# Patient Record
Sex: MALE | Race: WHITE | Employment: OTHER | URBAN - METROPOLITAN AREA
[De-identification: names, ages, dates, MRNs, and addresses within clinical notes are randomized per-mention and may not be internally consistent; named-entity substitution may affect disease eponyms.]

---

## 2017-04-10 ENCOUNTER — HOSPITAL ENCOUNTER (OUTPATIENT)
Dept: PREADMISSION TESTING | Age: 72
Discharge: HOME | End: 2017-04-10
Payer: MEDICARE

## 2017-04-10 VITALS
SYSTOLIC BLOOD PRESSURE: 155 MMHG | OXYGEN SATURATION: 96 % | HEIGHT: 67 IN | HEART RATE: 48 BPM | WEIGHT: 205 LBS | RESPIRATION RATE: 20 BRPM | DIASTOLIC BLOOD PRESSURE: 68 MMHG | BODY MASS INDEX: 32.18 KG/M2 | TEMPERATURE: 97.1 F

## 2017-04-10 LAB
ANION GAP BLD CALC-SCNC: 12 MMOL/L (ref 5–15)
BUN SERPL-MCNC: 35 MG/DL (ref 6–20)
BUN/CREAT SERPL: 30 (ref 12–20)
CALCIUM SERPL-MCNC: 8.8 MG/DL (ref 8.5–10.1)
CHLORIDE SERPL-SCNC: 104 MMOL/L (ref 97–108)
CO2 SERPL-SCNC: 23 MMOL/L (ref 21–32)
CREAT SERPL-MCNC: 1.18 MG/DL (ref 0.7–1.3)
GLUCOSE SERPL-MCNC: 88 MG/DL (ref 65–100)
POTASSIUM SERPL-SCNC: 4.6 MMOL/L (ref 3.5–5.1)
SODIUM SERPL-SCNC: 139 MMOL/L (ref 136–145)

## 2017-04-10 PROCEDURE — 36415 COLL VENOUS BLD VENIPUNCTURE: CPT | Performed by: ANESTHESIOLOGY

## 2017-04-10 PROCEDURE — 80048 BASIC METABOLIC PNL TOTAL CA: CPT | Performed by: ANESTHESIOLOGY

## 2017-04-10 PROCEDURE — 93005 ELECTROCARDIOGRAM TRACING: CPT

## 2017-04-10 RX ORDER — TRIAMTERENE/HYDROCHLOROTHIAZID 37.5-25 MG
1 TABLET ORAL DAILY
COMMUNITY

## 2017-04-10 RX ORDER — FENOFIBRATE 160 MG/1
160 TABLET ORAL
COMMUNITY

## 2017-04-10 RX ORDER — DICLOFENAC SODIUM 75 MG/1
TABLET, DELAYED RELEASE ORAL 2 TIMES DAILY
COMMUNITY
End: 2019-04-10

## 2017-04-10 NOTE — PERIOP NOTES
[facility]  PREOPERATIVE INSTRUCTIONS    Surgery Date:   4/21/17  Surgery arrival time given by surgeon: NO   If no,[facility] [address] staff will call you between 4 PM- 8 PM the day before surgery with your arrival time. If your surgery is on a Monday, we will call you the preceding Friday. Please call [phone] after 8 PM if you did not receive your arrival time. 1. Please report at the designated time to the [address] [facility]. Bring your insurance card, photo identification, and any copayment ( if applicable). 2. You must have a responsible adult to drive you home. You need to have a responsible adult to stay with you the first 24 hours after surgery if you are going home the same day of your surgery and you should not drive a car for 24 hours following your surgery. 3. Nothing to eat or drink after midnight the night before surgery. This includes no water, gum, mints, coffee, juice, etc.  Please note special instructions, if applicable, below for medications. 4. MEDICATIONS TO TAKE THE MORNING OF SURGERY WITH A SIP OF WATER: Propanolol,Amlodipine  5. No alcoholic beverages 24 hours before or after your surgery. 6. If you are being admitted to the hospital,please leave personal belongings/luggage in your car until you have an assigned hospital room number. 7. Stop Aspirin and/or any non-steroidal anti-inflammatory drugs (i.e. Ibuprofen, Naproxen, Advil, Aleve) as directed by your surgeon. You may take Tylenol. Stop herbal supplements 1 week prior to  surgery. 8. If you are currently taking Plavix, Coumadin,or any other blood-thinning/anticoagulant medication contact your surgeon for instructions. 9. Please wear comfortable clothes. Wear your glasses instead of contacts. We ask that all money, jewelry and valuables be left at home. Wear no make up, particularly mascara, the day of surgery. 10.  All body piercings, rings,and jewelry need to be removed and left at home.     Please wear your hair loose or down. Please no pony-tails, buns, or any metal hair accessories. If you shower the morning of surgery, please do not apply any lotions, powders, or deodorants afterwards. Do not shave any body area within 24 hours of your surgery. 11. Please follow all instructions to avoid any potential surgical cancellation. 12.  Should your physical condition change, (i.e. fever, cold, flu, etc.) please notify your surgeon as soon as possible. 13. It is important to be on time. If a situation occurs where you may be delayed, please call:  [phone]  on the day of surgery. 14. The Preadmission Testing staff can be reached at [phone]. .  15. Special instructions: free  parking    The patient was contacted  in person. He  verbalize  understanding of all instructions does not  need reinforcement.

## 2017-04-11 LAB
ATRIAL RATE: 43 BPM
CALCULATED P AXIS, ECG09: 43 DEGREES
CALCULATED R AXIS, ECG10: -29 DEGREES
CALCULATED T AXIS, ECG11: 34 DEGREES
DIAGNOSIS, 93000: NORMAL
P-R INTERVAL, ECG05: 178 MS
Q-T INTERVAL, ECG07: 482 MS
QRS DURATION, ECG06: 96 MS
QTC CALCULATION (BEZET), ECG08: 407 MS
VENTRICULAR RATE, ECG03: 43 BPM

## 2017-04-20 ENCOUNTER — ANESTHESIA EVENT (OUTPATIENT)
Dept: SURGERY | Age: 72
End: 2017-04-20
Payer: MEDICARE

## 2017-04-20 RX ORDER — DIPHENHYDRAMINE HYDROCHLORIDE 50 MG/ML
12.5 INJECTION, SOLUTION INTRAMUSCULAR; INTRAVENOUS AS NEEDED
Status: CANCELLED | OUTPATIENT
Start: 2017-04-20 | End: 2017-04-20

## 2017-04-20 RX ORDER — SODIUM CHLORIDE, SODIUM LACTATE, POTASSIUM CHLORIDE, CALCIUM CHLORIDE 600; 310; 30; 20 MG/100ML; MG/100ML; MG/100ML; MG/100ML
125 INJECTION, SOLUTION INTRAVENOUS CONTINUOUS
Status: CANCELLED | OUTPATIENT
Start: 2017-04-20

## 2017-04-20 RX ORDER — HYDROMORPHONE HYDROCHLORIDE 1 MG/ML
.25-1 INJECTION, SOLUTION INTRAMUSCULAR; INTRAVENOUS; SUBCUTANEOUS
Status: CANCELLED | OUTPATIENT
Start: 2017-04-20

## 2017-04-20 RX ORDER — ONDANSETRON 2 MG/ML
4 INJECTION INTRAMUSCULAR; INTRAVENOUS AS NEEDED
Status: CANCELLED | OUTPATIENT
Start: 2017-04-20

## 2017-04-20 RX ORDER — SODIUM CHLORIDE 0.9 % (FLUSH) 0.9 %
5-10 SYRINGE (ML) INJECTION AS NEEDED
Status: CANCELLED | OUTPATIENT
Start: 2017-04-20

## 2017-04-21 ENCOUNTER — ANESTHESIA (OUTPATIENT)
Dept: SURGERY | Age: 72
End: 2017-04-21
Payer: MEDICARE

## 2017-04-21 ENCOUNTER — APPOINTMENT (OUTPATIENT)
Dept: GENERAL RADIOLOGY | Age: 72
End: 2017-04-21
Attending: ORTHOPAEDIC SURGERY
Payer: MEDICARE

## 2017-04-21 ENCOUNTER — HOSPITAL ENCOUNTER (OUTPATIENT)
Age: 72
Setting detail: OUTPATIENT SURGERY
Discharge: HOME | End: 2017-04-21
Attending: ORTHOPAEDIC SURGERY | Admitting: ORTHOPAEDIC SURGERY
Payer: MEDICARE

## 2017-04-21 VITALS
SYSTOLIC BLOOD PRESSURE: 140 MMHG | RESPIRATION RATE: 21 BRPM | OXYGEN SATURATION: 94 % | HEART RATE: 49 BPM | TEMPERATURE: 97.5 F | DIASTOLIC BLOOD PRESSURE: 58 MMHG

## 2017-04-21 LAB
GLUCOSE BLD STRIP.AUTO-MCNC: 115 MG/DL (ref 65–100)
SERVICE CMNT-IMP: ABNORMAL

## 2017-04-21 PROCEDURE — 76010000154 HC OR TIME FIRST 0.5 HR: Performed by: ORTHOPAEDIC SURGERY

## 2017-04-21 PROCEDURE — 77030003666 HC NDL SPINAL BD -A: Performed by: ORTHOPAEDIC SURGERY

## 2017-04-21 PROCEDURE — 82962 GLUCOSE BLOOD TEST: CPT

## 2017-04-21 PROCEDURE — 74011636320 HC RX REV CODE- 636/320: Performed by: ORTHOPAEDIC SURGERY

## 2017-04-21 PROCEDURE — 74011250636 HC RX REV CODE- 250/636: Performed by: ANESTHESIOLOGY

## 2017-04-21 PROCEDURE — 74011250636 HC RX REV CODE- 250/636: Performed by: ORTHOPAEDIC SURGERY

## 2017-04-21 PROCEDURE — 76000 FLUOROSCOPY <1 HR PHYS/QHP: CPT

## 2017-04-21 PROCEDURE — 76060000031 HC ANESTHESIA FIRST 0.5 HR: Performed by: ORTHOPAEDIC SURGERY

## 2017-04-21 PROCEDURE — 76210000021 HC REC RM PH II 0.5 TO 1 HR: Performed by: ORTHOPAEDIC SURGERY

## 2017-04-21 PROCEDURE — 74011000250 HC RX REV CODE- 250: Performed by: ORTHOPAEDIC SURGERY

## 2017-04-21 RX ORDER — SODIUM CHLORIDE 0.9 % (FLUSH) 0.9 %
5-10 SYRINGE (ML) INJECTION AS NEEDED
Status: DISCONTINUED | OUTPATIENT
Start: 2017-04-21 | End: 2017-04-21 | Stop reason: HOSPADM

## 2017-04-21 RX ORDER — LIDOCAINE HYDROCHLORIDE 10 MG/ML
0.1 INJECTION, SOLUTION EPIDURAL; INFILTRATION; INTRACAUDAL; PERINEURAL AS NEEDED
Status: DISCONTINUED | OUTPATIENT
Start: 2017-04-21 | End: 2017-04-21 | Stop reason: HOSPADM

## 2017-04-21 RX ORDER — SODIUM CHLORIDE 0.9 % (FLUSH) 0.9 %
5-10 SYRINGE (ML) INJECTION EVERY 8 HOURS
Status: DISCONTINUED | OUTPATIENT
Start: 2017-04-21 | End: 2017-04-21 | Stop reason: HOSPADM

## 2017-04-21 RX ORDER — BUPIVACAINE HYDROCHLORIDE 5 MG/ML
INJECTION, SOLUTION EPIDURAL; INTRACAUDAL AS NEEDED
Status: DISCONTINUED | OUTPATIENT
Start: 2017-04-21 | End: 2017-04-21 | Stop reason: HOSPADM

## 2017-04-21 RX ORDER — SODIUM CHLORIDE, SODIUM LACTATE, POTASSIUM CHLORIDE, CALCIUM CHLORIDE 600; 310; 30; 20 MG/100ML; MG/100ML; MG/100ML; MG/100ML
100 INJECTION, SOLUTION INTRAVENOUS CONTINUOUS
Status: DISCONTINUED | OUTPATIENT
Start: 2017-04-21 | End: 2017-04-21 | Stop reason: HOSPADM

## 2017-04-21 RX ORDER — TRIAMCINOLONE ACETONIDE 40 MG/ML
INJECTION, SUSPENSION INTRA-ARTICULAR; INTRAMUSCULAR AS NEEDED
Status: DISCONTINUED | OUTPATIENT
Start: 2017-04-21 | End: 2017-04-21 | Stop reason: HOSPADM

## 2017-04-21 RX ORDER — LIDOCAINE HYDROCHLORIDE 10 MG/ML
INJECTION INFILTRATION; PERINEURAL AS NEEDED
Status: DISCONTINUED | OUTPATIENT
Start: 2017-04-21 | End: 2017-04-21 | Stop reason: HOSPADM

## 2017-04-21 RX ADMIN — CEFAZOLIN 0.2 G: 1 INJECTION, POWDER, FOR SOLUTION INTRAMUSCULAR; INTRAVENOUS; PARENTERAL at 15:42

## 2017-04-21 RX ADMIN — SODIUM CHLORIDE, SODIUM LACTATE, POTASSIUM CHLORIDE, AND CALCIUM CHLORIDE 100 ML/HR: 600; 310; 30; 20 INJECTION, SOLUTION INTRAVENOUS at 15:43

## 2017-04-21 NOTE — PERIOP NOTES
Discharge instructions reviewed with patient. Verbalized understanding and compliance. Discharged via w/c accompanied by daughter. No complaints offered at this time.

## 2017-04-21 NOTE — H&P
Orthopaedic PRE-OP Admission History and Physical    Past Medical History:   Diagnosis Date    Arthritis     knees and hips    Diabetes ([facility] [address] 2007    Hypertension     Ill-defined condition     elevted cho;estero;    Ill-defined condition     gilbert\"s syndrome    Ill-defined condition     essential tremors      Past Surgical History:   Procedure Laterality Date    HX ORTHOPAEDIC Left     repair fractured arm age 10   [place] TONSILLECTOMY      SHOULDER SURG [address] UNLISTED Left 2010    arthroplasty      Prior to Admission medications    Medication Sig Start Date End Date Taking? Authorizing Provider   diclofenac EC (VOLTAREN) 75 mg EC tablet Take  by mouth two (2) times a day. Historical Provider   triamterene-hydroCHLOROthiazide (MAXZIDE) 37.5-25 mg per tablet Take  by mouth daily. Historical Provider   fenofibrate (LOFIBRA) 160 mg tablet Take 160 mg by mouth daily. Historical Provider   amLODIPine (NORVASC) 10 mg tablet Take 10 mg by mouth daily. Historical Provider   lisinopril (PRINIVIL, ZESTRIL) 10 mg tablet Take 40 mg by mouth daily. Historical Provider   EZETIMIBE (ZETIA PO) Take 10 mg by mouth daily. Historical Provider   metFORMIN (GLUCOPHAGE) 500 mg tablet Take 1,000 mg by mouth two (2) times daily (with meals). Historical Provider   potassium citrate (UROCIT-K10) 10 mEq (1,080 mg) TbER Take  by mouth daily. Historical Provider   propranolol (INDERAL) 10 mg tablet Take 20 mg by mouth two (2) times a day. Historical Provider     No current facility-administered medications for this encounter. Current Outpatient Prescriptions   Medication Sig    diclofenac EC (VOLTAREN) 75 mg EC tablet Take  by mouth two (2) times a day.  triamterene-hydroCHLOROthiazide (MAXZIDE) 37.5-25 mg per tablet Take  by mouth daily.  fenofibrate (LOFIBRA) 160 mg tablet Take 160 mg by mouth daily.  amLODIPine (NORVASC) 10 mg tablet Take 10 mg by mouth daily.     lisinopril (PRINIVIL, ZESTRIL) 10 mg tablet Take 40 mg by mouth daily.  EZETIMIBE (ZETIA PO) Take 10 mg by mouth daily.  metFORMIN (GLUCOPHAGE) 500 mg tablet Take 1,000 mg by mouth two (2) times daily (with meals).  potassium citrate (UROCIT-K10) 10 mEq (1,080 mg) TbER Take  by mouth daily.  propranolol (INDERAL) 10 mg tablet Take 20 mg by mouth two (2) times a day. Allergies   Allergen Reactions    Penicillins Hives    Statins-Hmg-Coa Reductase Inhibitors Other (comments)     Elevated liver and bilirubin enzgmes        Review of Systems  Review of systems was documented in PAT and also in the HPI. Physical Exam  Gen: No acute distress   Resp: No accessory muscle use, no acute distress, conversant without gasping, clear lung fields. Card: No abnormalities detected, RRR- See PAT exam if available. Abd: Soft, non-tender, non-distended  Lymph: No palpable lymph nodes of the affected extremity  Skin: No skin breakdown noted. Labs: No results for input(s): WBC, HGB, HCT, K, CREA, GLU, CRP, HGBEXT, HCTEXT in the last 72 hours. No lab exists for component: ESR    [facility] Note - Subjective / Exam / Niyah Lente / Plan      Chief Complaint    left hip and knee   __________________________________________________________________________________________  SUBJECTIVE :       The patient returns in follow-up today for left hip arthritis, left knee arthritis. Intensity is noted to be severe in regards to the left hip, some radiating pain and left knee pain as well. The patient localizes their pain to left groin, with severe rotational symptoms. Since the previous visit the patient notes marked improvement with a left hip injection, then worsening symptoms over the past several months. Prior treatments or interventions include physical therapy, intra-articular injections, anti-inflammatories.      Social:    The patient does live alone, he travels a great deal, and participates in baseball collection activities. The patient's daughter is a physical therapist here in town.  __________________________________________________________________________________________  OBJECTIVE :     Left hip evaluation demonstrates marked rotational limitation, positive Stinchfield, positive impingement. The patient walks with a coxalgic gait utilizing a cane for ambulation, left slightly shorter than the right. Left knee evaluation demonstrates a 1+ knee effusion, limited knee range of motion, tenderness to palpation over the medial lateral joint line.  __________________________________________________________________________________________  RADIOGRAPHIC EVALUATION :      I ordered and interpreted the following films AP of the pelvis, AP and lateral views of the hip demonstrate progressive arthritic change, collapse of the femoral head, bone-on-bone changes, osteophyte formation, subchondral sclerosis. __________________________________________________________________________________________  ASSESSMENT :     Left hip arthritis, referred pain to the left knee with mild left knee arthritis.  __________________________________________________________________________________________  PLAN      Medical Decision Making and Discussion:  I have discussed with the patient I think this is the hip causing the vast majority of his symptoms, I would recommend proceeding with left total hip replacement. The patient does have a trip planned for late June or early July, we can do the surgery in the early portion of July after he returns. The patient does live alone, his daughter who is a physical therapist should be able to stay with him over the weekend, we will do the surgery on Wednesday, and he should be fairly functional by Monday or Tuesday of the following week. Therapy recommendations: At home exercises.       Medications this Visit:  Anti-inflammatories, diclofenac     Medical Concerns or Issues:  None     Follow-up: Primary care clearance, preadmission testing, joint class. _____________________________________________________________________  SPECIAL SURGICAL CONSIDERATIONS :  Direct lateral approach     POST SURGERY CONSIDERATIONS :  None     SOCIAL CONSIDERATIONS :  None  ______________________________________________________________________  COUNSELING :     I have discussed the planned surgery with the patient in detail and a joint decision was made to proceed with surgical intervention. Conservative measures have been exhausted prior to the decision for arthroplasty. We have discussed the risks, alternatives, and benefits of the surgery. Risks of surgery include infection, bleeding, damage to neurovascular structures, the need for further surgery, and the risk associated with anesthesia. These include heart attack, stroke, DVT formation, pulmonary embolism and death. We has also discussed bone or implant failure following surgery and the further interventions if necessary. Specific arthroplasty risks include fracture around the prosthesis, deep infection, limited range of motion, and possible dislocation of the prosthesis. We had a lengthy discussion regarding total joint replacement, and longevity of the implants. The patient was not given a guarantee of a successful outcome. I discussed expectations prior to the surgery, the need for rehabilitation following surgery. A packet was given to the patient to include the date of surgery, testing prior to the surgery, and postoperative follow-up to include physical therapy. TREATMENT :      I performed injection. The left knee was injected. Informed verbal consent was obtained prior to the injection. The risk indications and alternatives to the injection were discussed with the patient. A sterile prep of the area was performed and the skin anesthetized with Ethyl Chloride spray.  The patient was injected with 2cc of 1% lidocaine, 2 cc of 0.5% Marcaine, and 1cc of Celestone 6 milligrams/cc. A sterile dressing was applied following the injection. A possible elevation of blood sugar in diabetics following corticosteriod injections was discussed. The patient may require primary care clearance for surgery. Please do not hesitate to contact my office at  if you have any concerns. Amended : Roxy Liz MD, MD; 03/17/2017 2:25 PM EST  Active Problems   Complete rotator cuff tear   (M75.120)  Left hip pain   (M25.552)  Osteoarthritis of shoulder   (M19.019)  Osteoarthritis, localized, knee   (M17.9)  Sprain of left rotator cuff capsule   (H83.838N)  Sprain of right rotator cuff capsule   (F95.418J). Current Meds   TraMADol HCl - 50 MG Oral Tablet;TAKE 1 TABLET 3 TIMES DAILY AS NEEDED.; Rx  Diclofenac Sodium 75 MG Oral Tablet Delayed Release;TAKE 1 TABLET TWICE DAILY WITH FOOD.; Rx  MetFORMIN HCl - 500 MG Oral Tablet;; RPT  Potassium Chloride ER 10 MEQ Oral Capsule Extended Release;; RPT  HydroCHLOROthiazide 25 MG Oral Tablet;; RPT  Lisinopril 40 MG Oral Tablet;; RPT  Zetia 10 MG Oral Tablet (Ezetimibe);; RPT  Felodipine ER 5 MG Oral Tablet Extended Release 24 Hour;; RPT  AmLODIPine Besylate 10 MG Oral Tablet;; RPT  AmLODIPine Besylate 5 MG Oral Tablet;; RPT  Fenofibrate 160 MG Oral Tablet;; RPT  Klor-Con M10 10 MEQ Oral Tablet Extended Release;; RPT  Propranolol HCl - 20 MG Oral Tablet;; RPT. Allergies   Penicillins. PMH   Arthritis (M19.90)  Diabetes (E11.9)  High blood pressure (I10). PSH   Adenoidectomy  Cataract Surgery. Family Hx   Arthritis: Mother,Brother (M19.90)  Cancer: Mother (C80.1)  Diabetes: Brother (E11.9)  Hypertension: Mother,Father,Brother (I10)  Stroke: Brother (I63.9).   Personal Hx   Abstinence from alcohol (Z78.9)  Alcohol drinker (Z78.9); : 6  Being A Social Drinker  Former smoker (X51.942)  Living alone (Z60.2)  Number of children;         Surgical Counseling   After a thorough discussion we will proceed with surgical intervention without contraindications. I discussed surgical indications and alternatives with the patient. Risks including infection, bleeding, damage to other structures, need for further surgery and the risks of anesthesia (DVT, PE, Stroke, Heart Attack and Death) have been discussed with the patient. Verbal and written consent were obtained. Orthopaedic PRE-OP Admission History and Physical    Past Medical History:   Diagnosis Date    Arthritis     knees and hips    Diabetes ([address] 2007    Hypertension     Ill-defined condition     elevted cho;estero;    Ill-defined condition     gilbert\"s syndrome    Ill-defined condition     essential tremors      Past Surgical History:   Procedure Laterality Date    HX ORTHOPAEDIC Left     repair fractured arm age 10   [de-identified] TONSILLECTOMY      SHOULDER SURG [address] UNLISTED Left 2010    arthroplasty      Prior to Admission medications    Medication Sig Start Date End Date Taking? Authorizing Provider   diclofenac EC (VOLTAREN) 75 mg EC tablet Take  by mouth two (2) times a day. Historical Provider   triamterene-hydroCHLOROthiazide (MAXZIDE) 37.5-25 mg per tablet Take  by mouth daily. Historical Provider   fenofibrate (LOFIBRA) 160 mg tablet Take 160 mg by mouth daily. Historical Provider   amLODIPine (NORVASC) 10 mg tablet Take 10 mg by mouth daily. Historical Provider   lisinopril (PRINIVIL, ZESTRIL) 10 mg tablet Take 40 mg by mouth daily. Historical Provider   EZETIMIBE (ZETIA PO) Take 10 mg by mouth daily. Historical Provider   metFORMIN (GLUCOPHAGE) 500 mg tablet Take 1,000 mg by mouth two (2) times daily (with meals). Historical Provider   potassium citrate (UROCIT-K10) 10 mEq (1,080 mg) TbER Take  by mouth daily. Historical Provider   propranolol (INDERAL) 10 mg tablet Take 20 mg by mouth two (2) times a day. Historical Provider     No current facility-administered medications for this encounter.       Current Outpatient Prescriptions   Medication Sig    diclofenac EC (VOLTAREN) 75 mg EC tablet Take  by mouth two (2) times a day.  triamterene-hydroCHLOROthiazide (MAXZIDE) 37.5-25 mg per tablet Take  by mouth daily.  fenofibrate (LOFIBRA) 160 mg tablet Take 160 mg by mouth daily.  amLODIPine (NORVASC) 10 mg tablet Take 10 mg by mouth daily.  lisinopril (PRINIVIL, ZESTRIL) 10 mg tablet Take 40 mg by mouth daily.  EZETIMIBE (ZETIA PO) Take 10 mg by mouth daily.  metFORMIN (GLUCOPHAGE) 500 mg tablet Take 1,000 mg by mouth two (2) times daily (with meals).  potassium citrate (UROCIT-K10) 10 mEq (1,080 mg) TbER Take  by mouth daily.  propranolol (INDERAL) 10 mg tablet Take 20 mg by mouth two (2) times a day. Allergies   Allergen Reactions    Penicillins Hives    Statins-Hmg-Coa Reductase Inhibitors Other (comments)     Elevated liver and bilirubin enzgmes        Review of Systems  Review of systems was documented in PAT and also in the HPI. Physical Exam  Gen: No acute distress   Resp: No accessory muscle use, no acute distress, conversant without gasping, clear lung fields. Card: No abnormalities detected, RRR- See PAT exam if available. Abd: Soft, non-tender, non-distended  Lymph: No palpable lymph nodes of the affected extremity  Skin: No skin breakdown noted. Labs: No results for input(s): WBC, HGB, HCT, K, CREA, GLU, CRP, HGBEXT, HCTEXT in the last 72 hours. No lab exists for component: ESR    [facility] Note - Subjective / Exam / Sierra Martínez / Plan      Chief Complaint    left hip and knee   __________________________________________________________________________________________  SUBJECTIVE :       The patient returns in follow-up today for left hip arthritis, left knee arthritis. Intensity is noted to be severe in regards to the left hip, some radiating pain and left knee pain as well. The patient localizes their pain to left groin, with severe rotational symptoms. Since the previous visit the patient notes marked improvement with a left hip injection, then worsening symptoms over the past several months. Prior treatments or interventions include physical therapy, intra-articular injections, anti-inflammatories. Social:    The patient does live alone, he travels a great deal, and participates in baseball collection activities. The patient's daughter is a physical therapist here in town.  __________________________________________________________________________________________  OBJECTIVE :     Left hip evaluation demonstrates marked rotational limitation, positive Stinchfield, positive impingement. The patient walks with a coxalgic gait utilizing a cane for ambulation, left slightly shorter than the right. Left knee evaluation demonstrates a 1+ knee effusion, limited knee range of motion, tenderness to palpation over the medial lateral joint line.  __________________________________________________________________________________________  RADIOGRAPHIC EVALUATION :      I ordered and interpreted the following films AP of the pelvis, AP and lateral views of the hip demonstrate progressive arthritic change, collapse of the femoral head, bone-on-bone changes, osteophyte formation, subchondral sclerosis. __________________________________________________________________________________________  ASSESSMENT :     Left hip arthritis, referred pain to the left knee with mild left knee arthritis.  __________________________________________________________________________________________  PLAN      Medical Decision Making and Discussion:  I have discussed with the patient I think this is the hip causing the vast majority of his symptoms, I would recommend proceeding with left total hip replacement. The patient does have a trip planned for late June or early July, we can do the surgery in the early portion of July after he returns.   The patient does live alone, his daughter who is a physical therapist should be able to stay with him over the weekend, we will do the surgery on Wednesday, and he should be fairly functional by Monday or Tuesday of the following week. Therapy recommendations: At home exercises. Medications this Visit:  Anti-inflammatories, diclofenac     Medical Concerns or Issues:  None     Follow-up:  Primary care clearance, preadmission testing, joint class. _____________________________________________________________________  SPECIAL SURGICAL CONSIDERATIONS :  Direct lateral approach     POST SURGERY CONSIDERATIONS :  None     SOCIAL CONSIDERATIONS :  None  ______________________________________________________________________  COUNSELING :     I have discussed the planned surgery with the patient in detail and a joint decision was made to proceed with surgical intervention. Conservative measures have been exhausted prior to the decision for arthroplasty. We have discussed the risks, alternatives, and benefits of the surgery. Risks of surgery include infection, bleeding, damage to neurovascular structures, the need for further surgery, and the risk associated with anesthesia. These include heart attack, stroke, DVT formation, pulmonary embolism and death. We has also discussed bone or implant failure following surgery and the further interventions if necessary. Specific arthroplasty risks include fracture around the prosthesis, deep infection, limited range of motion, and possible dislocation of the prosthesis. We had a lengthy discussion regarding total joint replacement, and longevity of the implants. The patient was not given a guarantee of a successful outcome. I discussed expectations prior to the surgery, the need for rehabilitation following surgery. A packet was given to the patient to include the date of surgery, testing prior to the surgery, and postoperative follow-up to include physical therapy. TREATMENT :      I performed injection.  The left knee was injected. Informed verbal consent was obtained prior to the injection. The risk indications and alternatives to the injection were discussed with the patient. A sterile prep of the area was performed and the skin anesthetized with Ethyl Chloride spray. The patient was injected with 2cc of 1% lidocaine, 2 cc of 0.5% Marcaine, and 1cc of Celestone 6 milligrams/cc. A sterile dressing was applied following the injection. A possible elevation of blood sugar in diabetics following corticosteriod injections was discussed. The patient may require primary care clearance for surgery. Please do not hesitate to contact my office at  if you have any concerns. Amended : Adria Rdz MD, MD; 03/17/2017 2:25 PM EST  Active Problems   Complete rotator cuff tear   (M75.120)  Left hip pain   (M25.552)  Osteoarthritis of shoulder   (M19.019)  Osteoarthritis, localized, knee   (M17.9)  Sprain of left rotator cuff capsule   (A28.322W)  Sprain of right rotator cuff capsule   (Z30.368K). Current Meds   TraMADol HCl - 50 MG Oral Tablet;TAKE 1 TABLET 3 TIMES DAILY AS NEEDED.; Rx  Diclofenac Sodium 75 MG Oral Tablet Delayed Release;TAKE 1 TABLET TWICE DAILY WITH FOOD.; Rx  MetFORMIN HCl - 500 MG Oral Tablet;; RPT  Potassium Chloride ER 10 MEQ Oral Capsule Extended Release;; RPT  HydroCHLOROthiazide 25 MG Oral Tablet;; RPT  Lisinopril 40 MG Oral Tablet;; RPT  Zetia 10 MG Oral Tablet (Ezetimibe);; RPT  Felodipine ER 5 MG Oral Tablet Extended Release 24 Hour;; RPT  AmLODIPine Besylate 10 MG Oral Tablet;; RPT  AmLODIPine Besylate 5 MG Oral Tablet;; RPT  Fenofibrate 160 MG Oral Tablet;; RPT  Klor-Con M10 10 MEQ Oral Tablet Extended Release;; RPT  Propranolol HCl - 20 MG Oral Tablet;; RPT. Allergies   Penicillins. PMH   Arthritis (M19.90)  Diabetes (E11.9)  High blood pressure (I10). PSH   Adenoidectomy  Cataract Surgery. Family Hx   Arthritis: Mother,Brother (M19.90)  Cancer:  Mother (C80.1)  Diabetes: Brother (E11.9)  Hypertension: Mother,Father,Brother (I10)  Stroke: Brother (I63.9). Personal Hx   Abstinence from alcohol (Z78.9)  Alcohol drinker (Z78.9); : 6  Being A Social Drinker  Former smoker (W71.996)  Living alone (Z60.2)  Number of children; : 3.        Surgical Counseling   After a thorough discussion we will proceed with surgical intervention without contraindications. I discussed surgical indications and alternatives with the patient. Risks including infection, bleeding, damage to other structures, need for further surgery and the risks of anesthesia (DVT, PE, Stroke, Heart Attack and Death) have been discussed with the patient. Verbal and written consent were obtained.          Donna Chavis MD  Cell [phone]  Nurse [address] [place] [address] [phone]  Medical Staff : Nimco Bustos / Heide Marcus  Office : [phone] [facility]  [phone]

## 2017-04-21 NOTE — OP NOTES
OPERATIVE REPORT    Admit Date: 4/21/2017  Admit Diagnosis: LEFT HIP OSTEOARTHRITIS    Date of Procedure: 4/21/2017   Preoperative Diagnosis: LEFT HIP OSTEOARTHRITIS  Postoperative Diagnosis: LEFT HIP OSTEOARTHRITIS    Procedure: Procedure(s):  LEFT HIP INJECTION  Surgeon: Roxy Liz MD  Assistant(s): None  Anesthesia: MAC   Estimated Blood Loss: 20cc  Specimens: * No specimens in log *   Complications: None      INDICATIONS:  Nicholas Valle is a patient with possible hip arthritis and was indicated for an injection. We discussed risks, alternatives and expectations prior to surgery. PROCEDURE PERFORMED :   The patient was seen in the pre-operative holding area and the proper limb initialized. Questions were answered and the patient was taken to the operating room for anesthesia. They were positioned on the table and the operative extremity was prepped and draped in a sterile fashion. The appropriate time-out was performed prior to the start. Utilizing flouro the hip was identified on AP radiographs. A spinal needle was used and localized to the intra-articular joint capsule. The hip was first injected with 1cc of Radio-opaque dye to ensure intra-articular placement. The hip was then injected with a combination of 2cc of Kenalog 40mg / cc, 2cc of 1% Lidocaine and 2cc of 0.5% Marcaine under sterile conditions. I performed the injection. Hip Arthritis Tonnis Grade : 3    A sterile dressing was applied and the patient then recovered from anesthesia and was taken to the post-operative holding area in a stable condition.      IMPLANTS :   * No implants in log *      Roxy Liz MD  Pager [phone]

## 2017-04-21 NOTE — DISCHARGE INSTRUCTIONS
GENERAL DISCHARGE INSTRUCTIONS - Hip Injection    Patient: Soniya Ward MRN: [id number]  SSN: [id number]              Please take the time to review the following instructions before you leave the hospital and use them as guidelines during your recovery from surgery. If you have any questions you may contact my office at [phone]. SPECIAL INSTRUCTIONS :   1. No driving for 12XUB. This is due to the anesthesia and not the injection. 2. May weight bear as tolerated. No therapy is formally required. 3. Avoid excessive activities even if the hip feels markedly improved. 4. It may take 1-10 days for the steroid to take effect. If you do not have marked relief by 10 days then the improvement from the injection may be limited. Wound Care/ Dressing Changes:     SOFT DRESSING :   You may remove the dressing the day following surgery. It isnt necessary to apply antibiotic ointment to your incisions. Showering/ Bathing: You may shower the day following the injection. Diet:  You may advance to your regular diet as tolerated. Medication:      1. You should not require narcotics or other pain medications following the injection. Over the counter medications such as Alleve, Motrin may be beneficial if needed. 2.   Do not exceed 4000mg of Tylenol/Acetaminophen per day. 3. You may resume the medication you were taking prior to your surgery. Pain medication may change the effects of any antidepressant medication you may be taking. If you have any questions about possible interactions between your regular medications and the pain medication, you should consult the physician who prescribes your regular medications. Follow up appointment:    Please follow up at your scheduled appointment or 1-2 months following the injection if your hip symptoms are note markedly improved.   If you do not already have an appointment please call our office at [phone] for your follow up appointment. Important Signs and Symptoms:    If any of the following signs or symptoms occur, you should contact Dr. Radha Kee office. Please be advised if a problem arises which you feel requires immediate medical attention or you are unable to contact Dr. Radha Kee office you should seek immediate medical attention at the ER or other health care facility you have access to.    1. A sudden increase in swelling and/or redness or warmth at the area your surgery was performed which isnt relieved by rest, ice, and elevation. 2. Oral temperature greater than 101 degrees for 12 hours or more which isnt relieved by an increase in fluid intake and taking 2 Tylenol every 4-6 hours. 3. Excessive drainage from your incisions, or drainage which hasnt stopped by 72 hours after your surgery. 4. Fever, chills, shortness of breath, chest pain, nausea, vomiting or other signs and symptoms which are of concern to you. GENERAL DISCHARGE INSTRUCTIONS - Hip Injection    Patient: Pamela Lamb MRN: [id number]  SSN: [id number]              Please take the time to review the following instructions before you leave the hospital and use them as guidelines during your recovery from surgery. If you have any questions you may contact my office at [phone]. SPECIAL INSTRUCTIONS :   1. No driving for 04HLS. This is due to the anesthesia and not the injection. 2. May weight bear as tolerated. No therapy is formally required. 3. Avoid excessive activities even if the hip feels markedly improved. 4. It may take 1-10 days for the steroid to take effect. If you do not have marked relief by 10 days then the improvement from the injection may be limited. Wound Care/ Dressing Changes:     SOFT DRESSING :   You may remove the dressing the day following surgery. It isnt necessary to apply antibiotic ointment to your incisions. Showering/ Bathing: You may shower the day following the injection. Diet:  You may advance to your regular diet as tolerated. Medication:      4. You should not require narcotics or other pain medications following the injection. Over the counter medications such as Alleve, Motrin may be beneficial if needed. 5.   Do not exceed 4000mg of Tylenol/Acetaminophen per day. 6. You may resume the medication you were taking prior to your surgery. Pain medication may change the effects of any antidepressant medication you may be taking. If you have any questions about possible interactions between your regular medications and the pain medication, you should consult the physician who prescribes your regular medications. Follow up appointment:    Please follow up at your scheduled appointment or 1-2 months following the injection if your hip symptoms are note markedly improved. If you do not already have an appointment please call our office at [phone] for your follow up appointment. Important Signs and Symptoms:    If any of the following signs or symptoms occur, you should contact Dr. Mak Marshall office. Please be advised if a problem arises which you feel requires immediate medical attention or you are unable to contact Dr. Mak Marshall office you should seek immediate medical attention at the ER or other health care facility you have access to.    5. A sudden increase in swelling and/or redness or warmth at the area your surgery was performed which isnt relieved by rest, ice, and elevation. 6. Oral temperature greater than 101 degrees for 12 hours or more which isnt relieved by an increase in fluid intake and taking 2 Tylenol every 4-6 hours. 7. Excessive drainage from your incisions, or drainage which hasnt stopped by 72 hours after your surgery. 8. Fever, chills, shortness of breath, chest pain, nausea, vomiting or other signs and symptoms which are of concern to you.

## 2017-04-21 NOTE — ANESTHESIA PREPROCEDURE EVALUATION
Anesthetic History   No history of anesthetic complications            Review of Systems / Medical History  Patient summary reviewed, nursing notes reviewed and pertinent labs reviewed    Pulmonary  Within defined limits                 Neuro/Psych   Within defined limits           Cardiovascular    Hypertension              Exercise tolerance: >4 METS     GI/Hepatic/Renal  Within defined limits              Endo/Other    Diabetes    Obesity and arthritis     Other Findings              Physical Exam    Airway  Mallampati: II    Neck ROM: normal range of motion   Mouth opening: Normal     Cardiovascular  Regular rate and rhythm,  S1 and S2 normal,  no murmur, click, rub, or gallop  Rhythm: regular  Rate: normal         Dental  No notable dental hx       Pulmonary  Breath sounds clear to auscultation               Abdominal  GI exam deferred       Other Findings            Anesthetic Plan    ASA: 2  Anesthesia type: MAC          Induction: Intravenous  Anesthetic plan and risks discussed with: Patient

## 2017-04-21 NOTE — IP AVS SNAPSHOT
[address] [place] [address] 
[phone] Patient: Greer Mcwilliams MRN: [id number] :1945 You are allergic to the following Allergen Reactions Penicillins Hives Ancef graded challenge done 17, tolerated well, no reaction noted Statins-Hmg-Coa Reductase Inhibitors Other (comments) Elevated liver and bilirubin enzgmes Recent Documentation Smoking Status Former Smoker Emergency Contacts Name Discharge Info Relation Home Work Mobile Maddie Salter DISCHARGE CAREGIVER [3] Daughter [21] [phone] About your hospitalization You were admitted on:  2017 You last received care in the:  [facility] PACU You were discharged on:  2017 Unit phone number:  [phone] Why you were hospitalized Your primary diagnosis was:  Not on File Providers Seen During Your Hospitalizations Provider Role Specialty Primary office phone Re Salvaodr MD Attending Provider Orthopedic Surgery [phone] Your Primary Care Physician (PCP) Primary Care Physician Office Phone Office Fax Evette Ramos [phone] Follow-up Information Follow up With Details Comments Contact Info [place]   [id number] [facility] [address] [place] [address] 
[phone] Current Discharge Medication List  
  
CONTINUE these medications which have NOT CHANGED Dose & Instructions Dispensing Information Comments Morning Noon Evening Bedtime  
 amLODIPine 10 mg tablet Commonly known as:  [place] Michelle Your last dose was: Your next dose is:    
   
   
 Dose:  10 mg Take 10 mg by mouth daily. Refills:  0  
     
   
   
   
  
 diclofenac EC 75 mg EC tablet Commonly known as:  VOLTAREN Your last dose was: Your next dose is: Take  by mouth two (2) times a day. Refills:  0  
     
   
   
   
  
 fenofibrate 160 mg tablet Commonly known as:  LOFIBRA Your last dose was: Your next dose is:    
   
   
 Dose:  160 mg Take 160 mg by mouth daily. Refills:  0  
     
   
   
   
  
 lisinopril 10 mg tablet Commonly known as:  Daljit Mash Your last dose was: Your next dose is:    
   
   
 Dose:  40 mg Take 40 mg by mouth daily. Refills:  0  
     
   
   
   
  
 metFORMIN 500 mg tablet Commonly known as:  GLUCOPHAGE Your last dose was: Your next dose is:    
   
   
 Dose:  1000 mg Take 1,000 mg by mouth two (2) times daily (with meals). Refills:  0  
     
   
   
   
  
 potassium citrate 10 mEq (1,080 mg) Joyce Spears Commonly known as:  [place] Your last dose was: Your next dose is: Take  by mouth daily. Refills:  0  
     
   
   
   
  
 propranolol 10 mg tablet Commonly known as:  INDERAL Your last dose was: Your next dose is:    
   
   
 Dose:  20 mg Take 20 mg by mouth two (2) times a day. Refills:  0  
     
   
   
   
  
 triamterene-hydroCHLOROthiazide 37.5-25 mg per tablet Commonly known as:  Laila Hotter Your last dose was: Your next dose is: Take  by mouth daily. Refills:  0 ZETIA PO Your last dose was: Your next dose is:    
   
   
 Dose:  10 mg Take 10 mg by mouth daily. Refills:  0 Discharge Instructions GENERAL DISCHARGE INSTRUCTIONS - Hip Injection Patient: Jolynn Ibarra MRN: [id number]  SSN: [id number] Please take the time to review the following instructions before you leave the hospital and use them as guidelines during your recovery from surgery. If you have any questions you may contact my office at [phone]. SPECIAL INSTRUCTIONS :  
1. No driving for 64TVY. This is due to the anesthesia and not the injection. 2. May weight bear as tolerated. No therapy is formally required. 3. Avoid excessive activities even if the hip feels markedly improved. 4. It may take 1-10 days for the steroid to take effect. If you do not have marked relief by 10 days then the improvement from the injection may be limited. Wound Care/ Dressing Changes: SOFT DRESSING :  
You may remove the dressing the day following surgery. It isnt necessary to apply antibiotic ointment to your incisions. Showering/ Bathing: You may shower the day following the injection. Diet: 
You may advance to your regular diet as tolerated. Medication: 
 
 
1. You should not require narcotics or other pain medications following the injection. Over the counter medications such as Alleve, Motrin may be beneficial if needed. 2.   Do not exceed 4000mg of Tylenol/Acetaminophen per day. 3. You may resume the medication you were taking prior to your surgery. Pain medication may change the effects of any antidepressant medication you may be taking. If you have any questions about possible interactions between your regular medications and the pain medication, you should consult the physician who prescribes your regular medications. Follow up appointment: 
 
Please follow up at your scheduled appointment or 1-2 months following the injection if your hip symptoms are note markedly improved. If you do not already have an appointment please call our office at [phone] for your follow up appointment. Important Signs and Symptoms: 
 
If any of the following signs or symptoms occur, you should contact Dr. Don Pa office.   Please be advised if a problem arises which you feel requires immediate medical attention or you are unable to contact Dr. Don Pa office you should seek immediate medical attention at the ER or other health care facility you have access to. 
 
1. A sudden increase in swelling and/or redness or warmth at the area your surgery was performed which isnt relieved by rest, ice, and elevation. 2. Oral temperature greater than 101 degrees for 12 hours or more which isnt relieved by an increase in fluid intake and taking 2 Tylenol every 4-6 hours. 3. Excessive drainage from your incisions, or drainage which hasnt stopped by 72 hours after your surgery. 4. Fever, chills, shortness of breath, chest pain, nausea, vomiting or other signs and symptoms which are of concern to you. GENERAL DISCHARGE INSTRUCTIONS - Hip Injection Patient: Soniya Ward MRN: [id number]  SSN: [id number] Please take the time to review the following instructions before you leave the hospital and use them as guidelines during your recovery from surgery. If you have any questions you may contact my office at [phone]. SPECIAL INSTRUCTIONS :  
1. No driving for 50EFC. This is due to the anesthesia and not the injection. 2. May weight bear as tolerated. No therapy is formally required. 3. Avoid excessive activities even if the hip feels markedly improved. 4. It may take 1-10 days for the steroid to take effect. If you do not have marked relief by 10 days then the improvement from the injection may be limited. Wound Care/ Dressing Changes: SOFT DRESSING :  
You may remove the dressing the day following surgery. It isnt necessary to apply antibiotic ointment to your incisions. Showering/ Bathing: You may shower the day following the injection. Diet: 
You may advance to your regular diet as tolerated. Medication: 
 
 
4. You should not require narcotics or other pain medications following the injection. Over the counter medications such as Alleve, Motrin may be beneficial if needed. 5.   Do not exceed 4000mg of Tylenol/Acetaminophen per day. 6. You may resume the medication you were taking prior to your surgery. Pain medication may change the effects of any antidepressant medication you may be taking. If you have any questions about possible interactions between your regular medications and the pain medication, you should consult the physician who prescribes your regular medications. Follow up appointment: 
 
Please follow up at your scheduled appointment or 1-2 months following the injection if your hip symptoms are note markedly improved. If you do not already have an appointment please call our office at [phone] for your follow up appointment. Important Signs and Symptoms: 
 
If any of the following signs or symptoms occur, you should contact Dr. Ferny Eduardo office. Please be advised if a problem arises which you feel requires immediate medical attention or you are unable to contact Dr. Ferny Eduardo office you should seek immediate medical attention at the ER or other health care facility you have access to. 
 
5. A sudden increase in swelling and/or redness or warmth at the area your surgery was performed which isnt relieved by rest, ice, and elevation. 6. Oral temperature greater than 101 degrees for 12 hours or more which isnt relieved by an increase in fluid intake and taking 2 Tylenol every 4-6 hours. 7. Excessive drainage from your incisions, or drainage which hasnt stopped by 72 hours after your surgery. 8. Fever, chills, shortness of breath, chest pain, nausea, vomiting or other signs and symptoms which are of concern to you. Discharge Orders None General Information Please provide this summary of care documentation to your next provider. Patient Signature:  ____________________________________________________________ Date:  ____________________________________________________________  
  
Paula Daniel  Provider Signature: ____________________________________________________________ Date:  ____________________________________________________________

## 2017-07-19 ENCOUNTER — HOSPITAL ENCOUNTER (OUTPATIENT)
Dept: PREADMISSION TESTING | Age: 72
Discharge: HOME | End: 2017-07-19
Payer: MEDICARE

## 2017-07-19 VITALS
SYSTOLIC BLOOD PRESSURE: 142 MMHG | WEIGHT: 188.05 LBS | OXYGEN SATURATION: 95 % | HEIGHT: 67 IN | DIASTOLIC BLOOD PRESSURE: 63 MMHG | HEART RATE: 56 BPM | TEMPERATURE: 97.1 F | BODY MASS INDEX: 29.52 KG/M2 | RESPIRATION RATE: 18 BRPM

## 2017-07-19 LAB
ABO + RH BLD: NORMAL
ALBUMIN SERPL BCP-MCNC: 4.2 G/DL (ref 3.5–5)
ALBUMIN/GLOB SERPL: 1.4 {RATIO} (ref 1.1–2.2)
ALP SERPL-CCNC: 41 U/L (ref 45–117)
ALT SERPL-CCNC: 21 U/L (ref 12–78)
AMORPH CRY URNS QL MICRO: ABNORMAL
ANION GAP BLD CALC-SCNC: 7 MMOL/L (ref 5–15)
APPEARANCE UR: ABNORMAL
APTT PPP: 29.2 SEC (ref 22.1–32.5)
AST SERPL W P-5'-P-CCNC: 16 U/L (ref 15–37)
ATRIAL RATE: 53 BPM
BACTERIA URNS QL MICRO: NEGATIVE /HPF
BASOPHILS # BLD AUTO: 0.1 K/UL (ref 0–0.1)
BASOPHILS # BLD: 1 % (ref 0–1)
BILIRUB SERPL-MCNC: 0.5 MG/DL (ref 0.2–1)
BILIRUB UR QL: NEGATIVE
BLOOD GROUP ANTIBODIES SERPL: NORMAL
BUN SERPL-MCNC: 44 MG/DL (ref 6–20)
BUN/CREAT SERPL: 30 (ref 12–20)
CALCIUM SERPL-MCNC: 9.4 MG/DL (ref 8.5–10.1)
CALCULATED P AXIS, ECG09: 59 DEGREES
CALCULATED R AXIS, ECG10: -28 DEGREES
CALCULATED T AXIS, ECG11: 20 DEGREES
CHLORIDE SERPL-SCNC: 102 MMOL/L (ref 97–108)
CO2 SERPL-SCNC: 31 MMOL/L (ref 21–32)
COLOR UR: ABNORMAL
CREAT SERPL-MCNC: 1.47 MG/DL (ref 0.7–1.3)
CRP SERPL-MCNC: <0.29 MG/DL
DIAGNOSIS, 93000: NORMAL
EOSINOPHIL # BLD: 0.1 K/UL (ref 0–0.4)
EOSINOPHIL NFR BLD: 1 % (ref 0–7)
EPITH CASTS URNS QL MICRO: ABNORMAL /LPF
ERYTHROCYTE [DISTWIDTH] IN BLOOD BY AUTOMATED COUNT: 12.5 % (ref 11.5–14.5)
EST. AVERAGE GLUCOSE BLD GHB EST-MCNC: 114 MG/DL
GLOBULIN SER CALC-MCNC: 3 G/DL (ref 2–4)
GLUCOSE SERPL-MCNC: 115 MG/DL (ref 65–100)
GLUCOSE UR STRIP.AUTO-MCNC: NEGATIVE MG/DL
GRAN CASTS URNS QL MICRO: ABNORMAL /LPF
HBA1C MFR BLD: 5.6 % (ref 4.2–6.3)
HCT VFR BLD AUTO: 39.2 % (ref 36.6–50.3)
HGB BLD-MCNC: 12.8 G/DL (ref 12.1–17)
HGB UR QL STRIP: NEGATIVE
INR PPP: 1.1 (ref 0.9–1.1)
KETONES UR QL STRIP.AUTO: NEGATIVE MG/DL
LEUKOCYTE ESTERASE UR QL STRIP.AUTO: NEGATIVE
LYMPHOCYTES # BLD AUTO: 13 % (ref 12–49)
LYMPHOCYTES # BLD: 1.2 K/UL (ref 0.8–3.5)
MCH RBC QN AUTO: 29.2 PG (ref 26–34)
MCHC RBC AUTO-ENTMCNC: 32.7 G/DL (ref 30–36.5)
MCV RBC AUTO: 89.5 FL (ref 80–99)
MONOCYTES # BLD: 0.5 K/UL (ref 0–1)
MONOCYTES NFR BLD AUTO: 6 % (ref 5–13)
NEUTS SEG # BLD: 7.2 K/UL (ref 1.8–8)
NEUTS SEG NFR BLD AUTO: 79 % (ref 32–75)
NITRITE UR QL STRIP.AUTO: NEGATIVE
P-R INTERVAL, ECG05: 158 MS
PH UR STRIP: 5 [PH] (ref 5–8)
PLATELET # BLD AUTO: 311 K/UL (ref 150–400)
POTASSIUM SERPL-SCNC: 4.7 MMOL/L (ref 3.5–5.1)
PROT SERPL-MCNC: 7.2 G/DL (ref 6.4–8.2)
PROT UR STRIP-MCNC: NEGATIVE MG/DL
PROTHROMBIN TIME: 10.7 SEC (ref 9–11.1)
Q-T INTERVAL, ECG07: 434 MS
QRS DURATION, ECG06: 90 MS
QTC CALCULATION (BEZET), ECG08: 407 MS
RBC # BLD AUTO: 4.38 M/UL (ref 4.1–5.7)
RBC #/AREA URNS HPF: ABNORMAL /HPF (ref 0–5)
SODIUM SERPL-SCNC: 140 MMOL/L (ref 136–145)
SP GR UR REFRACTOMETRY: 1.02 (ref 1–1.03)
SPECIMEN EXP DATE BLD: NORMAL
THERAPEUTIC RANGE,PTTT: NORMAL SECS (ref 58–77)
UA: UC IF INDICATED,UAUC: ABNORMAL
UROBILINOGEN UR QL STRIP.AUTO: 0.2 EU/DL (ref 0.2–1)
VENTRICULAR RATE, ECG03: 53 BPM
WBC # BLD AUTO: 9.1 K/UL (ref 4.1–11.1)
WBC URNS QL MICRO: ABNORMAL /HPF (ref 0–4)

## 2017-07-19 PROCEDURE — 83036 HEMOGLOBIN GLYCOSYLATED A1C: CPT | Performed by: ORTHOPAEDIC SURGERY

## 2017-07-19 PROCEDURE — 93005 ELECTROCARDIOGRAM TRACING: CPT

## 2017-07-19 PROCEDURE — 85025 COMPLETE CBC W/AUTO DIFF WBC: CPT | Performed by: ORTHOPAEDIC SURGERY

## 2017-07-19 PROCEDURE — 81001 URINALYSIS AUTO W/SCOPE: CPT | Performed by: ORTHOPAEDIC SURGERY

## 2017-07-19 PROCEDURE — 85730 THROMBOPLASTIN TIME PARTIAL: CPT | Performed by: ORTHOPAEDIC SURGERY

## 2017-07-19 PROCEDURE — 86140 C-REACTIVE PROTEIN: CPT | Performed by: ORTHOPAEDIC SURGERY

## 2017-07-19 PROCEDURE — 86900 BLOOD TYPING SEROLOGIC ABO: CPT | Performed by: ORTHOPAEDIC SURGERY

## 2017-07-19 PROCEDURE — 84466 ASSAY OF TRANSFERRIN: CPT | Performed by: ORTHOPAEDIC SURGERY

## 2017-07-19 PROCEDURE — 80053 COMPREHEN METABOLIC PANEL: CPT | Performed by: ORTHOPAEDIC SURGERY

## 2017-07-19 PROCEDURE — 36415 COLL VENOUS BLD VENIPUNCTURE: CPT | Performed by: ORTHOPAEDIC SURGERY

## 2017-07-19 PROCEDURE — 85610 PROTHROMBIN TIME: CPT | Performed by: ORTHOPAEDIC SURGERY

## 2017-07-19 RX ORDER — TRAMADOL HYDROCHLORIDE 50 MG/1
50 TABLET ORAL
COMMUNITY
End: 2017-07-28

## 2017-07-19 RX ORDER — HYDROCODONE BITARTRATE AND ACETAMINOPHEN 7.5; 325 MG/1; MG/1
1 TABLET ORAL
COMMUNITY
End: 2017-07-28

## 2017-07-19 RX ORDER — POTASSIUM CHLORIDE 750 MG/1
10 TABLET, EXTENDED RELEASE ORAL DAILY
COMMUNITY

## 2017-07-19 RX ORDER — PROPRANOLOL HYDROCHLORIDE 20 MG/1
20 TABLET ORAL DAILY
COMMUNITY

## 2017-07-19 RX ORDER — LISINOPRIL 40 MG/1
40 TABLET ORAL
COMMUNITY

## 2017-07-19 NOTE — H&P
PAT Pre-Op History & Physical    Patient: Rupali Fall                  MRN: [id number]          SSN: [id number]  YOB: 1945          Age: 70 y.o. Sex: male                Subjective:   Patient is a 70 y.o.  male who presents with history of chronic left hip pain that has been a problem for \"years\" per patient report. Rates his hip pain 9/10 and describes it as constant aching but can be sharp with movement. Has failed steroid joint injections, NSAIDs, Tramadol, and narcotic pain medications. The patient was evaluated in the surgeon's office and it was determined that the most appropriate plan of care is to proceed with surgical intervention. Patient's PCP Pierre Santana MD        Past Medical History:   Diagnosis Date    Arthritis     knees and hips;  Right Shoulder    Chronic pain     hip, knes, right shoulder    Diabetes ([facility] [address] 2007    High cholesterol     Hypertension     Ill-defined condition     elevated cholesterol    Ill-defined condition     gilbert\"s syndrome    Ill-defined condition     essential tremors    Ill-defined condition 07/19/2017    overweight  BMI= 29.4      Past Surgical History:   Procedure Laterality Date    HX ORTHOPAEDIC Left     repair fractured arm age 10    HX TONSILLECTOMY      childhood    SHOULDER SURG PROC UNLISTED Left 2010    arthroplasty      Prior to Admission medications    Medication Sig Start Date End Date Taking? Authorizing Provider   propranolol (INDERAL) 20 mg tablet Take 20 mg by mouth daily as needed. Takes Every Morning Before Breakfast.   Yes Historical Provider   lisinopril (PRINIVIL, ZESTRIL) 40 mg tablet Take 40 mg by mouth daily (after breakfast). Yes Historical Provider   traMADol (ULTRAM) 50 mg tablet Take 50 mg by mouth every six (6) hours as needed for Pain. May TAKE 1-2 Tabs as needed.    Indications: Pain   Yes Historical Provider   HYDROcodone-acetaminophen (NORCO) 7.5-325 mg per tablet Take 1 Tab by mouth every eight (8) hours as needed for Pain. Indications: Pain   Yes Historical Provider   potassium chloride (KLOR-CON M10) 10 mEq tablet Take 10 mEq by mouth daily (after breakfast). Yes Historical Provider   triamterene-hydroCHLOROthiazide (MAXZIDE) 37.5-25 mg per tablet Take 1 Tab by mouth Daily (before breakfast). Yes Historical Provider   fenofibrate (LOFIBRA) 160 mg tablet Take 160 mg by mouth daily (after breakfast). Yes Historical Provider   amLODIPine (NORVASC) 10 mg tablet Take 10 mg by mouth daily (after breakfast). Yes Historical Provider   EZETIMIBE (ZETIA PO) Take 10 mg by mouth Daily (before breakfast). Yes Historical Provider   metFORMIN (GLUCOPHAGE) 500 mg tablet Take 1,000 mg by mouth two (2) times daily (with meals). Yes Historical Provider   diclofenac EC (VOLTAREN) 75 mg EC tablet Take  by mouth two (2) times a day. Historical Provider     Current Outpatient Prescriptions   Medication Sig    propranolol (INDERAL) 20 mg tablet Take 20 mg by mouth daily as needed. Takes Every Morning Before Breakfast.    lisinopril (PRINIVIL, ZESTRIL) 40 mg tablet Take 40 mg by mouth daily (after breakfast).  traMADol (ULTRAM) 50 mg tablet Take 50 mg by mouth every six (6) hours as needed for Pain. May TAKE 1-2 Tabs as needed. Indications: Pain    HYDROcodone-acetaminophen (NORCO) 7.5-325 mg per tablet Take 1 Tab by mouth every eight (8) hours as needed for Pain. Indications: Pain    potassium chloride (KLOR-CON M10) 10 mEq tablet Take 10 mEq by mouth daily (after breakfast).  triamterene-hydroCHLOROthiazide (MAXZIDE) 37.5-25 mg per tablet Take 1 Tab by mouth Daily (before breakfast).  fenofibrate (LOFIBRA) 160 mg tablet Take 160 mg by mouth daily (after breakfast).  amLODIPine (NORVASC) 10 mg tablet Take 10 mg by mouth daily (after breakfast).  EZETIMIBE (ZETIA PO) Take 10 mg by mouth Daily (before breakfast).     metFORMIN (GLUCOPHAGE) 500 mg tablet Take 1,000 mg by mouth two (2) times daily (with meals).  diclofenac EC (VOLTAREN) 75 mg EC tablet Take  by mouth two (2) times a day. No current facility-administered medications for this encounter. Allergies   Allergen Reactions    Penicillins Hives     Ancef graded challenge done 17, tolerated well, no reaction noted    Statins-Hmg-Coa Reductase Inhibitors Other (comments)     Elevated liver and bilirubin enzgmes      Social History   Substance Use Topics    Smoking status: Former Smoker     Packs/day: 0.50     Years: 5.00     Quit date: 4/10/1969    Smokeless tobacco: Never Used    Alcohol use 1.8 oz/week     1 Glasses of wine, 1 Cans of beer, 1 Shots of liquor per week      Comment: Occassionally Drinks 3 days a week ; One drink. Not all three in one night. History   Drug Use No     Family History   Problem Relation Age of Onset    Cancer Mother      cervical    Hypertension Mother     Diabetes Father     Kidney Disease Father     Hypertension Brother     Stroke Brother     Lupus Sister     Alcohol abuse Sister          Review of Systems    Patient denies difficulty swallowing, mouth sores, or loose teeth. Patient denies any recent dental procedures or any planned prior to surgery. Patient denies chest pain, tightness, pain radiating down left arm, palpitations. Denies dizziness, visual disturbances, or lightheadedness. Patient denies shortness of breath, wheezing, cough, fever, or chills. Patient denies diarrhea or abdominal pain. States he has esplosive loose stools at times. Patient denies urinary problems including dysuria, hesitancy, urgency, or incontinence. Denies skin breakdown, rashes, insect bites or open area. Objective:   Patient Vitals for the past 24 hrs:   Temp Pulse Resp BP SpO2   17 1322 97.1 °F (36.2 °C) (!) 56 18 142/63 95 %     Temp (24hrs), Av.1 °F (36.2 °C), Min:97.1 °F (36.2 °C), Max:97.1 °F (36.2 °C)    Body mass index is 29.45 kg/(m^2).   Wt Readings from Last 1 Encounters:   07/19/17 85.3 kg (188 lb 0.8 oz)        Physical Exam:     General: Pleasant,  cooperative, no apparent distress, appears stated age. Uses cane to ambulate. Eyes: Conjunctivae/corneas clear. EOMs intact. Nose: Nares normal.   Mouth/Throat: Lips, mucosa, and tongue normal. Teeth and gums normal.   Neck: Supple, symmetrical, trachea midline. Back: Symmetric   Lungs: Clear to auscultation bilaterally. Heart: Regular rate and rhythm, S1, S2 normal. No murmur, click, rub or gallop. Abdomen: Soft, non-tender. Bowel sounds normal. No distention. Musculoskeletal:  Gait is antalgic. Extremities:  + tremors BUE. Otherwise extremities normal, atraumatic, no cyanosis or edema. Calves                                 supple, non tender to palpation. Pulses: 2+ and symmetric bilateral upper extremities. Cap. refill <2 seconds   Skin: Skin color, texture, turgor normal.  No rashes or lesions. Neurologic: CN II-XII grossly intact. Alert and oriented x3. Labs:   Recent Results (from the past 72 hour(s))   C REACTIVE PROTEIN, QT    Collection Time: 07/19/17  2:31 PM   Result Value Ref Range    C-Reactive protein <0.29 <0.60 mg/dL   CBC WITH AUTOMATED DIFF    Collection Time: 07/19/17  2:31 PM   Result Value Ref Range    WBC 9.1 4.1 - 11.1 K/uL    RBC 4.38 4.10 - 5.70 M/uL    HGB 12.8 12.1 - 17.0 g/dL    HCT 39.2 36.6 - 50.3 %    MCV 89.5 80.0 - 99.0 FL    MCH 29.2 26.0 - 34.0 PG    MCHC 32.7 30.0 - 36.5 g/dL    RDW 12.5 11.5 - 14.5 %    PLATELET 998 820 - 052 K/uL    NEUTROPHILS 79 (H) 32 - 75 %    LYMPHOCYTES 13 12 - 49 %    MONOCYTES 6 5 - 13 %    EOSINOPHILS 1 0 - 7 %    BASOPHILS 1 0 - 1 %    ABS. NEUTROPHILS 7.2 1.8 - 8.0 K/UL    ABS. LYMPHOCYTES 1.2 0.8 - 3.5 K/UL    ABS. MONOCYTES 0.5 0.0 - 1.0 K/UL    ABS. EOSINOPHILS 0.1 0.0 - 0.4 K/UL    ABS.  BASOPHILS 0.1 0.0 - 0.1 K/UL   METABOLIC PANEL, COMPREHENSIVE    Collection Time: 07/19/17  2:31 PM   Result Value Ref Range    Sodium 140 136 - 145 mmol/L    Potassium 4.7 3.5 - 5.1 mmol/L    Chloride 102 97 - 108 mmol/L    CO2 31 21 - 32 mmol/L    Anion gap 7 5 - 15 mmol/L    Glucose 115 (H) 65 - 100 mg/dL    BUN 44 (H) 6 - 20 MG/DL    Creatinine 1.47 (H) 0.70 - 1.30 MG/DL    BUN/Creatinine ratio 30 (H) 12 - 20      GFR est AA 57 (L) >60 ml/min/1.73m2    GFR est non-AA 47 (L) >60 ml/min/1.73m2    Calcium 9.4 8.5 - 10.1 MG/DL    Bilirubin, total 0.5 0.2 - 1.0 MG/DL    ALT (SGPT) 21 12 - 78 U/L    AST (SGOT) 16 15 - 37 U/L    Alk. phosphatase 41 (L) 45 - 117 U/L    Protein, total 7.2 6.4 - 8.2 g/dL    Albumin 4.2 3.5 - 5.0 g/dL    Globulin 3.0 2.0 - 4.0 g/dL    A-G Ratio 1.4 1.1 - 2.2     HEMOGLOBIN A1C WITH EAG    Collection Time: 07/19/17  2:31 PM   Result Value Ref Range    Hemoglobin A1c 5.6 4.2 - 6.3 %    Est. average glucose 114 mg/dL   CULTURE, MRSA    Collection Time: 07/19/17  2:31 PM   Result Value Ref Range    Special Requests: NO SPECIAL REQUESTS      Culture result: MRSA NOT PRESENT      Culture result:            Screening of patient nares for MRSA is for surveillance purposes and, if positive, to facilitate isolation considerations in high risk settings. It is not intended for automatic decolonization interventions per se as regimens are not sufficiently effective to warrant routine use.    PROTHROMBIN TIME + INR    Collection Time: 07/19/17  2:31 PM   Result Value Ref Range    INR 1.1 0.9 - 1.1      Prothrombin time 10.7 9.0 - 11.1 sec   PTT    Collection Time: 07/19/17  2:31 PM   Result Value Ref Range    aPTT 29.2 22.1 - 32.5 sec    aPTT, therapeutic range     58.0 - 77.0 SECS   URINALYSIS W/ REFLEX CULTURE    Collection Time: 07/19/17  2:31 PM   Result Value Ref Range    Color YELLOW/STRAW      Appearance CLOUDY (A) CLEAR      Specific gravity 1.018 1.003 - 1.030      pH (UA) 5.0 5.0 - 8.0      Protein NEGATIVE  NEG mg/dL    Glucose NEGATIVE  NEG mg/dL    Ketone NEGATIVE  NEG mg/dL    Bilirubin NEGATIVE  NEG      Blood NEGATIVE NEG      Urobilinogen 0.2 0.2 - 1.0 EU/dL    Nitrites NEGATIVE  NEG      Leukocyte Esterase NEGATIVE  NEG      WBC 0-4 0 - 4 /hpf    RBC 0-5 0 - 5 /hpf    Epithelial cells FEW FEW /lpf    Bacteria NEGATIVE  NEG /hpf    UA:UC IF INDICATED CULTURE NOT INDICATED BY UA RESULT CNI      Amorphous Crystals FEW (A) NEG      Granular cast 2-5 (A) NEG /lpf   TYPE & SCREEN    Collection Time: 07/19/17  2:31 PM   Result Value Ref Range    Crossmatch Expiration 07/29/2017     ABO/Rh(D) A POSITIVE     Antibody screen NEG    TRANSFERRIN    Collection Time: 07/19/17  2:31 PM   Result Value Ref Range    Transferrin 276 200 - 370 mg/dL   EKG, 12 LEAD, INITIAL    Collection Time: 07/19/17  3:26 PM   Result Value Ref Range    Ventricular Rate 53 BPM    Atrial Rate 53 BPM    P-R Interval 158 ms    QRS Duration 90 ms    Q-T Interval 434 ms    QTC Calculation (Bezet) 407 ms    Calculated P Axis 59 degrees    Calculated R Axis -28 degrees    Calculated T Axis 20 degrees    Diagnosis       Sinus bradycardia  Poor Anterior Forces  Abnormal ECG  When compared with ECG of 4/10/17  No significant change  Confirmed by Teofilo Yadav MD. ([id number]) on 7/19/2017 6:54:54 PM         Assessment:     Left hip arthritis. Plan:     Scheduled for left total hip arthroplasty. Labs and EKG done per surgeon's orders. Lab results and EKG reviewed- unremarkable except creatinine= 1.47/ GFR= 47. Renal insufficiency POA- advise that NSAIDs be avoided pre and post operatively. Attended joint class 7/19/2017.     Davon Stevenson NP

## 2017-07-19 NOTE — PERIOP NOTES
[facility]  PREOPERATIVE INSTRUCTIONS    Surgery Date: Wednesday, 7/26/17. Surgery arrival time given by surgeon: NO  (If [facility] staff will call you between 2pm - 7pm the day before surgery with your arrival time. If your surgery is on a Monday, we will call you the preceding Friday. Please call [phone] after 7pm if you did not receive your arrival time.) Verification phone call on Tuesday, 7/25/17. 1. Report  to the 2nd [facility] at the time you were told the night before surgery. Bring your insurance card, photo identification, and any copayment (if applicable). 2. You must have a responsible adult to drive you home and stay with you the first 24 hours after surgery if you are going home the same day of your surgery. 3. Nothing to eat or drink after midnight the night before surgery. This means NO water, gum, mints, coffee, juice, etc.    4. No alcoholic beverages 24 hours before and after your surgery. 5. If you are being admitted to the hospital,please leave personal belongings/luggage in your car until you have an assigned hospital room number. 6. The hospital discharge time is 12pm NOON. Your adult  should be here prior to the 12pm NOON discharge time. 7. NO Aspirin and/or any non-steroidal anti-inflammatory drugs (i.e. Ibuprofen, Naproxen, Advil, Aleve) as directed by your surgeon. You may take Tylenol. Stop herbal supplements 1 week prior to  surgery. 8. If you are currently taking Plavix, Coumadin,or any other blood-thinning/ anticoagulant medication contact your surgeon for instructions. 9. Wear comfortable clothes. Wear your glasses instead of contacts. Please leave all money, jewelry and valuables at home. No make up, particularly mascara or finger nail polish, the day of surgery. 10.  REMOVE ALL body piercings, rings,and jewelry and leave at home. Wear your hair loose or down.; no pony-tails, buns, or any metal hair clips.    11. If you shower the morning of surgery, please do not apply any lotions, powders, or deodorants afterwards. Do not shave any body area within 24 hours of your surgery. 12. Please follow all instructions to avoid any potential surgical cancellation. 13. Should your physical condition change, (i.e. fever, cold, flu, etc.) please notify your surgeon as soon as possible. 14. It is important to be on time. If a situation occurs where you may be delayed, please call:  [phone] / [phone] on the day of surgery. 15. The Preadmission Testing staff can be reached at [phone]. 16. MEDICATIONS TO TAKE THE MORNING OF SURGERY WITH A SIP OF WATER: Propranolol and Amlodipine. May TAKE hydrocodone if needed for pain. 15.  Special Instructions:  · Use Chlorhexidine Care Fusion wash and sponges 3 days prior to surgery as instructed. · Incentive spirometer given with instructions to practice at home and bring back to the hospital on the day of surgery. · [facility] will contact you if your Hemoglobin A1C is greater than 7.5. · Ensure/Glucerna  sample, nutritional information, and Ensure/Glucerna coupon given. · Pain pamphlet and Call Don't Fall reminder reviewed with patient. ·  parking is complimentary Monday - Friday 7 am - 5 pm  · Bring PTA Medication list day of surgery with the last doses taken documented   · Do not bring medication bottles the day of surgery    The patient was contacted  in person. He  verbalize  understanding of all instructions does not  need reinforcement.

## 2017-07-20 LAB
BACTERIA SPEC CULT: NORMAL
BACTERIA SPEC CULT: NORMAL
SERVICE CMNT-IMP: NORMAL

## 2017-07-21 LAB — TRANSFERRIN SERPL-MCNC: 276 MG/DL (ref 200–370)

## 2017-07-21 NOTE — PROGRESS NOTES
Creatinine elevated (1.47) and GFR decreased (47) from last results in [address] [facility] (4/10/17= 1.18/>60). Patient has been taking Diclofenac 75 mg BID for hip pain. Surgeon notified of changes in lab values, CMP results faxed to patient's PCP Adriana Ashton MD), and left message on patient's answering machine to be sure he has stopped NSAIDs prior to surgery. Also notified him about changes in lab values and that they were not critical changes but he needs to avoid NSAIDs. Also let patient know that PCP was notified of changes in lab values.

## 2017-07-25 ENCOUNTER — ANESTHESIA EVENT (OUTPATIENT)
Dept: SURGERY | Age: 72
DRG: 470 | End: 2017-07-25
Payer: MEDICARE

## 2017-07-26 ENCOUNTER — HOSPITAL ENCOUNTER (INPATIENT)
Age: 72
LOS: 2 days | Discharge: HOME HEALTH | DRG: 470 | End: 2017-07-28
Attending: ORTHOPAEDIC SURGERY | Admitting: ORTHOPAEDIC SURGERY
Payer: MEDICARE

## 2017-07-26 ENCOUNTER — APPOINTMENT (OUTPATIENT)
Dept: GENERAL RADIOLOGY | Age: 72
DRG: 470 | End: 2017-07-26
Attending: ORTHOPAEDIC SURGERY
Payer: MEDICARE

## 2017-07-26 ENCOUNTER — ANESTHESIA (OUTPATIENT)
Dept: SURGERY | Age: 72
DRG: 470 | End: 2017-07-26
Payer: MEDICARE

## 2017-07-26 PROBLEM — M16.10 HIP ARTHRITIS: Status: ACTIVE | Noted: 2017-07-26

## 2017-07-26 PROBLEM — M19.90 OSTEOARTHRITIS (ARTHRITIS DUE TO WEAR AND TEAR OF JOINTS): Status: ACTIVE | Noted: 2017-07-26

## 2017-07-26 LAB
GLUCOSE BLD STRIP.AUTO-MCNC: 114 MG/DL (ref 65–100)
GLUCOSE BLD STRIP.AUTO-MCNC: 120 MG/DL (ref 65–100)
GLUCOSE BLD STRIP.AUTO-MCNC: 93 MG/DL (ref 65–100)
SERVICE CMNT-IMP: ABNORMAL
SERVICE CMNT-IMP: ABNORMAL
SERVICE CMNT-IMP: NORMAL

## 2017-07-26 PROCEDURE — 77030018883 HC BLD SAW SAG4 STRY -B: Performed by: ORTHOPAEDIC SURGERY

## 2017-07-26 PROCEDURE — 77030031139 HC SUT VCRL2 J&J -A: Performed by: ORTHOPAEDIC SURGERY

## 2017-07-26 PROCEDURE — 74011250636 HC RX REV CODE- 250/636: Performed by: ANESTHESIOLOGY

## 2017-07-26 PROCEDURE — 77030033067 HC SUT PDO STRATFX SPIR J&J -B: Performed by: ORTHOPAEDIC SURGERY

## 2017-07-26 PROCEDURE — 77030010507 HC ADH SKN DERMBND J&J -B: Performed by: ORTHOPAEDIC SURGERY

## 2017-07-26 PROCEDURE — 65270000029 HC RM PRIVATE

## 2017-07-26 PROCEDURE — 74011250637 HC RX REV CODE- 250/637: Performed by: ANESTHESIOLOGY

## 2017-07-26 PROCEDURE — 3E0T3CZ INTRODUCE REGIONAL ANESTH IN PERIPH NRV, PLEXI, PERC: ICD-10-PCS | Performed by: ANESTHESIOLOGY

## 2017-07-26 PROCEDURE — 74011000258 HC RX REV CODE- 258

## 2017-07-26 PROCEDURE — 77030002933 HC SUT MCRYL J&J -A: Performed by: ORTHOPAEDIC SURGERY

## 2017-07-26 PROCEDURE — 74011000250 HC RX REV CODE- 250

## 2017-07-26 PROCEDURE — 77030013079 HC BLNKT BAIR HGGR 3M -A: Performed by: ANESTHESIOLOGY

## 2017-07-26 PROCEDURE — 76030000021 HC AMB SURG 2 TO 2.5 HR INTENSV-TIER 1: Performed by: ORTHOPAEDIC SURGERY

## 2017-07-26 PROCEDURE — 77030020788: Performed by: ORTHOPAEDIC SURGERY

## 2017-07-26 PROCEDURE — C1776 JOINT DEVICE (IMPLANTABLE): HCPCS | Performed by: ORTHOPAEDIC SURGERY

## 2017-07-26 PROCEDURE — 76060000064 HC AMB SURG ANES 2 TO 2.5 HR: Performed by: ORTHOPAEDIC SURGERY

## 2017-07-26 PROCEDURE — 77030018836 HC SOL IRR NACL ICUM -A: Performed by: ORTHOPAEDIC SURGERY

## 2017-07-26 PROCEDURE — 74011250636 HC RX REV CODE- 250/636: Performed by: ORTHOPAEDIC SURGERY

## 2017-07-26 PROCEDURE — 77030032490 HC SLV COMPR SCD KNE COVD -B: Performed by: ORTHOPAEDIC SURGERY

## 2017-07-26 PROCEDURE — 74011000250 HC RX REV CODE- 250: Performed by: ORTHOPAEDIC SURGERY

## 2017-07-26 PROCEDURE — 77030018788 HC NDL SUT ANCH -A: Performed by: ORTHOPAEDIC SURGERY

## 2017-07-26 PROCEDURE — 0SRB04A REPLACEMENT OF LEFT HIP JOINT WITH CERAMIC ON POLYETHYLENE SYNTHETIC SUBSTITUTE, UNCEMENTED, OPEN APPROACH: ICD-10-PCS | Performed by: ORTHOPAEDIC SURGERY

## 2017-07-26 PROCEDURE — 77030007866 HC KT SPN ANES BBMI -B

## 2017-07-26 PROCEDURE — 74011250637 HC RX REV CODE- 250/637: Performed by: ORTHOPAEDIC SURGERY

## 2017-07-26 PROCEDURE — 77030012890

## 2017-07-26 PROCEDURE — 72170 X-RAY EXAM OF PELVIS: CPT

## 2017-07-26 PROCEDURE — 74011000272 HC RX REV CODE- 272: Performed by: ORTHOPAEDIC SURGERY

## 2017-07-26 PROCEDURE — 74011250636 HC RX REV CODE- 250/636

## 2017-07-26 PROCEDURE — 82962 GLUCOSE BLOOD TEST: CPT

## 2017-07-26 PROCEDURE — 77030020782 HC GWN BAIR PAWS FLX 3M -B

## 2017-07-26 PROCEDURE — 77030013708 HC HNDPC SUC IRR PULS STRY –B: Performed by: ORTHOPAEDIC SURGERY

## 2017-07-26 PROCEDURE — 77030011640 HC PAD GRND REM COVD -A: Performed by: ORTHOPAEDIC SURGERY

## 2017-07-26 PROCEDURE — 77030018547 HC SUT ETHBND1 J&J -B: Performed by: ORTHOPAEDIC SURGERY

## 2017-07-26 PROCEDURE — 76210000034 HC AMBSU PH I REC 0.5 TO 1 HR: Performed by: ORTHOPAEDIC SURGERY

## 2017-07-26 DEVICE — STEM FEM SZ 5 L108MM NK L35MM 40MM OFFSET 132DEG HIP TI: Type: IMPLANTABLE DEVICE | Site: HIP | Status: FUNCTIONAL

## 2017-07-26 DEVICE — COMPONENT HIP PRSS FT MTL ON CERM POLYETH X3: Type: IMPLANTABLE DEVICE | Status: FUNCTIONAL

## 2017-07-26 DEVICE — INSERT ACET 0DEG 36MM E X3 -- TRIDENT: Type: IMPLANTABLE DEVICE | Site: HIP | Status: FUNCTIONAL

## 2017-07-26 DEVICE — HEAD FEM DELT V40 -5MM NK 36MM -- V40 BIOLOX: Type: IMPLANTABLE DEVICE | Site: HIP | Status: FUNCTIONAL

## 2017-07-26 RX ORDER — OXYCODONE HYDROCHLORIDE 5 MG/1
5 TABLET ORAL
Status: DISCONTINUED | OUTPATIENT
Start: 2017-07-26 | End: 2017-07-28 | Stop reason: HOSPADM

## 2017-07-26 RX ORDER — EZETIMIBE 10 MG/1
10 TABLET ORAL DAILY
Status: DISCONTINUED | OUTPATIENT
Start: 2017-07-27 | End: 2017-07-28 | Stop reason: HOSPADM

## 2017-07-26 RX ORDER — HYDROMORPHONE HYDROCHLORIDE 1 MG/ML
0.5 INJECTION, SOLUTION INTRAMUSCULAR; INTRAVENOUS; SUBCUTANEOUS
Status: ACTIVE | OUTPATIENT
Start: 2017-07-26 | End: 2017-07-27

## 2017-07-26 RX ORDER — SODIUM CHLORIDE 0.9 % (FLUSH) 0.9 %
5-10 SYRINGE (ML) INJECTION EVERY 8 HOURS
Status: DISCONTINUED | OUTPATIENT
Start: 2017-07-26 | End: 2017-07-26 | Stop reason: HOSPADM

## 2017-07-26 RX ORDER — DIPHENHYDRAMINE HYDROCHLORIDE 50 MG/ML
12.5 INJECTION, SOLUTION INTRAMUSCULAR; INTRAVENOUS
Status: ACTIVE | OUTPATIENT
Start: 2017-07-26 | End: 2017-07-27

## 2017-07-26 RX ORDER — HYDROMORPHONE HYDROCHLORIDE 1 MG/ML
.25-1 INJECTION, SOLUTION INTRAMUSCULAR; INTRAVENOUS; SUBCUTANEOUS
Status: DISCONTINUED | OUTPATIENT
Start: 2017-07-26 | End: 2017-07-26 | Stop reason: HOSPADM

## 2017-07-26 RX ORDER — OXYCODONE HYDROCHLORIDE 5 MG/1
5 TABLET ORAL
Qty: 60 TAB | Refills: 0 | Status: SHIPPED | OUTPATIENT
Start: 2017-07-26 | End: 2019-04-10

## 2017-07-26 RX ORDER — AMLODIPINE BESYLATE 5 MG/1
10 TABLET ORAL
Status: DISCONTINUED | OUTPATIENT
Start: 2017-07-27 | End: 2017-07-28 | Stop reason: HOSPADM

## 2017-07-26 RX ORDER — DEXTROSE 50 % IN WATER (D50W) INTRAVENOUS SYRINGE
12.5-25 AS NEEDED
Status: DISCONTINUED | OUTPATIENT
Start: 2017-07-26 | End: 2017-07-28 | Stop reason: HOSPADM

## 2017-07-26 RX ORDER — TRIAMTERENE/HYDROCHLOROTHIAZID 37.5-25 MG
1 TABLET ORAL
Status: DISCONTINUED | OUTPATIENT
Start: 2017-07-27 | End: 2017-07-28 | Stop reason: HOSPADM

## 2017-07-26 RX ORDER — TRAMADOL HYDROCHLORIDE 50 MG/1
50 TABLET ORAL
Qty: 60 TAB | Refills: 0 | Status: SHIPPED | OUTPATIENT
Start: 2017-07-26 | End: 2019-04-10

## 2017-07-26 RX ORDER — OXYCODONE AND ACETAMINOPHEN 7.5; 325 MG/1; MG/1
1-2 TABLET ORAL
Qty: 70 TAB | Refills: 0 | Status: SHIPPED | OUTPATIENT
Start: 2017-07-26 | End: 2019-04-10

## 2017-07-26 RX ORDER — PROPOFOL 10 MG/ML
INJECTION, EMULSION INTRAVENOUS
Status: DISCONTINUED | OUTPATIENT
Start: 2017-07-26 | End: 2017-07-26 | Stop reason: HOSPADM

## 2017-07-26 RX ORDER — SODIUM CHLORIDE 0.9 % (FLUSH) 0.9 %
5-10 SYRINGE (ML) INJECTION EVERY 8 HOURS
Status: DISCONTINUED | OUTPATIENT
Start: 2017-07-27 | End: 2017-07-28 | Stop reason: HOSPADM

## 2017-07-26 RX ORDER — OXYCODONE HYDROCHLORIDE 5 MG/1
5 TABLET ORAL
Status: COMPLETED | OUTPATIENT
Start: 2017-07-26 | End: 2017-07-26

## 2017-07-26 RX ORDER — MAGNESIUM SULFATE 100 %
4 CRYSTALS MISCELLANEOUS AS NEEDED
Status: DISCONTINUED | OUTPATIENT
Start: 2017-07-26 | End: 2017-07-28 | Stop reason: HOSPADM

## 2017-07-26 RX ORDER — SODIUM CHLORIDE 9 MG/ML
INJECTION, SOLUTION INTRAVENOUS
Status: DISCONTINUED | OUTPATIENT
Start: 2017-07-26 | End: 2017-07-26 | Stop reason: HOSPADM

## 2017-07-26 RX ORDER — ACETAMINOPHEN 325 MG/1
975 TABLET ORAL ONCE
Status: COMPLETED | OUTPATIENT
Start: 2017-07-26 | End: 2017-07-26

## 2017-07-26 RX ORDER — SODIUM CHLORIDE 0.9 % (FLUSH) 0.9 %
5-10 SYRINGE (ML) INJECTION AS NEEDED
Status: DISCONTINUED | OUTPATIENT
Start: 2017-07-26 | End: 2017-07-26 | Stop reason: HOSPADM

## 2017-07-26 RX ORDER — POTASSIUM CHLORIDE 750 MG/1
10 TABLET, FILM COATED, EXTENDED RELEASE ORAL DAILY
Status: DISCONTINUED | OUTPATIENT
Start: 2017-07-27 | End: 2017-07-28 | Stop reason: HOSPADM

## 2017-07-26 RX ORDER — LIDOCAINE HYDROCHLORIDE 10 MG/ML
0.1 INJECTION, SOLUTION EPIDURAL; INFILTRATION; INTRACAUDAL; PERINEURAL AS NEEDED
Status: DISCONTINUED | OUTPATIENT
Start: 2017-07-26 | End: 2017-07-26 | Stop reason: HOSPADM

## 2017-07-26 RX ORDER — SODIUM CHLORIDE, SODIUM LACTATE, POTASSIUM CHLORIDE, CALCIUM CHLORIDE 600; 310; 30; 20 MG/100ML; MG/100ML; MG/100ML; MG/100ML
100 INJECTION, SOLUTION INTRAVENOUS CONTINUOUS
Status: DISCONTINUED | OUTPATIENT
Start: 2017-07-26 | End: 2017-07-26 | Stop reason: HOSPADM

## 2017-07-26 RX ORDER — OXYCODONE HYDROCHLORIDE 5 MG/1
10 TABLET ORAL
Status: DISCONTINUED | OUTPATIENT
Start: 2017-07-26 | End: 2017-07-28 | Stop reason: HOSPADM

## 2017-07-26 RX ORDER — FENTANYL CITRATE 50 UG/ML
INJECTION, SOLUTION INTRAMUSCULAR; INTRAVENOUS AS NEEDED
Status: DISCONTINUED | OUTPATIENT
Start: 2017-07-26 | End: 2017-07-26 | Stop reason: HOSPADM

## 2017-07-26 RX ORDER — ASPIRIN 325 MG
325 TABLET, DELAYED RELEASE (ENTERIC COATED) ORAL 2 TIMES DAILY
Status: DISCONTINUED | OUTPATIENT
Start: 2017-07-26 | End: 2017-07-28 | Stop reason: HOSPADM

## 2017-07-26 RX ORDER — OXYCODONE HCL 10 MG/1
10 TABLET, FILM COATED, EXTENDED RELEASE ORAL EVERY 12 HOURS
Status: COMPLETED | OUTPATIENT
Start: 2017-07-26 | End: 2017-07-27

## 2017-07-26 RX ORDER — SODIUM CHLORIDE 0.9 % (FLUSH) 0.9 %
5-10 SYRINGE (ML) INJECTION AS NEEDED
Status: DISCONTINUED | OUTPATIENT
Start: 2017-07-26 | End: 2017-07-28 | Stop reason: HOSPADM

## 2017-07-26 RX ORDER — DEXAMETHASONE SODIUM PHOSPHATE 4 MG/ML
10 INJECTION, SOLUTION INTRA-ARTICULAR; INTRALESIONAL; INTRAMUSCULAR; INTRAVENOUS; SOFT TISSUE ONCE
Status: COMPLETED | OUTPATIENT
Start: 2017-07-26 | End: 2017-07-26

## 2017-07-26 RX ORDER — FENOFIBRATE 145 MG/1
145 TABLET, COATED ORAL DAILY
Status: DISCONTINUED | OUTPATIENT
Start: 2017-07-27 | End: 2017-07-28 | Stop reason: HOSPADM

## 2017-07-26 RX ORDER — POLYETHYLENE GLYCOL 3350 17 G/17G
17 POWDER, FOR SOLUTION ORAL DAILY
Status: DISCONTINUED | OUTPATIENT
Start: 2017-07-27 | End: 2017-07-28 | Stop reason: HOSPADM

## 2017-07-26 RX ORDER — FAMOTIDINE 20 MG/1
20 TABLET, FILM COATED ORAL 2 TIMES DAILY
Status: DISCONTINUED | OUTPATIENT
Start: 2017-07-26 | End: 2017-07-28 | Stop reason: HOSPADM

## 2017-07-26 RX ORDER — INSULIN LISPRO 100 [IU]/ML
INJECTION, SOLUTION INTRAVENOUS; SUBCUTANEOUS
Status: DISCONTINUED | OUTPATIENT
Start: 2017-07-26 | End: 2017-07-28 | Stop reason: HOSPADM

## 2017-07-26 RX ORDER — PROPOFOL 10 MG/ML
INJECTION, EMULSION INTRAVENOUS AS NEEDED
Status: DISCONTINUED | OUTPATIENT
Start: 2017-07-26 | End: 2017-07-26 | Stop reason: HOSPADM

## 2017-07-26 RX ORDER — ASPIRIN 325 MG
325 TABLET ORAL 2 TIMES DAILY
Qty: 60 TAB | Refills: 0 | Status: SHIPPED | OUTPATIENT
Start: 2017-07-26 | End: 2019-04-10

## 2017-07-26 RX ORDER — ONDANSETRON 2 MG/ML
4 INJECTION INTRAMUSCULAR; INTRAVENOUS
Status: ACTIVE | OUTPATIENT
Start: 2017-07-26 | End: 2017-07-27

## 2017-07-26 RX ORDER — FACIAL-BODY WIPES
10 EACH TOPICAL DAILY
Qty: 2 SUPPOSITORY | Refills: 0 | Status: SHIPPED | OUTPATIENT
Start: 2017-07-26 | End: 2019-04-10

## 2017-07-26 RX ORDER — FACIAL-BODY WIPES
10 EACH TOPICAL DAILY PRN
Status: DISCONTINUED | OUTPATIENT
Start: 2017-07-28 | End: 2017-07-28 | Stop reason: HOSPADM

## 2017-07-26 RX ORDER — SODIUM CHLORIDE 9 MG/ML
125 INJECTION, SOLUTION INTRAVENOUS CONTINUOUS
Status: DISPENSED | OUTPATIENT
Start: 2017-07-26 | End: 2017-07-27

## 2017-07-26 RX ORDER — ACETAMINOPHEN 325 MG/1
650 TABLET ORAL EVERY 6 HOURS
Status: DISCONTINUED | OUTPATIENT
Start: 2017-07-26 | End: 2017-07-28 | Stop reason: HOSPADM

## 2017-07-26 RX ORDER — CEFAZOLIN SODIUM IN 0.9 % NACL 2 G/50 ML
2 INTRAVENOUS SOLUTION, PIGGYBACK (ML) INTRAVENOUS ONCE
Status: COMPLETED | OUTPATIENT
Start: 2017-07-26 | End: 2017-07-26

## 2017-07-26 RX ORDER — NALOXONE HYDROCHLORIDE 0.4 MG/ML
0.4 INJECTION, SOLUTION INTRAMUSCULAR; INTRAVENOUS; SUBCUTANEOUS AS NEEDED
Status: DISCONTINUED | OUTPATIENT
Start: 2017-07-26 | End: 2017-07-28 | Stop reason: HOSPADM

## 2017-07-26 RX ORDER — ONDANSETRON 8 MG/1
4 TABLET, ORALLY DISINTEGRATING ORAL
Qty: 30 TAB | Refills: 0 | Status: SHIPPED | OUTPATIENT
Start: 2017-07-26 | End: 2019-04-10

## 2017-07-26 RX ORDER — AMOXICILLIN 250 MG
1 CAPSULE ORAL 2 TIMES DAILY
Status: DISCONTINUED | OUTPATIENT
Start: 2017-07-26 | End: 2017-07-28 | Stop reason: HOSPADM

## 2017-07-26 RX ORDER — PREGABALIN 75 MG/1
75 CAPSULE ORAL ONCE
Status: COMPLETED | OUTPATIENT
Start: 2017-07-26 | End: 2017-07-26

## 2017-07-26 RX ORDER — FENTANYL CITRATE 50 UG/ML
25 INJECTION, SOLUTION INTRAMUSCULAR; INTRAVENOUS
Status: DISCONTINUED | OUTPATIENT
Start: 2017-07-26 | End: 2017-07-26 | Stop reason: HOSPADM

## 2017-07-26 RX ORDER — BUPIVACAINE HYDROCHLORIDE 7.5 MG/ML
INJECTION, SOLUTION EPIDURAL; RETROBULBAR AS NEEDED
Status: DISCONTINUED | OUTPATIENT
Start: 2017-07-26 | End: 2017-07-26 | Stop reason: HOSPADM

## 2017-07-26 RX ORDER — MIDAZOLAM HYDROCHLORIDE 1 MG/ML
INJECTION, SOLUTION INTRAMUSCULAR; INTRAVENOUS AS NEEDED
Status: DISCONTINUED | OUTPATIENT
Start: 2017-07-26 | End: 2017-07-26 | Stop reason: HOSPADM

## 2017-07-26 RX ORDER — CEFAZOLIN SODIUM IN 0.9 % NACL 2 G/50 ML
2 INTRAVENOUS SOLUTION, PIGGYBACK (ML) INTRAVENOUS EVERY 8 HOURS
Status: COMPLETED | OUTPATIENT
Start: 2017-07-26 | End: 2017-07-27

## 2017-07-26 RX ADMIN — BUPIVACAINE HYDROCHLORIDE 2.6 ML: 7.5 INJECTION, SOLUTION EPIDURAL; RETROBULBAR at 12:49

## 2017-07-26 RX ADMIN — SODIUM CHLORIDE 125 ML/HR: 900 INJECTION, SOLUTION INTRAVENOUS at 18:08

## 2017-07-26 RX ADMIN — OXYCODONE HYDROCHLORIDE 10 MG: 10 TABLET, FILM COATED, EXTENDED RELEASE ORAL at 20:09

## 2017-07-26 RX ADMIN — PROPOFOL 50 MCG/KG/MIN: 10 INJECTION, EMULSION INTRAVENOUS at 13:02

## 2017-07-26 RX ADMIN — DOCUSATE SODIUM -SENNOSIDES 1 TABLET: 50; 8.6 TABLET, COATED ORAL at 17:37

## 2017-07-26 RX ADMIN — MIDAZOLAM HYDROCHLORIDE 2 MG: 1 INJECTION, SOLUTION INTRAMUSCULAR; INTRAVENOUS at 12:39

## 2017-07-26 RX ADMIN — OXYCODONE HYDROCHLORIDE 10 MG: 5 TABLET ORAL at 23:27

## 2017-07-26 RX ADMIN — ACETAMINOPHEN 650 MG: 325 TABLET ORAL at 17:37

## 2017-07-26 RX ADMIN — PROPOFOL 20 MG: 10 INJECTION, EMULSION INTRAVENOUS at 13:02

## 2017-07-26 RX ADMIN — SODIUM CHLORIDE: 9 INJECTION, SOLUTION INTRAVENOUS at 14:02

## 2017-07-26 RX ADMIN — SODIUM CHLORIDE, SODIUM LACTATE, POTASSIUM CHLORIDE, AND CALCIUM CHLORIDE: 600; 310; 30; 20 INJECTION, SOLUTION INTRAVENOUS at 12:43

## 2017-07-26 RX ADMIN — SODIUM CHLORIDE, SODIUM LACTATE, POTASSIUM CHLORIDE, AND CALCIUM CHLORIDE 100 ML/HR: 600; 310; 30; 20 INJECTION, SOLUTION INTRAVENOUS at 11:23

## 2017-07-26 RX ADMIN — FAMOTIDINE 20 MG: 20 TABLET, FILM COATED ORAL at 17:37

## 2017-07-26 RX ADMIN — FENTANYL CITRATE 50 MCG: 50 INJECTION, SOLUTION INTRAMUSCULAR; INTRAVENOUS at 12:39

## 2017-07-26 RX ADMIN — OXYCODONE HYDROCHLORIDE 5 MG: 5 TABLET ORAL at 15:50

## 2017-07-26 RX ADMIN — ACETAMINOPHEN 650 MG: 325 TABLET ORAL at 23:21

## 2017-07-26 RX ADMIN — OXYCODONE HYDROCHLORIDE 10 MG: 10 TABLET, FILM COATED, EXTENDED RELEASE ORAL at 11:07

## 2017-07-26 RX ADMIN — CEFAZOLIN 2 G: 1 INJECTION, POWDER, FOR SOLUTION INTRAMUSCULAR; INTRAVENOUS; PARENTERAL at 20:09

## 2017-07-26 RX ADMIN — ACETAMINOPHEN 975 MG: 325 TABLET ORAL at 11:07

## 2017-07-26 RX ADMIN — ASPIRIN 325 MG: 325 TABLET, DELAYED RELEASE ORAL at 17:37

## 2017-07-26 RX ADMIN — CEFAZOLIN 2 G: 1 INJECTION, POWDER, FOR SOLUTION INTRAMUSCULAR; INTRAVENOUS; PARENTERAL at 13:20

## 2017-07-26 RX ADMIN — OXYCODONE HYDROCHLORIDE 10 MG: 5 TABLET ORAL at 18:12

## 2017-07-26 RX ADMIN — DEXAMETHASONE SODIUM PHOSPHATE 10 MG: 4 INJECTION, SOLUTION INTRAMUSCULAR; INTRAVENOUS at 17:37

## 2017-07-26 RX ADMIN — PREGABALIN 75 MG: 75 CAPSULE ORAL at 11:07

## 2017-07-26 NOTE — INTERVAL H&P NOTE
H&P Update:  Sai Arenas was seen and examined. History and physical has been reviewed. The patient has been examined.  There have been no significant clinical changes since the completion of the originally dated History and Physical.    Signed By: Joanie Murrell MD     July 26, 2017 6:09 AM

## 2017-07-26 NOTE — ANESTHESIA PREPROCEDURE EVALUATION
Anesthetic History   No history of anesthetic complications            Review of Systems / Medical History  Patient summary reviewed and pertinent labs reviewed    Pulmonary  Within defined limits                 Neuro/Psych             Comments: Essential tremor Cardiovascular    Hypertension              Exercise tolerance: >4 METS     GI/Hepatic/Renal               Comments: Elevated BUN/Creat. Since being started on Voltaren several months ago Endo/Other    Diabetes: type 2    Arthritis     Other Findings   Comments: Harish's Syn         Physical Exam    Airway  Mallampati: III  TM Distance: 4 - 6 cm  Neck ROM: normal range of motion   Mouth opening: Normal     Cardiovascular    Rhythm: regular  Rate: normal         Dental  No notable dental hx       Pulmonary  Breath sounds clear to auscultation               Abdominal         Other Findings            Anesthetic Plan    ASA: 3  Anesthesia type: spinal            Anesthetic plan and risks discussed with: Patient and Son / Daughter    Discussed at length with the patient and his daughter the issue of his recently elevated BUN/Creat. I spoke with his PCP who said this is a new finding. I explained that this is probably related to recently starting Voltaren, but no way to be sure today. They desire to proceed and will follow up with Dr. Charito Neal postop.

## 2017-07-26 NOTE — PROGRESS NOTES
Physical Therapy orders acknowledged, chart reviewed and discussed with nurse. Evaluation initiated and aborted patient still have no movement from waist down. Nurse aware and monitoring patient. We will continue to follow up with the patient for therapy. Thank you.

## 2017-07-26 NOTE — PROGRESS NOTES
Primary Nurse Yue Degroot RN and Emmanuel Saenz RN performed a dual skin assessment on this patient No impairment noted  Mario score is 19

## 2017-07-26 NOTE — OP NOTES
OPERATIVE REPORT     Admit Date: 7/26/2017  Admit Diagnosis: LEFT HIP ARTHRITIS  Osteoarthritis (arthritis due to wear and tear of joints)  Preoperative Diagnosis: LEFT HIP ARTHRITIS  Postoperative Diagnosis: LEFT HIP ARTHRITIS    Procedure: Procedure(s):  LEFT TOTAL HIP ARTHROPLASTY DIRECT LATERAL  Surgeon: Shanita Mosqueda MD  Assistant(s): None  Anesthesia: Spinal   Estimated Blood Loss: 200cc  Specimens: * No specimens in log *   Complications: None        INDICATIONS:    The patient is a 70 y.o., male who has complained of left hip pain. The patient has failed conservative treatment and presents for definitive operative care. Informed consent was obtained including a discussion of the risks and benefits, which include, but are not limited to, bleeding, infection, neurovascular damage, wound complications, pain and stiffness in the hip, periprosthetic loosening, fracture, dislocation and venous thrombo-embolic disease, the patient consented for the procedure with both verbal and written consent. DESCRIPTION OF PROCEDURE:          The correct hip was identified and signed with my initials in the preoperative holding area. All questions were answered. The patient was positioned lateral decubitus on the operating room table with the operative hip up. After adequate anesthesia, the hip was prepped and draped in sterile fashion. A direct lateral approach was used through skin and down to the Iliotibial band which was marked using a surgical marking pen. The Iliotibial band was split in-line with it's fibers. The gluteus medius was exposed and split at the mid-point of the insertion into the greater trochanter. The muscle was marked with a separate surgical marker and two tag sutures were placed in a horizontal mattress fashion. Along with capsule this was elevated as a unit and a gentle dislocation of the hip was performed.  A standard neck cut was made according to the implant geometry and the femoral head was removed. Acetabular exposure was then obtained and the labrum was excised along with the foveal contents. Reaming in an anatomic position was then performed starting with an odd-sized reamer 8 sizes below the templated acetabular cup size. Sequential reaming was performed up to the final reamer size with good overall contact. Osteophytes were removed at this point. Final cup position with version and inclination were checked and verified. The transverse acetabular ligament was used as a landmark. The final cup was impacted in place. Screw fixation was not used and then the liner placed. The femur was then exposed, residual soft tissue was removed and the box osteotome was used along with the entry reamer to open the canal. Sequential broaching was started with the zero broach and broached up to the final implant size. The final femoral stem was then placed. Trials were performed and leg lengths were verified. The final head was impacted in place. Stability and leg lengths were assessed again and verified. The final implants were irrigated. The abductor repair was closed with a free needle to approximate and then with 0-Vicryl. The wound was irrigated again and the Iliotibial band was closed with 0-Vicryl and #2 Strattafix barbed suture. The wound was irrigated and the sub-Q was closed with 2-0 Vicryl, 4-0 monocryl and dermabond. A sterile dressing was applied. The patient was awoken from anesthesia and taken to the recovery room in a stable condiition. OPERATIVE FINDINGS : Severe OA of the left hip    IMPLANTS :     Implant Name Type Inv.  Item Serial No.  Lot No. LRB No. Used Action   INSERT ACET 0DEG 36MM E X3 -- TRIDENT - SN/A  INSERT ACET 0DEG 36MM E X3 -- TRIDENT N/A [employer] HOW [id number] Left 1 Implanted   TACHO TRITANIUM HEMISPHERICAL CLUSTER HOLE SHELL SIZE 54MM ALPH CDE E   N/A [employer] HOW [id number] Left 1 Implanted   STEM FEM SZ 5 132D [id number] -- ACCOLADE II V40 - SN/A  STEM FEM SZ 5 132D [id number] -- ACCOLADE II V40 N/A [employer] HOW [id number] Left 1 Implanted   HEAD FEM DELT V40 -5MM NK 36MM -- V40 BIOLOX - SN/A   HEAD FEM DELT V40 -5MM NK 36MM -- V40 BIOLOX N/A [employer] HOW [id number] Left 1 Implanted       JUSTIFICATION FOR SURGICAL ASSISTANT:   Surgical Assistant physician assistant was requried and necessary in this case, to help with soft tissue retraction, extremity positioning, equiment management, implant management, and wound closure.      Kylee Santo MD

## 2017-07-26 NOTE — PROGRESS NOTES
Data collection time discrepnacy - 1st data set from monitor at 1454 - in PACU at 1459 - Approximate 6 min lag. PACU IN REPORT FROM ANESTHESIA    Verbal report received from   [facility]   +SRNA    following Spinal for Procedure(s) (LRB):  LEFT TOTAL HIP ARTHROPLASTY DIRECT LATERAL (Left) by  Carlitos Ruelas MD.    Note the anesthesia record for medications given intraoperatively. Brief Initial Visual Assessment:    Patient is an  Geriatric Adult,  And is  not crying and is  not consolable. Patient is:  DROWSY    ALERT    CALM & COOPERATIVE      Oriented x 4 (Person, Place, Time and Situation.)     Airway is:   Patent    Respiratory pattern is:    Even, Non-labored. Skin is:   Pink, Warm and Dry. Membranes are:    Pink and Moist.    Patient is in:    No Acute Discomfort. 0/10 pain using Verbal Numeric  Scale. - Note E-MAR for medications administered. Note assessments documented in flowsheets;any assessment variants to be found in comments or narrative perioperative nurse notes.        Post-anesthesia care now assumed, record signed by Cassidy DELANEY, RN-BC

## 2017-07-26 NOTE — IP AVS SNAPSHOT
Yvonne Garza 
 
 
 [address] [facility] 
[phone] Patient: Jose Dixon MRN: [id number] :1945 You are allergic to the following Allergen Reactions Penicillins Hives Ancef graded challenge done 17, tolerated well, no reaction noted Statins-Hmg-Coa Reductase Inhibitors Other (comments) Elevated liver and bilirubin enzgmes Recent Documentation Height Weight BMI Smoking Status 1.702 m 82.2 kg 28.38 kg/m2 Former Smoker Emergency Contacts Name Discharge Info Relation Home Work Mobile Maddie Salter DISCHARGE CAREGIVER [3] Daughter [21] [phone] About your hospitalization You were admitted on:  2017 You last received care in the:  [facility] 4M POST SURG ORT 2 You were discharged on:  2017 Unit phone number:  [phone] Why you were hospitalized Your primary diagnosis was:  Not on File Your diagnoses also included:  Osteoarthritis (Arthritis Due To Wear And Tear Of Joints), Hip Arthritis Providers Seen During Your Hospitalizations Provider Role Specialty Primary office phone Nano Goldberg MD Attending Provider Orthopedic Surgery [phone] Your Primary Care Physician (PCP) Primary Care Physician Office Phone Office Fax Timothy Mccormick [phone] Follow-up Information Follow up With Details Comments Contact Info AT [address] [facility].   [address] [facility] [address] [phone] Santiago Moralez MD   [address] [facility] [address] [facility] 
[phone] Current Discharge Medication List  
  
START taking these medications Dose & Instructions Dispensing Information Comments Morning Noon Evening Bedtime  
 aspirin 325 mg tablet Commonly known as:  ASPIRIN Your last dose was: Your next dose is:    
   
   
 Dose:  325 mg Take 1 Tab by mouth two (2) times a day. Quantity:  60 Tab Refills:  0  
     
   
   
   
  
 bisacodyl 10 mg suppository Commonly known as:  DULCOLAX (BISACODYL) Your last dose was: Your next dose is:    
   
   
 Dose:  10 mg Insert 10 mg into rectum daily. Quantity:  2 Suppository Refills:  0  
     
   
   
   
  
 ondansetron 8 mg disintegrating tablet Commonly known as:  ZOFRAN ODT Your last dose was: Your next dose is:    
   
   
 Dose:  4 mg Take 0.5 Tabs by mouth every eight (8) hours as needed for Nausea. Quantity:  30 Tab Refills:  0  
     
   
   
   
  
 oxyCODONE IR 5 mg immediate release tablet Commonly known as:  Elias Seats Your last dose was: Your next dose is:    
   
   
 Dose:  5 mg Take 1 Tab by mouth every eight (8) hours as needed for Pain. Max Daily Amount: 15 mg. Quantity:  60 Tab Refills:  0  
     
   
   
   
  
 oxyCODONE-acetaminophen 7.5-325 mg per tablet Commonly known as:  PERCOCET 7.5 Your last dose was: Your next dose is:    
   
   
 Dose:  1-2 Tab Take 1-2 Tabs by mouth every four (4) hours as needed for Pain. Max Daily Amount: 12 Tabs. For Post-op Pain Control Quantity:  70 Tab Refills:  0 CONTINUE these medications which have CHANGED Dose & Instructions Dispensing Information Comments Morning Noon Evening Bedtime  
 traMADol 50 mg tablet Commonly known as:  ULTRAM  
What changed:  additional instructions Your last dose was: Your next dose is:    
   
   
 Dose:  50 mg Take 1 Tab by mouth every six (6) hours as needed for Pain. Max Daily Amount: 200 mg. Quantity:  60 Tab Refills:  0 CONTINUE these medications which have NOT CHANGED Dose & Instructions Dispensing Information Comments Morning Noon Evening Bedtime amLODIPine 10 mg tablet Commonly known as:  Salbador Hess Your last dose was: Your next dose is:    
   
   
 Dose:  10 mg Take 10 mg by mouth daily (after breakfast). Refills:  0  
     
   
   
   
  
 diclofenac EC 75 mg EC tablet Commonly known as:  VOLTAREN Your last dose was: Your next dose is: Take  by mouth two (2) times a day. Refills:  0  
     
   
   
   
  
 fenofibrate 160 mg tablet Commonly known as:  LOFIBRA Your last dose was: Your next dose is:    
   
   
 Dose:  160 mg Take 160 mg by mouth daily (after breakfast). Refills:  0 KLOR-CON M10 10 mEq tablet Generic drug:  potassium chloride Your last dose was: Your next dose is:    
   
   
 Dose:  10 mEq Take 10 mEq by mouth daily (after breakfast). Refills:  0  
     
   
   
   
  
 lisinopril 40 mg tablet Commonly known as:  Therisa Semen Your last dose was: Your next dose is:    
   
   
 Dose:  40 mg Take 40 mg by mouth daily (after breakfast). Refills:  0  
     
   
   
   
  
 metFORMIN 500 mg tablet Commonly known as:  GLUCOPHAGE Your last dose was: Your next dose is:    
   
   
 Dose:  1000 mg Take 1,000 mg by mouth two (2) times daily (with meals). Refills:  0  
     
   
   
   
  
 propranolol 20 mg tablet Commonly known as:  INDERAL Your last dose was: Your next dose is:    
   
   
 Dose:  20 mg Take 20 mg by mouth daily as needed. Takes Every Morning Before Breakfast.  
 Refills:  0  
     
   
   
   
  
 triamterene-hydroCHLOROthiazide 37.5-25 mg per tablet Commonly known as:  Albesa Flatten Your last dose was: Your next dose is:    
   
   
 Dose:  1 Tab Take 1 Tab by mouth Daily (before breakfast). Refills:  0 ZETIA PO Your last dose was:     
   
Your next dose is:    
   
   
 Dose:  10 mg  
 Take 10 mg by mouth Daily (before breakfast). Refills:  0 STOP taking these medications HYDROcodone-acetaminophen 7.5-325 mg per tablet Commonly known as:  Brad Ayers Where to Get Your Medications Information on where to get these meds will be given to you by the nurse or doctor. ! Ask your nurse or doctor about these medications  
  aspirin 325 mg tablet  
 bisacodyl 10 mg suppository  
 ondansetron 8 mg disintegrating tablet  
 oxyCODONE IR 5 mg immediate release tablet  
 oxyCODONE-acetaminophen 7.5-325 mg per tablet  
 traMADol 50 mg tablet Discharge Instructions TOTAL HIP DISCHARGE INSTRUCTIONS Patient: Michael Allison MRN: [id number]  SSN: [id number] Please take the time to review the following instructions before you leave the hospital and use them as guidelines during your recovery from surgery. If you have any questions you may contact my office at [phone]. After hours or during the weekend you may reach me personally at [phone] if there is an emergency. SPECIAL INSTRUCTIONS :  
1. Do not bend greater than 90 degrees at the hip for 4 weeks following your discharge 2. Avoid exercises or activities which bring the leg out or away from the mid-line of the body. The surgical repair involves this muscle and it will require 4 weeks to heal. You may disregard these instructions for a direct anterior approach. 3. You may walk as tolerated and are encouraged to work daily on progressing your activities with a walker initially. 4. You may transition to a cane for walking 5-7 days from surgery once you feel safe. You may use a walker for longer periods if you feel unstable. Wound Care/ Dressing Changes: DRESSING :  
 
Honey Comb Dressing : This may be removed to shower at 72 hours.  This is used only in the hospital. Other dressing options can be purchased over the counter at a local pharmacy or medical supply vendor. A porous adhesive dressing such as pictured above can be purchased at a local [employer] or [employer]. You only need to keep the incision covered for 7 days after showers. A dressing may be used for longer if there are issues with clothing clinging to the incision. Showering/ Bathing: If your incision is dry without drainage you may shower following your discharge home. Your dressing should be removed for showering. It is fine to have water run over the incision. Do not vigorously scrub your incision. Apply a clean, dry dressing after you have dried your incision. Do not take a bath or get into a swimming pool / Junita My until you follow up with Dr. Obdulio Benoit. Do not soak your incision under water. If there is continued drainage or you are concerned contact Dr Taiwo Torres office prior to showering [phone] ext [phone]. Showering/ Bathing: If your incision is dry without drainage you may shower following your discharge home. Your dressing should be removed for showering. It is fine to have water run over the incision. Do not vigorously scrub your incision. Apply a clean, dry dressing after you have dried your incision. Do not take a bath or get into a swimming pool / Junita My until you follow up with Dr. Obdulio Benoit. Do not soak your incision under water. If there is continued drainage or you are concerned contact Dr Taiwo Torres office prior to showering [phone] ext [phone]. Diet: 
You may advance to your regular diet as tolerated. Increase your clear liquid intake for the next 2-3 days. Nutrition Recommendations for Discharge:          
 
Continue Oral Nutrition Supplements at discharge: Ensure Active High Protein for Muscle Health 1-2 times per day 
for 30 days unless otherwise directed by your Primary Care Physician.   
This product can be purchased at your local grocery store or drug store and online. [address] Medication: 
 
 
1. You will be given prescriptions for pain medication when you are discharged from the hospital. The side effects of these medications can be substantial and the narcotic medications are not mandatory. You may substitute these medications with Tylenol or Alleve / Motrin. 2.  Please use the medications as prescribed. Pain medications may cause constipation- Colace twice daily and Miralax two scoops 2-3 times a day while taking the narcotic medication should help prevent constipation. Other possible side effects of pain medication are dizziness, headache, nausea, vomiting, and urinary retention. Discontinue the pain medication if you develop itching, rash, shortness of breath, or difficulties swallowing. If these symptoms become severe or are not relieved by discontinuing the medication, you should seek immediate medical attention. 3. Refills of pain medication are authorized during office hours only (8 AM- 5 PM  Monday thru Friday). Many of these medication will require you or a family member to pick-up a physical prescription at the office. 4. Medications other than antiinflammatories will not be called into the pharmacy after business hours. 5. Do not take Tylenol/Acetaminophen in addition to your pain medication as most pain medications already contain this ingredient. Do not exceed 4000mg of Tylenol/Acetaminophen per day. 6. You may resume the medication(s) you were taking prior to your surgery. Narcotics may change the effects of some antidepressant medication(s). If you have any questions about possible interactions between your regular medications and the pain medication, you should ask the pharmacist or contact the prescribing physician.  
7. You will be discharged with prescriptions for additional pain medications (Tramadol or Toradol) and a medication for nausea and vomiting (Phenergan). You only need to fill these prescriptions if the primary pain medication is not working or you experiencing post-op nausea. 8. If you have constipation which is not improved by oral stool softeners then a Ducolax suppository should be purchased over the counter. 9. Continue the blood thinner (Aspirin or Lovenox) for a total of 30 days following surgery. Follow up appointment: 
 
Please call our office at [phone] for your follow up appointment. This should be scheduled 14 days following the date of surgery. Physical Therapy / Nursing: 
 
Physical Therapy following surgery will be arranged at home along with at home nursing care. They have specific instructions for rehab and wound care. .  
 
Returning to work: 
 
Normal return to work is 3-12 weeks following total hip replacement. Depending on your progression following surgery and specific job duties you may take longer for a full return to work. DRIVING You should not return to driving until you are off all narcotic pain medications and able to safely and quickly apply the brakes. This is normally 3-6 weeks for left hips and 4-8 weeks for right hip. Important Signs and Symptoms: 
 
If any of the following signs or symptoms occur, you should contact Dr. Maximiliano Virgen office. Please be advised if a problem arises which you feel requires immediate medical attention or you are unable to contact Dr. Maximiliano Virgen office you should seek immediate medical attention at the ER or other health care facility you have access to. 
 
1. A sudden increase in swelling and/or redness or warmth at the area your surgery was performed which isnt relieved by rest, ice, and elevation. 2. Oral temperature greater than 101 degrees for 12 hours or more which isnt relieved by an increase in fluid intake and taking 2 Tylenol every 4-6 hours. 3. Excessive drainage from your incisions, or drainage which hasnt stopped by 72 hours after your surgery. 4. Fever, chills, shortness of breath, chest pain, nausea, vomiting or other signs and symptoms which are of concern to you. frequently asked questions ? What should I take for pain? 
o In general you will be discharged with three medications for pain (Percocet 7.5 / 325mg, Oxycodone 5mg and Tramadol). This may vary slightly depending on what you were taking in the hospital.  
? 1st Line  Percocet 1-2 tablets every 4-6 hrs (Or as directed). ? This is the first and only medication you need to take. Initially you may need 2 tablets every 4 hours, but as your pain subsides, this will taper to 1 tablet every 6 hours. ? 2nd Line  Oxycodone 1 every 8 hours (Or as directed), take these between Percocet doses if your pain is not below 4 / 10. This may be needed only for several days following your discharge. ? Oxycodone may be taken more frequently if needed 1-2 tablets every 4-6 hours if the pain is severe between Percocet doses. ? 3rd Line  Tramadol  Take this medication as directed if the Percocet and Oxycodone are not working. Once you taper off Percocet, this medication may also be used. ? 4th Line  Add Alleve 220mg every 12 hours or Motrin 400mg (200mg x 2) every 8 hours ? When should I call for advice regarding my pain? 
o After 12 hours on the above regimen, if nothing is working call the office ([phone]) or call my cell after hours [phone]. 
? Can I get refills? 
o Narcotic refills are provided for the first 6 weeks following surgery. ? I will generally try to taper down to a single narcotic medication by your two week appointment. o Try Tylenol 650mg along with Alleve 220mg or Motrin 200mg during the majority of the day. ? Save the narcotic pain medications for physical therapy (1 hour prior) and before sleeping at night. ? Keep in mind you need to discontinue these medications prior to returning to driving.   
? Is swelling normal? 
 o Almost everyone has some degree of swelling following surgery. o Following hip and knee replacement surgery, swelling can be normal below the incision for the first 1-2 weeks. ? This swelling peaks around 5-7 days after surgery. ? You may have some bruising around the back of the thigh, calf and even into the foot. ? What should I do for the swelling? 
o Keep the limb elevated. o Apply compression socks (knee high for total knees and up to the mid-thigh for total hips.  
o Heat or ice may be applied, choose the modality that makes you the most comfortable. ? How long should I remain on blood thinners following surgery? 
o Thirty days ? When can I drive? 
o Once you have stopped using regular narcotic pain medications (Percocet, Lortab, etc.) and can safely apply the brakes without hesitation. ? When can I shower? o 72hours following surgery if the incision is dry. 
o No submersion of the incision, bathing or swimming for 14 days following surgery or until cleared by Dr Adan Anand. ? What do I do with the dressing when I shower? 
o The dressing can be removed. o The incision is sealed with Dermabond (Biologic glue) and except for wounds which are draining should be watertight. ? How active should I be following surgery? o Progress activities in moderation at your own pace.  
o Walk each day and set progressive goals with small increments (1st week  ½ block of walking, 2nd week  1 block, 3rd week  2 blocks, etc.) Please do not hesitate to call me at [phone] (cell phone) for questions following surgery - Kat Flores MD 
 
 
 
 
 
Discharge Orders None [employer] Announcement We are excited to announce that we are making your provider's discharge notes available to you in [employer]. You will see these notes when they are completed and signed by the physician that discharged you from your recent hospital stay.   If you have any questions or concerns about any information you see in [facility], please call the Health Information Department where you were seen or reach out to your Primary Care Provider for more information about your plan of care. General Information Please provide this summary of care documentation to your next provider. Patient Signature:  ____________________________________________________________ Date:  ____________________________________________________________  
  
Gloria Oka Provider Signature:  ____________________________________________________________ Date:  ____________________________________________________________

## 2017-07-26 NOTE — PROGRESS NOTES
POST ANESTHESIA CARE   DISCHARGE or TRANSFER NOTE    Michael Almazan was   transfered      via     Bed  To  hospital room 433 . Patient was escorted by    nurse   . Patient verbalized   appreciation and was very pleased with care received   throughout their stay. Patient was discharged in   pleasant mood . Pain at discharge/transfer was     0 /10. Discharge, medication and follow-up instructions were verbalized as understood prior to discharge  (if applicable for same-day procedures being discharged.)    All personal belongings have been returned to patient, and patient/family verbally confirm receiving belongings as all present. TRANSFER - OUT REPORT:    Verbal report given to   Jaki MEI  receiving   Michael Almazan   and being transferred   to  [address] [place] [address]      for routine post - op       Patient Situation, Background, Assessment and Recommendations (SBAR) was provided and included information from the following SBAR report(s) (SBAR, OR Summary and MAR) which were reviewed with the receiving nurse. Lines:   Peripheral IV 07/26/17 Right;Mid Forearm (Active)   Site Assessment Clean, dry, & intact 7/26/2017  2:55 PM   Phlebitis Assessment 0 7/26/2017  2:55 PM   Infiltration Assessment 0 7/26/2017  2:55 PM   Dressing Status Clean, dry, & intact 7/26/2017  2:55 PM   Dressing Type Transparent;Tape 7/26/2017  2:55 PM   Hub Color/Line Status Patent; Infusing 7/26/2017  2:55 PM      Also Reviewed (checked if appropriate):  []  PCA Drug and Settings      []  On-Q @ * ml/hr      [x]  Cold Therapy with extra gels  []  Drains x *      []  Significant Wound care/devices (e.g. Wound vac, etc.)  []  Vent Settings      []  Critical Care Drips  Contact Precautions: There are currently no Active Isolations  Patient transported with:  Registered Nurse    After report, opportunity for questions and clarification was provided. Chance DELANEY RN-BC Clin. IV

## 2017-07-26 NOTE — ANESTHESIA PROCEDURE NOTES
Spinal Block    Start time: 7/26/2017 12:39 PM  End time: 7/26/2017 12:49 PM  Performed by: Loren Orourke  Authorized by: Loren Orourke     Pre-procedure:   Indications: at surgeon's request and primary anesthetic  Preanesthetic Checklist: patient identified, risks and benefits discussed, anesthesia consent, site marked, patient being monitored and timeout performed    Timeout Time: 12:39          Spinal Block:   Patient Position:  Seated  Prep Region:  Lumbar  Prep: DuraPrep      Location:  L3-4  Technique:  Single shot        Needle:   Needle Type:  Pencan  Needle Gauge:  25 G  Attempts:  1      Events: CSF confirmed, no blood with aspiration and no paresthesia        Assessment:  Insertion:  Uncomplicated  Patient tolerance:  Patient tolerated the procedure well with no immediate complications

## 2017-07-26 NOTE — ANESTHESIA POSTPROCEDURE EVALUATION
Post-Anesthesia Evaluation and Assessment    Patient: Jill Diego MRN: [id number]  SSN: [id number]    YOB: 1945  Age: 70 y.o. Sex: male       Cardiovascular Function/Vital Signs  Visit Vitals    /73    Pulse (!) 54    Temp 36.4 °C (97.5 °F)    Resp 13    Ht 5' 7\" (1.702 m)    Wt 82.2 kg (181 lb 3.5 oz)    SpO2 100%    BMI 28.38 kg/m2       Patient is status post spinal anesthesia for Procedure(s):  LEFT TOTAL HIP ARTHROPLASTY DIRECT LATERAL. Nausea/Vomiting: None    Postoperative hydration reviewed and adequate. Pain:  Pain Scale 1: Numeric (0 - 10) (07/26/17 1454)  Pain Intensity 1: 0 (07/26/17 1454)   Managed    Neurological Status:   Neuro (WDL): Exceptions to WDL (07/26/17 1454)  Neuro  Neurologic State: Drowsy; Appropriate for age (07/26/17 1454)  LLE Motor Response: Numbness; No movement to any stimulus; Pharmacologically paralyzed (07/26/17 1454)  RLE Motor Response: Numbness; No movement to any stimulus; Pharmacologically paralyzed (07/26/17 1454)   At baseline    Mental Status and Level of Consciousness: Arousable    Pulmonary Status:   O2 Device: Room air (07/26/17 1505)   Adequate oxygenation and airway patent    Complications related to anesthesia: None    Post-anesthesia assessment completed.  No concerns    Signed By: Wesley Reynolds MD     July 26, 2017

## 2017-07-26 NOTE — IP AVS SNAPSHOT
Tobi Mohr 
 
 
 [address] [place] [address] 
[phone] Patient: Marya Hernandez MRN: [id number] :1945 Current Discharge Medication List  
  
START taking these medications Dose & Instructions Dispensing Information Comments Morning Noon Evening Bedtime  
 aspirin 325 mg tablet Commonly known as:  ASPIRIN Your last dose was: Your next dose is:    
   
   
 Dose:  325 mg Take 1 Tab by mouth two (2) times a day. Quantity:  60 Tab Refills:  0  
     
   
   
   
  
 bisacodyl 10 mg suppository Commonly known as:  DULCOLAX (BISACODYL) Your last dose was: Your next dose is:    
   
   
 Dose:  10 mg Insert 10 mg into rectum daily. Quantity:  2 Suppository Refills:  0  
     
   
   
   
  
 ondansetron 8 mg disintegrating tablet Commonly known as:  ZOFRAN ODT Your last dose was: Your next dose is:    
   
   
 Dose:  4 mg Take 0.5 Tabs by mouth every eight (8) hours as needed for Nausea. Quantity:  30 Tab Refills:  0  
     
   
   
   
  
 oxyCODONE IR 5 mg immediate release tablet Commonly known as:  Nilam Gomes Your last dose was: Your next dose is:    
   
   
 Dose:  5 mg Take 1 Tab by mouth every eight (8) hours as needed for Pain. Max Daily Amount: 15 mg. Quantity:  60 Tab Refills:  0  
     
   
   
   
  
 oxyCODONE-acetaminophen 7.5-325 mg per tablet Commonly known as:  PERCOCET 7.5 Your last dose was: Your next dose is:    
   
   
 Dose:  1-2 Tab Take 1-2 Tabs by mouth every four (4) hours as needed for Pain. Max Daily Amount: 12 Tabs. For Post-op Pain Control Quantity:  70 Tab Refills:  0 CONTINUE these medications which have CHANGED Dose & Instructions Dispensing Information Comments Morning Noon Evening Bedtime  
 traMADol 50 mg tablet Commonly known as:  Lalo Fulton  
 What changed:  additional instructions Your last dose was: Your next dose is:    
   
   
 Dose:  50 mg Take 1 Tab by mouth every six (6) hours as needed for Pain. Max Daily Amount: 200 mg. Quantity:  60 Tab Refills:  0 CONTINUE these medications which have NOT CHANGED Dose & Instructions Dispensing Information Comments Morning Noon Evening Bedtime  
 amLODIPine 10 mg tablet Commonly known as:  Sundra Eastern Your last dose was: Your next dose is:    
   
   
 Dose:  10 mg Take 10 mg by mouth daily (after breakfast). Refills:  0  
     
   
   
   
  
 diclofenac EC 75 mg EC tablet Commonly known as:  VOLTAREN Your last dose was: Your next dose is: Take  by mouth two (2) times a day. Refills:  0  
     
   
   
   
  
 fenofibrate 160 mg tablet Commonly known as:  LOFIBRA Your last dose was: Your next dose is:    
   
   
 Dose:  160 mg Take 160 mg by mouth daily (after breakfast). Refills:  0 KLOR-CON M10 10 mEq tablet Generic drug:  potassium chloride Your last dose was: Your next dose is:    
   
   
 Dose:  10 mEq Take 10 mEq by mouth daily (after breakfast). Refills:  0  
     
   
   
   
  
 lisinopril 40 mg tablet Commonly known as:  Pecola Cypher Your last dose was: Your next dose is:    
   
   
 Dose:  40 mg Take 40 mg by mouth daily (after breakfast). Refills:  0  
     
   
   
   
  
 metFORMIN 500 mg tablet Commonly known as:  GLUCOPHAGE Your last dose was: Your next dose is:    
   
   
 Dose:  1000 mg Take 1,000 mg by mouth two (2) times daily (with meals). Refills:  0  
     
   
   
   
  
 propranolol 20 mg tablet Commonly known as:  INDERAL Your last dose was:     
   
Your next dose is:    
   
   
 Dose:  20 mg  
 Take 20 mg by mouth daily as needed. Takes Every Morning Before Breakfast.  
 Refills:  0  
     
   
   
   
  
 triamterene-hydroCHLOROthiazide 37.5-25 mg per tablet Commonly known as:  Bry Benson Your last dose was: Your next dose is:    
   
   
 Dose:  1 Tab Take 1 Tab by mouth Daily (before breakfast). Refills:  0 ZETIA PO Your last dose was: Your next dose is:    
   
   
 Dose:  10 mg Take 10 mg by mouth Daily (before breakfast). Refills:  0 STOP taking these medications HYDROcodone-acetaminophen 7.5-325 mg per tablet Commonly known as:  Brissa Auguste Where to Get Your Medications Information on where to get these meds will be given to you by the nurse or doctor. ! Ask your nurse or doctor about these medications  
  aspirin 325 mg tablet  
 bisacodyl 10 mg suppository  
 ondansetron 8 mg disintegrating tablet  
 oxyCODONE IR 5 mg immediate release tablet  
 oxyCODONE-acetaminophen 7.5-325 mg per tablet  
 traMADol 50 mg tablet

## 2017-07-27 LAB
ANION GAP BLD CALC-SCNC: 12 MMOL/L (ref 5–15)
BUN SERPL-MCNC: 27 MG/DL (ref 6–20)
BUN/CREAT SERPL: 28 (ref 12–20)
CALCIUM SERPL-MCNC: 8.3 MG/DL (ref 8.5–10.1)
CHLORIDE SERPL-SCNC: 105 MMOL/L (ref 97–108)
CO2 SERPL-SCNC: 24 MMOL/L (ref 21–32)
CREAT SERPL-MCNC: 0.95 MG/DL (ref 0.7–1.3)
GLUCOSE BLD STRIP.AUTO-MCNC: 116 MG/DL (ref 65–100)
GLUCOSE BLD STRIP.AUTO-MCNC: 146 MG/DL (ref 65–100)
GLUCOSE BLD STRIP.AUTO-MCNC: 167 MG/DL (ref 65–100)
GLUCOSE BLD STRIP.AUTO-MCNC: 168 MG/DL (ref 65–100)
GLUCOSE SERPL-MCNC: 131 MG/DL (ref 65–100)
HGB BLD-MCNC: 10.6 G/DL (ref 12.1–17)
POTASSIUM SERPL-SCNC: 4.1 MMOL/L (ref 3.5–5.1)
SERVICE CMNT-IMP: ABNORMAL
SODIUM SERPL-SCNC: 141 MMOL/L (ref 136–145)

## 2017-07-27 PROCEDURE — 82962 GLUCOSE BLOOD TEST: CPT

## 2017-07-27 PROCEDURE — 97165 OT EVAL LOW COMPLEX 30 MIN: CPT

## 2017-07-27 PROCEDURE — 97530 THERAPEUTIC ACTIVITIES: CPT

## 2017-07-27 PROCEDURE — 65270000029 HC RM PRIVATE

## 2017-07-27 PROCEDURE — 80048 BASIC METABOLIC PNL TOTAL CA: CPT | Performed by: ORTHOPAEDIC SURGERY

## 2017-07-27 PROCEDURE — 97116 GAIT TRAINING THERAPY: CPT

## 2017-07-27 PROCEDURE — 74011250636 HC RX REV CODE- 250/636: Performed by: ORTHOPAEDIC SURGERY

## 2017-07-27 PROCEDURE — 74011250637 HC RX REV CODE- 250/637: Performed by: ORTHOPAEDIC SURGERY

## 2017-07-27 PROCEDURE — 97161 PT EVAL LOW COMPLEX 20 MIN: CPT

## 2017-07-27 PROCEDURE — 36415 COLL VENOUS BLD VENIPUNCTURE: CPT | Performed by: ORTHOPAEDIC SURGERY

## 2017-07-27 PROCEDURE — 97535 SELF CARE MNGMENT TRAINING: CPT

## 2017-07-27 PROCEDURE — 74011250637 HC RX REV CODE- 250/637: Performed by: ANESTHESIOLOGY

## 2017-07-27 PROCEDURE — 85018 HEMOGLOBIN: CPT | Performed by: ORTHOPAEDIC SURGERY

## 2017-07-27 RX ADMIN — FAMOTIDINE 20 MG: 20 TABLET, FILM COATED ORAL at 17:14

## 2017-07-27 RX ADMIN — OXYCODONE HYDROCHLORIDE 10 MG: 10 TABLET, FILM COATED, EXTENDED RELEASE ORAL at 08:21

## 2017-07-27 RX ADMIN — OXYCODONE HYDROCHLORIDE 10 MG: 5 TABLET ORAL at 10:40

## 2017-07-27 RX ADMIN — OXYCODONE HYDROCHLORIDE 10 MG: 5 TABLET ORAL at 14:13

## 2017-07-27 RX ADMIN — AMLODIPINE BESYLATE 10 MG: 5 TABLET ORAL at 08:19

## 2017-07-27 RX ADMIN — SODIUM CHLORIDE 125 ML/HR: 900 INJECTION, SOLUTION INTRAVENOUS at 02:23

## 2017-07-27 RX ADMIN — Medication 10 ML: at 07:15

## 2017-07-27 RX ADMIN — ACETAMINOPHEN 650 MG: 325 TABLET ORAL at 11:53

## 2017-07-27 RX ADMIN — OXYCODONE HYDROCHLORIDE 5 MG: 5 TABLET ORAL at 21:18

## 2017-07-27 RX ADMIN — DOCUSATE SODIUM -SENNOSIDES 1 TABLET: 50; 8.6 TABLET, COATED ORAL at 08:19

## 2017-07-27 RX ADMIN — POLYETHYLENE GLYCOL 3350 17 G: 17 POWDER, FOR SOLUTION ORAL at 08:21

## 2017-07-27 RX ADMIN — FAMOTIDINE 20 MG: 20 TABLET, FILM COATED ORAL at 08:20

## 2017-07-27 RX ADMIN — TRIAMTERENE AND HYDROCHLOROTHIAZIDE 1 TABLET: 37.5; 25 TABLET ORAL at 07:14

## 2017-07-27 RX ADMIN — FENOFIBRATE 145 MG: 145 TABLET ORAL at 08:19

## 2017-07-27 RX ADMIN — Medication 10 ML: at 21:19

## 2017-07-27 RX ADMIN — Medication 10 ML: at 14:00

## 2017-07-27 RX ADMIN — DOCUSATE SODIUM -SENNOSIDES 1 TABLET: 50; 8.6 TABLET, COATED ORAL at 17:14

## 2017-07-27 RX ADMIN — ASPIRIN 325 MG: 325 TABLET, DELAYED RELEASE ORAL at 17:14

## 2017-07-27 RX ADMIN — ACETAMINOPHEN 650 MG: 325 TABLET ORAL at 17:14

## 2017-07-27 RX ADMIN — CEFAZOLIN 2 G: 1 INJECTION, POWDER, FOR SOLUTION INTRAMUSCULAR; INTRAVENOUS; PARENTERAL at 04:31

## 2017-07-27 RX ADMIN — ACETAMINOPHEN 650 MG: 325 TABLET ORAL at 07:14

## 2017-07-27 RX ADMIN — ASPIRIN 325 MG: 325 TABLET, DELAYED RELEASE ORAL at 08:19

## 2017-07-27 RX ADMIN — OXYCODONE HYDROCHLORIDE 10 MG: 5 TABLET ORAL at 04:31

## 2017-07-27 RX ADMIN — OXYCODONE HYDROCHLORIDE 10 MG: 5 TABLET ORAL at 07:14

## 2017-07-27 RX ADMIN — POTASSIUM CHLORIDE 10 MEQ: 750 TABLET, FILM COATED, EXTENDED RELEASE ORAL at 08:20

## 2017-07-27 RX ADMIN — OXYCODONE HYDROCHLORIDE 10 MG: 10 TABLET, FILM COATED, EXTENDED RELEASE ORAL at 21:18

## 2017-07-27 RX ADMIN — EZETIMIBE 10 MG: 10 TABLET ORAL at 08:20

## 2017-07-27 NOTE — PROGRESS NOTES
Bedside and Verbal shift change report given to [place], RN (oncoming nurse) by Chuy Malagon RN (offgoing nurse). Report included the following information SBAR, Kardex and MAR.

## 2017-07-27 NOTE — PROGRESS NOTES
Problem: Self Care Deficits Care Plan (Adult)  Goal: *Acute Goals and Plan of Care (Insert Text)  Occupational Therapy Goals  Initiated 7/27/2017     1. Patient will perform lower body dressing using adaptive dressing aids at modified independence level within 7 days. 2. Patient will perform toilet transfers at supervision/set-up level using rolling walker within 7 days. 3. Patient will perform all aspects of toileting at minimal assistance level within 7 days. 4. Patient will demonstrate all lateral hip precautions without cues within 7 days. OCCUPATIONAL THERAPY EVALUATION  Patient: Shaylee Flores (89 y.o. male)  Date: 7/27/2017  Primary Diagnosis: LEFT HIP ARTHRITIS  Osteoarthritis (arthritis due to wear and tear of joints)  Hip arthritis  Procedure(s) (LRB):  LEFT TOTAL HIP ARTHROPLASTY DIRECT LATERAL (Left) 1 Day Post-Op   Precautions: fall, WBAT, Total hip-lateral approach       ASSESSMENT :  Based on the objective data described below, the patient presents with decreased activity tolerance following admission for L FRANSISCA, direct lateral approach. PTA, patient lived alone, has close support from his daughter who's a PT, and he managed all care independently. Patient was educated on all lateral hip precautions and required max verbal cues/reminders to maintain during activity. Patient was received in the chair and required min A for all OOB transfers using rolling walker; Initially needed up to mod A for sit-stand transfers however with repetition and verbal cues able to improved to min A. Patient was able to transfer into the bathroom for toilet transfer training; Recommend BSC over standard commode to ensure precautions are maintained with toileting hygiene. Patient is currently independent with UB ADLs and required mod to max A for LB ADLs. Patient demonstrated good understanding of education provided regarding safe transfer technique, body mechanics/positioning, and adaptive ADL techniques. Identified need for [facility] and hip kit during treatment; He is agreeable to discuss with daughter and arrange to have it at home for him by discharge. Patient reports that he will have a personal care aid stay with for several days post d/c from 8pm-8am (overnight) to assist as needed. Patient would benefit from continued skilled OT to progress towards goals and improve overall independence. Patient will benefit from skilled intervention to address the above impairments. Patients rehabilitation potential is considered to be Good  Factors which may influence rehabilitation potential include:   [X]             None noted  [ ]             Mental ability/status  [ ]             Medical condition  [ ]             Home/family situation and support systems  [ ]             Safety awareness  [ ]             Pain tolerance/management  [ ]             Other:        PLAN :  Recommendations and Planned Interventions:  [X]               Self Care Training                  [X]        Therapeutic Activities  [X]               Functional Mobility Training    [ ]        Cognitive Retraining  [X]               Therapeutic Exercises           [X]        Endurance Activities  [ ]               Balance Training                   [ ]        Neuromuscular Re-Education  [ ]               Visual/Perceptual Training     [X]   Home Safety Training  [X]               Patient Education                 [X]        Family Training/Education  [ ]               Other (comment):     Frequency/Duration: Patient will be followed by occupational therapy 5 times a week to address goals. Discharge Recommendations: To Be Determined  Further Equipment Recommendations for Discharge: hip kit, [facility]- agreeable to have at home prior to discharge       SUBJECTIVE:   Patient stated I think that will really help; I'll talk to my daughter.  re: [facility]      OBJECTIVE DATA SUMMARY:   HISTORY:   Past Medical History:   Diagnosis Date    Arthritis       knees and hips;  Right Shoulder    Chronic pain       hip, knes, right shoulder    Diabetes ([facility] [address] 2007    High cholesterol      Hypertension      Ill-defined condition       elevated cholesterol    Ill-defined condition       gilbert\"s syndrome    Ill-defined condition       essential tremors    Ill-defined condition 07/19/2017     overweight  BMI= 29.4     Past Surgical History:   Procedure Laterality Date    HX ORTHOPAEDIC Left       repair fractured arm age 10    HX TONSILLECTOMY         childhood    SHOULDER SURG PROC UNLISTED Left 2010     arthroplasty        Prior Level of Function/Home Situation: Patient lives alone. See assessment section for PLOF information. Home Situation  Home Environment: Private residence  # Steps to Enter: 2  One/Two Story Residence: Two story  # of Interior Steps: 13  Height of Each Step (in): 0 inches  Interior Rails: Left  Lift Chair Available: No  Living Alone: Yes  Support Systems: Child(juan)  Patient Expects to be Discharged to[de-identified] Private residence  Current DME Used/Available at Home: Safety frame toliet  Tub or Shower Type: Shower  [X]  Right hand dominant             [ ]  Left hand dominant     EXAMINATION OF PERFORMANCE DEFICITS:  Cognitive/Behavioral Status:  Neurologic State: Alert  Orientation Level: Oriented X4  Cognition: Appropriate for age attention/concentration; Appropriate decision making; Appropriate safety awareness; Follows commands  Perception: Appears intact  Perseveration: No perseveration noted  Safety/Judgement: Awareness of environment     Skin: Intact in the uppers     Edema: None noted in the uppers     Hearing:   Auditory  Auditory Impairment: Hard of hearing, bilateral, None     Vision/Perceptual:    Tracking: Able to track stimulus in all quadrants w/o difficulty    Diplopia: No    Acuity: Within Defined Limits    Corrective Lenses: Glasses     Range of Motion:  WDL in the uppers     Strength:  WDL in the uppers     Coordination:  Fine Motor Skills-Upper: Left Intact; Right Intact    Gross Motor Skills-Upper: Left Intact; Right Intact     Tone & Sensation:  Tone: normal  Sensation: intact in the uppers     Balance:  Sitting: Intact  Standing: Impaired  Standing - Static: Good  Standing - Dynamic : Fair     Functional Mobility and Transfers for ADLs:  Bed Mobility:  Rolling:  (pt was received up & requesting to remain up for lunch)  Supine to Sit: Minimum assistance  Sit to Supine: Minimum assistance  Scooting: Minimum assistance     Transfers:  Sit to Stand: Minimum assistance;Assist x1;Additional time  Stand to Sit: Minimum assistance;Assist x1;Additional time  Bed to Chair: Minimum assistance;Assist x1;Additional time  Toilet Transfer : Minimum assistance;Assist x1;Additional time     ADL Assessment:  Feeding: Independent     Oral Facial Hygiene/Grooming: Independent     Bathing: Moderate assistance     Upper Body Dressing: Independent     Lower Body Dressing: Maximum assistance     Toileting: Maximum assistance      Dressing joint: Patient instructed to don/doff left LE first/last.  Patient instructed to don all clothing while sitting prior to standing, doff all clothing to knees while standing, then sit to doff clothing off from knees to feet in order to facilitate fall prevention, pain management, and energy conservation. Patient indicated understanding/recalled strategies with verbal + visual cues. Home safety: Patient instructed on home modifications and safety (raise height of ADL objects, appropriate height of chair surfaces, recliner safety, change of floor surfaces, clear pathways) to increase independence and fall prevention. Patient indicated understanding. Standing: Patient instructed to walk up to sink/counter top/surfaces, step into walker to increase safety of joint and fall prevention. Patient educated about hip anatomy verbally and with pictures and educated to avoid internal rotation of left LE.   Instructed to apply concept to ADLs within the home (no reaching across body to left side, square off while using objects, slide objects along surfaces). Patient instructed to increase amount of time standing, observe standing position during ADLs in order to increase even weight bearing through bilateral LEs in order to increase independence with ADLs. Goal to be reached 30 days post - op, per orthopedic surgeon or per PT. Patient indicated understanding. Cognitive Retraining  Safety/Judgement: Awareness of environment     Functional Measure:  Barthel Index:      Bathin  Bladder: 10  Bowels: 10  Groomin  Dressin  Feeding: 10  Mobility: 0  Stairs: 0  Toilet Use: 0  Transfer (Bed to Chair and Back): 10  Total: 50         Barthel and G-code impairment scale:  Percentage of impairment CH  0% CI  1-19% CJ  20-39% CK  40-59% CL  60-79% CM  80-99% CN  100%   Barthel Score 0-100 100 99-80 79-60 59-40 20-39 1-19    0   Barthel Score 0-20 20 17-19 13-16 9-12 5-8 1-4 0      The Barthel ADL Index: Guidelines  1. The index should be used as a record of what a patient does, not as a record of what a patient could do. 2. The main aim is to establish degree of independence from any help, physical or verbal, however minor and for whatever reason. 3. The need for supervision renders the patient not independent. 4. A patient's performance should be established using the best available evidence. Asking the patient, friends/relatives and nurses are the usual sources, but direct observation and common sense are also important. However direct testing is not needed. 5. Usually the patient's performance over the preceding 24-48 hours is important, but occasionally longer periods will be relevant. 6. Middle categories imply that the patient supplies over 50 per cent of the effort. 7. Use of aids to be independent is allowed. Glendy Foss, Barthel, D.W. (8380). Functional evaluation: the Barthel Index. [address] [facility] (14)2.   Greg pearson [facility], NICK IBARRA. Steve Resendiz., Milana De Guzman., Howard Orr (1999). Measuring the change indisability after inpatient rehabilitation; comparison of the responsiveness of the Barthel Index and Functional [place] Measure. Journal of Neurology, Neurosurgery, and Psychiatry, 66(4), 446-881. OTIS Mckeon, TATE Gamez, & Christi Weinstein M.A. (2004.) Assessment of post-stroke quality of life in cost-effectiveness studies: The usefulness of the Barthel Index and the EuroQoL-5D. Quality of Life Research, 13, 782-25         G codes: In compliance with CMSs Claims Based Outcome Reporting, the following G-code set was chosen for this patient based on their primary functional limitation being treated: The outcome measure chosen to determine the severity of the functional limitation was the Barthel Index with a score of 50/100 which was correlated with the impairment scale.       · Self Care:               - CURRENT STATUS:    CK - 40%-59% impaired, limited or restricted               - GOAL STATUS:           CJ - 20%-39% impaired, limited or restricted               - D/C STATUS:                       ---------------To be determined---------------      Occupational Therapy Evaluation Charge Determination   History Examination Decision-Making   LOW Complexity : Brief history review  LOW Complexity : 1-3 performance deficits relating to physical, cognitive , or psychosocial skils that result in activity limitations and / or participation restrictions  LOW Complexity : No comorbidities that affect functional and no verbal or physical assistance needed to complete eval tasks       Based on the above components, the patient evaluation is determined to be of the following complexity level: LOW   Pain:  Pain Scale 1: Numeric (0 - 10)  Pain Intensity 1: 3  Pain Location 1: Hip  Pain Orientation 1: Left  Pain Description 1: Aching  Pain Intervention(s) 1: Medication (see MAR)      Activity Tolerance: Patient tolerated eval well. After treatment:   [X] Patient left in no apparent distress sitting up in chair  [ ] Patient left in no apparent distress in bed  [X] Call bell left within reach  [X] Nursing notified  [ ] Caregiver present  [X] Chair alarm activated      COMMUNICATION/EDUCATION:   The patients plan of care was discussed with: Physical Therapist, Registered Nurse and patient. .  [X] Home safety education was provided and the patient/caregiver indicated understanding. [X] Patient/family have participated as able in goal setting and plan of care. [X] Patient/family agree to work toward stated goals and plan of care. [ ] Patient understands intent and goals of therapy, but is neutral about his/her participation. [ ] Patient is unable to participate in goal setting and plan of care. This patients plan of care is appropriate for delegation to \[facility]\"".      Thank you for this referral.  Montana Welsh OT  Time Calculation: 46 mins

## 2017-07-27 NOTE — PROGRESS NOTES
7/27/2017  CARE MANAGEMENT NOTE:  CM is following pt for initial discharge planning. EMR reviewed. CM met with pt who was his own historian for this needs assessment. Reportedly, pt resides alone in a two story home with bedroom on the second floor. There are two entry steps thru the garage and 14 stairs between floor levels. PTA, pt was ambulatory with a singe point cane, indepn with ADLs, and drives. He states that his family resides locally but he has some concern with getting to appts as they work. [facility]. He does not have home healthcare and only a cane for home use. PCP is Dr. America Hodgkin. Pt has pvt duty set up with [facility] Friday, Sat., Sunday from 8 p - 8 a and on Monday until 3 p.m.  CM made home health referral for skilled nursing and PT to [facility] and they have accepted. Ordered rolling walker for home use thru [place] and it will be delivered to hospital room on Friday.   Kirit

## 2017-07-27 NOTE — PROGRESS NOTES
Bedside and Verbal shift change report given to [address] (oncoming nurse) by AMANDA Greene (offgoing nurse). Report given with SBAR, Kardex, OR Summary, Intake/Output, MAR and Recent Results.

## 2017-07-27 NOTE — PROGRESS NOTES
Rounded on [facility] and provided Anointing of the Sick at request of patient    Fr. Mak Theodore

## 2017-07-27 NOTE — PROGRESS NOTES
TOTAL HIP ARTHROPLASTY DAILY NOTE     ASSESSMENT / PLAN :   1. Pain Control : Excellent - Able to sleep and participate with therapy  2. Overnight Issues : None, some knee pain with dangling over the bed  3. Wound or incisional issue : Healing incision with no visible drainage  4. Therapy / Weight Bearing Recommendations : Weight bear as tolerated with use of a walker and two person assist while mobilizing  5. DVT Prophylaxis : Mechanical and Aspirin and mechanical lower extremity compression device  6. Disposition : Discharge to home with home health (maximum of 4 visits)  7. Medical Concerns : Stable  8. Comments : None       POD  1 Day Post-Op s/p Procedure(s):  LEFT TOTAL HIP ARTHROPLASTY DIRECT LATERAL     SUBJECTIVE :     Concerns : Stable. OBJECTIVE :     Vitals:    07/26/17 1658 07/26/17 2010 07/26/17 2329 07/27/17 0414   BP: 168/73 166/86 140/83 138/79   Pulse: (!) 51 71 71 68   Resp: 16 16 18 16   Temp:  98.1 °F (36.7 °C) 97.8 °F (36.6 °C) 98.1 °F (36.7 °C)   SpO2: 99% 96% 96% 98%   Weight:       Height:           Alert and oriented x3. left exam of the hip reveals that the dressing is clean, dry and intact. The patient is able to fire the quadriceps / flex at the hip  Sensation is intact to light touch. No calf pain.      Labs:  Recent Labs      07/27/17   0515   HGB  10.6*   NA  141   K  4.1   CL  105   CO2  24   BUN  27*   CREA  0.95   GLU  131*        Patient mobility           Candice Hopper MD  Cell [phone]  Medical Staff : Ajay Ramirez / Blanca Triana  Office : [phone] ext  [phone]

## 2017-07-27 NOTE — PROGRESS NOTES
Problem: Mobility Impaired (Adult and Pediatric)  Goal: *Acute Goals and Plan of Care (Insert Text)  Physical Therapy Goals  Initiated 7/27/2017    1. Patient will move from supine to sit and sit to supine , scoot up and down and roll side to side in bed with independence within 4 days. 2. Patient will perform sit to stand with modified independence within 4 days. 3. Patient will ambulate with modified independence for 200 feet with the least restrictive device within 4 days. 4. Patient will ascend/descend 4 stairs with handrail(s) with modified independence within 4 days. 5. Patient will verbalize and demonstrate understanding of lateral precautions per protocol within 4 days. 6. Patient will perform all home exercise program per protocol with independence within 4 days. PHYSICAL THERAPY TREATMENT  Patient: Collin Matos (33 y.o. male)  Date: 7/27/2017  Diagnosis: LEFT HIP ARTHRITIS  Osteoarthritis (arthritis due to wear and tear of joints)  Hip arthritis <principal problem not specified>  Procedure(s) (LRB):  LEFT TOTAL HIP ARTHROPLASTY DIRECT LATERAL (Left) 1 Day Post-Op  Precautions: WBAT, Total hip      ASSESSMENT:  Based on the objective data described below, patient tolerated treatment very well. Patient in the bathroom when received and ready to ambulate back to bed. Offered to be OOB to chair however patient requested to just go back to bed he was up in the chair a long time this am and would like to take a nap. Assisted back to bed min assist, nurse in the room and getting ready to give his medication and agreed to monitor patient. Progression toward goals:  [X]    Improving appropriately and progressing toward goals  [ ]    Improving slowly and progressing toward goals  [ ]    Not making progress toward goals and plan of care will be adjusted       PLAN:  Patient continues to benefit from skilled intervention to address the above impairments.   Continue treatment per established plan of care.  Discharge Recommendations:  Home Health  Further Equipment Recommendations for Discharge:  Rolling walker ordered already       SUBJECTIVE:   Patient stated Just gas and ready to go back to bed to get a nap.       OBJECTIVE DATA SUMMARY:   Critical Behavior:  Neurologic State: Alert  Orientation Level: Oriented X4  Cognition: Appropriate for age attention/concentration, Appropriate decision making, Appropriate safety awareness, Follows commands  Safety/Judgement: Awareness of environment  Functional Mobility Training:  Bed Mobility:  Rolling: Minimum assistance  Supine to Sit: Minimum assistance  Sit to Supine: Minimum assistance  Scooting: Minimum assistance        Transfers:  Sit to Stand: Minimum assistance  Stand to Sit: Minimum assistance  Stand Pivot Transfers: Minimum assistance     Bed to Chair: Minimum assistance                    Balance:  Sitting: Intact  Standing: Impaired  Standing - Static: Good  Standing - Dynamic : Fair  Ambulation/Gait Training:  Distance (ft): 10 Feet (ft)  Assistive Device: Walker, rolling;Gait belt  Ambulation - Level of Assistance: Minimal assistance     Gait Description (WDL): Exceptions to WDL  Gait Abnormalities: Antalgic     Left Side Weight Bearing: As tolerated  Base of Support: Widened     Speed/Nel: Slow              Therapeutic Exercises:    Instructed patient to continue active range of motion exercise on both legs while up on chair or on bed. Pain:  Pain Scale 1: Numeric (0 - 10)  Pain Intensity 1: 9  Pain Location 1: Hip  Pain Orientation 1: Left  Pain Description 1: Aching  Pain Intervention(s) 1: Medication (see MAR)  Activity Tolerance:   Good. Please refer to the flowsheet for vital signs taken during this treatment.   After treatment:   [ ]    Patient left in no apparent distress sitting up in chair  [X]    Patient left in no apparent distress in bed  [X]    Call bell left within reach  [X]    Nursing notified  [ ]    Caregiver present  [ ] Bed alarm activated      COMMUNICATION/COLLABORATION:   The patients plan of care was discussed with: Registered Nurse and patient     Pollo Winters, PT,WCC.    Time Calculation: 24 mins

## 2017-07-27 NOTE — PROGRESS NOTES
Problem: Mobility Impaired (Adult and Pediatric)  Goal: *Acute Goals and Plan of Care (Insert Text)  Physical Therapy Goals  Initiated 7/27/2017    1. Patient will move from supine to sit and sit to supine , scoot up and down and roll side to side in bed with independence within 4 days. 2. Patient will perform sit to stand with modified independence within 4 days. 3. Patient will ambulate with modified independence for 200 feet with the least restrictive device within 4 days. 4. Patient will ascend/descend 4 stairs with handrail(s) with modified independence within 4 days. 5. Patient will verbalize and demonstrate understanding of lateral precautions per protocol within 4 days. 6. Patient will perform all home exercise program per protocol with independence within 4 days. PHYSICAL THERAPY EVALUATION  Patient: Bahman Cruz (15 y.o. male)  Date: 7/27/2017  Primary Diagnosis: LEFT HIP ARTHRITIS  Osteoarthritis (arthritis due to wear and tear of joints)  Hip arthritis  Procedure(s) (LRB):  LEFT TOTAL HIP ARTHROPLASTY DIRECT LATERAL (Left) 1 Day Post-Op   Precautions:   WBAT, Total hip       ASSESSMENT :  Based on the objective data described below, the patient presents with difficulty with ambulation. Sit on the edge of bed with min assist, sit to stand min assist, ambulate with rolling walker min assist, no loss of balance slow antalgic gait pattern noted. Need some verbal cues to advance rolling walker OOB to chair as tolerated. Performed some active range of motion exercise on both LE per Lateral exercise protocol. Activated chair alarm, notified nurse who agreed to monitor and assist back to bed when ask. Patient will benefit from skilled intervention to address the above impairments.   Patients rehabilitation potential is considered to be Excellent  Factors which may influence rehabilitation potential include:   [ ]         None noted  [ ]         Mental ability/status  [ ]         Medical condition  [ ]         Home/family situation and support systems  [ ]         Safety awareness  [X]         Pain tolerance/management  [ ]         Other:        PLAN :  Recommendations and Planned Interventions:  [X]           Bed Mobility Training             [ ]    Neuromuscular Re-Education  [X]           Transfer Training                   [ ]    Orthotic/Prosthetic Training  [X]           Gait Training                         [ ]    Modalities  [X]           Therapeutic Exercises           [ ]    Edema Management/Control  [X]           Therapeutic Activities            [ ]    Patient and Family Training/Education  [ ]           Other (comment):     Frequency/Duration: Patient will be followed by physical therapy  twice daily to address goals. Discharge Recommendations: Home Health  Further Equipment Recommendations for Discharge: rolling walker       SUBJECTIVE:   Patient stated Ready for my therapy.       OBJECTIVE DATA SUMMARY:   HISTORY:    Past Medical History:   Diagnosis Date    Arthritis       knees and hips;  Right Shoulder    Chronic pain       hip, knes, right shoulder    Diabetes ([facility] [address] 2007    High cholesterol      Hypertension      Ill-defined condition       elevated cholesterol    Ill-defined condition       gilbert\"s syndrome    Ill-defined condition       essential tremors    Ill-defined condition 07/19/2017     overweight  BMI= 29.4     Past Surgical History:   Procedure Laterality Date    HX ORTHOPAEDIC Left       repair fractured arm age 10   [facility] HX TONSILLECTOMY         childhood    SHOULDER SURG PROC UNLISTED Left 2010     arthroplasty     Prior Level of Function/Home Situation: Modified independent with single point cane.   Personal factors and/or comorbidities impacting plan of care:      Home Situation  Home Environment: Private residence  # Steps to Enter: 2  One/Two Story Residence: Two story  # of Interior Steps: 13  Height of Each Step (in): 0 inches  Interior Rails: Left  Lift Chair Available: No  Living Alone: Yes  Support Systems: None  Patient Expects to be Discharged to[de-identified] Private residence  Current DME Used/Available at Home: Cane, straight, Raised toilet seat     EXAMINATION/PRESENTATION/DECISION MAKING:   Critical Behavior:  Neurologic State: Alert           Hearing: Auditory  Auditory Impairment: Hard of hearing, bilateral, None     Range Of Motion:  AROM: Generally decreased, functional (limited due to pain)           PROM: Generally decreased, functional (limited due to pain)           Strength:    Strength: Generally decreased, functional (limited due to pain)                    Tone & Sensation:                                  Coordination:  Coordination: Within functional limits  Vision:      Functional Mobility:  Bed Mobility:  Rolling: Minimum assistance  Supine to Sit: Minimum assistance  Sit to Supine: Minimum assistance  Scooting: Minimum assistance  Transfers:  Sit to Stand: Minimum assistance  Stand to Sit: Minimum assistance  Stand Pivot Transfers: Minimum assistance     Bed to Chair: Minimum assistance              Balance:   Sitting: Intact  Standing: Impaired;Pull to stand; With support  Standing - Static: Good  Standing - Dynamic : Fair  Ambulation/Gait Training:  Distance (ft): 30 Feet (ft)  Assistive Device: Walker, rolling;Gait belt  Ambulation - Level of Assistance: Minimal assistance     Gait Description (WDL): Exceptions to WDL  Gait Abnormalities: Antalgic     Left Side Weight Bearing: As tolerated  Base of Support: Widened     Speed/Nel: Slow                                   Therapeutic Exercises:    Instructed patient to continue active range of motion exercise lateral exercise protocol on both legs while up on chair or on bed.          Functional Measure:  Tinetti test:      Sitting Balance: 1  Arises: 1  Attempts to Rise: 2  Immediate Standing Balance: 1  Standing Balance: 1  Nudged: 2  Eyes Closed: 1  Turn 360 Degrees - Continuous/Discontinuous: 1  Turn 360 Degrees - Steady/Unsteady: 1  Sitting Down: 2  Balance Score: 13  Indication of Gait: 1  R Step Length/Height: 0  L Step Length/Height: 0  R Foot Clearance: 1  L Foot Clearance: 1  Step Symmetry: 1  Step Continuity: 1  Path: 1  Trunk: 1  Walking Time: 0  Gait Score: 7  Total Score: 20         Tinetti Test and G-code impairment scale:  Percentage of Impairment CH     0%    CI     1-19% CJ     20-39% CK     40-59% CL     60-79% CM     80-99% CN      100%   Tinetti  Score 0-28 28 23-27 17-22 12-16 6-11 1-5 0          Tinetti Tool Score Risk of Falls  <19 = High Fall Risk  19-24 = Moderate Fall Risk  25-28 = Low Fall Risk  Tinetti ME. Performance-Oriented Assessment of Mobility Problems in Elderly Patients. [address]el[address] 66; [id number]. (Scoring Description: PT Bulletin Feb. 10, 1993)     Older adults: Anabel Rodríguez et al, 2009; n = 1000 [facility] elderly evaluated with ABC, DYLAN, ADL, and IADL)  · Mean DYLAN score for males aged 69-68 years = 26.21(3.40)  · Mean DYLAN score for females age 69-68 years = 25.16(4.30)  · Mean DYLAN score for males over 80 years = 23.29(6.02)  · Mean DYLAN score for females over 80 years = 17.20(8.32)         G codes: In compliance with CMSs Claims Based Outcome Reporting, the following G-code set was chosen for this patient based on their primary functional limitation being treated: The outcome measure chosen to determine the severity of the functional limitation was the tinetti with a score of 20/28 which was correlated with the impairment scale.       · Mobility - Walking and Moving Around:               - CURRENT STATUS:    CJ - 20%-39% impaired, limited or restricted               - GOAL STATUS:           CI - 1%-19% impaired, limited or restricted               - D/C STATUS:                       ---------------To be determined---------------      Physical Therapy Evaluation Charge Determination   History Examination Presentation Decision-Making   LOW Complexity : Zero comorbidities / personal factors that will impact the outcome / POC LOW Complexity : 1-2 Standardized tests and measures addressing body structure, function, activity limitation and / or participation in recreation  LOW Complexity : Stable, uncomplicated  Other outcome measures tinetti  MEDIUM      Based on the above components, the patient evaluation is determined to be of the following complexity level: LOW      Pain:  Pain Scale 1: Numeric (0 - 10)  Pain Intensity 1: 9  Pain Location 1: Shoulder  Pain Orientation 1: Left  Pain Description 1: Aching  Pain Intervention(s) 1: Emotional support;Position  Activity Tolerance:   Good. Please refer to the flowsheet for vital signs taken during this treatment. After treatment:   [X]         Patient left in no apparent distress sitting up in chair  [ ]         Patient left in no apparent distress in bed  [X]         Call bell left within reach  [X]         Nursing notified  [ ]         Caregiver present  [X]         Chair alarm activated      COMMUNICATION/EDUCATION:   The patients plan of care was discussed with: Registered Nurse,  and patient. [X]         Fall prevention education was provided and the patient/caregiver indicated understanding. [X]         Patient/family have participated as able in goal setting and plan of care. [X]         Patient/family agree to work toward stated goals and plan of care. [ ]         Patient understands intent and goals of therapy, but is neutral about his/her participation. [ ]         Patient is unable to participate in goal setting and plan of care. Thank you for this referral.  Walt George, PT,WCC.     Time Calculation: 25 mins

## 2017-07-27 NOTE — PROGRESS NOTES
Nutrition Assessment:    RECOMMENDATIONS/INTERVENTION(S):   Continue CCD (Diabetic diet)    Start ONS trial to aid in meeting protein needs:  · Glucerna once daily  · Ensure High Protein once daily    Monitor PO, ONS tolerance, BG, wt  ASSESSMENT:   7/27:  Consult received for general nutrition management & supplements. 70 yom admitted for L hip OA. Pmhx includes DM, HTN, high cholesterol, Gilbert's syndrome. S/p total hip arthroplasty w/ surgical wound to L hip. No edema noted. Labs/meds reviewed. -120-114; A1C 5.6. Visited pt this morning. Reports some nausea and decreased appetite - observed 50% PO of lunch meal.  Poor to fair appetite w/ decreased PO x 3-4 mo PTA. # - 11.7% decrease. Continued to eat ~ 3 meals per day but decreased portion sizes. Feeling good about wt loss. Somewhat agreeable to ONS trial to aid in meeting protein needs. Encouraged attempting 3 meals and ONS BID as snacks. Also encouraged pt to ask RN for anti-nausea meds prn. SUBJECTIVE/OBJECTIVE:     Diet Order: Consistent carb  % Eaten:  No data found. Pertinent Medications: [x] Reviewed - insulin, pepcid, miralax, pericolace, zofran, compazine    Chemistries:  Lab Results   Component Value Date/Time    Sodium 141 07/27/2017 05:15 AM    Potassium 4.1 07/27/2017 05:15 AM    Chloride 105 07/27/2017 05:15 AM    CO2 24 07/27/2017 05:15 AM    Anion gap 12 07/27/2017 05:15 AM    Glucose 131 07/27/2017 05:15 AM    BUN 27 07/27/2017 05:15 AM    Creatinine 0.95 07/27/2017 05:15 AM    BUN/Creatinine ratio 28 07/27/2017 05:15 AM    GFR est AA >60 07/27/2017 05:15 AM    GFR est non-AA >60 07/27/2017 05:15 AM    Calcium 8.3 07/27/2017 05:15 AM    AST (SGOT) 16 07/19/2017 02:31 PM    Alk.  phosphatase 41 07/19/2017 02:31 PM    Protein, total 7.2 07/19/2017 02:31 PM    Albumin 4.2 07/19/2017 02:31 PM    Globulin 3.0 07/19/2017 02:31 PM    A-G Ratio 1.4 07/19/2017 02:31 PM    ALT (SGPT) 21 07/19/2017 02:31 PM Anthropometrics: Height: 5' 7\" (170.2 cm) Weight: 82.2 kg (181 lb 3.5 oz)    IBW (%IBW): 74 kg (163 lb 2.3 oz) (111.08 %) UBW (%UBW): 93 kg (205 lb) (88.4 %)    BMI: Body mass index is 28.38 kg/(m^2). This BMI is indicative of:   [] Underweight    [] Normal    [x] Overweight    []  Obesity    []  Extreme Obesity (BMI>40)  Estimated Nutrition Needs (Based on): 1995 Kcals/day (RMR (1535) x 1.3 AF) , 88 g (-103 (1.2-1.4 g/kg x IBW)) Protein  Carbohydrate: At Least 130 g/day  Fluids: 2000 mL/day    Last BM: 7/26, abd WDL   [x]Active     []Hyperactive  []Hypoactive       [] Absent   BS  Skin:    [] Intact   [x] Incision - L hip  [] Breakdown   [] DTI   [] Tears/Excoriation/Abrasion  []Edema [] Other:      Wt Readings from Last 30 Encounters:   07/27/17 82.2 kg (181 lb 3.5 oz)   07/19/17 85.3 kg (188 lb 0.8 oz)   04/10/17 93 kg (205 lb)   08/17/16 92.7 kg (204 lb 7 oz)      NUTRITION DIAGNOSES:   Problem:  Inadequate protein-energy intake     Etiology: related to conditions associated with a dx: L hip OA     Signs/Symptoms: as evidenced by reports of pain x 3-4 mo & decreased appetite, PO PTA  observed 50% PO lunch meal    NUTRITION INTERVENTIONS:  Meals/Snacks: General/healthful diet   Supplements: Commercial supplement     Initial/Brief Nutrition Education: Purpose of nutrition education        GOAL:   Consume/tolerate >50% meals & >75% ONS w/ stable BG in the next 3-5 days    Cultural, Scientology, or Ethnic Dietary Needs: None     LEARNING NEEDS (Diet, Food/Nutrient-Drug Interaction):    [] None Identified   [x] Identified and Education Provided/Documented   [] Identified and Pt declined/was not appropriate      [x] Interdisciplinary Care Plan Reviewed/Documented    [x] Discharge Needs:    See D/C note   [] No Nutrition Related Discharge Needs    NUTRITION RISK:   Pt Is At Nutrition Risk  [x]     No Nutrition Risk Identified  []       PT SEEN FOR:    [x]  MD Consult: []Calorie Count      []Diabetic Diet Education        []Diet Education     []Electrolyte Management     [x]General Nutrition Management and Supplements     []Management of Tube Feeding     []TPN Recommendations    []  RN Referral:  []MST score >=2     []Enteral/Parenteral Nutrition PTA     []Pregnant: Gestational DM or Multigestation                 [] Pressure Ulcer    []  Low BMI      []  Length of Stay       [] Dysphagia Diet         [] Ventilator  []  Follow-up     Previous Recommendations:   [] Implemented          [] Not Implemented          [x] Not Applicable    Previous Goal:   [] Met              [] Progressing Towards Goal              [] Not Progressing Towards Goal   [x] Not Applicable            An Murphy, [address] [place] [address]   Pager [phone]

## 2017-07-28 VITALS
TEMPERATURE: 98.4 F | RESPIRATION RATE: 15 BRPM | SYSTOLIC BLOOD PRESSURE: 140 MMHG | HEIGHT: 67 IN | DIASTOLIC BLOOD PRESSURE: 71 MMHG | HEART RATE: 88 BPM | WEIGHT: 181.22 LBS | BODY MASS INDEX: 28.44 KG/M2 | OXYGEN SATURATION: 98 %

## 2017-07-28 LAB
GLUCOSE BLD STRIP.AUTO-MCNC: 130 MG/DL (ref 65–100)
GLUCOSE BLD STRIP.AUTO-MCNC: 177 MG/DL (ref 65–100)
HGB BLD-MCNC: 10.6 G/DL (ref 12.1–17)
SERVICE CMNT-IMP: ABNORMAL
SERVICE CMNT-IMP: ABNORMAL

## 2017-07-28 PROCEDURE — 85018 HEMOGLOBIN: CPT | Performed by: ORTHOPAEDIC SURGERY

## 2017-07-28 PROCEDURE — 97535 SELF CARE MNGMENT TRAINING: CPT

## 2017-07-28 PROCEDURE — 97530 THERAPEUTIC ACTIVITIES: CPT

## 2017-07-28 PROCEDURE — 82962 GLUCOSE BLOOD TEST: CPT

## 2017-07-28 PROCEDURE — 36415 COLL VENOUS BLD VENIPUNCTURE: CPT | Performed by: ORTHOPAEDIC SURGERY

## 2017-07-28 PROCEDURE — 74011250637 HC RX REV CODE- 250/637: Performed by: ORTHOPAEDIC SURGERY

## 2017-07-28 PROCEDURE — 97116 GAIT TRAINING THERAPY: CPT

## 2017-07-28 RX ADMIN — ASPIRIN 325 MG: 325 TABLET, DELAYED RELEASE ORAL at 08:52

## 2017-07-28 RX ADMIN — ACETAMINOPHEN 650 MG: 325 TABLET ORAL at 12:17

## 2017-07-28 RX ADMIN — Medication 10 ML: at 04:33

## 2017-07-28 RX ADMIN — ACETAMINOPHEN 650 MG: 325 TABLET ORAL at 08:53

## 2017-07-28 RX ADMIN — POLYETHYLENE GLYCOL 3350 17 G: 17 POWDER, FOR SOLUTION ORAL at 08:53

## 2017-07-28 RX ADMIN — OXYCODONE HYDROCHLORIDE 10 MG: 5 TABLET ORAL at 13:36

## 2017-07-28 RX ADMIN — EZETIMIBE 10 MG: 10 TABLET ORAL at 08:52

## 2017-07-28 RX ADMIN — OXYCODONE HYDROCHLORIDE 5 MG: 5 TABLET ORAL at 04:33

## 2017-07-28 RX ADMIN — OXYCODONE HYDROCHLORIDE 10 MG: 5 TABLET ORAL at 07:28

## 2017-07-28 RX ADMIN — ACETAMINOPHEN 650 MG: 325 TABLET ORAL at 04:33

## 2017-07-28 RX ADMIN — AMLODIPINE BESYLATE 10 MG: 5 TABLET ORAL at 08:52

## 2017-07-28 RX ADMIN — FENOFIBRATE 145 MG: 145 TABLET ORAL at 08:52

## 2017-07-28 RX ADMIN — FAMOTIDINE 20 MG: 20 TABLET, FILM COATED ORAL at 08:53

## 2017-07-28 RX ADMIN — OXYCODONE HYDROCHLORIDE 5 MG: 5 TABLET ORAL at 10:34

## 2017-07-28 RX ADMIN — DOCUSATE SODIUM -SENNOSIDES 1 TABLET: 50; 8.6 TABLET, COATED ORAL at 08:52

## 2017-07-28 RX ADMIN — POTASSIUM CHLORIDE 10 MEQ: 750 TABLET, FILM COATED, EXTENDED RELEASE ORAL at 08:53

## 2017-07-28 RX ADMIN — TRIAMTERENE AND HYDROCHLOROTHIAZIDE 1 TABLET: 37.5; 25 TABLET ORAL at 07:28

## 2017-07-28 NOTE — PROGRESS NOTES
Problem: Self Care Deficits Care Plan (Adult)  Goal: *Acute Goals and Plan of Care (Insert Text)  Occupational Therapy Goals  Initiated 7/27/2017; All goals met 7/28/2017    1. Patient will perform lower body dressing using adaptive dressing aids at modified independence level within 7 days. 2. Patient will perform toilet transfers at supervision/set-up level using rolling walker within 7 days. 3. Patient will perform all aspects of toileting at minimal assistance level within 7 days. 4. Patient will demonstrate all lateral hip precautions without cues within 7 days. OCCUPATIONAL THERAPY TREATMENT/DISCHARGE  Patient: Nicole Shea (21 y.o. male)  Date: 7/28/2017  Diagnosis: LEFT HIP ARTHRITIS  Osteoarthritis (arthritis due to wear and tear of joints)  Hip arthritis <principal problem not specified>  Procedure(s) (LRB):  LEFT TOTAL HIP ARTHROPLASTY DIRECT LATERAL (Left) 2 Days Post-Op  Precautions: WBAT, Total hip- direct lateral approach  Chart, occupational therapy assessment, plan of care, and goals were reviewed. ASSESSMENT:  Mr. Liat Arriaga has made good progress, met all goals, and cleared for discharge from OT perspective. Patient will have his daughter who is a PT provide a ride home and stay with him until this evening, then he will have caregivers 24/7 for 3 days post op with the option to extend if needed. Patient was able to recall/verbalize all lateral hip precautions;Min verbal cues/reminders needed during ADL retraining to maintain precautions. Patient demonstrated good understanding of education provided regarding adaptive ADL techniques, including education and proper use of adaptive dressing and bathing aids (hip kit), and safe transfer technique. Patient was received in the chair and required CGA using rolling walker for all OOB transfers, including on/off elevated commode and simulation of shower transfer.   Patient was independent with UB dressing and bathing, supervision/setup + use of adaptive equipment needed for LB dressing and bathing, and increased time (mod I) needed for toileting. Patient was educated on general home safety with good carryover of information during today's tx. Patient has no further skilled OT and is expected to discharge home this afternoon. Progression toward goals:  [X]          Improving appropriately and progressing toward goals  [ ]          Improving slowly and progressing toward goals  [ ]          Not making progress toward goals and plan of care will be adjusted       PLAN:  Patient will be discharged from occupational therapy at this time. Rationale for discharge:  [X] Goals Achieved  [ ] Goebony Sher  [ ] Patient not participating in therapy  [ ] Other:  Discharge Recommendations:  None for OT  Further Equipment Recommendations for Discharge:  Hip kit and BSC already obtained by daughter and at home setup and ready for use       SUBJECTIVE:   Patient stated I have been doing it this way for a long time.  re: LB dressing however with further education he confirms that he did not maintain precautions that he now has post op. OBJECTIVE DATA SUMMARY:   Cognitive/Behavioral Status:  Neurologic State: Alert  Orientation Level: Oriented X4  Cognition: Appropriate decision making, Appropriate for age attention/concentration, Appropriate safety awareness  Safety/Judgement: Awareness of environment, Good awareness of safety precautions      Functional Mobility and Transfers for ADLs:              Bed Mobility:  Rolling:  (already up in the chair)  Scooting: Supervision                Transfers:  Sit to Stand: Contact guard assistance              Functional Transfers  Toilet Transfer : Contact guard assistance     Balance:  Sitting: Intact  Standing: Intact; With support  Standing - Static: Good  Standing - Dynamic : Fair      ADL Intervention:  Grooming  Grooming Assistance: Independent     Upper Body Bathing  Bathing Assistance: Independent     Lower Body Bathing  Bathing Assistance: Supervision/set-up  Lower Body : Supervision/set-up  Position Performed: Seated in chair  Cues: Verbal cues provided  Adaptive Equipment: Long handled sponge     Upper Body Dressing Assistance  Dressing Assistance: Independent  Pullover Shirt: Independent  Cues: Verbal cues provided (instructed to perform sitting down not standing)     Lower Body Dressing Assistance  Dressing Assistance: Supervision/set up  Underpants: Supervision/set-up  Socks: Supervision/set-up  Shoes with Cloth Laces: Supervision/set-up (shoes remained tied )  Leg Crossed Method Used: No (precautions limit this position)  Position Performed: Seated in chair  Cues: Verbal cues provided;Visual cues provided  Adaptive Equipment Used: Sock aid; Long handled shoe horn;Dressing stick; Reacher     Toileting  Toileting Assistance: Modified independent (increased time)  Bladder Hygiene: Modified independent  Bowel Hygiene: Modified indpendent  Clothing Management: Modified independent  Cues: Verbal cues provided  Adaptive Equipment:  (BSC placed over commode)      Bathing: Patient instructed when bathing to not submerge wound in water, stand to shower or sponge bathe, cover wound with plastic and tape to ensure no water reaches bandage/wound without cues and to leave bandage on and not remove. Patient indicated understanding. Dressing joint: Patient instructed to don/doff left LE first/last.  Patient instructed to don all clothing while sitting prior to standing, doff all clothing to knees while standing, then sit to doff clothing off from knees to feet in order to facilitate fall prevention, pain management, and energy conservation. Patient indicated understanding/recalled strategies.        Home safety: Patient instructed on home modifications and safety (raise height of ADL objects, appropriate height of chair surfaces, recliner safety, change of floor surfaces, clear pathways) to increase independence and fall prevention. Patient indicated understanding. Standing: Patient instructed to walk up to sink/counter top/surfaces, step into walker to increase safety of joint and fall prevention. Patient educated about hip anatomy verbally and with pictures and educated to avoid internal rotation of left LE. Instructed to apply concept to ADLs within the home (no reaching across body to left side, square off while using objects, slide objects along surfaces). Patient instructed to increase amount of time standing, observe standing position during ADLs in order to increase even weight bearing through bilateral LEs in order to increase independence with ADLs. Goal to be reached 30 days post - op, per orthopedic surgeon or per PT. Patient indicated understanding. Tub/shower combo transfer: Patient instructed regarding it is safe to begin transfer into walk in shower with small ledge as evidenced by demonstration with simulated shower height during tx. Patient instructed to use the same technique as used with stairs when entering and exiting tub (\"up with the non-surgical, down with the surgical leg\"). Patient indicated understanding and demonstrated safe transfer technique. He was instructed to complete shower only when cleared by MD (discharge paperwork) and to have a caregiver with him when showering for the first time after transitioning home. Cognitive Retraining  Safety/Judgement: Awareness of environment;Good awareness of safety precautions     Activity Tolerance:    Patient tolerated eval well. After treatment:   [X]  Patient left in no apparent distress sitting up in chair  [ ]  Patient left in no apparent distress in bed  [X]  Call bell left within reach  [X]  Nursing notified  [ ]  Caregiver present  [X]  Chair alarm activated      COMMUNICATION/COLLABORATION:   The patients plan of care was discussed with: Physical Therapist, Registered Nurse and patient.      Uche Mendoza OTR/L  Time Calculation: 64 mins

## 2017-07-28 NOTE — PROGRESS NOTES
Problem: Mobility Impaired (Adult and Pediatric)  Goal: *Acute Goals and Plan of Care (Insert Text)  Physical Therapy Goals  Initiated 7/27/2017    1. Patient will move from supine to sit and sit to supine , scoot up and down and roll side to side in bed with independence within 4 days. 2. Patient will perform sit to stand with modified independence within 4 days. 3. Patient will ambulate with modified independence for 200 feet with the least restrictive device within 4 days. 4. Patient will ascend/descend 4 stairs with handrail(s) with modified independence within 4 days. 5. Patient will verbalize and demonstrate understanding of lateral precautions per protocol within 4 days. 6. Patient will perform all home exercise program per protocol with independence within 4 days. PHYSICAL THERAPY TREATMENT  Patient: Daniela Syed (80 y.o. male)  Date: 7/28/2017  Diagnosis: LEFT HIP ARTHRITIS  Osteoarthritis (arthritis due to wear and tear of joints)  Hip arthritis <principal problem not specified>  Procedure(s) (LRB):  LEFT TOTAL HIP ARTHROPLASTY DIRECT LATERAL (Left) 2 Days Post-Op  Precautions: WBAT, Total hip      ASSESSMENT:  Based on the objective data described below, patient tolerated treatment very well. Patient pain better controlled now, sit to stand CGA, ambulate with rolling walker CGA on the 4th floor hallway slow pace gait, still having some pain on his left knee during ambualtion. Per chart reviewed Dr. Parvin Rivera just performed injection on the left knee prior to ambulation. OOB to chair as tolerated  Instructed patient to continue active range of motion exercise on both legs while up on chair or on bed. Patient eager to go home explained to patient since he has steps to enter his house he need to practice stairs first prior to going home. Notified nurse who agreed to monitor patient.       Progression toward goals:  [X]    Improving appropriately and progressing toward goals  [ ]    Improving slowly and progressing toward goals  [ ]    Not making progress toward goals and plan of care will be adjusted       PLAN:  Patient continues to benefit from skilled intervention to address the above impairments. Continue treatment per established plan of care. Discharge Recommendations:  Home Health   Further Equipment Recommendations for Discharge:  rolling walker       SUBJECTIVE:   Patient stated Umer Milton will be going home today.       OBJECTIVE DATA SUMMARY:   Critical Behavior:  Neurologic State: Alert  Orientation Level: Oriented X4  Cognition: Appropriate decision making, Appropriate safety awareness, Follows commands  Safety/Judgement: Awareness of environment  Functional Mobility Training:  Bed Mobility:  Rolling:  (already up in the chair by nurse)                 Transfers:  Sit to Stand: Contact guard assistance  Stand to Sit: Contact guard assistance  Stand Pivot Transfers: Contact guard assistance     Bed to Chair: Contact guard assistance                    Balance:  Sitting: Intact  Standing: Intact; With support  Standing - Static: Fair  Standing - Dynamic : Fair  Ambulation/Gait Training:  Distance (ft): 60 Feet (ft)  Assistive Device: Walker, rolling;Gait belt  Ambulation - Level of Assistance: Contact guard assistance     Gait Description (WDL): Exceptions to WDL  Gait Abnormalities: Antalgic     Left Side Weight Bearing: As tolerated  Base of Support: Widened     Speed/Nel: Slow                 Therapeutic Exercises:    Instructed patient to continue active range of motion exercise on both legs while up on chair or on bed. Pain:  Pain Scale 1: Numeric (0 - 10)  Pain Intensity 1: 6  Pain Location 1: Hip;Knee  Pain Orientation 1: Left  Pain Description 1: Aching  Pain Intervention(s) 1: Medication (see MAR)  Activity Tolerance:   Good. Please refer to the flowsheet for vital signs taken during this treatment.   After treatment:   [X]    Patient left in no apparent distress sitting up in chair  [ ] Patient left in no apparent distress in bed  [X]    Call bell left within reach  [X]    Nursing notified  [ ]    Caregiver present  [ ]    Bed alarm activated      COMMUNICATION/COLLABORATION:   The patients plan of care was discussed with: Occupational Therapist, Registered Nurse and patient     Sara Orta PT,WCC.    Time Calculation: 24 mins

## 2017-07-28 NOTE — PROGRESS NOTES
Problem: Mobility Impaired (Adult and Pediatric)  Goal: *Acute Goals and Plan of Care (Insert Text)  Physical Therapy Goals  Initiated 7/27/2017    1. Patient will move from supine to sit and sit to supine , scoot up and down and roll side to side in bed with independence within 4 days. 2. Patient will perform sit to stand with modified independence within 4 days. 3. Patient will ambulate with modified independence for 200 feet with the least restrictive device within 4 days. 4. Patient will ascend/descend 4 stairs with handrail(s) with modified independence within 4 days. 5. Patient will verbalize and demonstrate understanding of lateral precautions per protocol within 4 days. 6. Patient will perform all home exercise program per protocol with independence within 4 days. PHYSICAL THERAPY TREATMENT  Patient: Bahman Cruz (37 y.o. male)  Date: 7/28/2017  Diagnosis: LEFT HIP ARTHRITIS  Osteoarthritis (arthritis due to wear and tear of joints)  Hip arthritis <principal problem not specified>  Procedure(s) (LRB):  LEFT TOTAL HIP ARTHROPLASTY DIRECT LATERAL (Left) 2 Days Post-Op  Precautions: WBAT, Total hip      ASSESSMENT:  Based on the objective data described below, patient tolerated treatment very well. ambulate with rolling walker CGA, pain little better up and down stairs with single point cane on the right and rails on the left CGA. Patient insist of going home today. Patient stated his daughter will be with him tonight and a nurse coming today at 8 pm and will have nurse 24/7 for 3 days. Patient stated both of his knee are bad, left side bone on bone and will not make any difference if he stay overnight or go home today. It will still be painful. Explained to patient to continue to do range of motion exercise on both LE all planes and HHPT to follow up when discharge to home.  Patient verbalized understanding and agreed with all precautions taught by Physical Therapist. Notified nurse who agreed to monitor patient. Progression toward goals:  [X]    Improving appropriately and progressing toward goals  [ ]    Improving slowly and progressing toward goals  [ ]    Not making progress toward goals and plan of care will be adjusted       PLAN:  Patient continues to benefit from skilled intervention to address the above impairments. Continue treatment per established plan of care. Discharge Recommendations:  Home Health  Further Equipment Recommendations for Discharge:  rolling walker       SUBJECTIVE:   Patient stated Memo Sebastian will go home today and I already have a nurse coming for 3 days to stay with me.       OBJECTIVE DATA SUMMARY:   Critical Behavior:  Neurologic State: Alert  Orientation Level: Oriented X4  Cognition: Appropriate decision making, Appropriate safety awareness, Follows commands  Safety/Judgement: Awareness of environment  Functional Mobility Training:  Bed Mobility:  Rolling:  (already up in the chair)                 Transfers:  Sit to Stand: Contact guard assistance  Stand to Sit: Contact guard assistance  Stand Pivot Transfers: Contact guard assistance     Bed to Chair: Contact guard assistance                    Balance:  Sitting: Intact  Standing: Intact; With support  Standing - Static: Good  Standing - Dynamic : Fair  Ambulation/Gait Training:  Distance (ft): 80 Feet (ft)  Assistive Device: Walker, rolling;Gait belt  Ambulation - Level of Assistance: Contact guard assistance     Gait Description (WDL): Exceptions to WDL  Gait Abnormalities: Antalgic     Left Side Weight Bearing: As tolerated  Base of Support: Widened     Speed/Nel: Slow                                  Stairs:  Number of Stairs Trained: 4  Stairs - Level of Assistance: Contact guard assistance              Rail Use: Left      Therapeutic Exercises:    Instructed patient to continue active range of motion exercise on both legs while up on chair or on bed.       Pain:  Pain Scale 1: Numeric (0 - 10)  Pain Intensity 1: 9  Pain Location 1: Hip  Pain Orientation 1: Left  Pain Description 1: Aching  Pain Intervention(s) 1: Medication (see MAR)  Activity Tolerance:   Good. Please refer to the flowsheet for vital signs taken during this treatment. After treatment:   [X]    Patient left in no apparent distress sitting up in chair  [ ]    Patient left in no apparent distress in bed  [X]    Call bell left within reach  [X]    Nursing notified  [ ]    Caregiver present  [ ]    Bed alarm activated      COMMUNICATION/COLLABORATION:   The patients plan of care was discussed with: Occupational Therapist, Registered Nurse,  and patient     Hemanth Oneal PT,WCC.    Time Calculation: 25 mins

## 2017-07-28 NOTE — PROGRESS NOTES
Discussed with the patient and all questioned fully answered. He will call Dr. Lino Davidson if any problems arise. Discharge instructions reviewed with pt and pt's daughter. Prescriptions given to pt.

## 2017-07-28 NOTE — DISCHARGE INSTRUCTIONS
TOTAL HIP DISCHARGE INSTRUCTIONS    Patient: Jose Dixon MRN: [id number]  SSN: [id number]              Please take the time to review the following instructions before you leave the hospital and use them as guidelines during your recovery from surgery. If you have any questions you may contact my office at [phone]. After hours or during the weekend you may reach me personally at [phone] if there is an emergency. SPECIAL INSTRUCTIONS :   1. Do not bend greater than 90 degrees at the hip for 4 weeks following your discharge  2. Avoid exercises or activities which bring the leg out or away from the mid-line of the body. The surgical repair involves this muscle and it will require 4 weeks to heal. You may disregard these instructions for a direct anterior approach. 3. You may walk as tolerated and are encouraged to work daily on progressing your activities with a walker initially. 4. You may transition to a cane for walking 5-7 days from surgery once you feel safe. You may use a walker for longer periods if you feel unstable. Wound Care/ Dressing Changes:      DRESSING :     Honey Comb Dressing : This may be removed to shower at 72 hours. This is used only in the hospital. Other dressing options can be purchased over the counter at a local pharmacy or medical supply vendor. A porous adhesive dressing such as pictured above can be purchased at a local [employer] or [employer]. You only need to keep the incision covered for 7 days after showers. A dressing may be used for longer if there are issues with clothing clinging to the incision. Showering/ Bathing: If your incision is dry without drainage you may shower following your discharge home. Your dressing should be removed for showering. It is fine to have water run over the incision. Do not vigorously scrub your incision. Apply a clean, dry dressing after you have dried your incision.   Do not take a bath or get into a swimming pool / ulike until you follow up with Dr. Anuel Kamara. Do not soak your incision under water. If there is continued drainage or you are concerned contact Dr Dick Lassiter office prior to showering [phone] ext [phone]. Showering/ Bathing: If your incision is dry without drainage you may shower following your discharge home. Your dressing should be removed for showering. It is fine to have water run over the incision. Do not vigorously scrub your incision. Apply a clean, dry dressing after you have dried your incision. Do not take a bath or get into a swimming pool / [place] Barnett until you follow up with Dr. Anuel Kamara. Do not soak your incision under water. If there is continued drainage or you are concerned contact Dr Dick Lassiter office prior to showering [phone] ext [phone]. Diet:  You may advance to your regular diet as tolerated. Increase your clear liquid intake for the next 2-3 days. Nutrition Recommendations for Discharge:             Continue Oral Nutrition Supplements at discharge:   Ensure Active High Protein for Muscle Health 1-2 times per day  for 30 days unless otherwise directed by your Primary Care Physician. This product can be purchased at your local grocery store or drug store and online. Ronna Cool RD           Medication:      1. You will be given prescriptions for pain medication when you are discharged from the hospital. The side effects of these medications can be substantial and the narcotic medications are not mandatory. You may substitute these medications with Tylenol or Alleve / Motrin. 2.  Please use the medications as prescribed. Pain medications may cause constipation- Colace twice daily and Miralax two scoops 2-3 times a day while taking the narcotic medication should help prevent constipation.  Other possible side effects of pain medication are dizziness, headache, nausea, vomiting, and urinary retention. Discontinue the pain medication if you develop itching, rash, shortness of breath, or difficulties swallowing. If these symptoms become severe or are not relieved by discontinuing the medication, you should seek immediate medical attention. 3. Refills of pain medication are authorized during office hours only (8 AM- 5 PM  Monday thru Friday). Many of these medication will require you or a family member to pick-up a physical prescription at the office. 4. Medications other than antiinflammatories will not be called into the pharmacy after business hours. 5. Do not take Tylenol/Acetaminophen in addition to your pain medication as most pain medications already contain this ingredient. Do not exceed 4000mg of Tylenol/Acetaminophen per day. 6. You may resume the medication(s) you were taking prior to your surgery. Narcotics may change the effects of some antidepressant medication(s). If you have any questions about possible interactions between your regular medications and the pain medication, you should ask the pharmacist or contact the prescribing physician. 7. You will be discharged with prescriptions for additional pain medications (Tramadol or Toradol) and a medication for nausea and vomiting (Phenergan). You only need to fill these prescriptions if the primary pain medication is not working or you experiencing post-op nausea. 8. If you have constipation which is not improved by oral stool softeners then a Ducolax suppository should be purchased over the counter. 9. Continue the blood thinner (Aspirin or Lovenox) for a total of 30 days following surgery. Follow up appointment:    Please call our office at [phone] for your follow up appointment. This should be scheduled 14 days following the date of surgery. Physical Therapy / Nursing:    Physical Therapy following surgery will be arranged at home along with at home nursing care.  They have specific instructions for rehab and wound care. .     Returning to work:    Normal return to work is 3-12 weeks following total hip replacement. Depending on your progression following surgery and specific job duties you may take longer for a full return to work. DRIVING    You should not return to driving until you are off all narcotic pain medications and able to safely and quickly apply the brakes. This is normally 3-6 weeks for left hips and 4-8 weeks for right hip. Important Signs and Symptoms:    If any of the following signs or symptoms occur, you should contact Dr. Steven Rae office. Please be advised if a problem arises which you feel requires immediate medical attention or you are unable to contact Dr. Steven Rae office you should seek immediate medical attention at the ER or other health care facility you have access to.    1. A sudden increase in swelling and/or redness or warmth at the area your surgery was performed which isnt relieved by rest, ice, and elevation. 2. Oral temperature greater than 101 degrees for 12 hours or more which isnt relieved by an increase in fluid intake and taking 2 Tylenol every 4-6 hours. 3. Excessive drainage from your incisions, or drainage which hasnt stopped by 72 hours after your surgery. 4. Fever, chills, shortness of breath, chest pain, nausea, vomiting or other signs and symptoms which are of concern to you. frequently asked questions   What should I take for pain?  o In general you will be discharged with three medications for pain (Percocet 7.5 / 325mg, Oxycodone 5mg and Tramadol). This may vary slightly depending on what you were taking in the hospital.   - 1st Line - Percocet 1-2 tablets every 4-6 hrs (Or as directed).  This is the first and only medication you need to take. Initially you may need 2 tablets every 4 hours, but as your pain subsides, this will taper to 1 tablet every 6 hours.    - 2nd Line - Oxycodone 1 every 8 hours (Or as directed), take these between Percocet doses if your pain is not below 4 / 10. This may be needed only for several days following your discharge.  Oxycodone may be taken more frequently if needed 1-2 tablets every 4-6 hours if the pain is severe between Percocet doses. - 3rd Line - Tramadol - Take this medication as directed if the Percocet and Oxycodone are not working. Once you taper off Percocet, this medication may also be used. - 4th Line - Add Alleve 220mg every 12 hours or Motrin 400mg (200mg x 2) every 8 hours   When should I call for advice regarding my pain?  o After 12 hours on the above regimen, if nothing is working call the office ([phone] or [phone]) or call my cell after hours [phone].  Can I get refills?  o Narcotic refills are provided for the first 6 weeks following surgery. - I will generally try to taper down to a single narcotic medication by your two week appointment. o Try Tylenol 650mg along with Alleve 220mg or Motrin 200mg during the majority of the day. - Save the narcotic pain medications for physical therapy (1 hour prior) and before sleeping at night. - Keep in mind you need to discontinue these medications prior to returning to driving.  Is swelling normal?  o Almost everyone has some degree of swelling following surgery. o Following hip and knee replacement surgery, swelling can be normal below the incision for the first 1-2 weeks. - This swelling peaks around 5-7 days after surgery.   - You may have some bruising around the back of the thigh, calf and even into the foot.  What should I do for the swelling?  o Keep the limb elevated. o Apply compression socks (knee high for total knees and up to the mid-thigh for total hips.   o Heat or ice may be applied, choose the modality that makes you the most comfortable.     How long should I remain on blood thinners following surgery?  o Thirty days   When can I drive?  o Once you have stopped using regular narcotic pain medications (Percocet, Lortab, etc.) and can safely apply the brakes without hesitation.  When can I shower? o 72hours following surgery if the incision is dry.  o No submersion of the incision, bathing or swimming for 14 days following surgery or until cleared by Dr Britt Corrales.  What do I do with the dressing when I shower?  o The dressing can be removed. o The incision is sealed with Dermabond (Biologic glue) and except for wounds which are draining should be watertight.  How active should I be following surgery?   o Progress activities in moderation at your own pace.   o Walk each day and set progressive goals with small increments (1st week - ½ block of walking, 2nd week - 1 block, 3rd week - 2 blocks, etc.)    Please do not hesitate to call me at [phone] (cell phone) for questions following surgery - Laurel Avila MD

## 2017-07-28 NOTE — PROGRESS NOTES
7/28/2017   CARE MANAGEMENT NOTE:  CM notified [facility] [employer] agency of pt's discharge today and faxed JOHANA Beth

## 2017-07-28 NOTE — PROGRESS NOTES
Problem: Falls - Risk of  Goal: *Absence of falls  Outcome: Resolved/Met Date Met:  07/28/17  No falls at the time of this note; aware of all fall precautions and current need for assistance with OOB and ambulation. Problem: Hip Replacement: Post-Op Day 2  Goal: *Met physical therapy criteria for discharge to the next level of care  Outcome: Progressing Towards Goal  Progressing with Rehab; PT/OT. Not cleared at this time but plan after 2 sessions today patient may be cleared for discharge to home. Will follow for progress. Goal: *Optimal pain control with oral analgesia  Outcome: Progressing Towards Goal  Tolerating prn analgesia; stated pain goal = 3. Goal: *Hemodynamically stable  Outcome: Progressing Towards Goal  VSS, Hgb appears stable at 10.8. Goal: *Tolerating diet  Outcome: Resolved/Met Date Met:  07/28/17  Tolerating diabetic diet; had consultation with dietician regarding high protein food choices while recovering from surgery. Goal: *Verbalizes understanding of any indicated hip precautions  Outcome: Progressing Towards Goal  Working with PT and reviewing hip precautions; will be able to verbalize prior to Rehab clearing him for discharge. Goal: *Patient verbalizes understanding of discharge instructions  Outcome: Progressing Towards Goal  Discharge instructions prepared and to be reviewed with patients once clears Rehab.

## 2017-07-28 NOTE — PROGRESS NOTES
TOTAL HIP ARTHROPLASTY DAILY NOTE     ASSESSMENT / PLAN :   1. Pain Control : Acceptable - Some pain at times, but the patient does not wish to increase their medications  2. Overnight Issues : none, continued L. Knee pain w/ known OA  3. Wound or incisional issue : Healing incision with no visible drainage  4. Therapy / Weight Bearing Recommendations : Weight bear as tolerated with use of a walker and two person assist while mobilizing  5. DVT Prophylaxis : Mechanical and Aspirin and mechanical lower extremity compression device  6. Disposition : Discharge to home with home health (maximum of 4 visits)  7. Medical Concerns : Stable this am  8. Comments : mobilize with therapy, home this afternoon. PROCEDURE : I performed an injection of the left knee :    The knee was cleaned with alcohol. Using an 22-gauge needle the knee was injected with 1cc Kenalog 40mg / ml, 2cc Lidocaine 1% and 2cc of Marcaine 0.5%. A sterile dressing was applied after the injection. POD  2 Days Post-Op s/p Procedure(s):  LEFT TOTAL HIP ARTHROPLASTY DIRECT LATERAL     SUBJECTIVE :     Concerns : Stable. OBJECTIVE :     Vitals:    07/27/17 1609 07/27/17 2004 07/28/17 0436 07/28/17 0730   BP: 147/70 153/70 178/75 119/75   Pulse: 67 60 65 74   Resp: 18 16 18 15   Temp: 97.9 °F (36.6 °C) 98.1 °F (36.7 °C) 97.7 °F (36.5 °C) 98.3 °F (36.8 °C)   SpO2: 99% 95% 100% 98%   Weight:       Height:           Alert and oriented x3. left exam of the hip reveals that the dressing is clean, dry and intact. The patient is able to fire the quadriceps / flex at the hip  Sensation is intact to light touch. No calf pain.      Labs:  Recent Labs      07/28/17   0443  07/27/17   0515   HGB  10.6*  10.6*   NA   --   141   K   --   4.1   CL   --   105   CO2   --   24   BUN   --   27*   CREA   --   0.95   GLU   --   131*        Patient mobility  Gait  Base of Support: Widened  Speed/Nel: Slow  Gait Abnormalities: Antalgic  Ambulation - Level of Assistance: Minimal assistance  Distance (ft): 10 Feet (ft)  Assistive Device: Walker, rolling, Gait belt        Dennie Dover, MD  Cell [phone]  Medical Staff : Ramses Cope / Nikolai Valenzuela  Office : [phone] ext  [phone]

## 2018-06-26 ENCOUNTER — HOSPITAL ENCOUNTER (OUTPATIENT)
Dept: VASCULAR SURGERY | Age: 73
Discharge: HOME | End: 2018-06-26
Attending: FAMILY MEDICINE
Payer: MEDICARE

## 2018-06-26 DIAGNOSIS — I82.409 DVT (DEEP VENOUS THROMBOSIS) (HCC): ICD-10-CM

## 2018-06-26 DIAGNOSIS — R60.9 SWELLING: ICD-10-CM

## 2018-06-26 PROCEDURE — 93970 EXTREMITY STUDY: CPT

## 2018-06-26 NOTE — PROCEDURES
Clarisa  *** FINAL REPORT ***    Name: Carlos Durbin  MRN: [id number]    Outpatient  : 17 Oct 1945  HIS Order #: [id number]  [address] [facility] Visit #: [id number]  Date: 2018    TYPE OF TEST: Peripheral Venous Testing    REASON FOR TEST  Edema    Right Leg:-  Deep venous thrombosis:           No  Superficial venous thrombosis:    No  Deep venous insufficiency:        No  Superficial venous insufficiency: No    Left Leg:-  Deep venous thrombosis:           No  Superficial venous thrombosis:    No  Deep venous insufficiency:        No  Superficial venous insufficiency: No      INTERPRETATION/FINDINGS  PROCEDURE:  BILATERAL LE VENOUS DUPLEX. Evaluation of lower extremity veins with ultrasound (B-mode imaging,  pulsed Doppler, color Doppler). Includes the common femoral, deep  femoral, femoral, popliteal, posterior tibial, peroneal, and great  saphenous veins. FINDINGS: Technically difficult study due to accoustic shadowing from  calcified arteries and excess edema. Gray scale and color flow duplex  images of the veins visualized in both lower extremities demonstrate  normal compressibility, spontaneous and augmented flow profiles, and  absence of filling defects throughout the deep and superficial veins  in both lower extremities. CONCLUSION: Bilateral lower extremity venous duplex negative for deep  venous thrombosis or thrombophlebitis in the veins visualized. Prominent lymph nodes noted bilaterally in the groin, measuring 3.02 x   1.24 cm on the right and 4.11 x 0.95 cm on the left. Avascular  structure with calcification is noted in the right popliteal fossa  measuring approximately 3.51 x 1.44 cm. ADDITIONAL COMMENTS    I have personally reviewed the data relevant to the interpretation of  this  study. TECHNOLOGIST: Shari Garcia RDCS  Signed: 2018 03:14 PM    PHYSICIAN: Oc Lugo.  Rosanna Concepcion MD  Signed: 2018 04:00 PM

## 2018-07-05 ENCOUNTER — HOSPITAL ENCOUNTER (OUTPATIENT)
Dept: CT IMAGING | Age: 73
Discharge: HOME | End: 2018-07-05
Attending: FAMILY MEDICINE
Payer: MEDICARE

## 2018-07-05 DIAGNOSIS — R59.0 INGUINAL ADENOPATHY: ICD-10-CM

## 2018-07-05 PROCEDURE — 74177 CT ABD & PELVIS W/CONTRAST: CPT

## 2018-07-05 PROCEDURE — 74011636320 HC RX REV CODE- 636/320: Performed by: FAMILY MEDICINE

## 2018-07-05 RX ADMIN — IOPAMIDOL 100 ML: 755 INJECTION, SOLUTION INTRAVENOUS at 10:58

## 2018-08-22 ENCOUNTER — HOSPITAL ENCOUNTER (OUTPATIENT)
Dept: CT IMAGING | Age: 73
Discharge: HOME | End: 2018-08-22
Attending: ORTHOPAEDIC SURGERY
Payer: MEDICARE

## 2018-08-22 DIAGNOSIS — M25.551 RIGHT HIP PAIN: ICD-10-CM

## 2018-08-22 PROCEDURE — 73700 CT LOWER EXTREMITY W/O DYE: CPT

## 2019-03-18 ENCOUNTER — HOSPITAL ENCOUNTER (OUTPATIENT)
Dept: CT IMAGING | Age: 74
Discharge: HOME | End: 2019-03-18
Attending: ORTHOPAEDIC SURGERY
Payer: MEDICARE

## 2019-03-18 DIAGNOSIS — G89.29 CHRONIC PAIN OF RIGHT HIP: ICD-10-CM

## 2019-03-18 DIAGNOSIS — M16.11 PRIMARY OSTEOARTHRITIS OF RIGHT HIP: ICD-10-CM

## 2019-03-18 DIAGNOSIS — M25.551 CHRONIC PAIN OF RIGHT HIP: ICD-10-CM

## 2019-03-18 PROCEDURE — 73700 CT LOWER EXTREMITY W/O DYE: CPT

## 2019-04-10 ENCOUNTER — HOSPITAL ENCOUNTER (OUTPATIENT)
Dept: NON INVASIVE DIAGNOSTICS | Age: 74
Discharge: HOME | End: 2019-04-10
Attending: ORTHOPAEDIC SURGERY
Payer: MEDICARE

## 2019-04-10 ENCOUNTER — HOSPITAL ENCOUNTER (OUTPATIENT)
Dept: PREADMISSION TESTING | Age: 74
Discharge: HOME | End: 2019-04-10
Payer: MEDICARE

## 2019-04-10 VITALS
DIASTOLIC BLOOD PRESSURE: 61 MMHG | HEART RATE: 41 BPM | RESPIRATION RATE: 16 BRPM | BODY MASS INDEX: 32.91 KG/M2 | SYSTOLIC BLOOD PRESSURE: 140 MMHG | WEIGHT: 209.7 LBS | TEMPERATURE: 97.6 F | OXYGEN SATURATION: 95 % | HEIGHT: 67 IN

## 2019-04-10 LAB
ABO + RH BLD: NORMAL
ALBUMIN SERPL-MCNC: 4 G/DL (ref 3.5–5)
ALBUMIN/GLOB SERPL: 1.4 {RATIO} (ref 1.1–2.2)
ALP SERPL-CCNC: 37 U/L (ref 45–117)
ALT SERPL-CCNC: 22 U/L (ref 12–78)
ANION GAP SERPL CALC-SCNC: 6 MMOL/L (ref 5–15)
APPEARANCE UR: CLEAR
AST SERPL-CCNC: 15 U/L (ref 15–37)
ATRIAL RATE: 45 BPM
BACTERIA URNS QL MICRO: NEGATIVE /HPF
BASOPHILS # BLD: 0.1 K/UL (ref 0–0.1)
BASOPHILS NFR BLD: 1 % (ref 0–1)
BILIRUB SERPL-MCNC: 0.6 MG/DL (ref 0.2–1)
BILIRUB UR QL: NEGATIVE
BLOOD GROUP ANTIBODIES SERPL: NORMAL
BUN SERPL-MCNC: 33 MG/DL (ref 6–20)
BUN/CREAT SERPL: 29 (ref 12–20)
CALCIUM SERPL-MCNC: 8.9 MG/DL (ref 8.5–10.1)
CALCULATED P AXIS, ECG09: 63 DEGREES
CALCULATED R AXIS, ECG10: -18 DEGREES
CALCULATED T AXIS, ECG11: 12 DEGREES
CHLORIDE SERPL-SCNC: 108 MMOL/L (ref 97–108)
CO2 SERPL-SCNC: 27 MMOL/L (ref 21–32)
COLOR UR: NORMAL
CREAT SERPL-MCNC: 1.13 MG/DL (ref 0.7–1.3)
CRP SERPL-MCNC: 0.52 MG/DL (ref 0–0.6)
DIAGNOSIS, 93000: NORMAL
DIFFERENTIAL METHOD BLD: ABNORMAL
EOSINOPHIL # BLD: 0.2 K/UL (ref 0–0.4)
EOSINOPHIL NFR BLD: 4 % (ref 0–7)
EPITH CASTS URNS QL MICRO: NORMAL /LPF
ERYTHROCYTE [DISTWIDTH] IN BLOOD BY AUTOMATED COUNT: 12.8 % (ref 11.5–14.5)
ERYTHROCYTE [SEDIMENTATION RATE] IN BLOOD: 1 MM/HR (ref 0–20)
EST. AVERAGE GLUCOSE BLD GHB EST-MCNC: 126 MG/DL
GLOBULIN SER CALC-MCNC: 2.9 G/DL (ref 2–4)
GLUCOSE SERPL-MCNC: 102 MG/DL (ref 65–100)
GLUCOSE UR STRIP.AUTO-MCNC: NEGATIVE MG/DL
HBA1C MFR BLD: 6 % (ref 4.2–6.3)
HCT VFR BLD AUTO: 38.4 % (ref 36.6–50.3)
HGB BLD-MCNC: 12.6 G/DL (ref 12.1–17)
HGB UR QL STRIP: NEGATIVE
HYALINE CASTS URNS QL MICRO: NORMAL /LPF (ref 0–5)
IMM GRANULOCYTES # BLD AUTO: 0.1 K/UL (ref 0–0.04)
IMM GRANULOCYTES NFR BLD AUTO: 1 % (ref 0–0.5)
KETONES UR QL STRIP.AUTO: NEGATIVE MG/DL
LEUKOCYTE ESTERASE UR QL STRIP.AUTO: NEGATIVE
LYMPHOCYTES # BLD: 0.9 K/UL (ref 0.8–3.5)
LYMPHOCYTES NFR BLD: 15 % (ref 12–49)
MCH RBC QN AUTO: 30.4 PG (ref 26–34)
MCHC RBC AUTO-ENTMCNC: 32.8 G/DL (ref 30–36.5)
MCV RBC AUTO: 92.8 FL (ref 80–99)
MONOCYTES # BLD: 0.6 K/UL (ref 0–1)
MONOCYTES NFR BLD: 11 % (ref 5–13)
NEUTS SEG # BLD: 4 K/UL (ref 1.8–8)
NEUTS SEG NFR BLD: 68 % (ref 32–75)
NITRITE UR QL STRIP.AUTO: NEGATIVE
NRBC # BLD: 0 K/UL (ref 0–0.01)
NRBC BLD-RTO: 0 PER 100 WBC
P-R INTERVAL, ECG05: 188 MS
PH UR STRIP: 6 [PH] (ref 5–8)
PLATELET # BLD AUTO: 246 K/UL (ref 150–400)
PMV BLD AUTO: 10.8 FL (ref 8.9–12.9)
POTASSIUM SERPL-SCNC: 4.6 MMOL/L (ref 3.5–5.1)
PROT SERPL-MCNC: 6.9 G/DL (ref 6.4–8.2)
PROT UR STRIP-MCNC: NEGATIVE MG/DL
Q-T INTERVAL, ECG07: 496 MS
QRS DURATION, ECG06: 98 MS
QTC CALCULATION (BEZET), ECG08: 429 MS
RBC # BLD AUTO: 4.14 M/UL (ref 4.1–5.7)
RBC #/AREA URNS HPF: NORMAL /HPF (ref 0–5)
SODIUM SERPL-SCNC: 141 MMOL/L (ref 136–145)
SP GR UR REFRACTOMETRY: 1.02 (ref 1–1.03)
SPECIMEN EXP DATE BLD: NORMAL
UA: UC IF INDICATED,UAUC: NORMAL
UROBILINOGEN UR QL STRIP.AUTO: 0.2 EU/DL (ref 0.2–1)
VENTRICULAR RATE, ECG03: 45 BPM
WBC # BLD AUTO: 5.9 K/UL (ref 4.1–11.1)
WBC URNS QL MICRO: NORMAL /HPF (ref 0–4)

## 2019-04-10 PROCEDURE — 85025 COMPLETE CBC W/AUTO DIFF WBC: CPT

## 2019-04-10 PROCEDURE — 86140 C-REACTIVE PROTEIN: CPT

## 2019-04-10 PROCEDURE — 85652 RBC SED RATE AUTOMATED: CPT

## 2019-04-10 PROCEDURE — 84466 ASSAY OF TRANSFERRIN: CPT

## 2019-04-10 PROCEDURE — 80053 COMPREHEN METABOLIC PANEL: CPT

## 2019-04-10 PROCEDURE — 93005 ELECTROCARDIOGRAM TRACING: CPT

## 2019-04-10 PROCEDURE — 36415 COLL VENOUS BLD VENIPUNCTURE: CPT

## 2019-04-10 PROCEDURE — 81001 URINALYSIS AUTO W/SCOPE: CPT

## 2019-04-10 PROCEDURE — 86900 BLOOD TYPING SEROLOGIC ABO: CPT

## 2019-04-10 PROCEDURE — 83036 HEMOGLOBIN GLYCOSYLATED A1C: CPT

## 2019-04-10 RX ORDER — SULINDAC 200 MG/1
200 TABLET ORAL 2 TIMES DAILY
COMMUNITY
End: 2019-04-20

## 2019-04-10 NOTE — PERIOP NOTES
[address] [facility] [address], [address] [facility]                            MAIN OR                                  [phone]   MAIN PRE OP                          [phone]                                                                                AMBULATORY PRE OP          [phone]  PRE-ADMISSION TESTING    [phone]     Surgery Date:   4/17/2019 Wednesday      Is surgery arrival time given by surgeon? NO  If NO, [facility] staff will call you between 3 and 7pm the day before your surgery with your arrival time. (If your surgery is on a Monday, we will call you the Friday before.)    Call [phone] after 7pm Monday-Friday if you did not receive your arrival time. INSTRUCTIONS BEFORE YOUR SURGERY   When You  Arrive   Arrive at the [address] [facility] on the day of your surgery  Have your insurance card, photo ID, and any copayment (if needed)     Food   and   Drink   NO food or drink after midnight the night before surgery    This means NO water, gum, mints, coffee, juice, etc.  No alcohol (beer, wine, liquor) 24 hours before and after surgery     Medications to   TAKE   Morning of Surgery   MEDICATIONS TO TAKE THE MORNING OF SURGERY WITH A SIP OF WATER:    Amlodipine, Propanolol     Medications  To  STOP      7 days before surgery    Non-Steroidal anti-inflammatory Drugs (NSAID's): for example, Ibuprofen (Advil, Motrin), Naproxen (Aleve)   Aspirin, if taking for pain    Herbal supplements, vitamins, and fish oil   Other:Sulindac  (Pain medications not listed above, including Tylenol may be taken)   Blood  Thinners    If you take  Aspirin, Plavix, Coumadin, or any blood-thinning or anti-blood clot medicine, talk to the doctor who prescribed the medications for pre-operative instructions.      Bathing Clothing  Jewelry  Valuables       If you shower the morning of surgery, please do not apply anything to your skin (lotions, powders, deodorant, or makeup, especially mascara)   Follow all special bath instructions (for total joint replacement, spine and bowel surgeries)   Do not shave or trim anywhere 24 hours before surgery   Wear your hair loose or down; no pony-tails, buns, or metal hair clips   Wear loose, comfortable, clean clothes   Wear glasses instead of contacts   Leave money, valuables, and jewelry, including body piercings, at home     Going Home       or Spending the Night    SAME-DAY SURGERY: You must have a responsible adult drive you home and stay with you 24 hours after surgery   ADMITS: If your doctor is keeping you into the hospital after surgery, leave personal belongings/luggage in your car until you have a hospital room number. Hospital discharge time is 12 noon  Drivers must be here before 12 noon unless you are told differently   Special Instructions   · Use Chlorhexidine Care Fusion wash and sponges 3 days prior to surgery as instructed. · Incentive spirometer given with instructions to practice at home and bring back to the hospital on the day of surgery. · [facility] will contact you if your Hemoglobin A1C is greater than 7.5. · Ensure/Glucerna  sample, nutritional information, and Ensure/Glucerna coupon given. · Pain pamphlet and Call Don't Fall reminder reviewed with patient. ·  parking is complimentary Monday - Friday 7 am - 5 pm  · Bring PTA Medication list day of surgery with the last doses taken documented   · Do not bring medication bottles the day of surgery         Follow all instructions so your surgery wont be cancelled. Please, be on time. If a situation occurs and you are delayed the day of surgery, call [phone]. If your physical condition changes (like a fever, cold, flu, etc.) call your surgeon. The patient was contacted  in person. Home medication reviewed and verified during PAT appointment.   The patient verbalizes understanding of all instructions and does not  need reinforcement.

## 2019-04-11 LAB
BACTERIA SPEC CULT: NORMAL
BACTERIA SPEC CULT: NORMAL
SERVICE CMNT-IMP: NORMAL
TRANSFERRIN SERPL-MCNC: 327 MG/DL (ref 200–370)

## 2019-04-12 NOTE — H&P
PAT Pre-Op History & Physical    Patient: Shelby Resendiz                  MRN: [id number]          SSN: [id number]            YOB: 1945                  Chief Complaint:   Pre-Op Exam - Right Hip Replacement          HPI:   Patient is a 68 y.o.  male  who presents with history of right hip pain for less than 5 years. He states now that he has to use a cane he feels it is time to fix his hip. He arrives today in a wheelchair because walking long distances is hard. The pain is in his hip and leg. He rates the pain a constant 8/10. He denies buckling of his leg. The pain is increased whenever he stands or walks. Certain movements will send pain shooting down his leg. Some days he states it feels like his hip has ,     He has failed injections. The patient was evaluated in the surgeon's office and it was determined that the most appropriate plan of care is to proceed with surgical intervention. Patient's PCP Delano Beach MD      Past Medical History:   Diagnosis Date    Chronic pain     hip, knes, right shoulder    Diabetes ([address] 2007    Essential tremor     Gilbert's syndrome     High cholesterol     Hypertension     Localized swelling of both lower legs 06/2018    Obesity (BMI 30-39.9) 04/10/2019    BMI 32.84    Osteoarthritis     knees and hips;  Right Shoulder      Past Surgical History:   Procedure Laterality Date    HX COLONOSCOPY N/A 2009    HX HIP REPLACEMENT Left 07/2017    HX ORTHOPAEDIC Left age 10    repair fractured arm     HX SHOULDER REPLACEMENT Left 2010    HX TONSILLECTOMY N/A childhood      Prior to Admission medications    Medication Sig Start Date End Date Taking? Authorizing Provider   sulindac (CLINORIL) 200 mg tablet Take 200 mg by mouth two (2) times a day. Yes Provider, Historical   cyanocobalamin, vitamin B-12, (VITAMIN B12 PO) Take 1 Cap by mouth daily. Yes Provider, Historical   propranolol (INDERAL) 20 mg tablet Take 20 mg by mouth daily. Indications: Essential Tremor   Yes Provider, Historical   lisinopril (PRINIVIL, ZESTRIL) 40 mg tablet Take 40 mg by mouth daily (after breakfast). Yes Provider, Historical   potassium chloride (KLOR-CON M10) 10 mEq tablet Take 10 mEq by mouth daily. Yes Provider, Historical   triamterene-hydroCHLOROthiazide (MAXZIDE) 37.5-25 mg per tablet Take 1 Tab by mouth daily. Yes Provider, Historical   fenofibrate (LOFIBRA) 160 mg tablet Take 160 mg by mouth daily (after breakfast). Yes Provider, Historical   amLODIPine (NORVASC) 10 mg tablet Take 10 mg by mouth daily (after breakfast). Yes Provider, Historical   ezetimibe (ZETIA) 10 mg tablet Take 10 mg by mouth Daily (before breakfast). Yes Provider, Historical   metFORMIN (GLUCOPHAGE) 500 mg tablet Take 1,000 mg by mouth two (2) times daily (with meals). Yes Provider, Historical       Allergies   Allergen Reactions    Penicillins Hives     Ancef graded challenge done 17, tolerated well, no reaction noted    Statins-Hmg-Coa Reductase Inhibitors Other (comments)     Elevated liver and bilirubin enzgmes        Social History     Tobacco Use    Smoking status: Former Smoker     Packs/day: 0.50     Years: 5.00     Pack years: 2.50     Last attempt to quit: 4/10/1969     Years since quittin.0    Smokeless tobacco: Never Used   Substance Use Topics    Alcohol use: Yes     Alcohol/week: 1.8 oz     Types: 1 Glasses of wine, 1 Cans of beer, 1 Shots of liquor per week     Comment: Occassionally Drinks 3 days a week ; One drink. Not all three in one night.        Social History     Substance and Sexual Activity   Drug Use No     Family History   Problem Relation Age of Onset    Cancer Mother         cervical    Hypertension Mother     Diabetes Father     Kidney Disease Father     Hypertension Brother     Stroke Brother     Lupus Sister     Alcohol abuse Sister        Review of Systems:    Constitutional: Negative for chills and fever  HENT: Negative for congestion and sore throat  Eyes: negative for blurred vision and double vision  Respiratory: Negative for cough, shortness of breath and wheezing  Cardiovascular: Negative for chest pain and palpitations  Gastrointestinal: Negative for abdominal pain, constipation, diarrhea and nausea  Genitourinary: Negative for dysuria and hematuria  Musculoskeletal: Positive for right hip pain. Positive for bilateral lower leg edema. Skin: Negative for rash, open wounds  Neurological: Negative for dizziness, tremors and headaches  Psychiatric: Negative for depression. The patient is not nervous/anxious. Objective:     Visit Vitals  /61 (BP 1 Location: Left arm, BP Patient Position: Sitting)   Pulse (!) 41   Temp 97.6 °F (36.4 °C)   Resp 16   Ht 5' 7\" (1.702 m)   Wt 95.1 kg (209 lb 11.2 oz)   SpO2 95%   BMI 32.84 kg/m²     Body mass index is 32.84 kg/m². Wt Readings from Last 1 Encounters:   04/10/19 95.1 kg (209 lb 11.2 oz)        Physical Exam:     General: Pleasant,  cooperative, no apparent distress, appears stated age. Eyes: Conjunctivae/corneas clear. EOMs intact. Nose: Nares normal.   Mouth/Throat: Lips, mucosa, and tongue normal. Teeth and gums normal.   Lungs: Clear to auscultation bilaterally. Heart: Bradycardia, S1, S2 normal. No murmur, click, rub or gallop. Abdomen: Soft, non-tender. Bowel sounds normal. No distention. Musculoskeletal:  Gait antalgic. Bilateral lower leg edema. Currently wearing compression hose bilaterally. Extremities:  Extremities normal, atraumatic, no cyanosis. Calves supple, non tender to palpation. Pulses: 2+ and symmetric bilateral upper extremities. Cap. refill <2 seconds   Skin: Skin color, texture, turgor normal. No visible open areas, examined fully clothed   Neurologic: CN II-XII grossly intact. Alert and oriented x3.     Labs:   Recent Results (from the past 72 hour(s))   C REACTIVE PROTEIN, QT    Collection Time: 04/10/19 11:00 AM   Result Value Ref Range    C-Reactive protein 0.52 0.00 - 0.60 mg/dL   CBC WITH AUTOMATED DIFF    Collection Time: 04/10/19 11:00 AM   Result Value Ref Range    WBC 5.9 4.1 - 11.1 K/uL    RBC 4.14 4.10 - 5.70 M/uL    HGB 12.6 12.1 - 17.0 g/dL    HCT 38.4 36.6 - 50.3 %    MCV 92.8 80.0 - 99.0 FL    MCH 30.4 26.0 - 34.0 PG    MCHC 32.8 30.0 - 36.5 g/dL    RDW 12.8 11.5 - 14.5 %    PLATELET 684 325 - 424 K/uL    MPV 10.8 8.9 - 12.9 FL    NRBC 0.0 0  WBC    ABSOLUTE NRBC 0.00 0.00 - 0.01 K/uL    NEUTROPHILS 68 32 - 75 %    LYMPHOCYTES 15 12 - 49 %    MONOCYTES 11 5 - 13 %    EOSINOPHILS 4 0 - 7 %    BASOPHILS 1 0 - 1 %    IMMATURE GRANULOCYTES 1 (H) 0.0 - 0.5 %    ABS. NEUTROPHILS 4.0 1.8 - 8.0 K/UL    ABS. LYMPHOCYTES 0.9 0.8 - 3.5 K/UL    ABS. MONOCYTES 0.6 0.0 - 1.0 K/UL    ABS. EOSINOPHILS 0.2 0.0 - 0.4 K/UL    ABS. BASOPHILS 0.1 0.0 - 0.1 K/UL    ABS. IMM. GRANS. 0.1 (H) 0.00 - 0.04 K/UL    DF AUTOMATED     METABOLIC PANEL, COMPREHENSIVE    Collection Time: 04/10/19 11:00 AM   Result Value Ref Range    Sodium 141 136 - 145 mmol/L    Potassium 4.6 3.5 - 5.1 mmol/L    Chloride 108 97 - 108 mmol/L    CO2 27 21 - 32 mmol/L    Anion gap 6 5 - 15 mmol/L    Glucose 102 (H) 65 - 100 mg/dL    BUN 33 (H) 6 - 20 MG/DL    Creatinine 1.13 0.70 - 1.30 MG/DL    BUN/Creatinine ratio 29 (H) 12 - 20      GFR est AA >60 >60 ml/min/1.73m2    GFR est non-AA >60 >60 ml/min/1.73m2    Calcium 8.9 8.5 - 10.1 MG/DL    Bilirubin, total 0.6 0.2 - 1.0 MG/DL    ALT (SGPT) 22 12 - 78 U/L    AST (SGOT) 15 15 - 37 U/L    Alk.  phosphatase 37 (L) 45 - 117 U/L    Protein, total 6.9 6.4 - 8.2 g/dL    Albumin 4.0 3.5 - 5.0 g/dL    Globulin 2.9 2.0 - 4.0 g/dL    A-G Ratio 1.4 1.1 - 2.2     HEMOGLOBIN A1C WITH EAG    Collection Time: 04/10/19 11:00 AM   Result Value Ref Range    Hemoglobin A1c 6.0 4.2 - 6.3 %    Est. average glucose 126 mg/dL   CULTURE, MRSA    Collection Time: 04/10/19 11:00 AM   Result Value Ref Range    Special Requests: NO SPECIAL REQUESTS Culture result: MRSA NOT PRESENT      Culture result:            Screening of patient nares for MRSA is for surveillance purposes and, if positive, to facilitate isolation considerations in high risk settings. It is not intended for automatic decolonization interventions per se as regimens are not sufficiently effective to warrant routine use.    SED RATE (ESR)    Collection Time: 04/10/19 11:00 AM   Result Value Ref Range    Sed rate, automated 1 0 - 20 mm/hr   TRANSFERRIN    Collection Time: 04/10/19 11:00 AM   Result Value Ref Range    Transferrin 327 200 - 370 mg/dL   URINALYSIS W/ REFLEX CULTURE    Collection Time: 04/10/19 11:00 AM   Result Value Ref Range    Color YELLOW/STRAW      Appearance CLEAR CLEAR      Specific gravity 1.018 1.003 - 1.030      pH (UA) 6.0 5.0 - 8.0      Protein NEGATIVE  NEG mg/dL    Glucose NEGATIVE  NEG mg/dL    Ketone NEGATIVE  NEG mg/dL    Bilirubin NEGATIVE  NEG      Blood NEGATIVE  NEG      Urobilinogen 0.2 0.2 - 1.0 EU/dL    Nitrites NEGATIVE  NEG      Leukocyte Esterase NEGATIVE  NEG      WBC 0-4 0 - 4 /hpf    RBC 0-5 0 - 5 /hpf    Epithelial cells FEW FEW /lpf    Bacteria NEGATIVE  NEG /hpf    UA:UC IF INDICATED CULTURE NOT INDICATED BY UA RESULT CNI      Hyaline cast 0-2 0 - 5 /lpf   TYPE & SCREEN    Collection Time: 04/10/19 11:00 AM   Result Value Ref Range    Crossmatch Expiration 04/20/2019     ABO/Rh(D) A POSITIVE     Antibody screen NEG    EKG, 12 LEAD, INITIAL    Collection Time: 04/10/19 11:30 AM   Result Value Ref Range    Ventricular Rate 45 BPM    Atrial Rate 45 BPM    P-R Interval 188 ms    QRS Duration 98 ms    Q-T Interval 496 ms    QTC Calculation (Bezet) 429 ms    Calculated P Axis 63 degrees    Calculated R Axis -18 degrees    Calculated T Axis 12 degrees    Diagnosis       Sinus bradycardia with premature atrial complexes  Abnormal ECG  When compared with ECG of 19-JUL-2017 15:27,  premature atrial complexes are now present  Confirmed by Barry Branch MD., [facility] ([phone]) on 4/10/2019 12:22:48 PM         Assessment and Plan     1. OA Right Hip  2. Pre-op general physical exam    Scheduled for Right Total Hip replacement    All labs reviewed and unremarkable. EKG reviewed.  MRSA negative      Merle Cortez, NP

## 2019-04-16 ENCOUNTER — ANESTHESIA EVENT (OUTPATIENT)
Dept: SURGERY | Age: 74
DRG: 470 | End: 2019-04-16
Payer: MEDICARE

## 2019-04-17 ENCOUNTER — HOSPITAL ENCOUNTER (INPATIENT)
Age: 74
LOS: 3 days | Discharge: HOME HEALTH | DRG: 470 | End: 2019-04-20
Attending: ORTHOPAEDIC SURGERY | Admitting: ORTHOPAEDIC SURGERY
Payer: MEDICARE

## 2019-04-17 ENCOUNTER — APPOINTMENT (OUTPATIENT)
Dept: GENERAL RADIOLOGY | Age: 74
DRG: 470 | End: 2019-04-17
Attending: PHYSICIAN ASSISTANT
Payer: MEDICARE

## 2019-04-17 ENCOUNTER — ANESTHESIA (OUTPATIENT)
Dept: SURGERY | Age: 74
DRG: 470 | End: 2019-04-17
Payer: MEDICARE

## 2019-04-17 DIAGNOSIS — M16.11 ARTHRITIS OF RIGHT HIP: Primary | ICD-10-CM

## 2019-04-17 LAB
GLUCOSE BLD STRIP.AUTO-MCNC: 127 MG/DL (ref 65–100)
GLUCOSE BLD STRIP.AUTO-MCNC: 133 MG/DL (ref 65–100)
GLUCOSE BLD STRIP.AUTO-MCNC: 142 MG/DL (ref 65–100)
SERVICE CMNT-IMP: ABNORMAL

## 2019-04-17 PROCEDURE — 74011250637 HC RX REV CODE- 250/637: Performed by: ANESTHESIOLOGY

## 2019-04-17 PROCEDURE — 74011000272 HC RX REV CODE- 272: Performed by: ORTHOPAEDIC SURGERY

## 2019-04-17 PROCEDURE — 74011250636 HC RX REV CODE- 250/636

## 2019-04-17 PROCEDURE — 77030007866 HC KT SPN ANES BBMI -B

## 2019-04-17 PROCEDURE — 76030000021 HC AMB SURG 2 TO 2.5 HR INTENSV-TIER 1: Performed by: ORTHOPAEDIC SURGERY

## 2019-04-17 PROCEDURE — C1776 JOINT DEVICE (IMPLANTABLE): HCPCS | Performed by: ORTHOPAEDIC SURGERY

## 2019-04-17 PROCEDURE — 74011000250 HC RX REV CODE- 250: Performed by: ORTHOPAEDIC SURGERY

## 2019-04-17 PROCEDURE — 77030018673: Performed by: ORTHOPAEDIC SURGERY

## 2019-04-17 PROCEDURE — 77030029821: Performed by: ORTHOPAEDIC SURGERY

## 2019-04-17 PROCEDURE — 82962 GLUCOSE BLOOD TEST: CPT

## 2019-04-17 PROCEDURE — 77030018723 HC ELCTRD BLD COVD -A: Performed by: ORTHOPAEDIC SURGERY

## 2019-04-17 PROCEDURE — 77030011640 HC PAD GRND REM COVD -A: Performed by: ORTHOPAEDIC SURGERY

## 2019-04-17 PROCEDURE — 77030013708 HC HNDPC SUC IRR PULS STRY –B: Performed by: ORTHOPAEDIC SURGERY

## 2019-04-17 PROCEDURE — 77030020788: Performed by: ORTHOPAEDIC SURGERY

## 2019-04-17 PROCEDURE — 74011250637 HC RX REV CODE- 250/637: Performed by: PHYSICIAN ASSISTANT

## 2019-04-17 PROCEDURE — 76060000064 HC AMB SURG ANES 2 TO 2.5 HR: Performed by: ORTHOPAEDIC SURGERY

## 2019-04-17 PROCEDURE — 77030018836 HC SOL IRR NACL ICUM -A: Performed by: ORTHOPAEDIC SURGERY

## 2019-04-17 PROCEDURE — 74011250636 HC RX REV CODE- 250/636: Performed by: ANESTHESIOLOGY

## 2019-04-17 PROCEDURE — 77030029829 HC TIB INST CKPNT DISP STRY -B: Performed by: ORTHOPAEDIC SURGERY

## 2019-04-17 PROCEDURE — 77030032490 HC SLV COMPR SCD KNE COVD -B

## 2019-04-17 PROCEDURE — 77030012935 HC DRSG AQUACEL BMS -B: Performed by: ORTHOPAEDIC SURGERY

## 2019-04-17 PROCEDURE — 77030002933 HC SUT MCRYL J&J -A: Performed by: ORTHOPAEDIC SURGERY

## 2019-04-17 PROCEDURE — 76210000046 HC AMBSU PH II REC FIRST 0.5 HR: Performed by: ORTHOPAEDIC SURGERY

## 2019-04-17 PROCEDURE — 74011250636 HC RX REV CODE- 250/636: Performed by: PHYSICIAN ASSISTANT

## 2019-04-17 PROCEDURE — 77030039266 HC ADH SKN EXOFIN S2SG -A: Performed by: ORTHOPAEDIC SURGERY

## 2019-04-17 PROCEDURE — 74011000250 HC RX REV CODE- 250

## 2019-04-17 PROCEDURE — 65270000029 HC RM PRIVATE

## 2019-04-17 PROCEDURE — 77030036660

## 2019-04-17 PROCEDURE — 77030038587 HC LNR BOOT DISP ALLN -B: Performed by: ORTHOPAEDIC SURGERY

## 2019-04-17 PROCEDURE — 72170 X-RAY EXAM OF PELVIS: CPT

## 2019-04-17 PROCEDURE — 76210000038 HC AMBSU PH I REC 2.5 TO 3 HR: Performed by: ORTHOPAEDIC SURGERY

## 2019-04-17 PROCEDURE — 77030035236 HC SUT PDS STRATFX BARB J&J -B: Performed by: ORTHOPAEDIC SURGERY

## 2019-04-17 PROCEDURE — 77030020782 HC GWN BAIR PAWS FLX 3M -B

## 2019-04-17 PROCEDURE — 77030020263 HC SOL INJ SOD CL0.9% LFCR 1000ML: Performed by: ORTHOPAEDIC SURGERY

## 2019-04-17 PROCEDURE — 74011250636 HC RX REV CODE- 250/636: Performed by: ORTHOPAEDIC SURGERY

## 2019-04-17 PROCEDURE — 0SR904A REPLACEMENT OF RIGHT HIP JOINT WITH CERAMIC ON POLYETHYLENE SYNTHETIC SUBSTITUTE, UNCEMENTED, OPEN APPROACH: ICD-10-PCS | Performed by: ORTHOPAEDIC SURGERY

## 2019-04-17 PROCEDURE — 77030031139 HC SUT VCRL2 J&J -A: Performed by: ORTHOPAEDIC SURGERY

## 2019-04-17 PROCEDURE — 77030006835 HC BLD SAW SAG STRY -B: Performed by: ORTHOPAEDIC SURGERY

## 2019-04-17 PROCEDURE — 8E0Y0CZ ROBOTIC ASSISTED PROCEDURE OF LOWER EXTREMITY, OPEN APPROACH: ICD-10-PCS | Performed by: ORTHOPAEDIC SURGERY

## 2019-04-17 PROCEDURE — C1713 ANCHOR/SCREW BN/BN,TIS/BN: HCPCS | Performed by: ORTHOPAEDIC SURGERY

## 2019-04-17 DEVICE — 0 DEGREE POLYETHYLENE INSERT
Type: IMPLANTABLE DEVICE | Site: HIP | Status: FUNCTIONAL
Brand: TRIDENT

## 2019-04-17 DEVICE — 132 DEGREE NECK ANGLE HIP STEM
Type: IMPLANTABLE DEVICE | Site: HIP | Status: FUNCTIONAL
Brand: ACCOLADE

## 2019-04-17 DEVICE — CERAMIC V40 FEMORAL HEAD
Type: IMPLANTABLE DEVICE | Site: HIP | Status: FUNCTIONAL
Brand: BIOLOX

## 2019-04-17 DEVICE — COMPONENT HIP PRSS FT MTL ON CERM POLYETH X3: Type: IMPLANTABLE DEVICE | Status: FUNCTIONAL

## 2019-04-17 DEVICE — TRIDENT II TRITANIUM CLUSTER 54E
Type: IMPLANTABLE DEVICE | Site: HIP | Status: FUNCTIONAL
Brand: TRIDENT II

## 2019-04-17 RX ORDER — FENOFIBRATE 145 MG/1
145 TABLET, COATED ORAL
Status: DISCONTINUED | OUTPATIENT
Start: 2019-04-18 | End: 2019-04-20 | Stop reason: HOSPADM

## 2019-04-17 RX ORDER — DIPHENHYDRAMINE HYDROCHLORIDE 50 MG/ML
12.5 INJECTION, SOLUTION INTRAMUSCULAR; INTRAVENOUS
Status: ACTIVE | OUTPATIENT
Start: 2019-04-17 | End: 2019-04-18

## 2019-04-17 RX ORDER — DEXTROSE 50 % IN WATER (D50W) INTRAVENOUS SYRINGE
12.5-25 AS NEEDED
Status: DISCONTINUED | OUTPATIENT
Start: 2019-04-17 | End: 2019-04-20 | Stop reason: HOSPADM

## 2019-04-17 RX ORDER — TRAMADOL HYDROCHLORIDE 50 MG/1
50 TABLET ORAL
Qty: 40 TAB | Refills: 0 | Status: SHIPPED | OUTPATIENT
Start: 2019-04-17 | End: 2019-04-24

## 2019-04-17 RX ORDER — PROPOFOL 10 MG/ML
INJECTION, EMULSION INTRAVENOUS
Status: DISCONTINUED | OUTPATIENT
Start: 2019-04-17 | End: 2019-04-17 | Stop reason: HOSPADM

## 2019-04-17 RX ORDER — FACIAL-BODY WIPES
10 EACH TOPICAL DAILY PRN
Status: DISCONTINUED | OUTPATIENT
Start: 2019-04-19 | End: 2019-04-20 | Stop reason: HOSPADM

## 2019-04-17 RX ORDER — GABAPENTIN 100 MG/1
100 CAPSULE ORAL
Status: DISCONTINUED | OUTPATIENT
Start: 2019-04-18 | End: 2019-04-20 | Stop reason: HOSPADM

## 2019-04-17 RX ORDER — LIDOCAINE HYDROCHLORIDE 10 MG/ML
0.1 INJECTION, SOLUTION EPIDURAL; INFILTRATION; INTRACAUDAL; PERINEURAL AS NEEDED
Status: DISCONTINUED | OUTPATIENT
Start: 2019-04-17 | End: 2019-04-17 | Stop reason: HOSPADM

## 2019-04-17 RX ORDER — ASPIRIN 81 MG/1
81 TABLET ORAL 2 TIMES DAILY
Qty: 60 TAB | Refills: 0 | Status: SHIPPED | OUTPATIENT
Start: 2019-04-17

## 2019-04-17 RX ORDER — ONDANSETRON 2 MG/ML
4 INJECTION INTRAMUSCULAR; INTRAVENOUS
Status: DISPENSED | OUTPATIENT
Start: 2019-04-17 | End: 2019-04-18

## 2019-04-17 RX ORDER — ALBUTEROL SULFATE 0.83 MG/ML
2.5 SOLUTION RESPIRATORY (INHALATION) AS NEEDED
Status: DISCONTINUED | OUTPATIENT
Start: 2019-04-17 | End: 2019-04-17 | Stop reason: HOSPADM

## 2019-04-17 RX ORDER — ACETAMINOPHEN 500 MG
500-1000 TABLET ORAL
Qty: 60 TAB | Refills: 0 | Status: SHIPPED | OUTPATIENT
Start: 2019-04-17

## 2019-04-17 RX ORDER — ACETAMINOPHEN 325 MG/1
975 TABLET ORAL ONCE
Status: COMPLETED | OUTPATIENT
Start: 2019-04-17 | End: 2019-04-17

## 2019-04-17 RX ORDER — HYDROMORPHONE HYDROCHLORIDE 1 MG/ML
0.5 INJECTION, SOLUTION INTRAMUSCULAR; INTRAVENOUS; SUBCUTANEOUS
Status: DISCONTINUED | OUTPATIENT
Start: 2019-04-17 | End: 2019-04-17 | Stop reason: HOSPADM

## 2019-04-17 RX ORDER — ONDANSETRON 8 MG/1
4 TABLET, ORALLY DISINTEGRATING ORAL
Qty: 30 TAB | Refills: 0 | Status: SHIPPED | OUTPATIENT
Start: 2019-04-17

## 2019-04-17 RX ORDER — METFORMIN HYDROCHLORIDE 500 MG/1
1000 TABLET ORAL 2 TIMES DAILY WITH MEALS
Status: DISCONTINUED | OUTPATIENT
Start: 2019-04-18 | End: 2019-04-19

## 2019-04-17 RX ORDER — SODIUM CHLORIDE 0.9 % (FLUSH) 0.9 %
5-40 SYRINGE (ML) INJECTION EVERY 8 HOURS
Status: DISCONTINUED | OUTPATIENT
Start: 2019-04-17 | End: 2019-04-20 | Stop reason: HOSPADM

## 2019-04-17 RX ORDER — ACETAMINOPHEN 325 MG/1
650 TABLET ORAL EVERY 6 HOURS
Status: DISCONTINUED | OUTPATIENT
Start: 2019-04-17 | End: 2019-04-20 | Stop reason: HOSPADM

## 2019-04-17 RX ORDER — POLYETHYLENE GLYCOL 3350 17 G/17G
17 POWDER, FOR SOLUTION ORAL DAILY
Status: DISCONTINUED | OUTPATIENT
Start: 2019-04-18 | End: 2019-04-20 | Stop reason: HOSPADM

## 2019-04-17 RX ORDER — EPHEDRINE SULFATE 50 MG/ML
INJECTION, SOLUTION INTRAVENOUS AS NEEDED
Status: DISCONTINUED | OUTPATIENT
Start: 2019-04-17 | End: 2019-04-17 | Stop reason: HOSPADM

## 2019-04-17 RX ORDER — ASPIRIN 81 MG/1
81 TABLET ORAL 2 TIMES DAILY
Status: DISCONTINUED | OUTPATIENT
Start: 2019-04-17 | End: 2019-04-20 | Stop reason: HOSPADM

## 2019-04-17 RX ORDER — PROPRANOLOL HYDROCHLORIDE 10 MG/1
20 TABLET ORAL DAILY
Status: DISCONTINUED | OUTPATIENT
Start: 2019-04-18 | End: 2019-04-20 | Stop reason: HOSPADM

## 2019-04-17 RX ORDER — DIPHENHYDRAMINE HYDROCHLORIDE 50 MG/ML
12.5 INJECTION, SOLUTION INTRAMUSCULAR; INTRAVENOUS AS NEEDED
Status: DISCONTINUED | OUTPATIENT
Start: 2019-04-17 | End: 2019-04-17 | Stop reason: HOSPADM

## 2019-04-17 RX ORDER — NALOXONE HYDROCHLORIDE 0.4 MG/ML
0.4 INJECTION, SOLUTION INTRAMUSCULAR; INTRAVENOUS; SUBCUTANEOUS AS NEEDED
Status: DISCONTINUED | OUTPATIENT
Start: 2019-04-17 | End: 2019-04-20 | Stop reason: HOSPADM

## 2019-04-17 RX ORDER — SODIUM CHLORIDE, SODIUM LACTATE, POTASSIUM CHLORIDE, CALCIUM CHLORIDE 600; 310; 30; 20 MG/100ML; MG/100ML; MG/100ML; MG/100ML
150 INJECTION, SOLUTION INTRAVENOUS CONTINUOUS
Status: DISCONTINUED | OUTPATIENT
Start: 2019-04-17 | End: 2019-04-17 | Stop reason: HOSPADM

## 2019-04-17 RX ORDER — LIDOCAINE HYDROCHLORIDE 20 MG/ML
INJECTION, SOLUTION INFILTRATION; PERINEURAL AS NEEDED
Status: DISCONTINUED | OUTPATIENT
Start: 2019-04-17 | End: 2019-04-17 | Stop reason: HOSPADM

## 2019-04-17 RX ORDER — EZETIMIBE 10 MG/1
10 TABLET ORAL
Status: DISCONTINUED | OUTPATIENT
Start: 2019-04-18 | End: 2019-04-20 | Stop reason: HOSPADM

## 2019-04-17 RX ORDER — POVIDONE-IODINE 10 %
SOLUTION, NON-ORAL TOPICAL AS NEEDED
Status: DISCONTINUED | OUTPATIENT
Start: 2019-04-17 | End: 2019-04-17 | Stop reason: HOSPADM

## 2019-04-17 RX ORDER — OXYCODONE HYDROCHLORIDE 5 MG/1
5 TABLET ORAL
Status: COMPLETED | OUTPATIENT
Start: 2019-04-17 | End: 2019-04-17

## 2019-04-17 RX ORDER — GABAPENTIN 300 MG/1
300 CAPSULE ORAL
Status: DISCONTINUED | OUTPATIENT
Start: 2019-04-17 | End: 2019-04-20 | Stop reason: HOSPADM

## 2019-04-17 RX ORDER — CEFAZOLIN SODIUM/WATER 2 G/20 ML
2 SYRINGE (ML) INTRAVENOUS ONCE
Status: COMPLETED | OUTPATIENT
Start: 2019-04-17 | End: 2019-04-17

## 2019-04-17 RX ORDER — TRANEXAMIC ACID 100 MG/ML
INJECTION, SOLUTION INTRAVENOUS AS NEEDED
Status: DISCONTINUED | OUTPATIENT
Start: 2019-04-17 | End: 2019-04-17 | Stop reason: HOSPADM

## 2019-04-17 RX ORDER — MIDAZOLAM HYDROCHLORIDE 1 MG/ML
INJECTION, SOLUTION INTRAMUSCULAR; INTRAVENOUS AS NEEDED
Status: DISCONTINUED | OUTPATIENT
Start: 2019-04-17 | End: 2019-04-17 | Stop reason: HOSPADM

## 2019-04-17 RX ORDER — POTASSIUM CHLORIDE 750 MG/1
10 TABLET, FILM COATED, EXTENDED RELEASE ORAL DAILY
Status: DISCONTINUED | OUTPATIENT
Start: 2019-04-18 | End: 2019-04-20 | Stop reason: HOSPADM

## 2019-04-17 RX ORDER — OXYCODONE HYDROCHLORIDE 5 MG/1
5 TABLET ORAL
Status: DISCONTINUED | OUTPATIENT
Start: 2019-04-17 | End: 2019-04-20 | Stop reason: HOSPADM

## 2019-04-17 RX ORDER — OXYCODONE HYDROCHLORIDE 5 MG/1
10 TABLET ORAL
Status: DISCONTINUED | OUTPATIENT
Start: 2019-04-17 | End: 2019-04-20 | Stop reason: HOSPADM

## 2019-04-17 RX ORDER — INSULIN LISPRO 100 [IU]/ML
INJECTION, SOLUTION INTRAVENOUS; SUBCUTANEOUS
Status: DISCONTINUED | OUTPATIENT
Start: 2019-04-17 | End: 2019-04-20 | Stop reason: HOSPADM

## 2019-04-17 RX ORDER — SODIUM CHLORIDE, SODIUM LACTATE, POTASSIUM CHLORIDE, CALCIUM CHLORIDE 600; 310; 30; 20 MG/100ML; MG/100ML; MG/100ML; MG/100ML
125 INJECTION, SOLUTION INTRAVENOUS CONTINUOUS
Status: DISCONTINUED | OUTPATIENT
Start: 2019-04-17 | End: 2019-04-17 | Stop reason: HOSPADM

## 2019-04-17 RX ORDER — GLYCOPYRROLATE 0.2 MG/ML
INJECTION INTRAMUSCULAR; INTRAVENOUS AS NEEDED
Status: DISCONTINUED | OUTPATIENT
Start: 2019-04-17 | End: 2019-04-17 | Stop reason: HOSPADM

## 2019-04-17 RX ORDER — CEFAZOLIN SODIUM/WATER 2 G/20 ML
2 SYRINGE (ML) INTRAVENOUS ONCE
Status: DISCONTINUED | OUTPATIENT
Start: 2019-04-17 | End: 2019-04-17 | Stop reason: HOSPADM

## 2019-04-17 RX ORDER — AMOXICILLIN 250 MG
1 CAPSULE ORAL 2 TIMES DAILY
Status: DISCONTINUED | OUTPATIENT
Start: 2019-04-17 | End: 2019-04-20 | Stop reason: HOSPADM

## 2019-04-17 RX ORDER — ONDANSETRON 2 MG/ML
4 INJECTION INTRAMUSCULAR; INTRAVENOUS AS NEEDED
Status: DISCONTINUED | OUTPATIENT
Start: 2019-04-17 | End: 2019-04-17 | Stop reason: HOSPADM

## 2019-04-17 RX ORDER — GABAPENTIN 100 MG/1
100 CAPSULE ORAL
Qty: 120 CAP | Refills: 1 | Status: SHIPPED | OUTPATIENT
Start: 2019-04-17

## 2019-04-17 RX ORDER — IBUPROFEN 800 MG/1
800 TABLET ORAL
Qty: 50 TAB | Refills: 2 | Status: SHIPPED | OUTPATIENT
Start: 2019-04-17 | End: 2019-04-20

## 2019-04-17 RX ORDER — MAGNESIUM SULFATE 100 %
4 CRYSTALS MISCELLANEOUS AS NEEDED
Status: DISCONTINUED | OUTPATIENT
Start: 2019-04-17 | End: 2019-04-20 | Stop reason: HOSPADM

## 2019-04-17 RX ORDER — PROPOFOL 10 MG/ML
INJECTION, EMULSION INTRAVENOUS AS NEEDED
Status: DISCONTINUED | OUTPATIENT
Start: 2019-04-17 | End: 2019-04-17 | Stop reason: HOSPADM

## 2019-04-17 RX ORDER — HYDROMORPHONE HYDROCHLORIDE 2 MG/ML
0.5 INJECTION, SOLUTION INTRAMUSCULAR; INTRAVENOUS; SUBCUTANEOUS
Status: ACTIVE | OUTPATIENT
Start: 2019-04-17 | End: 2019-04-18

## 2019-04-17 RX ORDER — OXYCODONE HCL 10 MG/1
10 TABLET, FILM COATED, EXTENDED RELEASE ORAL ONCE
Status: COMPLETED | OUTPATIENT
Start: 2019-04-17 | End: 2019-04-17

## 2019-04-17 RX ORDER — CELECOXIB 100 MG/1
200 CAPSULE ORAL 2 TIMES DAILY
Status: DISCONTINUED | OUTPATIENT
Start: 2019-04-17 | End: 2019-04-20 | Stop reason: HOSPADM

## 2019-04-17 RX ORDER — SODIUM CHLORIDE 0.9 % (FLUSH) 0.9 %
5-40 SYRINGE (ML) INJECTION AS NEEDED
Status: DISCONTINUED | OUTPATIENT
Start: 2019-04-17 | End: 2019-04-20 | Stop reason: HOSPADM

## 2019-04-17 RX ORDER — AMLODIPINE BESYLATE 5 MG/1
10 TABLET ORAL
Status: DISCONTINUED | OUTPATIENT
Start: 2019-04-18 | End: 2019-04-20 | Stop reason: HOSPADM

## 2019-04-17 RX ORDER — BUPIVACAINE HYDROCHLORIDE 5 MG/ML
INJECTION, SOLUTION EPIDURAL; INTRACAUDAL AS NEEDED
Status: DISCONTINUED | OUTPATIENT
Start: 2019-04-17 | End: 2019-04-17 | Stop reason: HOSPADM

## 2019-04-17 RX ORDER — CEFAZOLIN SODIUM/WATER 2 G/20 ML
2 SYRINGE (ML) INTRAVENOUS EVERY 8 HOURS
Status: COMPLETED | OUTPATIENT
Start: 2019-04-17 | End: 2019-04-18

## 2019-04-17 RX ORDER — FAMOTIDINE 20 MG/1
20 TABLET, FILM COATED ORAL 2 TIMES DAILY
Status: DISCONTINUED | OUTPATIENT
Start: 2019-04-17 | End: 2019-04-19

## 2019-04-17 RX ORDER — PREGABALIN 75 MG/1
75 CAPSULE ORAL ONCE
Status: COMPLETED | OUTPATIENT
Start: 2019-04-17 | End: 2019-04-17

## 2019-04-17 RX ORDER — SODIUM CHLORIDE 9 MG/ML
125 INJECTION, SOLUTION INTRAVENOUS CONTINUOUS
Status: DISPENSED | OUTPATIENT
Start: 2019-04-17 | End: 2019-04-18

## 2019-04-17 RX ORDER — FENTANYL CITRATE 50 UG/ML
INJECTION, SOLUTION INTRAMUSCULAR; INTRAVENOUS AS NEEDED
Status: DISCONTINUED | OUTPATIENT
Start: 2019-04-17 | End: 2019-04-17 | Stop reason: HOSPADM

## 2019-04-17 RX ORDER — OXYCODONE HYDROCHLORIDE 5 MG/1
5 TABLET ORAL
Qty: 30 TAB | Refills: 0 | Status: SHIPPED | OUTPATIENT
Start: 2019-04-17 | End: 2019-04-24

## 2019-04-17 RX ADMIN — SENNOSIDES, DOCUSATE SODIUM 1 TABLET: 50; 8.6 TABLET, FILM COATED ORAL at 20:37

## 2019-04-17 RX ADMIN — SODIUM CHLORIDE, SODIUM LACTATE, POTASSIUM CHLORIDE, AND CALCIUM CHLORIDE: 600; 310; 30; 20 INJECTION, SOLUTION INTRAVENOUS at 13:34

## 2019-04-17 RX ADMIN — Medication 2 G: at 13:30

## 2019-04-17 RX ADMIN — FENTANYL CITRATE 50 MCG: 50 INJECTION, SOLUTION INTRAMUSCULAR; INTRAVENOUS at 13:10

## 2019-04-17 RX ADMIN — EPHEDRINE SULFATE 5 MG: 50 INJECTION, SOLUTION INTRAVENOUS at 13:17

## 2019-04-17 RX ADMIN — TRANEXAMIC ACID 1 G: 100 INJECTION, SOLUTION INTRAVENOUS at 13:44

## 2019-04-17 RX ADMIN — SODIUM CHLORIDE 125 ML/HR: 900 INJECTION, SOLUTION INTRAVENOUS at 17:51

## 2019-04-17 RX ADMIN — OXYCODONE HYDROCHLORIDE 10 MG: 10 TABLET, FILM COATED, EXTENDED RELEASE ORAL at 12:06

## 2019-04-17 RX ADMIN — TRANEXAMIC ACID 1 G: 100 INJECTION, SOLUTION INTRAVENOUS at 15:02

## 2019-04-17 RX ADMIN — PROPOFOL 20 MG: 10 INJECTION, EMULSION INTRAVENOUS at 14:44

## 2019-04-17 RX ADMIN — PROPOFOL 10 MG: 10 INJECTION, EMULSION INTRAVENOUS at 14:45

## 2019-04-17 RX ADMIN — HYDROMORPHONE HYDROCHLORIDE 0.5 MG: 1 INJECTION, SOLUTION INTRAMUSCULAR; INTRAVENOUS; SUBCUTANEOUS at 17:05

## 2019-04-17 RX ADMIN — GABAPENTIN 300 MG: 300 CAPSULE ORAL at 23:03

## 2019-04-17 RX ADMIN — FAMOTIDINE 20 MG: 20 TABLET ORAL at 20:37

## 2019-04-17 RX ADMIN — EPHEDRINE SULFATE 10 MG: 50 INJECTION, SOLUTION INTRAVENOUS at 13:20

## 2019-04-17 RX ADMIN — MIDAZOLAM HYDROCHLORIDE 2 MG: 1 INJECTION, SOLUTION INTRAMUSCULAR; INTRAVENOUS at 13:05

## 2019-04-17 RX ADMIN — ACETAMINOPHEN 650 MG: 325 TABLET ORAL at 20:37

## 2019-04-17 RX ADMIN — OXYCODONE HYDROCHLORIDE 10 MG: 5 TABLET ORAL at 20:42

## 2019-04-17 RX ADMIN — FENTANYL CITRATE 50 MCG: 50 INJECTION, SOLUTION INTRAMUSCULAR; INTRAVENOUS at 13:05

## 2019-04-17 RX ADMIN — Medication 2 G: at 20:37

## 2019-04-17 RX ADMIN — PROPOFOL 50 MCG/KG/MIN: 10 INJECTION, EMULSION INTRAVENOUS at 13:43

## 2019-04-17 RX ADMIN — ACETAMINOPHEN 975 MG: 325 TABLET ORAL at 12:05

## 2019-04-17 RX ADMIN — ASPIRIN 81 MG: 81 TABLET, COATED ORAL at 20:37

## 2019-04-17 RX ADMIN — GLYCOPYRROLATE 0.1 MG: 0.2 INJECTION INTRAMUSCULAR; INTRAVENOUS at 13:43

## 2019-04-17 RX ADMIN — CELECOXIB 200 MG: 100 CAPSULE ORAL at 20:37

## 2019-04-17 RX ADMIN — SODIUM CHLORIDE, SODIUM LACTATE, POTASSIUM CHLORIDE, AND CALCIUM CHLORIDE 150 ML/HR: 600; 310; 30; 20 INJECTION, SOLUTION INTRAVENOUS at 12:05

## 2019-04-17 RX ADMIN — LIDOCAINE HYDROCHLORIDE 40 MG: 20 INJECTION, SOLUTION INFILTRATION; PERINEURAL at 14:35

## 2019-04-17 RX ADMIN — SODIUM CHLORIDE, SODIUM LACTATE, POTASSIUM CHLORIDE, AND CALCIUM CHLORIDE: 600; 310; 30; 20 INJECTION, SOLUTION INTRAVENOUS at 15:51

## 2019-04-17 RX ADMIN — OXYCODONE HYDROCHLORIDE 5 MG: 5 TABLET ORAL at 17:27

## 2019-04-17 RX ADMIN — GLYCOPYRROLATE 0.1 MG: 0.2 INJECTION INTRAMUSCULAR; INTRAVENOUS at 13:37

## 2019-04-17 RX ADMIN — EPHEDRINE SULFATE 5 MG: 50 INJECTION, SOLUTION INTRAVENOUS at 15:02

## 2019-04-17 RX ADMIN — EPHEDRINE SULFATE 5 MG: 50 INJECTION, SOLUTION INTRAVENOUS at 13:27

## 2019-04-17 RX ADMIN — PREGABALIN 75 MG: 75 CAPSULE ORAL at 12:05

## 2019-04-17 RX ADMIN — BUPIVACAINE HYDROCHLORIDE 2.3 ML: 5 INJECTION, SOLUTION EPIDURAL; INTRACAUDAL at 13:12

## 2019-04-17 NOTE — ANESTHESIA PREPROCEDURE EVALUATION
Anesthetic History No history of anesthetic complications Review of Systems / Medical History Patient summary reviewed and pertinent labs reviewed Pulmonary Within defined limits Neuro/Psych Comments: Essential tremor Cardiovascular Hypertension Exercise tolerance: >4 METS 
  
GI/Hepatic/Renal 
  
 
 
Renal disease: CRI Endo/Other Diabetes: type 2 Arthritis Other Findings Comments: Ignaciobert's Syn  
 
 
 
Physical Exam 
 
Airway Mallampati: III 
TM Distance: 4 - 6 cm Neck ROM: normal range of motion Mouth opening: Normal 
 
 Cardiovascular Rhythm: regular Rate: normal 
 
 
 
 Dental 
No notable dental hx Pulmonary Breath sounds clear to auscultation Abdominal 
 
 
 
 Other Findings Anesthetic Plan ASA: 3 Anesthesia type: spinal 
 
 
 
 
 
Anesthetic plan and risks discussed with: Patient and Son / Daughter

## 2019-04-17 NOTE — PERIOP NOTES
TRANSFER - OUT REPORT:    Verbal report given to Abbi RN(name) on Damien Muhammad  being transferred to room 420(unit) for routine progression of care       Report consisted of patients Situation, Background, Assessment and   Recommendations(SBAR). Information from the following report(s) Procedure Summary, Intake/Output, MAR and Recent Results was reviewed with the receiving nurse. Lines:   Peripheral IV 04/17/19 Left Arm (Active)   Site Assessment Clean, dry, & intact 4/17/2019  6:16 PM   Phlebitis Assessment 0 4/17/2019  6:16 PM   Infiltration Assessment 0 4/17/2019  6:16 PM   Dressing Status Clean, dry, & intact 4/17/2019  6:16 PM   Dressing Type Tape;Transparent 4/17/2019  6:16 PM   Hub Color/Line Status Pink; Infusing 4/17/2019  6:16 PM        Opportunity for questions and clarification was provided. Patient transported with:   Registered Nurse Bedside report given to RN. Patient comfortable and daughter at bedside.

## 2019-04-17 NOTE — ANESTHESIA PROCEDURE NOTES
Spinal Block    Start time: 4/17/2019 1:05 PM  End time: 4/17/2019 1:12 PM  Performed by: Adriana Fabian CRNA  Authorized by: Damion Donahue MD     Pre-procedure:   Indications: at surgeon's request and primary anesthetic  Preanesthetic Checklist: patient identified, risks and benefits discussed, anesthesia consent, site marked, patient being monitored and timeout performed    Timeout Time: 13:05          Spinal Block:   Patient Position:  Seated  Prep Region:  Lumbar  Prep: DuraPrep      Location:  L3-4  Technique:  Single shot        Needle:   Needle Type:  Pencan  Needle Gauge:  25 G  Attempts:  1      Events: CSF confirmed, no blood with aspiration and no paresthesia        Assessment:  Insertion:  Uncomplicated  Patient tolerance:  Patient tolerated the procedure well with no immediate complications

## 2019-04-17 NOTE — OP NOTES
OPERATIVE REPORT     Admit Date: 4/17/2019  Admit Diagnosis: Primary osteoarthritis of right hip [M16.11]  Preoperative Diagnosis: DEGENERATIVE JOINT DISEASE RIGHT HIP  Postoperative Diagnosis: DEGENERATIVE JOINT DISEASE RIGHT HIP    Procedure: Procedure(s):  RIGHT TOTAL HIP ARTHROPLASTY [address] DIRECT ANTERIOR  Surgeon: Berry Crigler, MD  Assistant(s): Antoni Escoto PA-C  Anesthesia: Spinal   Estimated Blood Loss: 800cc  Specimens: * No specimens in log *   Complications: None        INDICATIONS:    The patient is a 68 y.o., male who has complained of a long history of right hip pain / groin pain. The patient has failed conservative treatment to include medical management / therapy and presents for definitive operative care. Informed consent was obtained including a discussion of the risks and benefits, which include, but are not limited to, bleeding, infection, neurovascular damage, wound complications, pain and stiffness in the hip, periprosthetic loosening, fracture, dislocation and venous thrombo-embolic disease, the patient consented for the procedure. DESCRIPTION OF PROCEDURE:       The proper side and hip were identified and signed in the preoperative holding area. All questions were answered. The patient was given Ancef preop for an antibiotic. After adequate anesthesia, the patient was positioned supine on the [place] table, the feet were well padded in the boots. The hip was then prepped and draped in sterile fashion. The contralateral tracking array was placed. A direct anterior approach was used through skin and the tensor fascia. The interval between the Tensor Fascia and Sartorius was used and retractors were placed in an atraumatic fashion. The lateral femoral circumflex artery and vein were identified and coagulated. The proximal and distal capsule was identified and excised. A tracking array was placed in the proximal greater trochanter.  The leg length and offset were verified with the MAKOplasty probe. A standard neck cut was made according to the implant geometry. The femoral head was removed. Further soft tissue was debrided and medial capsule along the neck cut released. Acetabular exposure was then obtained and the labrum was excised along with the foveal contents. Registration and acetabular mapping was performed. Once registration was complete and all soft tissues were removed the planned acetabular reamer was used in conjunction with the MAKOplasty arm. Remaining bone and osteophyte was removed, cysts were bone grafted as necessary. The final cup was then impacted in place with haptic guidance and rigid robotic arm assistance. There were no screws placed. The liner was then placed. The femur was then exposed, residual soft tissue was removed and the box osteotome was used along with blunt rounded canal finder. Sequential broaching was started with the opening broach and then the zero broach and broached up to the final implant size. The final implant was placed and a trial head was placed then the reduction was performed. Leg lengths and offset were verified. The final head was then impacted in place and thorough head and neck irrigation, the leg length was verified again. The femoral tracker was removed. The wound was copiously irrigated with 1L of dilute betadine solution 5%. The interval between the tensor and Sartorius was closed with 0-Vicryl and running stratafix suture. The sub-Q was closed with with 2-0 Vicryl, 4-0 monocryl and dermabond. The pin sites were closed with 4-0 Monocryl. A sterile dressing was applied. The patient was awoken from anesthesia and taken to the recovery room in a stable condiition. OPERATIVE FINDINGS : Severe right hip OA noted. Large osteophyte of the posterior margin of the calcar. IMPLANTS :   Implant Name Type Inv.  Item Serial No.  Lot No. LRB No. Used Action   trident II tritanium clusterhole acetabular shell 54mm e Joint Component  [employer] [id number] Right 1 Implanted   STEM FEM SZ 6 [id number] -- ACCOLADE II V40 - [id number]  STEM FEM SZ 6 [id number] -- ACCOLADE II V40  [employer] [facility] [id number] Right 1 Implanted       JUSTIFICATION FOR SURGICAL ASSISTANT:   Surgical Assistant, was requried and necessary in this case, to help with soft tissue retraction, extremity positioning, equiment management, implant management, and wound closure.       Corry Gross MD

## 2019-04-17 NOTE — PROGRESS NOTES
Primary Nurse Khoi Ross, HAMIDA and Mari Foster, RN performed a dual skin assessment on this patient No impairment noted  Mario score is 18

## 2019-04-17 NOTE — ANESTHESIA POSTPROCEDURE EVALUATION
Procedure(s): RIGHT TOTAL HIP ARTHROPLASTY MAKOPLASTY DIRECT ANTERIOR. spinal 
 
Anesthesia Post Evaluation Multimodal analgesia: multimodal analgesia not used between 6 hours prior to anesthesia start to PACU discharge Patient location during evaluation: PACU Patient participation: complete - patient participated Level of consciousness: awake Pain management: adequate Airway patency: patent Anesthetic complications: no 
Cardiovascular status: acceptable, blood pressure returned to baseline and hemodynamically stable Respiratory status: acceptable Hydration status: acceptable Vitals Value Taken Time BP 98/72 4/17/2019  4:50 PM  
Temp 36.4 °C (97.6 °F) 4/17/2019  3:51 PM  
Pulse 46 4/17/2019  4:53 PM  
Resp 12 4/17/2019  4:53 PM  
SpO2 98 % 4/17/2019  4:53 PM  
Vitals shown include unvalidated device data.

## 2019-04-17 NOTE — PERIOP NOTES
Dr Laure Paige aware of bruise on top of patients left hand, and skin tear on left inner ring finger. Site looks clean and is covered with transpaent dressing.

## 2019-04-17 NOTE — DISCHARGE SUMMARY
Total Hip Replacement Home Discharge Summary    Patient ID:  Hyun Shell  [birth year]  23 y.o.  [id number]    Admit date: 4/17/2019    Discharge date and time: No discharge date for patient encounter. Admitting Physician: Barden Palencia MD     Admission Diagnoses: Primary osteoarthritis of right hip [M16.11]    Discharge Diagnoses: Active Problems:    Primary osteoarthritis of right hip (4/17/2019)        Harper Dane, MD      HOSPITAL COURSE:  Hyun Shell was admitted on4/17/2019 and underwent successful total hip replacement. The patient was transferred to the orthopaedic floor in stable condition. The patient received the appropriate therapy while in the hospital.  Venous thrombo-embolic prophylaxis included ASA. The incision site remained clean and dry throughout the hospital stay. The patient remained neurovascularly intact. At the time of discharge, the patient was able to demonstrate an understanding of the explicit discharge precautions and instructions following surgery. Important in Hospital Events : none    Post Op complications: None    Current Discharge Medication List      START taking these medications    Details   gabapentin (NEURONTIN) 100 mg capsule Take 1 Cap by mouth ACB/HS. T1 tablet at breakfast and 3 tablets at night. Qty: 120 Cap, Refills: 1    Associated Diagnoses: Arthritis of right hip      aspirin delayed-release 81 mg tablet Take 1 Tab by mouth two (2) times a day. Qty: 60 Tab, Refills: 0    Associated Diagnoses: Arthritis of right hip      ibuprofen (MOTRIN) 800 mg tablet Take 1 Tab by mouth every six (6) hours as needed for Pain. Qty: 50 Tab, Refills: 2    Associated Diagnoses: Arthritis of right hip      oxyCODONE IR (ROXICODONE) 5 mg immediate release tablet Take 1 Tab by mouth every four (4) hours as needed for Pain for up to 7 days. Max Daily Amount: 30 mg.  Indications: Pain  Qty: 30 Tab, Refills: 0    Associated Diagnoses: Arthritis of right hip traMADol (ULTRAM) 50 mg tablet Take 1 Tab by mouth every six (6) hours as needed for Pain (Take for breakthrough pain if Oxycodone is not working or when transitioning off Oxycodone) for up to 7 days. Max Daily Amount: 200 mg. Indications: Pain, Post-op Pain, Diagnosis Hip and Knee Arthritis ICD 10 - M16.9  Qty: 40 Tab, Refills: 0    Associated Diagnoses: Arthritis of right hip      acetaminophen (TYLENOL EXTRA STRENGTH) 500 mg tablet Take 1-2 Tabs by mouth every six (6) hours as needed for Pain. Not to exceed 4,000mg in any 24 hour period  Indications: Pain  Qty: 60 Tab, Refills: 0    Associated Diagnoses: Arthritis of right hip      ondansetron (ZOFRAN ODT) 8 mg disintegrating tablet Take 0.5 Tabs by mouth every eight (8) hours as needed for Nausea. Qty: 30 Tab, Refills: 0    Associated Diagnoses: Arthritis of right hip         CONTINUE these medications which have NOT CHANGED    Details   sulindac (CLINORIL) 200 mg tablet Take 200 mg by mouth two (2) times a day. cyanocobalamin, vitamin B-12, (VITAMIN B12 PO) Take 1 Cap by mouth daily. propranolol (INDERAL) 20 mg tablet Take 20 mg by mouth daily. Indications: Essential Tremor      lisinopril (PRINIVIL, ZESTRIL) 40 mg tablet Take 40 mg by mouth daily (after breakfast). potassium chloride (KLOR-CON M10) 10 mEq tablet Take 10 mEq by mouth daily. triamterene-hydroCHLOROthiazide (MAXZIDE) 37.5-25 mg per tablet Take 1 Tab by mouth daily. fenofibrate (LOFIBRA) 160 mg tablet Take 160 mg by mouth daily (after breakfast). amLODIPine (NORVASC) 10 mg tablet Take 10 mg by mouth daily (after breakfast). ezetimibe (ZETIA) 10 mg tablet Take 10 mg by mouth Daily (before breakfast). metFORMIN (GLUCOPHAGE) 500 mg tablet Take 1,000 mg by mouth two (2) times daily (with meals).              Discharged to: Home    Follow-up : Scheduled for 2 weeks post-op      Signed:  Josias Guevara MD  4/17/2019  11:53 AM

## 2019-04-18 LAB
ANION GAP SERPL CALC-SCNC: 7 MMOL/L (ref 5–15)
BUN SERPL-MCNC: 16 MG/DL (ref 6–20)
BUN/CREAT SERPL: 20 (ref 12–20)
CALCIUM SERPL-MCNC: 8.2 MG/DL (ref 8.5–10.1)
CHLORIDE SERPL-SCNC: 104 MMOL/L (ref 97–108)
CO2 SERPL-SCNC: 27 MMOL/L (ref 21–32)
CREAT SERPL-MCNC: 0.79 MG/DL (ref 0.7–1.3)
GLUCOSE BLD STRIP.AUTO-MCNC: 149 MG/DL (ref 65–100)
GLUCOSE BLD STRIP.AUTO-MCNC: 160 MG/DL (ref 65–100)
GLUCOSE BLD STRIP.AUTO-MCNC: 166 MG/DL (ref 65–100)
GLUCOSE BLD STRIP.AUTO-MCNC: 180 MG/DL (ref 65–100)
GLUCOSE SERPL-MCNC: 149 MG/DL (ref 65–100)
HGB BLD-MCNC: 10 G/DL (ref 12.1–17)
POTASSIUM SERPL-SCNC: 3.3 MMOL/L (ref 3.5–5.1)
SERVICE CMNT-IMP: ABNORMAL
SODIUM SERPL-SCNC: 138 MMOL/L (ref 136–145)

## 2019-04-18 PROCEDURE — 97161 PT EVAL LOW COMPLEX 20 MIN: CPT

## 2019-04-18 PROCEDURE — 97530 THERAPEUTIC ACTIVITIES: CPT

## 2019-04-18 PROCEDURE — 85018 HEMOGLOBIN: CPT

## 2019-04-18 PROCEDURE — 77030027138 HC INCENT SPIROMETER -A

## 2019-04-18 PROCEDURE — 80048 BASIC METABOLIC PNL TOTAL CA: CPT

## 2019-04-18 PROCEDURE — 97164 PT RE-EVAL EST PLAN CARE: CPT

## 2019-04-18 PROCEDURE — 97535 SELF CARE MNGMENT TRAINING: CPT

## 2019-04-18 PROCEDURE — 82962 GLUCOSE BLOOD TEST: CPT

## 2019-04-18 PROCEDURE — 65270000029 HC RM PRIVATE

## 2019-04-18 PROCEDURE — 74011250637 HC RX REV CODE- 250/637: Performed by: PHYSICIAN ASSISTANT

## 2019-04-18 PROCEDURE — 97165 OT EVAL LOW COMPLEX 30 MIN: CPT

## 2019-04-18 PROCEDURE — 97116 GAIT TRAINING THERAPY: CPT

## 2019-04-18 PROCEDURE — 36415 COLL VENOUS BLD VENIPUNCTURE: CPT

## 2019-04-18 PROCEDURE — 74011636637 HC RX REV CODE- 636/637: Performed by: PHYSICIAN ASSISTANT

## 2019-04-18 PROCEDURE — 74011250636 HC RX REV CODE- 250/636: Performed by: PHYSICIAN ASSISTANT

## 2019-04-18 PROCEDURE — 97110 THERAPEUTIC EXERCISES: CPT

## 2019-04-18 RX ADMIN — OXYCODONE HYDROCHLORIDE 10 MG: 5 TABLET ORAL at 09:00

## 2019-04-18 RX ADMIN — ACETAMINOPHEN 650 MG: 325 TABLET ORAL at 11:18

## 2019-04-18 RX ADMIN — GABAPENTIN 300 MG: 300 CAPSULE ORAL at 21:30

## 2019-04-18 RX ADMIN — SENNOSIDES, DOCUSATE SODIUM 1 TABLET: 50; 8.6 TABLET, FILM COATED ORAL at 09:00

## 2019-04-18 RX ADMIN — EZETIMIBE 10 MG: 10 TABLET ORAL at 06:30

## 2019-04-18 RX ADMIN — POTASSIUM CHLORIDE 10 MEQ: 750 TABLET, EXTENDED RELEASE ORAL at 09:00

## 2019-04-18 RX ADMIN — FENOFIBRATE 145 MG: 145 TABLET ORAL at 09:00

## 2019-04-18 RX ADMIN — ASPIRIN 81 MG: 81 TABLET, COATED ORAL at 17:10

## 2019-04-18 RX ADMIN — Medication 2 G: at 11:18

## 2019-04-18 RX ADMIN — POLYETHYLENE GLYCOL 3350 17 G: 17 POWDER, FOR SOLUTION ORAL at 08:59

## 2019-04-18 RX ADMIN — METFORMIN HYDROCHLORIDE 1000 MG: 500 TABLET ORAL at 09:00

## 2019-04-18 RX ADMIN — ONDANSETRON 4 MG: 2 INJECTION INTRAMUSCULAR; INTRAVENOUS at 11:19

## 2019-04-18 RX ADMIN — Medication 2 G: at 04:57

## 2019-04-18 RX ADMIN — INSULIN LISPRO 2 UNITS: 100 INJECTION, SOLUTION INTRAVENOUS; SUBCUTANEOUS at 17:12

## 2019-04-18 RX ADMIN — SENNOSIDES, DOCUSATE SODIUM 1 TABLET: 50; 8.6 TABLET, FILM COATED ORAL at 17:11

## 2019-04-18 RX ADMIN — Medication 10 ML: at 06:00

## 2019-04-18 RX ADMIN — FAMOTIDINE 20 MG: 20 TABLET ORAL at 08:59

## 2019-04-18 RX ADMIN — AMLODIPINE BESYLATE 10 MG: 5 TABLET ORAL at 09:00

## 2019-04-18 RX ADMIN — CELECOXIB 200 MG: 100 CAPSULE ORAL at 09:00

## 2019-04-18 RX ADMIN — INSULIN LISPRO 2 UNITS: 100 INJECTION, SOLUTION INTRAVENOUS; SUBCUTANEOUS at 09:01

## 2019-04-18 RX ADMIN — FAMOTIDINE 20 MG: 20 TABLET ORAL at 17:11

## 2019-04-18 RX ADMIN — OXYCODONE HYDROCHLORIDE 10 MG: 5 TABLET ORAL at 21:30

## 2019-04-18 RX ADMIN — Medication 10 ML: at 22:00

## 2019-04-18 RX ADMIN — METFORMIN HYDROCHLORIDE 1000 MG: 500 TABLET ORAL at 17:10

## 2019-04-18 RX ADMIN — OXYCODONE HYDROCHLORIDE 10 MG: 5 TABLET ORAL at 06:27

## 2019-04-18 RX ADMIN — GABAPENTIN 100 MG: 100 CAPSULE ORAL at 09:00

## 2019-04-18 RX ADMIN — ACETAMINOPHEN 650 MG: 325 TABLET ORAL at 17:10

## 2019-04-18 RX ADMIN — Medication 10 ML: at 14:00

## 2019-04-18 RX ADMIN — SODIUM CHLORIDE 125 ML/HR: 900 INJECTION, SOLUTION INTRAVENOUS at 01:36

## 2019-04-18 RX ADMIN — ACETAMINOPHEN 650 MG: 325 TABLET ORAL at 23:26

## 2019-04-18 RX ADMIN — ACETAMINOPHEN 650 MG: 325 TABLET ORAL at 04:57

## 2019-04-18 RX ADMIN — CELECOXIB 200 MG: 100 CAPSULE ORAL at 17:10

## 2019-04-18 RX ADMIN — PROPRANOLOL HYDROCHLORIDE 20 MG: 10 TABLET ORAL at 08:59

## 2019-04-18 RX ADMIN — INSULIN LISPRO 2 UNITS: 100 INJECTION, SOLUTION INTRAVENOUS; SUBCUTANEOUS at 11:33

## 2019-04-18 RX ADMIN — ASPIRIN 81 MG: 81 TABLET, COATED ORAL at 09:00

## 2019-04-18 NOTE — PROGRESS NOTES
TOTAL HIP ARTHROPLASTY DAILY NOTE     ASSESSMENT / PLAN :   1. Pain Control : Excellent - Able to sleep and participate with therapy  2. Overnight Issues : none  3. Wound or incisional issue : Aquacel C/D/I  4. Therapy / Weight Bearing Recommendations : Weight bear as tolerated with use of a walker and two person assist while mobilizing  5. DVT Prophylaxis : Mechanical and Aspirin and mechanical lower extremity compression device  6. Disposition : Home - home health vs outpatient PT  7. Medical Concerns : none - stable  8. Comments : anticipate discharge home Friday or Saturday       POD  1 Day Post-Op s/p Procedure(s):  RIGHT TOTAL HIP ARTHROPLASTY MAKOPLASTY DIRECT ANTERIOR     SUBJECTIVE :     Concerns : Doesn't have any support at home - will likely stay until Saturday. OBJECTIVE :     Vitals:    04/17/19 2008 04/17/19 2047 04/17/19 2344 04/18/19 0312   BP:  165/79 146/74 152/66   Pulse:  (!) 56 (!) 55 69   Resp:  16 17 18   Temp:  97.9 °F (36.6 °C) 97.9 °F (36.6 °C) 98.4 °F (36.9 °C)   SpO2:  98% 97% 97%   Weight: 95.1 kg (209 lb 11.2 oz)          Alert and oriented x3. right exam of the hip reveals that the dressing is clean, dry and intact. The patient is able to fire the quadriceps / flex at the hip  Sensation is intact to light touch. No calf pain.      Labs:  Recent Labs     04/18/19  0612   HGB 10.0*      K 3.3*      CO2 27   BUN 16   CREA 0.79   *        Patient mobility           Luiz Gloria MD  Cell [phone]  Tg Frye PA-C  Cell [phone]  Medical Assistants: [address]  [phone]                 Surgery Scheduler: Loretta Cooley, [address] [facility] ext [phone]

## 2019-04-18 NOTE — PROGRESS NOTES
4/18/2019 3:42 PM CM received call from [address] [facility]([phone]) who is arranging pt's personal care. [address] reported they were not aware pt would need personal care prior to surgery so no arrangements have been made. First available would be Monday. CM relayed personal care will not be available over the weekend to pt who expressed his frustration. CM inquired if pt would be able to stay with his daughter over the weekend until personal care can start. Pt reported his daughter Bambi Mancera had a baby and is still breast feeding\". Pt reported he doesn't, \"want to inconvenience\" his daughter but he will talk with her on 4/19 about staying with her over the weekend. CM relayed the above to pt's MD.     Discharge plan:   1. [facility]  2. Personal Care being arranged through [address] [place] [address] bundle program to start on 4/22.         4/18/2019  1:15 PM Contacted Kehinde JIMENEZ and Kehinde THOMAS, directed to Debra Billings, called and lvm at [phone] regarding pt's personal care. 4/18/2019  12:06 PM [facility] has accepted pt for home health PT.     4/18/2019 11:59 AM Case management consult for home health received. Met with pt. Charted address and phone number confirmed. Reason for Admission:  TOTAL HIP ARTHROPLASTY with Dr. Christie Ruffin. RRAT Score:8                    Plan for utilizing home health:  Yes, choice letter signed for [facility]. Referral sent to [facility] via [employer]. Current Advanced Directive/Advance Care Plan: Not on file    Likelihood of Readmission: low                          Transition of Care Plan: home with home health and personal care. Pt lives alone in a 2 story home in [place], there are 14 steps to pt's bedroom. Pt reported Dr. Christie Ruffin arranged for pt to have personal care starting the evening on 4/20 after his discharge.  JENNA lvms with Dr. Contreras Cluster office and [facility] case Alvin Garcia [phone]) to confirm pt's personal care has been arranged through [address] bundle. Pt was independent with adls and driving prior to admission. Pt has a rw and cane at home. Pt has rx coverage and fills his scripts at the [facility] on [address] Paul Led daughter, Peña Weber will transport pt home. CM will follow for confirmation of personal care from Dr. Melvi García office.  EHSAN Roldan  Care Management Interventions  PCP Verified by CM: Yes(Arnol Crenshaw, no nurse navigator)  Transition of Care Consult (CM Consult): [address]: No  Reason Outside [place]: Patient already serviced by other home care/hospice agency  MyChart Signup: No  Discharge Durable Medical Equipment: No  Physical Therapy Consult: Yes  Occupational Therapy Consult: Yes  Speech Therapy Consult: No  Current Support Network: Own Home, Lives Alone  Confirm Follow Up Transport: Family  Freedom of Choice Offered: (S) Yes

## 2019-04-18 NOTE — PROGRESS NOTES
NUTRITION   RD Screen      Pt seen for:      []  MST for     [x]   MD Consult    [x]        Supplements  []   PO intake check   []        Food Allergies  []   Food Preferences/tolerances    []        Rescreen  []   Education    []        Diet order clarification []   Other   []  LOS                          Nutrition Prescription:     Increase protein intake for 30 days, via supplements or dietary sources, in addition to balanced meals. Encourage adequate intake of meals and supplements    Assessment:   Information obtained from:   patient        Received consult for general nutrition management and supplements for patient 1 day s/p RIGHT TOTAL HIP ARTHROPLASTY MAKOPLASTY DIRECT ANTERIOR. PMHx Dm type II, HTN and high cholesterol. [phone] with an A1C of 6.0. Patient reports good intake and appetite both pre and post surgery. Was a bit nauseous yesterday after surgery but better today. At home, typically has fruit, yogurt and cottage cheese for breakfast. Other meals often convenience items from [employer]. After his left hip surgery, he states he lost 80 lbs but has since gained most of it back. Drank Glucerna with his last surgery and has some at home as well. Author and patient discussed nutrient needs to promote healing after surgery including protein sources/intake and balanced meals to support micronutrient intake. Patient was understanding and receptive of topics discussed. Also discussed general/healthful diet for weight loss/maintenance. Pt prefers chocolate Glucerna sent in the morning. Labs Reviewed: [x]  Medications Reviewed: [x]     Cultural, Latter day and ethnic food preferences:   [x]  None   []  Identified and addressed    Diet:  regular    PO Intake: [x]           Good     []           Fair      []           Poor     No data found.     Wt Readings from Last 5 Encounters:   04/17/19 95.1 kg (209 lb 11.2 oz)   04/10/19 95.1 kg (209 lb 11.2 oz)   07/27/17 82.2 kg (181 lb 3.5 oz) 07/19/17 85.3 kg (188 lb 0.8 oz)   04/10/17 93 kg (205 lb)   ]    Body mass index is 32.84 kg/m². Skin: right hip incision; trace BUE edema, pitting BLE edema  BM:  Unknown, bowel sounds not charted. ABD: WDL    Estimated Daily Nutrition Requirements:  Kcals/day: 2151 Kcals/day(BMR 1655 x1.3 AF)  Protein: 95 g(-114g (1.0-1.2g/kg ABW))  Fluid: 2150 ml    (1mL/kcal)  Based On: [place] 1898  Weight Used: Actual wt    Weight Changes:   []   Loss  []   Gain  [x]   Stable    Nutrition Problems Identified  [x]     None  []     Specified food preferences   []     Dislikes supplements              []     Allergies  []     Difficulty chewing     []     Dentition   []     Nausea/Vomiting  []    Constipation  []    Diarrhea    Nutrition Diagnosis:      Increased nutrient needs related to increased protein needs with wound healing evidenced by impaired skin integrity with right hip incision.     Intervention:   [x]    Obtained/adjusted food preferences/tolerances and/or snacks options   []    Dislikes supplements will try a substitution   []    Modify diet for food allergies  []    Adjust texture due to difficulty chewing   []    Encourage Fresh Fruit, Activia yogurt, fluid   [x]    Educated patient  []    Rescreen per screening protocol  [x]    Add Supplements    Goal: patient to consume 1 protein item with meals and 50% of meals and snacks in the next 5-7 days    Monitoring/Evaluation:   Education & Discharge Needs  [x] Nutrition related discharge needs addressed:   [x] Supplements (on d/c instruction &/or coupons provided)    [x] Education   [] No nutrition related discharge needs at this time    Monitor: intake, wt and wound healing    Rescreen: [x]  At Nutrition Risk          []  Not at [facility], rescreen per screening protocol      Dave Siva, [address]  Pager [phone]   Office [phone]

## 2019-04-18 NOTE — PROGRESS NOTES
Problem: Mobility Impaired (Adult and Pediatric)  Goal: *Acute Goals and Plan of Care (Insert Text)  Description  Physical Therapy Goals  Initiated 4/18/2019    1. Patient will move from supine to sit and sit to supine  in bed with modified independence within 4 days. 2. Patient will perform sit to stand with modified independence within 4 days. 3. Patient will ambulate with modified independence for 100 feet with the least restrictive device within 4 days. 4. Patient will ascend/descend 4 stairs with 1 handrail(s) with minimal assistance/contact guard assist within 4 days. 5. Patient will verbalize and demonstrate understanding of anterior hip precautions per protocol within 4 days. 6. Patient will perform total hip home exercise program per protocol with independence within 4 days. Outcome: Progressing Towards Goal   PHYSICAL THERAPY EVALUATION  Patient: Francisco Khalil (89 y.o. male)  Date: 4/18/2019  Primary Diagnosis: Primary osteoarthritis of right hip [M16.11]  Procedure(s) (LRB):  RIGHT TOTAL HIP ARTHROPLASTY MAKOPLASTY DIRECT ANTERIOR (Right) 1 Day Post-Op   Precautions: Total hip, Fall, WBAT    ASSESSMENT :  Based on the objective data described below, the patient presents with decreased ROM and strength to RLE, decreased bed mobility, transfers and gait following admission for right THR anterior makoplasty. Patient was received in bed alert and willing to work with PT. PT educated him regarding anterior hip precautions and proper exercises. Handout provided and he expressed understanding. Patient was able to perform bed exercises with assistance. He needs cues to avoid external rotation to operative leg. Patient sat on edge of bed with +1 mod assist, but once up he maintains good sitting balance independently. He stood with +2 mod assist, then ambulated 4 feet with rolling walker +2 min assist. Vitals stable. Patient talks a lot and needs redirected often to stay on task.     Patient has history of balance issues and essential tremor. He reports 5 falls in the last 2 years. He used a cane prior to admission. He lives alone in a two story home. He will likely need increased time in the hospital to achieve goals to be safe enough for discharge. Patient has all needed DME. Patient has a daughter who is a  pediatric PT but she is not available to assist him due to work and her own family. He will benefit from HHPT and an aid to assist with other activity once discharged. Patient will benefit from skilled intervention to address the above impairments. Patients rehabilitation potential is considered to be Good  Factors which may influence rehabilitation potential include:   ? None noted  ? Mental ability/status  ? Medical condition  ? Home/family situation and support systems  ? Safety awareness  ? Pain tolerance/management  ? Other:      PLAN :  Recommendations and Planned Interventions:  ?           Bed Mobility Training             ? Neuromuscular Re-Education  ? Transfer Training                   ? Orthotic/Prosthetic Training  ? Gait Training                         ? Modalities  ? Therapeutic Exercises           ? Edema Management/Control  ? Therapeutic Activities            ? Patient and Family Training/Education  ? Other (comment):    Frequency/Duration: Patient will be followed by physical therapy  twice daily to address goals. Discharge Recommendations: Home Health  Further Equipment Recommendations for Discharge: has all that he needs      SUBJECTIVE:   Patient stated It feels good to be out of the bed.     OBJECTIVE DATA SUMMARY:   HISTORY:    Past Medical History:   Diagnosis Date    Chronic pain     hip, knes, right shoulder    Diabetes ([address] 2007    Essential tremor     Gilbert's syndrome     High cholesterol     Hypertension     Localized swelling of both lower legs 06/2018    Obesity (BMI 30-39.9) 04/10/2019    BMI 32.84    Osteoarthritis     knees and hips;  Right Shoulder     Past Surgical History:   Procedure Laterality Date    HX COLONOSCOPY N/A 2009    HX HIP REPLACEMENT Left 07/2017    HX ORTHOPAEDIC Left age 10    repair fractured arm     HX SHOULDER REPLACEMENT Left 2010    HX TONSILLECTOMY N/A childhood     Prior Level of Function/Home Situation: used a cane prior to admission  Personal factors and/or comorbidities impacting plan of care: history of balance issues and falls; lives alone in two story home; no support upon discharge    Home Situation  Home Environment: Private residence  # Steps to Enter: 2  Rails to Enter: Yes  One/Two Story Residence: Two story  # of Interior Steps: 15  Interior Rails: Both  Lift Chair Available: No  Living Alone: Yes  Support Systems: Child(juan)  Patient Expects to be Discharged to[de-identified] Unknown  Current DME Used/Available at Home: Adaptive dressing aides, Cane, straight, Walker, rolling, Shower chair, Raised toilet seat    EXAMINATION/PRESENTATION/DECISION MAKING:   Critical Behavior:  Neurologic State: Alert, Appropriate for age  Orientation Level: Oriented X4  Cognition: Appropriate decision making  Safety/Judgement: Insight into deficits, Good awareness of safety precautions  Hearing: Auditory  Auditory Impairment: Hard of hearing, bilateral  Hearing Aids/Status: Does not own  Skin:  not fully observed  Edema: has chronic LE edema  Range Of Motion:  AROM: Generally decreased, functional(RLE less due to surgery)                       Strength:    Strength: Generally decreased, functional(RLE Less due to surgery)                    Tone & Sensation:   Tone: Normal              Sensation: Intact                         Functional Mobility:  Bed Mobility:     Supine to Sit: Additional time;Assist x1; Moderate assistance     Scooting: Modified independent; Additional time  Transfers:  Sit to Stand: Assist x2; Additional time;Moderate assistance  Stand to Sit: Minimum assistance; Additional time;Assist x2        Bed to Chair: Assist x2;Minimum assistance              Balance:   Sitting: Intact  Standing: Intact; With support  Ambulation/Gait Training:  Distance (ft): 4 Feet (ft)  Assistive Device: Gait belt;Walker, rolling  Ambulation - Level of Assistance: Minimal assistance;Assist x2        Gait Abnormalities: Step to gait  Right Side Weight Bearing: As tolerated     Base of Support: Widened                                           Therapeutic Exercises: Ankle pumps, quad, heel and gluteal sets, heel slides, internal rotation, hip abduction    Functional Measure:  Barthel Index:    Bathin  Bladder: 10  Bowels: 10  Groomin  Dressin  Feeding: 10  Mobility: 0  Stairs: 0  Toilet Use: 5  Transfer (Bed to Chair and Back): 5  Total: 50/100       Percentage of impairment   0%   1-19%   20-39%   40-59%   60-79%   80-99%   100%   Barthel Score 0-100 100 99-80 79-60 59-40 20-39 1-19   0     The Barthel ADL Index: Guidelines  1. The index should be used as a record of what a patient does, not as a record of what a patient could do. 2. The main aim is to establish degree of independence from any help, physical or verbal, however minor and for whatever reason. 3. The need for supervision renders the patient not independent. 4. A patient's performance should be established using the best available evidence. Asking the patient, friends/relatives and nurses are the usual sources, but direct observation and common sense are also important. However direct testing is not needed. 5. Usually the patient's performance over the preceding 24-48 hours is important, but occasionally longer periods will be relevant. 6. Middle categories imply that the patient supplies over 50 per cent of the effort. 7. Use of aids to be independent is allowed. Valerie Ott., Barthel, D.W. ([id number]). Functional evaluation: the Barthel Index.  [address] [facility] ([address], J.[email or web address].F, Tomi Harding., Mark Stallings., Shanika Najera. (1999). Measuring the change indisability after inpatient rehabilitation; comparison of the responsiveness of the Barthel Index and Functional Bertie Measure. Journal of Neurology, Neurosurgery, and Psychiatry, 66(4), 562-293. OTIS Chun, TATE Gamez, & Silvestre Mora M.A. (2004.) Assessment of post-stroke quality of life in cost-effectiveness studies: The usefulness of the Barthel Index and the EuroQoL-5D. Quality of Life Research, 15, 297-21           Physical Therapy Evaluation Charge Determination   History Examination Presentation Decision-Making   HIGH Complexity :3+ comorbidities / personal factors will impact the outcome/ POC  LOW Complexity : 1-2 Standardized tests and measures addressing body structure, function, activity limitation and / or participation in recreation  LOW Complexity : Stable, uncomplicated  Other outcome measures Barthel  LOW       Based on the above components, the patient evaluation is determined to be of the following complexity level: LOW     Pain:  Pain Scale 1: Numeric (0 - 10)  Pain Intensity 1:6  Pain Location 1: Hip  Pain Orientation 1: Right  Pain Description 1: Sore     Activity Tolerance:   good  Please refer to the flowsheet for vital signs taken during this treatment. After treatment:   ?         Patient left in no apparent distress sitting up in chair working with OT  ? Patient left in no apparent distress in bed  ? Call bell left within reach  ? Nursing notified  ? Caregiver present  ? Chair alarm activated    COMMUNICATION/EDUCATION:   The patients plan of care was discussed with: Occupational Therapist and Registered Nurse. ?         Fall prevention education was provided and the patient/caregiver indicated understanding. ? Patient/family have participated as able in goal setting and plan of care.   ? Patient/family agree to work toward stated goals and plan of care. ?         Patient understands intent and goals of therapy, but is neutral about his/her participation. ? Patient is unable to participate in goal setting and plan of care.     Thank you for this referral.  Christine Perez, PT   Time Calculation: 40 mins

## 2019-04-18 NOTE — PROGRESS NOTES
LYMPHEDEMA Therapy EVALUATION/discharge    Patient: Jacqueline Joyner (28 y.o. male)  Date: 4/18/2019  Primary Diagnosis: Primary osteoarthritis of right hip [M16.11]  Procedure(s) (LRB):  RIGHT TOTAL HIP ARTHROPLASTY MAKOPLASTY DIRECT ANTERIOR (Right) 1 Day Post-Op   Precautions: Total hip, Fall, WBAT    ASSESSMENT :  Based on the objective data described below, the patient presents with LE swelling following FRANSISCA. Order written from orthopedic surgeon for lymphedema treatment. Patient reports Left LE swelling >Right LE and mostly knee distal to his feet. Patient reports wearing compression at home, knee high, when further questioning asked he is only wearing the anti-embolism stockings- LINSEY hose. He reports that he follows vascular surgery (Dr. Denise Dailey) and he recommends compression stockings but he has never been to a clinic or wears any medical grade compression. Measurements were obtained. Patient is symmetrical, he does have more vascular type swelling that responds well to elevation and rest and is exacerbated with increased standing and gravity dependent positions. Patient due to not wearing medical grade compression and having issues with LE swelling would benefit from a referral to our outpatient lymphedema clinic for proper measurement and obtaining of garments to management his symptoms. handout given for follow-up appointments. Due to not wearing medical grade compression prior, it is not clinically recommended to initiate treatment while in the hospital.  He will be followed up outpatient . Patient will benefit from skilled intervention to address the above impairments.   Patients rehabilitation potential is considered to be Good  Factors which may influence rehabilitation potential include:   [x]           None noted  []           Mental ability/status  []           Medical condition  []           Home/family situation and support systems  []           Safety awareness  []           Pain tolerance/management  []           Other:      PLAN :  Discharge Recommendations: Outpatient lymphedema referall       HISTORY AND BACKGROUND:     Past Medical History:   Diagnosis Date    Chronic pain     hip, knes, right shoulder    Diabetes ([facility] [address] 2007    Essential tremor     Gilbert's syndrome     High cholesterol     Hypertension     Localized swelling of both lower legs 06/2018    Obesity (BMI 30-39.9) 04/10/2019    BMI 32.84    Osteoarthritis     knees and hips;  Right Shoulder     Past Surgical History:   Procedure Laterality Date    HX COLONOSCOPY N/A 2009    HX HIP REPLACEMENT Left 07/2017    HX ORTHOPAEDIC Left age 10    repair fractured arm     HX SHOULDER REPLACEMENT Left 2010    HX TONSILLECTOMY N/A childhood     Patient Active Problem List   Diagnosis Code    Osteoarthritis (arthritis due to wear and tear of joints) M19.90    Hip arthritis M16.10    Primary osteoarthritis of right hip M16.11     Lymphedema Diagnosis: Stage I LE mixed edema with vascular component  Date of Onset: 2 years ago  Present Symptoms and Functional Limitations: see below  Prior Level of Function:  Home Situation  Home Environment: Private residence  # Steps to Enter: 2  Rails to Enter: Yes  One/Two Story Residence: Two story  # of Interior Steps: 15  Interior Rails: Both  Lift Chair Available: No  Living Alone: Yes  Support Systems: Child(juan)  Patient Expects to be Discharged to[de-identified] Unknown  Current DME Used/Available at Home: Adaptive dressing aides, [address], [place], straight, Walker, rolling, Shower chair, Raised toilet seat  Personal factors and/or comorbidities impacting plan of care:   Previous Therapy:  None, only worn RICHARD hose  Compression/Lymphedema Equipment:  None, only wearing knee high richard hose    SUBJECTIVE:   Patient stated \"this leg is worse than that one. \"    OBJECTIVE DATA SUMMARY:   EXAMINATION/PRESENTATION/DECISION MAKING:   Pain:  Pain Scale 1: Numeric (0 - 10)  Pain Intensity 1: 8  Pain Location 1: Hip  Pain Orientation 1: Right  Pain Description 1: Sore     Skin and Tissue Assessment:  Intact  Stemmers Sign: negative  Height:     Weight:  Weight: 95.1 kg (209 lb 11.2 oz)   BMI:  BMI (calculated): 32.8  (36 or greater: adversely affecting lymphedema)  Wound/Ulcer:  None at LE where increased swelling, does have an incision site at right hip with dressing in place  Sensation:  intact  Mobility:   (See physical therapy notes related to mobility as applicable.)  Circumferential Measurements:   Affected Side: Bilateral   Left (cm) Right (cm)   5th Tuberosity 26.2    26.5      Ankle 23    32.5      Calf 38    37.5      Mid Knee 38.5    37      Thigh             Groin             Length               Precautions:  Standard lymphedema precautions to include avoiding blood pressure readings, injections and IVs or other procedures/acts that could lead to broken skin on affected area, and avoiding excessive heat, resistive activity or altitude without compression garment       Physical Therapy Evaluation Charge Determination   History Examination Presentation Decision-Making   MEDIUM  Complexity : 1-2 comorbidities / personal factors will impact the outcome/ POC  LOW Complexity : 1-2 Standardized tests and measures addressing body structure, function, activity limitation and / or participation in recreation  LOW Complexity : Stable, uncomplicated  LOW Complexity : FOTO score of       Based on the above components, the patient evaluation is determined to be of the following complexity level: LOW     TREATMENT PROVIDED:   Referral information given to the lymphedema clinic    COMMUNICATION/EDUCATION:   The patients plan of care was discussed with: Registered Nurse. [x]           Fall prevention education was provided and the patient/caregiver indicated understanding. [x]           Patient/family have participated as able in goal setting and plan of care.   [x]           Patient/family agree to work toward stated goals and plan of care. []           Patient understands intent and goals of therapy, but is neutral about his/her participation. []           Patient is unable to participate in goal setting and plan of care.     Thank you for this referral.  Rony Sarabia PT, DPT   Time Calculation: 15 mins

## 2019-04-18 NOTE — PROGRESS NOTES
Bedside and Verbal shift change report given to Shahzad (oncoming nurse) by Efrem Logan (offgoing nurse). Report included the following information SBAR, Kardex, Procedure Summary, Intake/Output and MAR.

## 2019-04-18 NOTE — PROGRESS NOTES
Problem: Falls - Risk of  Goal: *Absence of Falls  Description  Document Tia Manus Fall Risk and appropriate interventions in the flowsheet. Outcome: Progressing Towards Goal     Problem: Patient Education: Go to Patient Education Activity  Goal: Patient/Family Education  Outcome: Progressing Towards Goal     Problem: Pressure Injury - Risk of  Goal: *Prevention of pressure injury  Description  Document Mario Scale and appropriate interventions in the flowsheet.   Outcome: Progressing Towards Goal     Problem: Patient Education: Go to Patient Education Activity  Goal: Patient/Family Education  Outcome: Progressing Towards Goal

## 2019-04-18 NOTE — DISCHARGE INSTRUCTIONS
TOTAL HIP DISCHARGE INSTRUCTIONS    Patient: Dmitriy Lagunas MRN: [id number]  SSN: [id number]              Please take the time to review the following instructions before you leave the hospital and use them as guidelines during your recovery from surgery. If you have any questions you may contact my office at [phone]  After business hours or during the weekend you can contact me through [address] or text / call at [phone] (cell phone) for emergency's. Please use the office number during regular business hours. SPECIAL INSTRUCTIONS :   1. Do not bend greater than 90 degrees at the hip for 4 weeks following your discharge  2. Avoid exercises or activities which bring the leg out or away from the mid-line of the body. The surgical repair involves this muscle and it will require 4 weeks to heal. You may disregard these instructions for a direct anterior approach. 3. You may walk as tolerated and are encouraged to work daily on progressing your activities with a walker initially. 4. You may transition to a cane for walking 5-7 days from surgery once you feel safe. You may use a walker for longer periods if you feel unstable. 5. Call my office for routine questions M-F at [phone]. You may contact me directly through [address] [place] [address] if there are specific questions or text / call using my cell number [phone]. Wound Care/ Dressing Changes:  DRESSING :   AQUACEL Dressings : This should be removed by physical therapy or you may remove this yourself 7 days after the date of your surgery. If there is no drainage, then a simple dressing may be used or no dressing at all. Other dressing options can be purchased over the counter at a local pharmacy or medical supply vendor. A porous adhesive dressing such as pictured above can be purchased at a local [employer] or [employer]. You only need to keep the incision covered for 7 days after showers.  A dressing may be used for longer if there are issues with clothing clinging to the incision. Showering/ Bathing: You may shower with the Silverlon dressing in place. This is left in place for 7 days following discharge from the hospital. If your incision is dry without drainage you may shower following your discharge home. After 7 days your dressing should be removed for showering. It is fine to have water run over the incision. Do not vigorously scrub your incision. Apply a clean, dry dressing after you have dried your incision. Do not take a bath or get into a swimming pool / [facility] until you follow up with Dr. Katelyn Moya. Do not soak your incision under water. If there is continued drainage or you are concerned contact Dr Divina Shelton office prior to showering [phone] ext [phone] . Diet:  You may advance to your regular diet as tolerated. Increase your clear liquid intake for the next 2-3 days. Increase your protein intake for 30 days to promote healing. Common protein rich foods include: meat, fish, yogurt, milk, cheese, eggs, soy, and nut products. Continue to consume a balanced diet including whole grains, fruit and vegetables. If you are having difficulty consuming adequate protein, you may want to consider a protein supplement, such as Ensure High Protein or similar product, 1-2 bottles per day. Medication:      1. You will be given prescriptions for pain medication when you are discharged from the hospital. The side effects of these medications can be substantial and the narcotic medications are not mandatory. You may substitute these medications with Tylenol or Alleve / Motrin. 2. Please use the medications as prescribed. Pain medications may cause constipation- Colace twice daily and Miralax one scoop daily while taking the narcotic medication should help prevent constipation. Please discuss with your local pharmacist regarding increasing this dosage if constipation persists.   Other possible side effects of pain medication are dizziness, headache, nausea, vomiting, and urinary retention. Discontinue the pain medication if you develop itching, rash, shortness of breath, or difficulties swallowing. If these symptoms become severe or are not relieved by discontinuing the medication, you should seek immediate medical attention. 3. Refills of pain medication are authorized during office hours only (8 AM- 5 PM  Monday thru Friday). Many of these medication will require you or a family member to pick-up a physical prescription at the office. 4. Medications other than antiinflammatories will not be called into the pharmacy after business hours. 5. You may resume the medication(s) you were taking prior to your surgery. Narcotics may change the effects of some antidepressant medication(s). If you have any questions about possible interactions between your regular medications and the pain medication, you should ask the pharmacist or contact the prescribing physician. 6. If you have constipation which is not improved by oral stool softeners then a Ducolax suppository should be purchased over the counter. 7. Continue the blood thinner (Aspirin or Lovenox) for a total of 30 days following surgery. Follow up appointment:    Please call our office at [phone] for your follow up appointment. This should be scheduled 14 days following the date of surgery. Physical Therapy / Nursing:    Physical Therapy following surgery will be arranged as an outpatient or at home. They have specific instructions for rehab and wound care. It is fine to have physical therapy remove your dressing at 7 days following surgery. Returning to work:    Normal return to work is 6-12 weeks following surgery. Depending on your progression following surgery and specific job duties you may take longer for a full return to work.      DRIVING    You should not return to driving until you are off all opioid pain medications and able to safely and quickly apply the brakes. This is normally 2-6 weeks for left sided surgery and 2-8 weeks for right sided surgery. Important Signs and Symptoms:    If any of the following signs or symptoms occur, you should contact Dr. Carissa Joy office. Please be advised if a problem arises which you feel requires immediate medical attention or you are unable to contact Dr. Carissa Joy office you should seek immediate medical attention at the ER or other health care facility you have access to.    1. A sudden increase in swelling and/or redness or warmth at the area your surgery was performed which isnt relieved by rest, ice, and elevation. 2. Oral temperature greater than 101 degrees for 12 hours or more which isnt relieved by an increase in fluid intake and taking 2 Tylenol every 4-6 hours. 3. Excessive drainage from your incisions, or drainage which hasnt stopped by 72 hours after your surgery. 4. Fever, chills, shortness of breath, chest pain, nausea, vomiting or other signs and symptoms which are of concern to you. frequently asked questions   What should I take for pain?  o In general you will be discharged with three medications for pain (Extra Strength Tylenol, Oxycodone 5mg and Tramadol). Gabapentin is also used for pain and taken as directed (100mg in the am and 300mg prior to bed at night). This may vary slightly depending on what you were taking in the hospital. USE ICE regularly, this is the most important modality for controlling pain. Scheduled Medications :      1. Tylenol 1 tablet (500mg) to 2 tablets (1000mg) every 4 hours. This should not exceed 4000mg in a 24 hour period. 2. Ibuprofen 800mg every 8 hours x 30 days. 3. Gabapentin 1 tablet (100 mg) in the morning with breakfast and 3 tablets (300 mg) at night before bed.   Once you decide to discontinue this medication, you should taper off over a period of greater than 7 days    Break through Pain Medications :       1st Line - Oxycodone 1 (5mg) - 2 (10mg) every 4 hours (Or as directed), take these between Tylenol / Ibuprofen doses if your pain is not below 4 / 10. This may be needed only for several days following your discharge. Extra Strength Tylenol 1-2 tablets (500-1000mg) every 4-6 hrs. 2nd Line - Tramadol 50mg Every 8 hours for pain if not improving with the Tylenol and Oxycodone.  When should I call for advice regarding my pain?  o After 12 hours on the above regimen, if nothing is working call the office [phone], contact me through [address] or text / call my cell after hours [phone].  Can I get refills?  o Opioid refills are provided for the first 6 weeks following surgery. o Try Tylenol 500- 1000 mg along with Alleve 220-440mg (every 12 hours) or Motrin 200-800mg (every 6-8 hours) during the majority of the day. - Save the opioid pain medications for physical therapy (1 hour prior) and before sleeping at night. - Keep in mind you need to discontinue these medications prior to returning to driving.  Is swelling normal?  o Almost everyone has some degree of swelling following surgery. o Following hip and knee replacement surgery, swelling can be normal below the incision for the first 1-2 weeks. - This swelling peaks around 5-7 days after surgery.   - You may have some bruising around the back of the thigh, calf and even into the foot. - Use ice daily   What should I do for the swelling?  o Ice, Ice, Ice  o Keep the limb elevated. o Apply compression socks (knee high for total knees and up to the mid-thigh for total hips.   o Heat may also be applied, choose the modality that makes you the most comfortable.  How long should I remain on blood thinners following surgery?  o Thirty days   When can I drive?  o Once you have stopped using regular narcotic pain medications (Percocet, Lortab, etc.) and can safely apply the brakes without hesitation.  When can I shower?   o 48-72hours following surgery if the incision is dry.  o No submersion of the incision, bathing or swimming for 14 days following surgery or until cleared by Dr Allan Cross.  What do I do with the dressing when I shower?  o The dressing can be removed after 7 days. o The incision is sealed with Dermabond (Biologic glue) and except for wounds which are draining should be watertight.  How active should I be following surgery?   o Progress activities in moderation at your own pace.   o Walk each day and set progressive goals with small increments (1st week - ½ block of walking, 2nd week - 1 block, 3rd week - 2 blocks, etc.)     Please do not hesitate to contact me through [employer] or by text / call me at [phone] (cell phone) for questions following surgery - MD Denzel Rome MD  Cell [phone]  Breanna Branham [address] [phone]  Medical Staff : Johann Davison or Lam Holder @ [phone]

## 2019-04-18 NOTE — PROGRESS NOTES
Bedside and Verbal shift change report given to Abbi (oncoming nurse) by Armando Laughlin RN (offgoing nurse).  Report included the following information SBAR, Kardex, ED Summary, OR Summary, Procedure Summary, Intake/Output, MAR and Recent Results.

## 2019-04-19 LAB
ANION GAP SERPL CALC-SCNC: 8 MMOL/L (ref 5–15)
BUN SERPL-MCNC: 25 MG/DL (ref 6–20)
BUN/CREAT SERPL: 14 (ref 12–20)
CALCIUM SERPL-MCNC: 7.7 MG/DL (ref 8.5–10.1)
CHLORIDE SERPL-SCNC: 105 MMOL/L (ref 97–108)
CO2 SERPL-SCNC: 26 MMOL/L (ref 21–32)
CREAT SERPL-MCNC: 1.74 MG/DL (ref 0.7–1.3)
GLUCOSE BLD STRIP.AUTO-MCNC: 123 MG/DL (ref 65–100)
GLUCOSE BLD STRIP.AUTO-MCNC: 153 MG/DL (ref 65–100)
GLUCOSE BLD STRIP.AUTO-MCNC: 166 MG/DL (ref 65–100)
GLUCOSE BLD STRIP.AUTO-MCNC: 172 MG/DL (ref 65–100)
GLUCOSE SERPL-MCNC: 134 MG/DL (ref 65–100)
POTASSIUM SERPL-SCNC: 3.7 MMOL/L (ref 3.5–5.1)
SERVICE CMNT-IMP: ABNORMAL
SODIUM SERPL-SCNC: 139 MMOL/L (ref 136–145)

## 2019-04-19 PROCEDURE — 97530 THERAPEUTIC ACTIVITIES: CPT

## 2019-04-19 PROCEDURE — 82962 GLUCOSE BLOOD TEST: CPT

## 2019-04-19 PROCEDURE — 97535 SELF CARE MNGMENT TRAINING: CPT

## 2019-04-19 PROCEDURE — 74011250637 HC RX REV CODE- 250/637: Performed by: PHYSICIAN ASSISTANT

## 2019-04-19 PROCEDURE — 97116 GAIT TRAINING THERAPY: CPT

## 2019-04-19 PROCEDURE — 80048 BASIC METABOLIC PNL TOTAL CA: CPT

## 2019-04-19 PROCEDURE — 65270000029 HC RM PRIVATE

## 2019-04-19 PROCEDURE — 74011636637 HC RX REV CODE- 636/637: Performed by: PHYSICIAN ASSISTANT

## 2019-04-19 PROCEDURE — 36415 COLL VENOUS BLD VENIPUNCTURE: CPT

## 2019-04-19 RX ORDER — FAMOTIDINE 20 MG/1
20 TABLET, FILM COATED ORAL EVERY EVENING
Status: DISCONTINUED | OUTPATIENT
Start: 2019-04-20 | End: 2019-04-20 | Stop reason: HOSPADM

## 2019-04-19 RX ADMIN — ACETAMINOPHEN 650 MG: 325 TABLET ORAL at 18:02

## 2019-04-19 RX ADMIN — SENNOSIDES, DOCUSATE SODIUM 1 TABLET: 50; 8.6 TABLET, FILM COATED ORAL at 08:42

## 2019-04-19 RX ADMIN — Medication 10 ML: at 06:53

## 2019-04-19 RX ADMIN — METFORMIN HYDROCHLORIDE 1000 MG: 500 TABLET ORAL at 16:44

## 2019-04-19 RX ADMIN — AMLODIPINE BESYLATE 10 MG: 5 TABLET ORAL at 08:42

## 2019-04-19 RX ADMIN — PROPRANOLOL HYDROCHLORIDE 20 MG: 10 TABLET ORAL at 08:42

## 2019-04-19 RX ADMIN — METFORMIN HYDROCHLORIDE 1000 MG: 500 TABLET ORAL at 08:42

## 2019-04-19 RX ADMIN — ACETAMINOPHEN 650 MG: 325 TABLET ORAL at 06:53

## 2019-04-19 RX ADMIN — FAMOTIDINE 20 MG: 20 TABLET ORAL at 18:02

## 2019-04-19 RX ADMIN — Medication 10 ML: at 21:48

## 2019-04-19 RX ADMIN — ASPIRIN 81 MG: 81 TABLET, COATED ORAL at 18:02

## 2019-04-19 RX ADMIN — CELECOXIB 200 MG: 100 CAPSULE ORAL at 18:02

## 2019-04-19 RX ADMIN — POLYETHYLENE GLYCOL 3350 17 G: 17 POWDER, FOR SOLUTION ORAL at 08:43

## 2019-04-19 RX ADMIN — Medication 10 ML: at 16:45

## 2019-04-19 RX ADMIN — FAMOTIDINE 20 MG: 20 TABLET ORAL at 08:43

## 2019-04-19 RX ADMIN — FENOFIBRATE 145 MG: 145 TABLET ORAL at 08:42

## 2019-04-19 RX ADMIN — GABAPENTIN 100 MG: 100 CAPSULE ORAL at 08:42

## 2019-04-19 RX ADMIN — SENNOSIDES, DOCUSATE SODIUM 1 TABLET: 50; 8.6 TABLET, FILM COATED ORAL at 18:02

## 2019-04-19 RX ADMIN — EZETIMIBE 10 MG: 10 TABLET ORAL at 06:53

## 2019-04-19 RX ADMIN — CELECOXIB 200 MG: 100 CAPSULE ORAL at 08:42

## 2019-04-19 RX ADMIN — ASPIRIN 81 MG: 81 TABLET, COATED ORAL at 08:42

## 2019-04-19 RX ADMIN — POTASSIUM CHLORIDE 10 MEQ: 750 TABLET, EXTENDED RELEASE ORAL at 08:43

## 2019-04-19 RX ADMIN — INSULIN LISPRO 2 UNITS: 100 INJECTION, SOLUTION INTRAVENOUS; SUBCUTANEOUS at 16:44

## 2019-04-19 RX ADMIN — GABAPENTIN 300 MG: 300 CAPSULE ORAL at 21:48

## 2019-04-19 RX ADMIN — INSULIN LISPRO 2 UNITS: 100 INJECTION, SOLUTION INTRAVENOUS; SUBCUTANEOUS at 08:43

## 2019-04-19 NOTE — PROGRESS NOTES
Problem: Self Care Deficits Care Plan (Adult)  Goal: *Acute Goals and Plan of Care (Insert Text)  Description  Occupational Therapy Goals  Initiated 4/18/2019     1. Patient will perform lower body dressing using AE PRN at modified independence level within 7 days. 2. Patient will perform toilet transfers at modified independence level using Walkers, Type: Rolling Walker  within 7 days. 3. Patient will perform all aspects of toileting at modified independence level within 7 days. 4. Patient will demonstrate 3/3 hip precautions without cues within 7 days. Note:   OCCUPATIONAL THERAPY TREATMENT  Patient: Paddy Wang (87 y.o. male)  Date: 4/19/2019  Diagnosis: Primary osteoarthritis of right hip [M16.11] <principal problem not specified>  Procedure(s) (LRB):  RIGHT TOTAL HIP ARTHROPLASTY MAKOPLASTY DIRECT ANTERIOR (Right) 2 Days Post-Op  Precautions: Total hip, Fall, WBAT  Chart, occupational therapy assessment, plan of care, and goals were reviewed. ASSESSMENT:  Pt seated in chair agreeable to OT. He wanted to ambulate to bathroom to brush his teeth. Pt fully dressed and verbalized he did that yesterday without difficulty. Sit to stand and pt stood at sink for 15 min for oral hygiene/care. Pt stated he is feeling much better but wanted to return to bed to rest. Recommend home health. Progression toward goals:  ?       Improving appropriately and progressing toward goals  ? Improving slowly and progressing toward goals  ? Not making progress toward goals and plan of care will be adjusted     PLAN:  Patient continues to benefit from skilled intervention to address the above impairments. Continue treatment per established plan of care.   Discharge Recommendations: Home health  Further Equipment Recommendations for Discharge:  None for OT     SUBJECTIVE:   Patient stated I could not stand this long before the surgery    OBJECTIVE DATA SUMMARY:   Cognitive/Behavioral Status:  Neurologic State: Alert  Orientation Level: Oriented X4  Cognition: Appropriate decision making             Functional Mobility and Transfers for ADLs:  Bed Mobility:  Supine to Sit: Additional time;Minimum assistance  Scooting: Additional time;Contact guard assistance    Transfers:  Sit to Stand: Minimum assistance;Assist x1          Balance:  Sitting: Intact  Standing: With support    ADL Intervention:       Grooming  Brushing Teeth: Supervision/set-up(pt stood at sink 15 min for oral care)     Pt fully dressed this AM and he stated he did that yesterday without difficulty. Pain:   No pain verbalized. Activity Tolerance:   No signs/symptoms of distress or discomfort during OT  Please refer to the flowsheet for vital signs taken during this treatment. After treatment:   ? Patient left in no apparent distress sitting up in chair  ? Patient left in no apparent distress in bed  ? Call bell left within reach  ? Nursing notified  ? Caregiver present  ?  Bed alarm activated    COMMUNICATION/COLLABORATION:   The patients plan of care was discussed with: Physical Therapy Assistant and Occupational Therapist    RIVERA Galloway  Time Calculation: 25 mins

## 2019-04-19 NOTE — PROGRESS NOTES
Problem: Mobility Impaired (Adult and Pediatric)  Goal: *Acute Goals and Plan of Care (Insert Text)  Description  Physical Therapy Goals  Initiated 4/18/2019    1. Patient will move from supine to sit and sit to supine  in bed with modified independence within 4 days. 2. Patient will perform sit to stand with modified independence within 4 days. 3. Patient will ambulate with modified independence for 100 feet with the least restrictive device within 4 days. 4. Patient will ascend/descend 4 stairs with 1 handrail(s) with minimal assistance/contact guard assist within 4 days. 5. Patient will verbalize and demonstrate understanding of anterior hip precautions per protocol within 4 days. 6. Patient will perform total hip home exercise program per protocol with independence within 4 days. Outcome: Progressing Towards Goal  Note:   PHYSICAL THERAPY TREATMENT  Patient: Damien Muhammad (54 y.o. male)  Date: 4/19/2019  Diagnosis: Primary osteoarthritis of right hip [M16.11] <principal problem not specified>  Procedure(s) (LRB):  RIGHT TOTAL HIP ARTHROPLASTY MAKOPLASTY DIRECT ANTERIOR (Right) 2 Days Post-Op  Precautions: Total hip, Fall, WBAT  Chart, physical therapy assessment, plan of care and goals were reviewed. ASSESSMENT:  Pt received in bed, agreeable to transfer to chair form breakfast meal. Reports 1/10 pain at rest. Min A to come to sitting EOB with increased time. Difficulty using RUE to scoot toward EOB Pt describes as \"bad arm\" denies dizziness, nausea, lightheadedness with positional change. Min A for sit>stand using RW. Demonstrate antalgic gait, almost ataxic, gait pattern and required Min A for steadying. Pt returned to chair and set up for breakfast meal.   Progression toward goals:  ?      Improving appropriately and progressing toward goals  ? Improving slowly and progressing toward goals  ?       Not making progress toward goals and plan of care will be adjusted     PLAN:  Patient continues to benefit from skilled intervention to address the above impairments. Continue treatment per established plan of care. Discharge Recommendations:  Home Health  Further Equipment Recommendations for Discharge:  none      SUBJECTIVE:   Patient stated ?i just want to eat breakfast right now.?    OBJECTIVE DATA SUMMARY:   Critical Behavior:  Neurologic State: Alert  Orientation Level: Oriented X4  Cognition: Follows commands  Safety/Judgement: Awareness of environment, Good awareness of safety precautions, Insight into deficits  Functional Mobility Training:  Bed Mobility:     Supine to Sit: Additional time;Minimum assistance     Scooting: Additional time;Contact guard assistance        Transfers:  Sit to Stand: Minimum assistance;Assist x1  Stand to Sit: Contact guard assistance                             Balance:  Sitting: Intact  Standing: With support  Ambulation/Gait Training:  Distance (ft): 15 Feet (ft)  Assistive Device: Walker, rolling;Gait belt  Ambulation - Level of Assistance: Minimal assistance;Assist x1        Gait Abnormalities: Step to gait; Decreased step clearance; Antalgic        Base of Support: Widened                             Stairs: Therapeutic Exercises:   SUPINE  EXERCISES   Sets   Reps   Active Active Assist   Passive Self ROM   Comments   Ankle Pumps   ? ?                                        ?                                        ?                                           Quad Sets   ? ?                                        ?                                        ?                                           Heel Slides   ? ?                                        ?                                        ?                                           Hip Abduction   ?                                         ?                                        ? ?                                           Glut Sets   ? ?                                        ?                                        ?                                              ?                                        ?                                        ?                                        ?                                              ?                                        ?                                        ?                                        ?                                             STANDING  EXERCISES   Sets   Reps   Active Active Assist   Passive Self ROM   Comments   Heel Raises   ? ?                                        ?                                        ?                                           Hip Abduction   ? ?                                        ?                                        ?                                              ?                                        ?                                        ?                                        ?                                              ?                                        ?                                        ?                                        ?                                             Pain:  Pain Scale 1: Numeric (0 - 10)  Pain Intensity 1: 7  Pain Location 1: Hip     Pain Description 1: Aching  Pain Intervention(s) 1: Medication (see MAR)  Activity Tolerance:   good  Please refer to the flowsheet for vital signs taken during this treatment. After treatment:   ? Patient left in no apparent distress sitting up in chair  ? Patient left in no apparent distress in bed  ? Call bell left within reach  ? Nursing notified  ? Caregiver present  ?  chair alarm activated    COMMUNICATION/COLLABORATION:   The patient?s plan of care was discussed with: Registered Nurse    Morris Gunter   Time Calculation: 14 mins

## 2019-04-19 NOTE — PROGRESS NOTES
Problem: Mobility Impaired (Adult and Pediatric)  Goal: *Acute Goals and Plan of Care (Insert Text)  Description  Physical Therapy Goals  Initiated 4/18/2019    1. Patient will move from supine to sit and sit to supine  in bed with modified independence within 4 days. 2. Patient will perform sit to stand with modified independence within 4 days. 3. Patient will ambulate with modified independence for 100 feet with the least restrictive device within 4 days. 4. Patient will ascend/descend 4 stairs with 1 handrail(s) with minimal assistance/contact guard assist within 4 days. 5. Patient will verbalize and demonstrate understanding of anterior hip precautions per protocol within 4 days. 6. Patient will perform total hip home exercise program per protocol with independence within 4 days. 4/19/2019 1409 by Angus Greco  Outcome: Progressing Towards Goal  Note:   PHYSICAL THERAPY TREATMENT  Patient: Francisco Khalil (29 y.o. male)  Date: 4/19/2019  Diagnosis: Primary osteoarthritis of right hip [M16.11] <principal problem not specified>  Procedure(s) (LRB):  RIGHT TOTAL HIP ARTHROPLASTY MAKOPLASTY DIRECT ANTERIOR (Right) 2 Days Post-Op  Precautions: Total hip, Fall, WBAT  Chart, physical therapy assessment, plan of care and goals were reviewed. ASSESSMENT:  Pt received in bed, reporting 1/10 pain. Agreeable to participate with physical therapy. Able to come to sitting EOB with CGA, tho heavy use of bed rails. Sit<>stand using RW with CGA. Initially unsteady upon standing, Gait training x 100' using RW with Min A for steadying, occasional verbal cues for safe technique. demonstrates antalgic gait, with path deviations to L. pt reports leg length discrepancy of 1 inch on LLE Pt reports he does not have built-up shoe to compensate for this discrepancy. Pt fatigued and SOB with activity O2 sat 98%. Seated rest and PLB to recover. Pt reports \"stiffness\" but denies pain.  Requesting to return to bed, encouraged OOB for meals with nursing assistance. Pt unsteady with functional tranfers and  during gait and will require assistance at home for safety. May gaston able to attempt stair training tomorrow  Progression toward goals:  ?      Improving appropriately and progressing toward goals  ? Improving slowly and progressing toward goals  ? Not making progress toward goals and plan of care will be adjusted     PLAN:  Patient continues to benefit from skilled intervention to address the above impairments. Continue treatment per established plan of care. Discharge Recommendations:  Home Health with home aide for safety  Further Equipment Recommendations for Discharge:  none      SUBJECTIVE:   It doesn't really hurt    OBJECTIVE DATA SUMMARY:   Functional Mobility Training:  Bed Mobility:     Supine to Sit: Additional time;Contact guard assistance  Sit to Supine: Additional time;Contact guard assistance  Scooting: Additional time;Contact guard assistance        Transfers:  Sit to Stand: Contact guard assistance  Stand to Sit: Contact guard assistance                             Ambulation/Gait Training:  Distance (ft): 100 Feet (ft)  Assistive Device: Walker, rolling;Gait belt  Ambulation - Level of Assistance: Minimal assistance        Gait Abnormalities: Antalgic;Decreased step clearance; Path deviations        Base of Support: Widened                             Stairs: Therapeutic Exercises:   Exercises performed per protocol. See morning treatment note for description. Pain:                    Activity Tolerance:   good  Please refer to the flowsheet for vital signs taken during this treatment. After treatment:   ? Patient left in no apparent distress sitting up in chair  ? Patient left in no apparent distress in bed  ? Call bell left within reach  ? Nursing notified  ? Caregiver present  ?  Bed alarm activated    COMMUNICATION/COLLABORATION:   The patient?s plan of care was discussed with: Registered Nurse    Kiki Gong   Time Calculation: 31 mins                          4/19/2019 0917 by Yadira Lovelace  Outcome: Progressing Towards Goal  Note:   PHYSICAL THERAPY TREATMENT  Patient: Merlin Esposito (9 y.o. male)  Date: 4/19/2019  Diagnosis: Primary osteoarthritis of right hip [M16.11] <principal problem not specified>  Procedure(s) (LRB):  RIGHT TOTAL HIP ARTHROPLASTY MAKOPLASTY DIRECT ANTERIOR (Right) 2 Days Post-Op  Precautions: Total hip, Fall, WBAT  Chart, physical therapy assessment, plan of care and goals were reviewed. ASSESSMENT:  Pt received in bed, agreeable to transfer to chair form breakfast meal. Reports 1/10 pain at rest. Min A to come to sitting EOB with increased time. Difficulty using RUE to scoot toward EOB Pt describes as \"bad arm\" denies dizziness, nausea, lightheadedness with positional change. Min A for sit>stand using RW. Demonstrate antalgic gait, almost ataxic, gait pattern and required Min A for steadying. Pt returned to chair and set up for breakfast meal.   Progression toward goals:  ?      Improving appropriately and progressing toward goals  ? Improving slowly and progressing toward goals  ? Not making progress toward goals and plan of care will be adjusted     PLAN:  Patient continues to benefit from skilled intervention to address the above impairments. Continue treatment per established plan of care. Discharge Recommendations:  Home Health  Further Equipment Recommendations for Discharge:  none      SUBJECTIVE:   Patient stated ?i just want to eat breakfast right now.?    OBJECTIVE DATA SUMMARY:   Critical Behavior:  Neurologic State: Alert  Orientation Level: Oriented X4  Cognition: Follows commands  Safety/Judgement: Awareness of environment, Good awareness of safety precautions, Insight into deficits  Functional Mobility Training:  Bed Mobility:     Supine to Sit: Additional time;Minimum assistance     Scooting:  Additional time;Contact guard assistance        Transfers:  Sit to Stand: Minimum assistance;Assist x1  Stand to Sit: Contact guard assistance                             Balance:  Sitting: Intact  Standing: With support  Ambulation/Gait Training:  Distance (ft): 15 Feet (ft)  Assistive Device: Walker, rolling;Gait belt  Ambulation - Level of Assistance: Minimal assistance;Assist x1        Gait Abnormalities: Step to gait; Decreased step clearance; Antalgic        Base of Support: Widened                             Stairs: Therapeutic Exercises:   SUPINE  EXERCISES   Sets   Reps   Active Active Assist   Passive Self ROM   Comments   Ankle Pumps   ? ?                                        ?                                        ?                                           Quad Sets   ? ?                                        ?                                        ?                                           Heel Slides   ? ?                                        ?                                        ?                                           Hip Abduction   ? ?                                        ?                                        ?                                           Glut Sets   ?                                         ?                                        ?                                        ?                                              ?                                        ?                                        ?                                        ?                                              ?                                        ?                                        ?                                        ?                                             STANDING  EXERCISES   Sets   Reps   Active Active Assist   Passive Self ROM Comments   Heel Raises   ? ?                                        ?                                        ?                                           Hip Abduction   ? ?                                        ?                                        ?                                              ?                                        ?                                        ?                                        ?                                              ?                                        ?                                        ?                                        ?                                             Pain:  Pain Scale 1: Numeric (0 - 10)  Pain Intensity 1: 7  Pain Location 1: Hip     Pain Description 1: Aching  Pain Intervention(s) 1: Medication (see MAR)  Activity Tolerance:   good  Please refer to the flowsheet for vital signs taken during this treatment. After treatment:   ? Patient left in no apparent distress sitting up in chair  ? Patient left in no apparent distress in bed  ? Call bell left within reach  ? Nursing notified  ? Caregiver present  ?  chair alarm activated    COMMUNICATION/COLLABORATION:   The patient?s plan of care was discussed with: Registered Nurse    Tana Alicea   Time Calculation: 14 mins

## 2019-04-19 NOTE — PROGRESS NOTES
4/19/2019 4:42 PM Attempted to meet with pt to discuss discharge planning, pt resting. Confirmed with Armani Antunez at [facility] pt's personal care will start on 4/22 through [facility] from 9-5pm Mon-Friday. 4/19/2019 2:21 PM Anticipating pt to discharge home on 4/21 with home health through [facility] and personal care arranged through [facility] starting on 4/22. CM spoke with Armani Antunez at [facility] ([phone]) notified pt will discharge on 4/21. Nursing on day of discharge please call [facility] at [phone] to notify. Pt's daughter will transport pt home. CM will follow.  ALEXEI SpencerW

## 2019-04-20 VITALS
OXYGEN SATURATION: 96 % | HEART RATE: 44 BPM | WEIGHT: 209.7 LBS | RESPIRATION RATE: 18 BRPM | BODY MASS INDEX: 32.84 KG/M2 | TEMPERATURE: 97.7 F | SYSTOLIC BLOOD PRESSURE: 147 MMHG | DIASTOLIC BLOOD PRESSURE: 65 MMHG

## 2019-04-20 LAB
ANION GAP SERPL CALC-SCNC: 5 MMOL/L (ref 5–15)
BUN SERPL-MCNC: 29 MG/DL (ref 6–20)
BUN/CREAT SERPL: 21 (ref 12–20)
CALCIUM SERPL-MCNC: 8.1 MG/DL (ref 8.5–10.1)
CHLORIDE SERPL-SCNC: 104 MMOL/L (ref 97–108)
CO2 SERPL-SCNC: 26 MMOL/L (ref 21–32)
CREAT SERPL-MCNC: 1.38 MG/DL (ref 0.7–1.3)
GLUCOSE BLD STRIP.AUTO-MCNC: 157 MG/DL (ref 65–100)
GLUCOSE BLD STRIP.AUTO-MCNC: 200 MG/DL (ref 65–100)
GLUCOSE SERPL-MCNC: 141 MG/DL (ref 65–100)
POTASSIUM SERPL-SCNC: 3.9 MMOL/L (ref 3.5–5.1)
SERVICE CMNT-IMP: ABNORMAL
SERVICE CMNT-IMP: ABNORMAL
SODIUM SERPL-SCNC: 135 MMOL/L (ref 136–145)

## 2019-04-20 PROCEDURE — 97116 GAIT TRAINING THERAPY: CPT

## 2019-04-20 PROCEDURE — 97530 THERAPEUTIC ACTIVITIES: CPT

## 2019-04-20 PROCEDURE — 80048 BASIC METABOLIC PNL TOTAL CA: CPT

## 2019-04-20 PROCEDURE — 82962 GLUCOSE BLOOD TEST: CPT

## 2019-04-20 PROCEDURE — 74011250637 HC RX REV CODE- 250/637: Performed by: PHYSICIAN ASSISTANT

## 2019-04-20 PROCEDURE — 74011636637 HC RX REV CODE- 636/637: Performed by: PHYSICIAN ASSISTANT

## 2019-04-20 PROCEDURE — 36415 COLL VENOUS BLD VENIPUNCTURE: CPT

## 2019-04-20 RX ADMIN — AMLODIPINE BESYLATE 10 MG: 5 TABLET ORAL at 08:33

## 2019-04-20 RX ADMIN — INSULIN LISPRO 2 UNITS: 100 INJECTION, SOLUTION INTRAVENOUS; SUBCUTANEOUS at 08:32

## 2019-04-20 RX ADMIN — GABAPENTIN 100 MG: 100 CAPSULE ORAL at 08:33

## 2019-04-20 RX ADMIN — EZETIMIBE 10 MG: 10 TABLET ORAL at 06:43

## 2019-04-20 RX ADMIN — FENOFIBRATE 145 MG: 145 TABLET ORAL at 08:37

## 2019-04-20 RX ADMIN — ACETAMINOPHEN 650 MG: 325 TABLET ORAL at 13:06

## 2019-04-20 RX ADMIN — ACETAMINOPHEN 650 MG: 325 TABLET ORAL at 00:23

## 2019-04-20 RX ADMIN — SENNOSIDES, DOCUSATE SODIUM 1 TABLET: 50; 8.6 TABLET, FILM COATED ORAL at 08:33

## 2019-04-20 RX ADMIN — PROPRANOLOL HYDROCHLORIDE 20 MG: 10 TABLET ORAL at 08:33

## 2019-04-20 RX ADMIN — ACETAMINOPHEN 650 MG: 325 TABLET ORAL at 06:43

## 2019-04-20 RX ADMIN — Medication 10 ML: at 06:44

## 2019-04-20 RX ADMIN — CELECOXIB 200 MG: 100 CAPSULE ORAL at 08:33

## 2019-04-20 RX ADMIN — POLYETHYLENE GLYCOL 3350 17 G: 17 POWDER, FOR SOLUTION ORAL at 08:33

## 2019-04-20 RX ADMIN — ASPIRIN 81 MG: 81 TABLET, COATED ORAL at 08:33

## 2019-04-20 RX ADMIN — OXYCODONE HYDROCHLORIDE 10 MG: 5 TABLET ORAL at 00:26

## 2019-04-20 RX ADMIN — POTASSIUM CHLORIDE 10 MEQ: 750 TABLET, EXTENDED RELEASE ORAL at 13:10

## 2019-04-20 RX ADMIN — INSULIN LISPRO 3 UNITS: 100 INJECTION, SOLUTION INTRAVENOUS; SUBCUTANEOUS at 13:06

## 2019-04-20 NOTE — PROGRESS NOTES
Problem: Mobility Impaired (Adult and Pediatric)  Goal: *Acute Goals and Plan of Care (Insert Text)  Description  Physical Therapy Goals  Initiated 4/18/2019    1. Patient will move from supine to sit and sit to supine  in bed with modified independence within 4 days. 2. Patient will perform sit to stand with modified independence within 4 days. 3. Patient will ambulate with modified independence for 100 feet with the least restrictive device within 4 days. 4. Patient will ascend/descend 4 stairs with 1 handrail(s) with minimal assistance/contact guard assist within 4 days. 5. Patient will verbalize and demonstrate understanding of anterior hip precautions per protocol within 4 days. 6. Patient will perform total hip home exercise program per protocol with independence within 4 days. Outcome: Progressing Towards Goal   PHYSICAL THERAPY TREATMENT  Patient: Walter Leo (13 y.o. male)  Date: 4/20/2019  Diagnosis: Primary osteoarthritis of right hip [M16.11] <principal problem not specified>  Procedure(s) (LRB):  RIGHT TOTAL HIP ARTHROPLASTY MAKOPLASTY DIRECT ANTERIOR (Right) 3 Days Post-Op  Precautions: Total hip, Fall, WBAT  Chart, physical therapy assessment, plan of care and goals were reviewed. ASSESSMENT:  Patient cleared for PT session. Received in bed alert and ready. Patient with recall of 2/3 hip precautions. All were reviewed. Patient stood and ambulated 50 feet with rolling walker and up and down 4 steps with one rail, a cane and CGA. Patient steady on his feet today. Patient informed PT that he will go to his daughters home for the weekend until his HHA starts on Monday. This is a change from the initial plan to go home alone. Patient is safe for discharge to his daughters home. Notified nursing. Progression toward goals:  ?      Improving appropriately and progressing toward goals  ? Improving slowly and progressing toward goals  ?       Not making progress toward goals and plan of care will be adjusted     PLAN:  Patient continues to benefit from skilled intervention to address the above impairments. Continue treatment per established plan of care. Discharge Recommendations:  Home Health  Further Equipment Recommendations for Discharge:  none      SUBJECTIVE:   Patient stated ? I am going to my daughters home now instead of to my house. ?    OBJECTIVE DATA SUMMARY:   Critical Behavior:  Neurologic State: Alert  Orientation Level: Oriented X4  Cognition: Appropriate decision making, Appropriate for age attention/concentration, Appropriate safety awareness, Follows commands  Safety/Judgement: Awareness of environment, Good awareness of safety precautions, Insight into deficits  Functional Mobility Training:  Bed Mobility:     Supine to Sit: Stand-by assistance              Transfers:  Sit to Stand: Contact guard assistance  Stand to Sit: Contact guard assistance        Bed to Chair: Contact guard assistance                    Balance:  Sitting: Intact  Standing: Intact; With support  Ambulation/Gait Training:  Distance (ft): 50 Feet (ft)  Assistive Device: Walker, rolling;Gait belt  Ambulation - Level of Assistance: Contact guard assistance        Gait Abnormalities: Step to gait  Right Side Weight Bearing: As tolerated     Base of Support: Widened                             Stairs:  Number of Stairs Trained: 4  Stairs - Level of Assistance: Contact guard assistance  Rail Use: Left (cane on right side)    Therapeutic Exercises:   SUPINE  EXERCISES   Sets   Reps   Active Active Assist   Passive Self ROM   Comments   Ankle Pumps   ? ?                                        ?                                        ?                                           Quad Sets   ? ?                                        ?                                        ?                                           Heel Slides   ? ?                                        ?                                        ?                                           Hip Abduction   ? ?                                        ?                                        ?                                           Glut Sets   ? ?                                        ?                                        ?                                              ?                                        ?                                        ?                                        ?                                              ?                                        ?                                        ?                                        ?                                             STANDING  EXERCISES   Sets   Reps   Active Active Assist   Passive Self ROM   Comments   Heel Raises   ? ?                                        ?                                        ?                                           Hip Abduction   ? ?                                        ?                                        ?                                              ?                                        ?                                        ?                                        ?                                              ?                                        ?                                        ?                                        ?                                             Pain:   No complaints at this time                 Activity Tolerance:   Good. Please refer to the flowsheet for vital signs taken during this treatment. After treatment:   ? Patient left in no apparent distress sitting up in chair  ? Patient left in no apparent distress in bed  ?  Hebert Ashby left within reach  ? Nursing notified  ? Caregiver present  ?  Chair alarm activated    COMMUNICATION/COLLABORATION:   The patient?s plan of care was discussed with: Registered Nurse and     John Gayle, SHIKHA   Time Calculation: 25 mins

## 2019-04-20 NOTE — PROGRESS NOTES
TOTAL HIP ARTHROPLASTY DAILY NOTE     ASSESSMENT / PLAN :   1. Pain Control : Excellent - Able to sleep and participate with therapy  2. Overnight Issues : none  3. Wound or incisional issue : Healing incision with no visible drainage  4. Therapy / Weight Bearing Recommendations : Weight bear as tolerated with use of a walker and two person assist while mobilizing  5. DVT Prophylaxis : Mechanical and Aspirin and mechanical lower extremity compression device  6. Disposition : Discharge to home with home health (maximum of 4 visits)  7. Medical Concerns : Stable  8. Comments : Progressing with therapy       POD  2 Days Post-Op s/p Procedure(s):  RIGHT TOTAL HIP ARTHROPLASTY MAKOPLASTY DIRECT ANTERIOR     SUBJECTIVE :     Concerns : none. OBJECTIVE :     Vitals:    04/19/19 0804 04/19/19 1154 04/19/19 1433 04/19/19 1948   BP: 106/62 107/63 108/66 137/68   Pulse: (!) 54 (!) 50 (!) 53 (!) 59   Resp: 16 16 16 16   Temp: 98.3 °F (36.8 °C) 98.2 °F (36.8 °C) 98.4 °F (36.9 °C) 98.8 °F (37.1 °C)   SpO2: 97% 95% 96% 95%   Weight:           Alert and oriented x3. right exam of the hip reveals that the dressing is clean, dry and intact. The patient is able to fire the quadriceps / flex at the hip  Sensation is intact to light touch. No calf pain.      Labs:  Recent Labs     04/19/19  0447 04/18/19  0612   HGB  --  10.0*    138   K 3.7 3.3*    104   CO2 26 27   BUN 25* 16   CREA 1.74* 0.79   * 149*        Patient mobility  Gait  Base of Support: Widened  Gait Abnormalities: Antalgic, Decreased step clearance, Path deviations  Ambulation - Level of Assistance: Minimal assistance  Distance (ft): 100 Feet (ft)  Assistive Device: Walker, rolling, Gait belt        Pete France MD  Cell [phone], DEAN  Cell [phone]  Medical Assistants: [address]  [phone]                 Surgery Scheduler: JONATAN Parsons [address]

## 2019-04-20 NOTE — PROGRESS NOTES
Bedside shift change report given to Cherelle (oncoming nurse) by Loli Neely (offgoing nurse). Report included the following information SBAR, Kardex, Procedure Summary, MAR and Recent Results.

## 2019-04-20 NOTE — PROGRESS NOTES
Patient visited by [facility] Partner Volunteer on [address] [facility] on 4/20/19. Rev.  Joe Dickson, [address] paging service: [phone]

## 2019-04-20 NOTE — PROGRESS NOTES
Pharmacist Discharge Medication Reconciliation    Discharge Provider:  Dr. Luana Bianchi  - Spoke with Dr. Luana Bianchi over the phone. Due to elevated creatinine and NSAIDS Rx TORB to discontinue ibuprofen and sulindac on discharge. Continue ASA, metformin, lisinopril, triamterene/hctz     Discharge Medications:      My Medications        START taking these medications        Instructions Each Dose to Equal Morning Noon Evening Bedtime   acetaminophen 500 mg tablet  Commonly known as:  TYLENOL EXTRA STRENGTH    Your last dose was: Your next dose is: Take 1-2 Tabs by mouth every six (6) hours as needed for Pain. Not to exceed 4,000mg in any 24 hour period  Indications: Pain   500-1000 mg                 aspirin delayed-release 81 mg tablet    Your last dose was: Your next dose is: Take 1 Tab by mouth two (2) times a day. 81 mg                 gabapentin 100 mg capsule  Commonly known as:  NEURONTIN    Your last dose was: Your next dose is: Take 1 Cap by mouth ACB/HS. T1 tablet at breakfast and 3 tablets at night. 100 mg                 ondansetron 8 mg disintegrating tablet  Commonly known as:  ZOFRAN ODT    Your last dose was: Your next dose is: Take 0.5 Tabs by mouth every eight (8) hours as needed for Nausea. 4 mg                 oxyCODONE IR 5 mg immediate release tablet  Commonly known as:  ROXICODONE    Your last dose was: Your next dose is: Take 1 Tab by mouth every four (4) hours as needed for Pain for up to 7 days. Max Daily Amount: 30 mg. Indications: Pain   5 mg                 traMADol 50 mg tablet  Commonly known as:  ULTRAM    Your last dose was: Your next dose is: Take 1 Tab by mouth every six (6) hours as needed for Pain (Take for breakthrough pain if Oxycodone is not working or when transitioning off Oxycodone) for up to 7 days. Max Daily Amount: 200 mg.  Indications: Pain, Post-op Pain, Diagnosis Hip and Knee Arthritis ICD 10 - M16.9   50 mg                        CONTINUE taking these medications        Instructions Each Dose to Equal Morning Noon Evening Bedtime   amLODIPine 10 mg tablet  Commonly known as:  NORVASC    Your last dose was: Your next dose is: Take 10 mg by mouth daily (after breakfast). 10 mg                 fenofibrate 160 mg tablet  Commonly known as:  LOFIBRA    Your last dose was: Your next dose is: Take 160 mg by mouth daily (after breakfast). 160 mg                 KLOR-CON M10 10 mEq tablet  Generic drug:  potassium chloride    Your last dose was: Your next dose is: Take 10 mEq by mouth daily. 10 mEq                 lisinopril 40 mg tablet  Commonly known as:  Temi Hebert    Your last dose was: Your next dose is: Take 40 mg by mouth daily (after breakfast). 40 mg                 metFORMIN 500 mg tablet  Commonly known as:  GLUCOPHAGE    Your last dose was: Your next dose is: Take 1,000 mg by mouth two (2) times daily (with meals). 1000 mg                 propranolol 20 mg tablet  Commonly known as:  INDERAL    Your last dose was: Your next dose is: Take 20 mg by mouth daily. Indications: Essential Tremor   20 mg                 triamterene-hydroCHLOROthiazide 37.5-25 mg per tablet  Commonly known as:  MAXZIDE    Your last dose was: Your next dose is: Take 1 Tab by mouth daily. 1 Tab                 VITAMIN B12 PO    Your last dose was: Your next dose is: Take 1 Cap by mouth daily. 1 Cap                 ZETIA 10 mg tablet  Generic drug:  ezetimibe    Your last dose was: Your next dose is: Take 10 mg by mouth Daily (before breakfast).    10 mg                        STOP taking these medications      sulindac 200 mg tablet  Commonly known as:  CLINORIL                  Where to Get Your Medications        Information on where to get these meds will be given to you by the nurse or doctor.     Ask your nurse or doctor about these medications  · acetaminophen 500 mg tablet  · aspirin delayed-release 81 mg tablet  · gabapentin 100 mg capsule  · ondansetron 8 mg disintegrating tablet  · oxyCODONE IR 5 mg immediate release tablet  · traMADol 50 mg tablet        The patient's chart, MAR, and AVS were reviewed by   PETE Feliciano,   Contact: [phone]

## 2019-04-22 NOTE — PROGRESS NOTES
4/22/2019 8:56 AM Received call from JACQUIE([phone]) with At [address] who reported she has attempted to contact pt after discharge and has been unsuccessful. Xochilt Parish reported she plans to go to pt's home today to evaluate. CM called and lvm with pt. CM also called pt's daughter, no answer. CM called Richelle Sommers at [facility] and notified, she will contact [facility] who was to be at pt's home today at [facility] for personal care.   Jarrell Jane, ALEXEIW

## 2020-05-14 NOTE — DISCHARGE SUMMARY
Total Hip Replacement Home Discharge Summary    Patient ID:  Becky Bruno  46/37/2079  38 y.o.  [id number]    Admit date: 7/26/2017    Discharge date and time: No discharge date for patient encounter. Admitting Physician: Cristina Marcus MD     Admission Diagnoses: LEFT HIP ARTHRITIS  Osteoarthritis (arthritis due to wear and tear of joints)  Hip arthritis    Discharge Diagnoses: Active Problems:    Osteoarthritis (arthritis due to wear and tear of joints) (7/26/2017)      Hip arthritis (7/26/2017)        Ema Vaughn MD      HOSPITAL COURSE:  Becky Bruno was admitted on7/26/2017 and underwent successful total hip replacement. The patient was transferred to the orthopaedic floor in stable condition. The patient received the appropriate therapy while in the hospital.  Venous thrombo-embolic prophylaxis included ASA. The incision site remained clean and dry throughout the hospital stay. The patient remained neurovascularly intact. At the time of discharge, the patient was able to demonstrate an understanding of the explicit discharge precautions and instructions following surgery. Important in Hospital Events : Did well, tremor unchanged. Some Left Knee Pain    Post Op complications: None    Current Discharge Medication List      START taking these medications    Details   oxyCODONE-acetaminophen (PERCOCET 7.5) 7.5-325 mg per tablet Take 1-2 Tabs by mouth every four (4) hours as needed for Pain. Max Daily Amount: 12 Tabs. For Post-op Pain Control  Qty: 70 Tab, Refills: 0      oxyCODONE IR (ROXICODONE) 5 mg immediate release tablet Take 1 Tab by mouth every eight (8) hours as needed for Pain. Max Daily Amount: 15 mg.  Qty: 60 Tab, Refills: 0      ondansetron (ZOFRAN ODT) 8 mg disintegrating tablet Take 0.5 Tabs by mouth every eight (8) hours as needed for Nausea. Qty: 30 Tab, Refills: 0      bisacodyl (DULCOLAX, BISACODYL,) 10 mg suppository Insert 10 mg into rectum daily.   Qty: 2 Suppository, Refills: 0      aspirin (ASPIRIN) 325 mg tablet Take 1 Tab by mouth two (2) times a day. Qty: 60 Tab, Refills: 0         CONTINUE these medications which have CHANGED    Details   traMADol (ULTRAM) 50 mg tablet Take 1 Tab by mouth every six (6) hours as needed for Pain. Max Daily Amount: 200 mg. Qty: 60 Tab, Refills: 0         CONTINUE these medications which have NOT CHANGED    Details   propranolol (INDERAL) 20 mg tablet Take 20 mg by mouth daily as needed. Takes Every Morning Before Breakfast.      lisinopril (PRINIVIL, ZESTRIL) 40 mg tablet Take 40 mg by mouth daily (after breakfast). potassium chloride (KLOR-CON M10) 10 mEq tablet Take 10 mEq by mouth daily (after breakfast). triamterene-hydroCHLOROthiazide (MAXZIDE) 37.5-25 mg per tablet Take 1 Tab by mouth Daily (before breakfast). fenofibrate (LOFIBRA) 160 mg tablet Take 160 mg by mouth daily (after breakfast). amLODIPine (NORVASC) 10 mg tablet Take 10 mg by mouth daily (after breakfast). EZETIMIBE (ZETIA PO) Take 10 mg by mouth Daily (before breakfast). metFORMIN (GLUCOPHAGE) 500 mg tablet Take 1,000 mg by mouth two (2) times daily (with meals). diclofenac EC (VOLTAREN) 75 mg EC tablet Take  by mouth two (2) times a day.          STOP taking these medications       HYDROcodone-acetaminophen (NORCO) 7.5-325 mg per tablet Comments:   Reason for Stopping:               Discharged to: Home    Follow-up : Scheduled for 2 weeks post-op      Signed:  Graciela Bianchi MD  7/28/2017  11:29 AM hgb 7.0 on admission, was 10.2 on 2/2020 (in [employer]). pt endorsing dark stools, blood with wiping, overall weakness, positive FOBT. Last endoscopy/colonoscopy about 8-10 yrs ago per patient.   - s/p 1U PRBC in ED  - maintain active T&S  - GI consulted  - c/w PPI 40 IV BID  - Monitor CBC Hgb 7.0 on admission, was 10.2 on 2/2020 (in [employer]). pt endorsing dark stools, blood with wiping, overall weakness, positive FOBT. Last endoscopy/colonoscopy about 8-10 yrs ago per patient.   - s/p 1U PRBC in ED  - maintain active T&S  - GI consulted  - c/w PPI 40mg IV BID  - Monitor CBC q8

## 2021-10-20 NOTE — PROGRESS NOTES
PATIENT ALERT AND ORIENTED; SPEECH IS SLURRED AND DIFFICLT TO UNDERSTAND.
PATIENT CALLS OUT FREQUENTLY FOR STAFF BUT DOESN'T SEEM TO KNOW WHAT IT IS HE
IS WANTING. PATIENT TOOK A NAP JUST PRIOR TO LUNCH TODAY AND WAS AWOKEN BY
THERAPY AND BECAME VERY UPSET THAT HE WAS WOKE UP. DR CHANG ASSESSED THE
PATIENT THIS AFTERNOON AND DISCONTINUED SI PRECAUTIONS. PATIENT IS CURRENTLY
RESTING IN BED QUIETLY. CALL LIGHT WITHIN REACH. Problem: Mobility Impaired (Adult and Pediatric)  Goal: *Acute Goals and Plan of Care (Insert Text)  Description  Physical Therapy Goals  Initiated 4/18/2019    1. Patient will move from supine to sit and sit to supine  in bed with modified independence within 4 days. 2. Patient will perform sit to stand with modified independence within 4 days. 3. Patient will ambulate with modified independence for 100 feet with the least restrictive device within 4 days. 4. Patient will ascend/descend 4 stairs with 1 handrail(s) with minimal assistance/contact guard assist within 4 days. 5. Patient will verbalize and demonstrate understanding of anterior hip precautions per protocol within 4 days. 6. Patient will perform total hip home exercise program per protocol with independence within 4 days. 4/18/2019 1244 by Gerhard Castillo, PT  Outcome: Progressing Towards Goal  4/18/2019 1029 by Gerhard Castillo, PT  Outcome: Progressing Towards Goal   PHYSICAL THERAPY TREATMENT  Patient: Masha Hebert (40 y.o. male)  Date: 4/18/2019  Diagnosis: Primary osteoarthritis of right hip [M16.11] <principal problem not specified>  Procedure(s) (LRB):  RIGHT TOTAL HIP ARTHROPLASTY MAKOPLASTY DIRECT ANTERIOR (Right) 1 Day Post-Op  Precautions: Total hip, Fall, WBAT  Chart, physical therapy assessment, plan of care and goals were reviewed. ASSESSMENT:  Patient received up in chair from AM session falling asleep. /60 and  patient reporting feeing nauseated. Agreeable to return to bed. Patient had recall of 2/3 anterior hip precautions and all were reviewed. He stood with +2 min assist and ambulated around the bed with rolling walker +2 min assist. Occasional cues needed to maintain sequence. Patient required min assist to return RLE up onto bed. BP improved to 112/68. Provided patient with diet ginger ale and notified nursing of continued reports of nausea, despite having had medication earlier. Progression toward goals:  ? Improving appropriately and progressing toward goals  ? Improving slowly and progressing toward goals  ? Not making progress toward goals and plan of care will be adjusted     PLAN:  Patient continues to benefit from skilled intervention to address the above impairments. Continue treatment per established plan of care. Discharge Recommendations:  Home Health and aid due to lack of family support upon discharge. Further Equipment Recommendations for Discharge:  none      SUBJECTIVE:   \" I am falling asleep and am nauseated. I would like to go back to bed. \"    OBJECTIVE DATA SUMMARY:   Functional Mobility Training:  Bed Mobility:     Sit to Supine: Minimum assistance; Additional time  Scooting: Modified independent; Additional time        Transfers:  Sit to Stand: Assist x2;Minimum assistance; Additional time  Stand to Sit: Assist x2;Contact guard assistance        Bed to Chair: Assist x2;Minimum assistance                    Ambulation/Gait Training:  Distance (ft): 15 Feet (ft)  Assistive Device: Gait belt;Walker, rolling  Ambulation - Level of Assistance: Minimal assistance;Assist x2        Gait Abnormalities: Step to gait; Decreased step clearance  Right Side Weight Bearing: As tolerated     Base of Support: Widened                                          Therapeutic Exercises:   Exercises performed per protocol. See morning treatment note for description. Pain:  Pain Scale 1: Numeric (0 - 10)  Pain Intensity 1: 7  Pain Location 1: Hip  Pain Orientation 1: Right     Activity Tolerance:   Improving despite complaints of nausea. Please refer to the flowsheet for vital signs taken during this treatment. After treatment:   ? Patient left in no apparent distress sitting up in chair  ? Patient left in no apparent distress in bed  ? Call bell left within reach  ? Nursing notified  ? Caregiver present  ?  Bed alarm activated    COMMUNICATION/COLLABORATION:   The patient?s plan of care was discussed with: Registered Nurse    Laura Hastings, PT   Time Calculation: 23 mins

## 2022-03-18 PROBLEM — M19.90 OSTEOARTHRITIS (ARTHRITIS DUE TO WEAR AND TEAR OF JOINTS): Status: ACTIVE | Noted: 2017-07-26

## 2022-03-18 PROBLEM — M16.11 PRIMARY OSTEOARTHRITIS OF RIGHT HIP: Status: ACTIVE | Noted: 2019-04-17

## 2022-03-19 PROBLEM — M16.10 HIP ARTHRITIS: Status: ACTIVE | Noted: 2017-07-26

## 2022-05-06 NOTE — PROGRESS NOTES
Problem: Patient Education: Go to Patient Education Activity  Goal: Patient/Family Education  Outcome: Progressing Towards Goal  The patient attended the pre-operative joint replacement class. The content of the class, using a power-point presentation as well as visual demonstration was specific for patients undergoing total knee and total hip replacements. A pre-operative education booklet was provided to all patients. At the conclusion of class an opportunity was offered for any question or concerns the patient or family may have regarding their upcoming scheduled procedure. Patient attended class on 7/19/17, no  present. Alert and oriented to person, place and time

## 2025-05-01 ENCOUNTER — APPOINTMENT (OUTPATIENT)
Facility: HOSPITAL | Age: 80
DRG: 871 | End: 2025-05-01
Payer: MEDICARE

## 2025-05-01 ENCOUNTER — HOSPITAL ENCOUNTER (INPATIENT)
Facility: HOSPITAL | Age: 80
LOS: 11 days | Discharge: NURSING, REHAB OR LONG-TERM CARE | DRG: 871 | End: 2025-05-12
Attending: EMERGENCY MEDICINE | Admitting: HOSPITALIST
Payer: MEDICARE

## 2025-05-01 ENCOUNTER — APPOINTMENT (OUTPATIENT)
Dept: VASCULAR SURGERY | Facility: HOSPITAL | Age: 80
DRG: 871 | End: 2025-05-01
Attending: EMERGENCY MEDICINE
Payer: MEDICARE

## 2025-05-01 DIAGNOSIS — E87.0 HYPERNATREMIA: ICD-10-CM

## 2025-05-01 DIAGNOSIS — G93.40 ACUTE ENCEPHALOPATHY: ICD-10-CM

## 2025-05-01 DIAGNOSIS — R79.89 ELEVATED TROPONIN: ICD-10-CM

## 2025-05-01 DIAGNOSIS — E87.20 LACTIC ACIDOSIS: ICD-10-CM

## 2025-05-01 DIAGNOSIS — I48.91 ATRIAL FIBRILLATION, UNSPECIFIED TYPE (HCC): ICD-10-CM

## 2025-05-01 DIAGNOSIS — L89.219 PRESSURE INJURY OF SKIN OF RIGHT HIP, UNSPECIFIED INJURY STAGE: ICD-10-CM

## 2025-05-01 DIAGNOSIS — E86.0 DEHYDRATION: ICD-10-CM

## 2025-05-01 DIAGNOSIS — N17.9 AKI (ACUTE KIDNEY INJURY): ICD-10-CM

## 2025-05-01 DIAGNOSIS — R57.9 SHOCK (HCC): ICD-10-CM

## 2025-05-01 DIAGNOSIS — E83.41 HYPERMAGNESEMIA: ICD-10-CM

## 2025-05-01 DIAGNOSIS — R41.82 ALTERED MENTAL STATUS, UNSPECIFIED ALTERED MENTAL STATUS TYPE: Primary | ICD-10-CM

## 2025-05-01 DIAGNOSIS — R73.9 HYPERGLYCEMIA: ICD-10-CM

## 2025-05-01 DIAGNOSIS — T07.XXXA ABRASIONS OF MULTIPLE SITES: ICD-10-CM

## 2025-05-01 DIAGNOSIS — R41.0 CONFUSION: ICD-10-CM

## 2025-05-01 LAB
ALBUMIN SERPL-MCNC: 2.9 G/DL (ref 3.5–5)
ALBUMIN/GLOB SERPL: 0.7 (ref 1.1–2.2)
ALP SERPL-CCNC: 84 U/L (ref 45–117)
ALT SERPL-CCNC: 76 U/L (ref 12–78)
AMPHET UR QL SCN: NEGATIVE
ANION GAP BLD CALC-SCNC: 13 (ref 10–20)
ANION GAP SERPL CALC-SCNC: 14 MMOL/L (ref 2–12)
ANION GAP SERPL CALC-SCNC: 9 MMOL/L (ref 2–12)
APPEARANCE UR: CLEAR
APTT PPP: 24.8 SEC (ref 22.1–31)
AST SERPL-CCNC: 51 U/L (ref 15–37)
BACTERIA URNS QL MICRO: NEGATIVE /HPF
BARBITURATES UR QL SCN: NEGATIVE
BASE DEFICIT BLD-SCNC: 1 MMOL/L
BASOPHILS # BLD: 0.03 K/UL (ref 0–0.1)
BASOPHILS NFR BLD: 0.2 % (ref 0–1)
BENZODIAZ UR QL: NEGATIVE
BILIRUB SERPL-MCNC: 1.7 MG/DL (ref 0.2–1)
BILIRUB UR QL CFM: NEGATIVE
BUN SERPL-MCNC: 126 MG/DL (ref 6–20)
BUN SERPL-MCNC: 130 MG/DL (ref 6–20)
BUN/CREAT SERPL: 48 (ref 12–20)
BUN/CREAT SERPL: 51 (ref 12–20)
CA-I BLD-MCNC: 1.1 MMOL/L (ref 1.15–1.33)
CALCIUM SERPL-MCNC: 8.6 MG/DL (ref 8.5–10.1)
CALCIUM SERPL-MCNC: 9.7 MG/DL (ref 8.5–10.1)
CANNABINOIDS UR QL SCN: NEGATIVE
CHLORIDE BLD-SCNC: 124 MMOL/L (ref 100–111)
CHLORIDE SERPL-SCNC: 120 MMOL/L (ref 97–108)
CHLORIDE SERPL-SCNC: 124 MMOL/L (ref 97–108)
CK SERPL-CCNC: 232 U/L (ref 39–308)
CO2 BLD-SCNC: 24 MMOL/L (ref 22–29)
CO2 SERPL-SCNC: 23 MMOL/L (ref 21–32)
CO2 SERPL-SCNC: 27 MMOL/L (ref 21–32)
COCAINE UR QL SCN: NEGATIVE
COLOR UR: YELLOW
COMMENT:: NORMAL
CREAT SERPL-MCNC: 2.49 MG/DL (ref 0.7–1.3)
CREAT SERPL-MCNC: 2.69 MG/DL (ref 0.7–1.3)
CREAT UR-MCNC: 2.3 MG/DL (ref 0.6–1.3)
DIFFERENTIAL METHOD BLD: ABNORMAL
EOSINOPHIL # BLD: 0.01 K/UL (ref 0–0.4)
EOSINOPHIL NFR BLD: 0.1 % (ref 0–7)
EPITH CASTS URNS QL MICRO: ABNORMAL /LPF
ERYTHROCYTE [DISTWIDTH] IN BLOOD BY AUTOMATED COUNT: 14.8 % (ref 11.5–14.5)
ETHANOL SERPL-MCNC: <10 MG/DL (ref 0–0.08)
GLOBULIN SER CALC-MCNC: 4.1 G/DL (ref 2–4)
GLUCOSE BLD STRIP.AUTO-MCNC: 334 MG/DL (ref 65–117)
GLUCOSE BLD STRIP.AUTO-MCNC: 346 MG/DL (ref 65–117)
GLUCOSE BLD STRIP.AUTO-MCNC: 392 MG/DL (ref 74–99)
GLUCOSE SERPL-MCNC: 384 MG/DL (ref 65–100)
GLUCOSE SERPL-MCNC: 412 MG/DL (ref 65–100)
GLUCOSE UR STRIP.AUTO-MCNC: NEGATIVE MG/DL
HCO3 BLDA-SCNC: 24 MMOL/L
HCT VFR BLD AUTO: 50 % (ref 36.6–50.3)
HGB BLD-MCNC: 15.6 G/DL (ref 12.1–17)
HGB UR QL STRIP: NEGATIVE
IMM GRANULOCYTES # BLD AUTO: 0.19 K/UL (ref 0–0.04)
IMM GRANULOCYTES NFR BLD AUTO: 1.5 % (ref 0–0.5)
INR PPP: 1.2 (ref 0.9–1.1)
INR PPP: 1.2 (ref 0.9–1.1)
KETONES UR QL STRIP.AUTO: ABNORMAL MG/DL
LACTATE BLD-SCNC: 1.79 MMOL/L (ref 0.4–2)
LACTATE BLD-SCNC: 3.12 MMOL/L (ref 0.4–2)
LEUKOCYTE ESTERASE UR QL STRIP.AUTO: ABNORMAL
LIPASE SERPL-CCNC: 26 U/L (ref 13–75)
LYMPHOCYTES # BLD: 0.5 K/UL (ref 0.8–3.5)
LYMPHOCYTES NFR BLD: 4 % (ref 12–49)
Lab: NORMAL
MAGNESIUM SERPL-MCNC: 3.2 MG/DL (ref 1.6–2.4)
MCH RBC QN AUTO: 28.5 PG (ref 26–34)
MCHC RBC AUTO-ENTMCNC: 31.2 G/DL (ref 30–36.5)
MCV RBC AUTO: 91.2 FL (ref 80–99)
METHADONE UR QL: NEGATIVE
MONOCYTES # BLD: 1.56 K/UL (ref 0–1)
MONOCYTES NFR BLD: 12.4 % (ref 5–13)
NEUTS SEG # BLD: 10.31 K/UL (ref 1.8–8)
NEUTS SEG NFR BLD: 81.8 % (ref 32–75)
NITRITE UR QL STRIP.AUTO: NEGATIVE
NRBC # BLD: 0 K/UL (ref 0–0.01)
NRBC BLD-RTO: 0 PER 100 WBC
OPIATES UR QL: NEGATIVE
PCO2 BLDV: 41.3 MMHG (ref 41–51)
PCP UR QL: NEGATIVE
PH BLDV: 7.38 (ref 7.32–7.42)
PH UR STRIP: 5 (ref 5–8)
PLATELET # BLD AUTO: 385 K/UL (ref 150–400)
PMV BLD AUTO: 11.5 FL (ref 8.9–12.9)
PO2 BLDV: 47 MMHG (ref 25–40)
POTASSIUM BLD-SCNC: 4.1 MMOL/L (ref 3.5–5.5)
POTASSIUM SERPL-SCNC: 3.9 MMOL/L (ref 3.5–5.1)
POTASSIUM SERPL-SCNC: 4 MMOL/L (ref 3.5–5.1)
PROCALCITONIN SERPL-MCNC: 0.29 NG/ML
PROT SERPL-MCNC: 7 G/DL (ref 6.4–8.2)
PROT UR STRIP-MCNC: 30 MG/DL
PROTHROMBIN TIME: 12.3 SEC (ref 9.2–11.2)
PROTHROMBIN TIME: 12.4 SEC (ref 9.2–11.2)
RBC # BLD AUTO: 5.48 M/UL (ref 4.1–5.7)
RBC #/AREA URNS HPF: ABNORMAL /HPF (ref 0–5)
RBC MORPH BLD: ABNORMAL
SAO2 % BLD: 82 % (ref 94–98)
SERVICE CMNT-IMP: ABNORMAL
SODIUM BLD-SCNC: 161 MMOL/L (ref 136–145)
SODIUM SERPL-SCNC: 157 MMOL/L (ref 136–145)
SODIUM SERPL-SCNC: 160 MMOL/L (ref 136–145)
SP GR UR REFRACTOMETRY: 1.02 (ref 1–1.03)
SPECIMEN HOLD: NORMAL
SPECIMEN SITE: ABNORMAL
T4 FREE SERPL-MCNC: 1.1 NG/DL (ref 0.8–1.5)
THERAPEUTIC RANGE: NORMAL SECS (ref 58–77)
TROPONIN I SERPL HS-MCNC: 103 NG/L (ref 0–76)
TROPONIN I SERPL HS-MCNC: 129 NG/L (ref 0–76)
TSH SERPL DL<=0.05 MIU/L-ACNC: 0.85 UIU/ML (ref 0.36–3.74)
UFH PPP CHRO-ACNC: <0.1 IU/ML
UROBILINOGEN UR QL STRIP.AUTO: 1 EU/DL (ref 0.2–1)
WBC # BLD AUTO: 12.6 K/UL (ref 4.1–11.1)
WBC URNS QL MICRO: ABNORMAL /HPF (ref 0–4)

## 2025-05-01 PROCEDURE — 99285 EMERGENCY DEPT VISIT HI MDM: CPT

## 2025-05-01 PROCEDURE — 96374 THER/PROPH/DIAG INJ IV PUSH: CPT

## 2025-05-01 PROCEDURE — 84132 ASSAY OF SERUM POTASSIUM: CPT

## 2025-05-01 PROCEDURE — 80307 DRUG TEST PRSMV CHEM ANLYZR: CPT

## 2025-05-01 PROCEDURE — 82947 ASSAY GLUCOSE BLOOD QUANT: CPT

## 2025-05-01 PROCEDURE — 83690 ASSAY OF LIPASE: CPT

## 2025-05-01 PROCEDURE — 96361 HYDRATE IV INFUSION ADD-ON: CPT

## 2025-05-01 PROCEDURE — 2580000003 HC RX 258: Performed by: EMERGENCY MEDICINE

## 2025-05-01 PROCEDURE — 83605 ASSAY OF LACTIC ACID: CPT

## 2025-05-01 PROCEDURE — 2500000003 HC RX 250 WO HCPCS: Performed by: HOSPITALIST

## 2025-05-01 PROCEDURE — 84484 ASSAY OF TROPONIN QUANT: CPT

## 2025-05-01 PROCEDURE — 2060000000 HC ICU INTERMEDIATE R&B

## 2025-05-01 PROCEDURE — 84443 ASSAY THYROID STIM HORMONE: CPT

## 2025-05-01 PROCEDURE — 84145 PROCALCITONIN (PCT): CPT

## 2025-05-01 PROCEDURE — 6370000000 HC RX 637 (ALT 250 FOR IP): Performed by: HOSPITALIST

## 2025-05-01 PROCEDURE — 85025 COMPLETE CBC W/AUTO DIFF WBC: CPT

## 2025-05-01 PROCEDURE — 83735 ASSAY OF MAGNESIUM: CPT

## 2025-05-01 PROCEDURE — 6360000002 HC RX W HCPCS: Performed by: HOSPITALIST

## 2025-05-01 PROCEDURE — 82330 ASSAY OF CALCIUM: CPT

## 2025-05-01 PROCEDURE — 93005 ELECTROCARDIOGRAM TRACING: CPT | Performed by: EMERGENCY MEDICINE

## 2025-05-01 PROCEDURE — 73030 X-RAY EXAM OF SHOULDER: CPT

## 2025-05-01 PROCEDURE — 2700000000 HC OXYGEN THERAPY PER DAY

## 2025-05-01 PROCEDURE — 0202U NFCT DS 22 TRGT SARS-COV-2: CPT

## 2025-05-01 PROCEDURE — 85520 HEPARIN ASSAY: CPT

## 2025-05-01 PROCEDURE — 93971 EXTREMITY STUDY: CPT

## 2025-05-01 PROCEDURE — 71045 X-RAY EXAM CHEST 1 VIEW: CPT

## 2025-05-01 PROCEDURE — 87040 BLOOD CULTURE FOR BACTERIA: CPT

## 2025-05-01 PROCEDURE — 72125 CT NECK SPINE W/O DYE: CPT

## 2025-05-01 PROCEDURE — 2000000000 HC ICU R&B

## 2025-05-01 PROCEDURE — 96375 TX/PRO/DX INJ NEW DRUG ADDON: CPT

## 2025-05-01 PROCEDURE — 84295 ASSAY OF SERUM SODIUM: CPT

## 2025-05-01 PROCEDURE — 82803 BLOOD GASES ANY COMBINATION: CPT

## 2025-05-01 PROCEDURE — 84439 ASSAY OF FREE THYROXINE: CPT

## 2025-05-01 PROCEDURE — 80053 COMPREHEN METABOLIC PANEL: CPT

## 2025-05-01 PROCEDURE — 76770 US EXAM ABDO BACK WALL COMP: CPT

## 2025-05-01 PROCEDURE — 87154 CUL TYP ID BLD PTHGN 6+ TRGT: CPT

## 2025-05-01 PROCEDURE — 87077 CULTURE AEROBIC IDENTIFY: CPT

## 2025-05-01 PROCEDURE — 6360000002 HC RX W HCPCS: Performed by: EMERGENCY MEDICINE

## 2025-05-01 PROCEDURE — 82962 GLUCOSE BLOOD TEST: CPT

## 2025-05-01 PROCEDURE — 85610 PROTHROMBIN TIME: CPT

## 2025-05-01 PROCEDURE — 81001 URINALYSIS AUTO W/SCOPE: CPT

## 2025-05-01 PROCEDURE — 36415 COLL VENOUS BLD VENIPUNCTURE: CPT

## 2025-05-01 PROCEDURE — 96367 TX/PROPH/DG ADDL SEQ IV INF: CPT

## 2025-05-01 PROCEDURE — 85730 THROMBOPLASTIN TIME PARTIAL: CPT

## 2025-05-01 PROCEDURE — 82550 ASSAY OF CK (CPK): CPT

## 2025-05-01 PROCEDURE — 94761 N-INVAS EAR/PLS OXIMETRY MLT: CPT

## 2025-05-01 PROCEDURE — 73080 X-RAY EXAM OF ELBOW: CPT

## 2025-05-01 PROCEDURE — 82077 ASSAY SPEC XCP UR&BREATH IA: CPT

## 2025-05-01 PROCEDURE — 87186 SC STD MICRODIL/AGAR DIL: CPT

## 2025-05-01 PROCEDURE — 74176 CT ABD & PELVIS W/O CONTRAST: CPT

## 2025-05-01 PROCEDURE — 96366 THER/PROPH/DIAG IV INF ADDON: CPT

## 2025-05-01 PROCEDURE — 2580000003 HC RX 258: Performed by: NURSE PRACTITIONER

## 2025-05-01 PROCEDURE — 2580000003 HC RX 258: Performed by: HOSPITALIST

## 2025-05-01 PROCEDURE — 96365 THER/PROPH/DIAG IV INF INIT: CPT

## 2025-05-01 PROCEDURE — 70450 CT HEAD/BRAIN W/O DYE: CPT

## 2025-05-01 RX ORDER — SODIUM CHLORIDE 0.9 % (FLUSH) 0.9 %
5-40 SYRINGE (ML) INJECTION EVERY 12 HOURS SCHEDULED
Status: DISCONTINUED | OUTPATIENT
Start: 2025-05-01 | End: 2025-05-12 | Stop reason: HOSPADM

## 2025-05-01 RX ORDER — POLYETHYLENE GLYCOL 3350 17 G/17G
17 POWDER, FOR SOLUTION ORAL DAILY PRN
Status: DISCONTINUED | OUTPATIENT
Start: 2025-05-01 | End: 2025-05-12 | Stop reason: HOSPADM

## 2025-05-01 RX ORDER — HEPARIN SODIUM 10000 [USP'U]/100ML
5-30 INJECTION, SOLUTION INTRAVENOUS CONTINUOUS
Status: DISCONTINUED | OUTPATIENT
Start: 2025-05-01 | End: 2025-05-06

## 2025-05-01 RX ORDER — SODIUM CHLORIDE 0.9 % (FLUSH) 0.9 %
5-40 SYRINGE (ML) INJECTION PRN
Status: DISCONTINUED | OUTPATIENT
Start: 2025-05-01 | End: 2025-05-12 | Stop reason: HOSPADM

## 2025-05-01 RX ORDER — POTASSIUM CHLORIDE 7.45 MG/ML
10 INJECTION INTRAVENOUS PRN
Status: DISCONTINUED | OUTPATIENT
Start: 2025-05-01 | End: 2025-05-12 | Stop reason: HOSPADM

## 2025-05-01 RX ORDER — SODIUM CHLORIDE 450 MG/100ML
INJECTION, SOLUTION INTRAVENOUS CONTINUOUS
Status: DISCONTINUED | OUTPATIENT
Start: 2025-05-01 | End: 2025-05-03

## 2025-05-01 RX ORDER — SODIUM CHLORIDE, SODIUM LACTATE, POTASSIUM CHLORIDE, AND CALCIUM CHLORIDE .6; .31; .03; .02 G/100ML; G/100ML; G/100ML; G/100ML
1000 INJECTION, SOLUTION INTRAVENOUS ONCE
Status: COMPLETED | OUTPATIENT
Start: 2025-05-01 | End: 2025-05-01

## 2025-05-01 RX ORDER — ENOXAPARIN SODIUM 100 MG/ML
40 INJECTION SUBCUTANEOUS DAILY
Status: DISCONTINUED | OUTPATIENT
Start: 2025-05-01 | End: 2025-05-01

## 2025-05-01 RX ORDER — INSULIN LISPRO 100 [IU]/ML
0-8 INJECTION, SOLUTION INTRAVENOUS; SUBCUTANEOUS
Status: DISCONTINUED | OUTPATIENT
Start: 2025-05-01 | End: 2025-05-02

## 2025-05-01 RX ORDER — HEPARIN SODIUM 1000 [USP'U]/ML
4000 INJECTION, SOLUTION INTRAVENOUS; SUBCUTANEOUS PRN
Status: DISCONTINUED | OUTPATIENT
Start: 2025-05-01 | End: 2025-05-06

## 2025-05-01 RX ORDER — POTASSIUM CHLORIDE 750 MG/1
40 TABLET, EXTENDED RELEASE ORAL PRN
Status: DISCONTINUED | OUTPATIENT
Start: 2025-05-01 | End: 2025-05-12 | Stop reason: HOSPADM

## 2025-05-01 RX ORDER — ACETAMINOPHEN 325 MG/1
650 TABLET ORAL EVERY 6 HOURS PRN
Status: DISCONTINUED | OUTPATIENT
Start: 2025-05-01 | End: 2025-05-12 | Stop reason: HOSPADM

## 2025-05-01 RX ORDER — SODIUM CHLORIDE, SODIUM LACTATE, POTASSIUM CHLORIDE, AND CALCIUM CHLORIDE .6; .31; .03; .02 G/100ML; G/100ML; G/100ML; G/100ML
520 INJECTION, SOLUTION INTRAVENOUS ONCE
Status: COMPLETED | OUTPATIENT
Start: 2025-05-01 | End: 2025-05-01

## 2025-05-01 RX ORDER — SODIUM CHLORIDE 9 MG/ML
INJECTION, SOLUTION INTRAVENOUS PRN
Status: DISCONTINUED | OUTPATIENT
Start: 2025-05-01 | End: 2025-05-12 | Stop reason: HOSPADM

## 2025-05-01 RX ORDER — MORPHINE SULFATE 4 MG/ML
4 INJECTION, SOLUTION INTRAMUSCULAR; INTRAVENOUS
Refills: 0 | Status: COMPLETED | OUTPATIENT
Start: 2025-05-01 | End: 2025-05-01

## 2025-05-01 RX ORDER — HEPARIN SODIUM 1000 [USP'U]/ML
2000 INJECTION, SOLUTION INTRAVENOUS; SUBCUTANEOUS PRN
Status: DISCONTINUED | OUTPATIENT
Start: 2025-05-01 | End: 2025-05-06

## 2025-05-01 RX ORDER — HEPARIN SODIUM 1000 [USP'U]/ML
4000 INJECTION, SOLUTION INTRAVENOUS; SUBCUTANEOUS ONCE
Status: COMPLETED | OUTPATIENT
Start: 2025-05-01 | End: 2025-05-01

## 2025-05-01 RX ORDER — MAGNESIUM SULFATE IN WATER 40 MG/ML
2000 INJECTION, SOLUTION INTRAVENOUS PRN
Status: DISCONTINUED | OUTPATIENT
Start: 2025-05-01 | End: 2025-05-12 | Stop reason: HOSPADM

## 2025-05-01 RX ORDER — DILTIAZEM HYDROCHLORIDE 5 MG/ML
5 INJECTION INTRAVENOUS ONCE
Status: COMPLETED | OUTPATIENT
Start: 2025-05-01 | End: 2025-05-01

## 2025-05-01 RX ORDER — VANCOMYCIN 2 GRAM/500 ML IN 0.9 % SODIUM CHLORIDE INTRAVENOUS
25 ONCE
Status: COMPLETED | OUTPATIENT
Start: 2025-05-01 | End: 2025-05-01

## 2025-05-01 RX ORDER — ONDANSETRON 2 MG/ML
4 INJECTION INTRAMUSCULAR; INTRAVENOUS EVERY 6 HOURS PRN
Status: DISCONTINUED | OUTPATIENT
Start: 2025-05-01 | End: 2025-05-12 | Stop reason: HOSPADM

## 2025-05-01 RX ORDER — ACETAMINOPHEN 650 MG/1
650 SUPPOSITORY RECTAL EVERY 6 HOURS PRN
Status: DISCONTINUED | OUTPATIENT
Start: 2025-05-01 | End: 2025-05-12 | Stop reason: HOSPADM

## 2025-05-01 RX ORDER — 0.9 % SODIUM CHLORIDE 0.9 %
1000 INTRAVENOUS SOLUTION INTRAVENOUS ONCE
Status: COMPLETED | OUTPATIENT
Start: 2025-05-01 | End: 2025-05-01

## 2025-05-01 RX ORDER — HYDROMORPHONE HYDROCHLORIDE 1 MG/ML
1 INJECTION, SOLUTION INTRAMUSCULAR; INTRAVENOUS; SUBCUTANEOUS ONCE
Refills: 0 | Status: COMPLETED | OUTPATIENT
Start: 2025-05-01 | End: 2025-05-01

## 2025-05-01 RX ORDER — ONDANSETRON 4 MG/1
4 TABLET, ORALLY DISINTEGRATING ORAL EVERY 8 HOURS PRN
Status: DISCONTINUED | OUTPATIENT
Start: 2025-05-01 | End: 2025-05-12 | Stop reason: HOSPADM

## 2025-05-01 RX ADMIN — HYDROMORPHONE HYDROCHLORIDE 1 MG: 1 INJECTION, SOLUTION INTRAMUSCULAR; INTRAVENOUS; SUBCUTANEOUS at 16:46

## 2025-05-01 RX ADMIN — HEPARIN SODIUM 11 UNITS/KG/HR: 10000 INJECTION, SOLUTION INTRAVENOUS at 21:20

## 2025-05-01 RX ADMIN — SODIUM CHLORIDE 1000 ML: 0.9 INJECTION, SOLUTION INTRAVENOUS at 15:55

## 2025-05-01 RX ADMIN — SODIUM CHLORIDE: 0.45 INJECTION, SOLUTION INTRAVENOUS at 22:10

## 2025-05-01 RX ADMIN — INSULIN LISPRO 6 UNITS: 100 INJECTION, SOLUTION INTRAVENOUS; SUBCUTANEOUS at 22:28

## 2025-05-01 RX ADMIN — SODIUM CHLORIDE, SODIUM LACTATE, POTASSIUM CHLORIDE, AND CALCIUM CHLORIDE 1000 ML: .6; .31; .03; .02 INJECTION, SOLUTION INTRAVENOUS at 16:49

## 2025-05-01 RX ADMIN — MORPHINE SULFATE 4 MG: 4 INJECTION INTRAVENOUS at 15:55

## 2025-05-01 RX ADMIN — SODIUM CHLORIDE, PRESERVATIVE FREE 10 ML: 5 INJECTION INTRAVENOUS at 22:07

## 2025-05-01 RX ADMIN — Medication 2000 MG: at 16:58

## 2025-05-01 RX ADMIN — HEPARIN SODIUM 4000 UNITS: 1000 INJECTION INTRAVENOUS; SUBCUTANEOUS at 21:12

## 2025-05-01 RX ADMIN — DILTIAZEM HYDROCHLORIDE 5 MG: 5 INJECTION, SOLUTION INTRAVENOUS at 23:16

## 2025-05-01 RX ADMIN — PIPERACILLIN AND TAZOBACTAM 4500 MG: 4; .5 INJECTION, POWDER, FOR SOLUTION INTRAVENOUS at 16:23

## 2025-05-01 RX ADMIN — SODIUM CHLORIDE, SODIUM LACTATE, POTASSIUM CHLORIDE, AND CALCIUM CHLORIDE 520 ML: .6; .31; .03; .02 INJECTION, SOLUTION INTRAVENOUS at 20:20

## 2025-05-01 NOTE — CONSULTS
CRITICAL CARE CONSULT NOTE    Name: Orlin San   : 1945   MRN: [id number]   Date: 2025      Reason for ICU Consult:  Hypotension  Afib w/ RVR   Severe Sepsis     HISTORY OF PRESENT ILLNESS:   Orlin San is a 79 y.o. with a PMH of HTN, DM, HLD, and OA who presents to [facility] ED on  with AMS. Daughter reports LKW 11 days ago, when he stopped responding to text messages and phone calls she went to check on him. He was found lying on his side in the kitchen, incontinent of stool and urine. Downtime is unclear. Upon arrival to ED patient was hypothermic (96F), hypotensive (86/45), in afib with RVR (149) and altered. Laboratory work up significant for Hypernatremia (157), HAYLIE (sCr 2.69), Hyperglycemia (412) with HAGMA and Lactic Acidosis (AG 14, LA3.12) as well as leukocytosis (12.6). Patient was pan-scanned with no acute findings. Fluid bolus and broad spectrum abx were ordered. ICU consulted for refractory hypotension after first liter.     Patient assessed in ED with 2nd liter infusing, /102 (114) and heart rate improved to low 100s. GCS 12 (E 3, V3, M6), following simple commands with noxious stimuli. Lactic now normalized (1.7), repeat BMP pending. With resolution of hypotension and drastic improvement in RVR patient no longer meets ICU criteria.  Communicated with ER physician Arvind that patient would be more appropriate for internal medicine admission.  Please do not hesitate to contact our service with any questions or future concerns.     NEUROLOGICAL:    Acute Metabolic Encephalopathy   -CTH/C-spine negative   -Send Ammonia/TSH  -Ethanol and UDS  -Neuro checks per protocol     PULMONOLOGY:   Hx JAIRO   -CXR, cardiomegaly low lung volumes  -Supplemental O2 PRN to maintain SPO2 of 92% or greater     CARDIOVASCULAR:   Afib w/ RVR, Improving   Hypotension, Now RESOLVED, multi-factorial in setting of IVVD + Sepsis    Elevated Troponin   Hx HTN, HLD  -s/p 2L fluid bolus   -Give  of imminent, clinically significant deterioration, which requires the highest level of preparedness to intervene urgently. I participated in the decision-making and personally managed or directed the management of the following life and organ supporting interventions that required my frequent assessment to treat or prevent imminent deterioration.    I personally spent 45 minutes of critical care time.  This is time spent at this critically ill patient's bedside actively involved in patient care as well as the coordination of care.  This does not include any procedural time which has been billed separately.    Yusuf Pedraza, APRN - [facility]  5/1/2025

## 2025-05-01 NOTE — ED PROVIDER NOTES
[facility] EMERGENCY DEPARTMENT  EMERGENCY DEPARTMENT ENCOUNTER      Pt Name: Orlin San  MRN: [id number]  Birthdate 1945  Date of evaluation: 5/1/2025  Provider: Darci Samuels MD    CHIEF COMPLAINT       Chief Complaint   Patient presents with    Altered Mental Status         HISTORY OF PRESENT ILLNESS   (Location/Symptom, Timing/Onset, Context/Setting, Quality, Duration, Modifying Factors, Severity)  Note limiting factors.   79-year-old male presents from home via EMS with altered mental status.  Last time anyone spoke to the patient was 11 days ago.  EMS reports that the daughter told them patient had stopped replying to text messages and phone calls.  Went to check on him today and found him on the ground in his kitchen.  He was lying on his right side incontinent of stool and urine.  Unknown how long he was on the floor.  Has evidence of pressure ulcers on his right elbow, right hip, right knee.  Also holding his right forearm across his chest unwilling to move.  Patient awake but unable to give any further history mostly just moaning and groaning.  Blood sugar per EMS was 380.  Hypotensive and hypothermic on arrival.  EMS reports a history of bilateral hip replacements but denies any other known medical history.    The history is provided by the EMS personnel and medical records.         Review of External Medical Records:     Nursing Notes were reviewed.    REVIEW OF SYSTEMS    (2-9 systems for level 4, 10 or more for level 5)     Review of Systems   Unable to perform ROS: Mental status change       Except as noted above the remainder of the review of systems was reviewed and negative.       PAST MEDICAL HISTORY   No past medical history on file.      SURGICAL HISTORY     No past surgical history on file.      CURRENT MEDICATIONS       Previous Medications    No medications on file       ALLERGIES     Patient has no allergy information on record.    FAMILY HISTORY     No family

## 2025-05-02 PROBLEM — R41.82 ALTERED MENTAL STATUS: Status: ACTIVE | Noted: 2025-05-02

## 2025-05-02 LAB
ACB COMPLEX DNA BLD POS QL NAA+NON-PROBE: NOT DETECTED
ACCESSION NUMBER, LLC1M: ABNORMAL
ALBUMIN SERPL-MCNC: 2.1 G/DL (ref 3.5–5)
ALBUMIN/GLOB SERPL: 0.6 (ref 1.1–2.2)
ALP SERPL-CCNC: 67 U/L (ref 45–117)
ALT SERPL-CCNC: 63 U/L (ref 12–78)
ANION GAP SERPL CALC-SCNC: 11 MMOL/L (ref 2–12)
ANION GAP SERPL CALC-SCNC: 5 MMOL/L (ref 2–12)
ANION GAP SERPL CALC-SCNC: 6 MMOL/L (ref 2–12)
AST SERPL-CCNC: 53 U/L (ref 15–37)
B FRAGILIS DNA BLD POS QL NAA+NON-PROBE: NOT DETECTED
B PERT DNA SPEC QL NAA+PROBE: NOT DETECTED
BILIRUB SERPL-MCNC: 1.4 MG/DL (ref 0.2–1)
BIOFIRE TEST COMMENT: ABNORMAL
BORDETELLA PARAPERTUSSIS BY PCR: NOT DETECTED
BUN SERPL-MCNC: 103 MG/DL (ref 6–20)
BUN SERPL-MCNC: 124 MG/DL (ref 6–20)
BUN SERPL-MCNC: 86 MG/DL (ref 6–20)
BUN/CREAT SERPL: 48 (ref 12–20)
BUN/CREAT SERPL: 54 (ref 12–20)
BUN/CREAT SERPL: 55 (ref 12–20)
C ALBICANS DNA BLD POS QL NAA+NON-PROBE: NOT DETECTED
C AURIS DNA BLD POS QL NAA+NON-PROBE: NOT DETECTED
C GATTII+NEOFOR DNA BLD POS QL NAA+N-PRB: NOT DETECTED
C GLABRATA DNA BLD POS QL NAA+NON-PROBE: NOT DETECTED
C KRUSEI DNA BLD POS QL NAA+NON-PROBE: NOT DETECTED
C PARAP DNA BLD POS QL NAA+NON-PROBE: NOT DETECTED
C PNEUM DNA SPEC QL NAA+PROBE: NOT DETECTED
C TROPICLS DNA BLD POS QL NAA+NON-PROBE: NOT DETECTED
CALCIUM SERPL-MCNC: 7.4 MG/DL (ref 8.5–10.1)
CALCIUM SERPL-MCNC: 8 MG/DL (ref 8.5–10.1)
CALCIUM SERPL-MCNC: 8.5 MG/DL (ref 8.5–10.1)
CHLORIDE SERPL-SCNC: 117 MMOL/L (ref 97–108)
CHLORIDE SERPL-SCNC: 126 MMOL/L (ref 97–108)
CHLORIDE SERPL-SCNC: 129 MMOL/L (ref 97–108)
CO2 SERPL-SCNC: 25 MMOL/L (ref 21–32)
CO2 SERPL-SCNC: 28 MMOL/L (ref 21–32)
CO2 SERPL-SCNC: 29 MMOL/L (ref 21–32)
CREAT SERPL-MCNC: 1.79 MG/DL (ref 0.7–1.3)
CREAT SERPL-MCNC: 1.88 MG/DL (ref 0.7–1.3)
CREAT SERPL-MCNC: 2.31 MG/DL (ref 0.7–1.3)
E CLOAC COMP DNA BLD POS NAA+NON-PROBE: NOT DETECTED
E COLI DNA BLD POS QL NAA+NON-PROBE: NOT DETECTED
E FAECALIS DNA BLD POS QL NAA+NON-PROBE: DETECTED
E FAECIUM DNA BLD POS QL NAA+NON-PROBE: NOT DETECTED
EKG ATRIAL RATE: 150 BPM
EKG DIAGNOSIS: NORMAL
EKG Q-T INTERVAL: 340 MS
EKG QRS DURATION: 86 MS
EKG QTC CALCULATION (BAZETT): 529 MS
EKG R AXIS: 118 DEGREES
EKG T AXIS: -51 DEGREES
EKG VENTRICULAR RATE: 146 BPM
ENTEROBACTERALES DNA BLD POS NAA+N-PRB: NOT DETECTED
ERYTHROCYTE [DISTWIDTH] IN BLOOD BY AUTOMATED COUNT: 14.8 % (ref 11.5–14.5)
EST. AVERAGE GLUCOSE BLD GHB EST-MCNC: 151 MG/DL
ETHANOL SERPL-MCNC: <10 MG/DL (ref 0–0.08)
FLUAV SUBTYP SPEC NAA+PROBE: NOT DETECTED
FLUBV RNA SPEC QL NAA+PROBE: NOT DETECTED
GLOBULIN SER CALC-MCNC: 3.5 G/DL (ref 2–4)
GLUCOSE BLD STRIP.AUTO-MCNC: 185 MG/DL (ref 65–117)
GLUCOSE BLD STRIP.AUTO-MCNC: 204 MG/DL (ref 65–117)
GLUCOSE BLD STRIP.AUTO-MCNC: 229 MG/DL (ref 65–117)
GLUCOSE BLD STRIP.AUTO-MCNC: 261 MG/DL (ref 65–117)
GLUCOSE BLD STRIP.AUTO-MCNC: 67 MG/DL (ref 65–117)
GLUCOSE BLD STRIP.AUTO-MCNC: 68 MG/DL (ref 65–117)
GLUCOSE BLD STRIP.AUTO-MCNC: 80 MG/DL (ref 65–117)
GLUCOSE SERPL-MCNC: 199 MG/DL (ref 65–100)
GLUCOSE SERPL-MCNC: 330 MG/DL (ref 65–100)
GLUCOSE SERPL-MCNC: 488 MG/DL (ref 65–100)
GP B STREP DNA BLD POS QL NAA+NON-PROBE: NOT DETECTED
HADV DNA SPEC QL NAA+PROBE: NOT DETECTED
HAEM INFLU DNA BLD POS QL NAA+NON-PROBE: NOT DETECTED
HBA1C MFR BLD: 6.9 % (ref 4–5.6)
HCOV 229E RNA SPEC QL NAA+PROBE: NOT DETECTED
HCOV HKU1 RNA SPEC QL NAA+PROBE: NOT DETECTED
HCOV NL63 RNA SPEC QL NAA+PROBE: NOT DETECTED
HCOV OC43 RNA SPEC QL NAA+PROBE: NOT DETECTED
HCT VFR BLD AUTO: 43.1 % (ref 36.6–50.3)
HGB BLD-MCNC: 13.4 G/DL (ref 12.1–17)
HMPV RNA SPEC QL NAA+PROBE: NOT DETECTED
HPIV1 RNA SPEC QL NAA+PROBE: NOT DETECTED
HPIV2 RNA SPEC QL NAA+PROBE: NOT DETECTED
HPIV3 RNA SPEC QL NAA+PROBE: NOT DETECTED
HPIV4 RNA SPEC QL NAA+PROBE: NOT DETECTED
K OXYTOCA DNA BLD POS QL NAA+NON-PROBE: NOT DETECTED
KLEBSIELLA SP DNA BLD POS QL NAA+NON-PRB: NOT DETECTED
KLEBSIELLA SP DNA BLD POS QL NAA+NON-PRB: NOT DETECTED
L MONOCYTOG DNA BLD POS QL NAA+NON-PROBE: NOT DETECTED
M PNEUMO DNA SPEC QL NAA+PROBE: NOT DETECTED
MCH RBC QN AUTO: 28.8 PG (ref 26–34)
MCHC RBC AUTO-ENTMCNC: 31.1 G/DL (ref 30–36.5)
MCV RBC AUTO: 92.5 FL (ref 80–99)
MECA+MECC ISLT/SPM QL: DETECTED
N MEN DNA BLD POS QL NAA+NON-PROBE: NOT DETECTED
NRBC # BLD: 0 K/UL (ref 0–0.01)
NRBC BLD-RTO: 0 PER 100 WBC
P AERUGINOSA DNA BLD POS NAA+NON-PROBE: NOT DETECTED
PLATELET # BLD AUTO: 270 K/UL (ref 150–400)
PMV BLD AUTO: 11.1 FL (ref 8.9–12.9)
POTASSIUM SERPL-SCNC: 3 MMOL/L (ref 3.5–5.1)
POTASSIUM SERPL-SCNC: 3.5 MMOL/L (ref 3.5–5.1)
POTASSIUM SERPL-SCNC: 3.6 MMOL/L (ref 3.5–5.1)
PROT SERPL-MCNC: 5.6 G/DL (ref 6.4–8.2)
PROTEUS SP DNA BLD POS QL NAA+NON-PROBE: NOT DETECTED
RBC # BLD AUTO: 4.66 M/UL (ref 4.1–5.7)
RESISTANT GENE TARGETS: ABNORMAL
RSV RNA SPEC QL NAA+PROBE: NOT DETECTED
RV+EV RNA SPEC QL NAA+PROBE: NOT DETECTED
S AUREUS DNA BLD POS QL NAA+NON-PROBE: NOT DETECTED
S AUREUS+CONS DNA BLD POS NAA+NON-PROBE: DETECTED
S EPIDERMIDIS DNA BLD POS QL NAA+NON-PRB: DETECTED
S LUGDUNENSIS DNA BLD POS QL NAA+NON-PRB: NOT DETECTED
S MALTOPHILIA DNA BLD POS QL NAA+NON-PRB: NOT DETECTED
S MARCESCENS DNA BLD POS NAA+NON-PROBE: NOT DETECTED
S PNEUM DNA BLD POS QL NAA+NON-PROBE: NOT DETECTED
S PYO DNA BLD POS QL NAA+NON-PROBE: NOT DETECTED
SALMONELLA DNA BLD POS QL NAA+NON-PROBE: NOT DETECTED
SARS-COV-2 RNA RESP QL NAA+PROBE: NOT DETECTED
SERVICE CMNT-IMP: ABNORMAL
SERVICE CMNT-IMP: NORMAL
SODIUM SERPL-SCNC: 153 MMOL/L (ref 136–145)
SODIUM SERPL-SCNC: 160 MMOL/L (ref 136–145)
SODIUM SERPL-SCNC: 163 MMOL/L (ref 136–145)
STREPTOCOCCUS DNA BLD POS NAA+NON-PROBE: NOT DETECTED
TROPONIN I SERPL HS-MCNC: 124 NG/L (ref 0–76)
TROPONIN I SERPL HS-MCNC: 127 NG/L (ref 0–76)
UFH PPP CHRO-ACNC: 0.27 IU/ML
UFH PPP CHRO-ACNC: 0.42 IU/ML
UFH PPP CHRO-ACNC: 0.83 IU/ML
VANA+VANB ISLT/SPM QL: NOT DETECTED
WBC # BLD AUTO: 9.3 K/UL (ref 4.1–11.1)

## 2025-05-02 PROCEDURE — 2500000003 HC RX 250 WO HCPCS: Performed by: STUDENT IN AN ORGANIZED HEALTH CARE EDUCATION/TRAINING PROGRAM

## 2025-05-02 PROCEDURE — APPSS30 APP SPLIT SHARED TIME 16-30 MINUTES: Performed by: NURSE PRACTITIONER

## 2025-05-02 PROCEDURE — 6360000002 HC RX W HCPCS: Performed by: HOSPITALIST

## 2025-05-02 PROCEDURE — 82962 GLUCOSE BLOOD TEST: CPT

## 2025-05-02 PROCEDURE — 2500000003 HC RX 250 WO HCPCS: Performed by: NURSE PRACTITIONER

## 2025-05-02 PROCEDURE — 83036 HEMOGLOBIN GLYCOSYLATED A1C: CPT

## 2025-05-02 PROCEDURE — 80053 COMPREHEN METABOLIC PANEL: CPT

## 2025-05-02 PROCEDURE — 80048 BASIC METABOLIC PNL TOTAL CA: CPT

## 2025-05-02 PROCEDURE — 6370000000 HC RX 637 (ALT 250 FOR IP): Performed by: NURSE PRACTITIONER

## 2025-05-02 PROCEDURE — 2580000003 HC RX 258: Performed by: STUDENT IN AN ORGANIZED HEALTH CARE EDUCATION/TRAINING PROGRAM

## 2025-05-02 PROCEDURE — 2580000003 HC RX 258: Performed by: HOSPITALIST

## 2025-05-02 PROCEDURE — 6360000002 HC RX W HCPCS: Performed by: STUDENT IN AN ORGANIZED HEALTH CARE EDUCATION/TRAINING PROGRAM

## 2025-05-02 PROCEDURE — 84484 ASSAY OF TROPONIN QUANT: CPT

## 2025-05-02 PROCEDURE — 2060000000 HC ICU INTERMEDIATE R&B

## 2025-05-02 PROCEDURE — 94761 N-INVAS EAR/PLS OXIMETRY MLT: CPT

## 2025-05-02 PROCEDURE — 36415 COLL VENOUS BLD VENIPUNCTURE: CPT

## 2025-05-02 PROCEDURE — 99223 1ST HOSP IP/OBS HIGH 75: CPT | Performed by: INTERNAL MEDICINE

## 2025-05-02 PROCEDURE — 2700000000 HC OXYGEN THERAPY PER DAY

## 2025-05-02 PROCEDURE — 85520 HEPARIN ASSAY: CPT

## 2025-05-02 PROCEDURE — 6370000000 HC RX 637 (ALT 250 FOR IP): Performed by: STUDENT IN AN ORGANIZED HEALTH CARE EDUCATION/TRAINING PROGRAM

## 2025-05-02 PROCEDURE — 82077 ASSAY SPEC XCP UR&BREATH IA: CPT

## 2025-05-02 PROCEDURE — 85027 COMPLETE CBC AUTOMATED: CPT

## 2025-05-02 PROCEDURE — 2500000003 HC RX 250 WO HCPCS: Performed by: HOSPITALIST

## 2025-05-02 PROCEDURE — 93010 ELECTROCARDIOGRAM REPORT: CPT | Performed by: INTERNAL MEDICINE

## 2025-05-02 RX ORDER — DEXTROSE MONOHYDRATE 100 MG/ML
INJECTION, SOLUTION INTRAVENOUS CONTINUOUS PRN
Status: DISCONTINUED | OUTPATIENT
Start: 2025-05-02 | End: 2025-05-12 | Stop reason: HOSPADM

## 2025-05-02 RX ORDER — INSULIN LISPRO 100 [IU]/ML
0-16 INJECTION, SOLUTION INTRAVENOUS; SUBCUTANEOUS
Status: DISCONTINUED | OUTPATIENT
Start: 2025-05-02 | End: 2025-05-02

## 2025-05-02 RX ORDER — INSULIN LISPRO 100 [IU]/ML
0-16 INJECTION, SOLUTION INTRAVENOUS; SUBCUTANEOUS EVERY 4 HOURS
Status: DISCONTINUED | OUTPATIENT
Start: 2025-05-02 | End: 2025-05-05

## 2025-05-02 RX ORDER — MORPHINE SULFATE 2 MG/ML
2 INJECTION, SOLUTION INTRAMUSCULAR; INTRAVENOUS EVERY 4 HOURS PRN
Status: DISPENSED | OUTPATIENT
Start: 2025-05-02 | End: 2025-05-05

## 2025-05-02 RX ORDER — INSULIN LISPRO 100 [IU]/ML
0-16 INJECTION, SOLUTION INTRAVENOUS; SUBCUTANEOUS EVERY 6 HOURS
Status: DISCONTINUED | OUTPATIENT
Start: 2025-05-02 | End: 2025-05-02

## 2025-05-02 RX ORDER — INSULIN LISPRO 100 [IU]/ML
4 INJECTION, SOLUTION INTRAVENOUS; SUBCUTANEOUS ONCE
Status: COMPLETED | OUTPATIENT
Start: 2025-05-02 | End: 2025-05-02

## 2025-05-02 RX ADMIN — DEXTROSE 125 ML: 10 SOLUTION INTRAVENOUS at 23:32

## 2025-05-02 RX ADMIN — PIPERACILLIN AND TAZOBACTAM 3375 MG: 3; .375 INJECTION, POWDER, LYOPHILIZED, FOR SOLUTION INTRAVENOUS at 08:53

## 2025-05-02 RX ADMIN — SODIUM CHLORIDE, PRESERVATIVE FREE 10 ML: 5 INJECTION INTRAVENOUS at 08:54

## 2025-05-02 RX ADMIN — PIPERACILLIN AND TAZOBACTAM 3375 MG: 3; .375 INJECTION, POWDER, LYOPHILIZED, FOR SOLUTION INTRAVENOUS at 01:47

## 2025-05-02 RX ADMIN — HEPARIN SODIUM 2000 UNITS: 1000 INJECTION INTRAVENOUS; SUBCUTANEOUS at 12:34

## 2025-05-02 RX ADMIN — COLLAGENASE SANTYL: 250 OINTMENT TOPICAL at 15:13

## 2025-05-02 RX ADMIN — POTASSIUM CHLORIDE 10 MEQ: 10 INJECTION, SOLUTION INTRAVENOUS at 21:17

## 2025-05-02 RX ADMIN — PIPERACILLIN AND TAZOBACTAM 3375 MG: 3; .375 INJECTION, POWDER, LYOPHILIZED, FOR SOLUTION INTRAVENOUS at 16:36

## 2025-05-02 RX ADMIN — MORPHINE SULFATE 2 MG: 2 INJECTION, SOLUTION INTRAMUSCULAR; INTRAVENOUS at 14:29

## 2025-05-02 RX ADMIN — SODIUM CHLORIDE: 0.45 INJECTION, SOLUTION INTRAVENOUS at 21:15

## 2025-05-02 RX ADMIN — POTASSIUM CHLORIDE 10 MEQ: 10 INJECTION, SOLUTION INTRAVENOUS at 22:37

## 2025-05-02 RX ADMIN — POTASSIUM CHLORIDE 10 MEQ: 10 INJECTION, SOLUTION INTRAVENOUS at 20:20

## 2025-05-02 RX ADMIN — AMIODARONE HYDROCHLORIDE 0.5 MG/MIN: 1.8 INJECTION, SOLUTION INTRAVENOUS at 16:25

## 2025-05-02 RX ADMIN — INSULIN LISPRO 4 UNITS: 100 INJECTION, SOLUTION INTRAVENOUS; SUBCUTANEOUS at 15:12

## 2025-05-02 RX ADMIN — SODIUM CHLORIDE 2.5 MG/HR: 900 INJECTION, SOLUTION INTRAVENOUS at 01:31

## 2025-05-02 RX ADMIN — POTASSIUM CHLORIDE 10 MEQ: 10 INJECTION, SOLUTION INTRAVENOUS at 23:36

## 2025-05-02 RX ADMIN — POTASSIUM CHLORIDE 10 MEQ: 10 INJECTION, SOLUTION INTRAVENOUS at 18:46

## 2025-05-02 RX ADMIN — SODIUM CHLORIDE: 0.45 INJECTION, SOLUTION INTRAVENOUS at 04:56

## 2025-05-02 RX ADMIN — SODIUM CHLORIDE: 0.9 INJECTION, SOLUTION INTRAVENOUS at 01:46

## 2025-05-02 RX ADMIN — INSULIN LISPRO 16 UNITS: 100 INJECTION, SOLUTION INTRAVENOUS; SUBCUTANEOUS at 18:39

## 2025-05-02 RX ADMIN — MORPHINE SULFATE 2 MG: 2 INJECTION, SOLUTION INTRAMUSCULAR; INTRAVENOUS at 22:37

## 2025-05-02 RX ADMIN — INSULIN LISPRO 4 UNITS: 100 INJECTION, SOLUTION INTRAVENOUS; SUBCUTANEOUS at 06:41

## 2025-05-02 RX ADMIN — AMIODARONE HYDROCHLORIDE 0.5 MG/MIN: 1.8 INJECTION, SOLUTION INTRAVENOUS at 23:40

## 2025-05-02 RX ADMIN — INSULIN LISPRO 4 UNITS: 100 INJECTION, SOLUTION INTRAVENOUS; SUBCUTANEOUS at 09:06

## 2025-05-02 RX ADMIN — WATER 5 MG: 1 INJECTION INTRAMUSCULAR; INTRAVENOUS; SUBCUTANEOUS at 08:47

## 2025-05-02 RX ADMIN — AMIODARONE HYDROCHLORIDE 1 MG/MIN: 1.8 INJECTION, SOLUTION INTRAVENOUS at 10:38

## 2025-05-02 RX ADMIN — HEPARIN SODIUM 11 UNITS/KG/HR: 10000 INJECTION, SOLUTION INTRAVENOUS at 23:41

## 2025-05-02 NOTE — CONSULTS
PATRICIO EVANS [facility]    Orlin San  YOB: 1945          Assessment & Plan:     HAYLIE, baseline Cr unknown. Likely due to IVVD +/- ATN. Cannot r/o CKD.  AMS  AF RVR  HTN  Gilbert's disease  Lactic acidosis  Hypernatremia    Rec:  Continue hypotonic IVF  Avoid nehrotoxins  On empiric abx, f/u cultures  Monitor serial labs       Subjective:   CC: HAYLIE  HPI: 79 WM is seen for HAYLIE. He  has a h/o HTN and Gilbert's disease. There is no known CKD per his daughter and no baseline Cr available. He was found down at home confused. In the ER, Na was 157 and Cr 2.69. With IVF, Na is 160 and Cr 2.31. CK is normal. Renal US was normal. UA showed 30 mg/dl protein and no blood and was +for ketones. CBC is unremarkable. Calcium is normal.   ROS: Unable to obtain due to AMS  PMH: HTN, Gilbert's disease  SH: lives alone  Current Facility-Administered Medications   Medication Dose Route Frequency    morphine (PF) injection 2 mg  2 mg IntraVENous Q4H PRN    insulin lispro (HUMALOG,ADMELOG) injection vial 0-16 Units  0-16 Units SubCUTAneous 4x Daily AC & HS    glucose chewable tablet 16 g  4 tablet Oral PRN    dextrose bolus 10% 125 mL  125 mL IntraVENous PRN    Or    dextrose bolus 10% 250 mL  250 mL IntraVENous PRN    glucagon injection 1 mg  1 mg SubCUTAneous PRN    dextrose 10 % infusion   IntraVENous Continuous PRN    amiodarone (NEXTERONE) 360 mg in dextrose 5% 200 ml  1 mg/min IntraVENous Continuous    Followed by    amiodarone (NEXTERONE) 360 mg in dextrose 5% 200 ml  0.5 mg/min IntraVENous Continuous    sodium chloride flush 0.9 % injection 5-40 mL  5-40 mL IntraVENous 2 times per day    sodium chloride flush 0.9 % injection 5-40 mL  5-40 mL IntraVENous PRN    0.9 % sodium chloride infusion   IntraVENous PRN    potassium chloride (KLOR-CON) extended release tablet 40 mEq  40 mEq Oral PRN    Or    potassium bicarb-citric acid (EFFER-K) effervescent tablet 40 mEq  40 mEq  MD  5/2/2025        [facility]:  [email or web address]  [email or web address]    [place] office:  [address], [address]  [place] [address]  Phone: [phone]  Fax :     [phone]    [place] office:  [address] [place] [address]  Phone - [phone]  Fax - [phone]

## 2025-05-02 NOTE — PLAN OF CARE
Problem: Safety - Adult  Goal: Free from fall injury  5/2/2025 1653 by Aayush Sherman RN  Outcome: Progressing  5/2/2025 1653 by Aayush Sherman RN  Outcome: Progressing  5/2/2025 0646 by Meghann Josue RN  Outcome: Progressing     Problem: Discharge Planning  Goal: Discharge to home or other facility with appropriate resources  5/2/2025 1653 by Aayush Sherman RN  Outcome: Progressing  5/2/2025 1653 by Aayush Sherman RN  Outcome: Progressing  5/2/2025 0646 by Meghann Josue RN  Outcome: Progressing     Problem: Pain  Goal: Verbalizes/displays adequate comfort level or baseline comfort level  5/2/2025 1653 by Aayush Sherman RN  Outcome: Progressing  5/2/2025 1653 by Aayush Sherman RN  Outcome: Progressing     Problem: Chronic Conditions and Co-morbidities  Goal: Patient's chronic conditions and co-morbidity symptoms are monitored and maintained or improved  Outcome: Progressing

## 2025-05-02 NOTE — PROGRESS NOTES
0000On admission patient has several wound. Photos documented under media.     Informed Dr. Butterfield about elevated heart rate. Diltiazem push ordered and given.      0131 Diltiazem drip started.

## 2025-05-02 NOTE — CONSULTS
ATTENDING CARDIOLOGIST  The patient was personally examined and chart reviewed. All the elements of history and examination were personally performed and I agree with the plan as listed by advanced practice provider.    Treatment plan was addressed with the patient.      Subjective:  Spoke to daughter at bedside  Found down at home  ETOH negative  Trp elevated    Blood pressure 103/71, pulse (!) 117, temperature 98.1 °F (36.7 °C), temperature source Axillary, resp. rate 22, weight 83.2 kg (183 lb 6.8 oz), SpO2 95%.  Normal rate, regular rhythm, S1/S2  Lungs clear      A/P:  Elevated troponin -possibly due to demand in the setting of A-fib with RVR plus or minus minus rhabdomyolysis given he was found down.  Work on rate control with IV amiodarone for atrial fibrillation.  Obtain 2D echocardiogram.  Will comment more once echo completed.  No plans for ischemic evaluation right now.        []    High complexity decision making was performed  []    Patient is at high-risk of decompensation with multiple organ involvement        Juhi Elmore MS, MD, [facility]        Juhi Elmore MS, MD, [facility] Cadiology  [phone] (P)  [phone] (F)        [facility] CARDIOLOGY                    Cardiology Care Note     [x]Initial Encounter     []Follow-up    Patient Name: Orlin San - :1945 - MRN:[id number]  Primary Cardiologist: none   Consulting Cardiologist: Juhi Elmore MD     Reason for encounter: a fib    HPI:       Orlin San is a 79 y.o. male with PMH significant for type 2 diabetes, questionable Parkinson disease, hypertension, hyperlipidemia, who was brought to the ER after his daughter found him on the floor, last known contact was on  of last month.  According to the daughter, he stopped responding to text messages and phone calls.  When she went to his house, he was found lying on the side in the kitchen.  He was incontinent of stool and urine.  It is not clear how long has he  2,000 Units, IntraVENous, PRN, Annemarie Butterfield MD    heparin 25,000 units in dextrose 5% 250 mL (premix) infusion, 5-30 Units/kg/hr, IntraVENous, Continuous, Annemarie Butterfield MD, Last Rate: 9.2 mL/hr at 05/02/25 0458, 11 Units/kg/hr at 05/02/25 0458    dilTIAZem 100 mg in sodium chloride 0.9 % 100 mL infusion (ADD-Preston), 2.5-15 mg/hr, IntraVENous, Continuous, Annemarie Butterfield MD, Last Rate: 2.5 mL/hr at 05/02/25 0733, 2.5 mg/hr at 05/02/25 0733    AMAN Garzon - NP    [facility] Cardiology  Call center: (P) [phone]  (F) [phone]      CC:Dov George MD

## 2025-05-02 NOTE — H&P
[facility]          [address] [place]  [address]                           HISTORY & PHYSICAL      PATIENT NAME: KIMBERLEE HIGGINBOTHAM               : 1945  MED REC NO: [id number]                       ROOM: [facility]  ACCOUNT NO: [id number]                       ADMIT DATE: 2025  PROVIDER: Annemarie Butterfield MD    PRESENTING COMPLAINT:      Altered mental status.    HISTORY OF PRESENT ILLNESS:      The patient is a 79-year-old gentleman who has previous history significant for type 2 diabetes, questionable Parkinson disease, hypertension, hyperlipidemia, who was brought to the ER after his daughter found him on the floor, last known contact was on  of last month.  According to the daughter, he stopped responding to text messages and phone calls.  When she went to his house, he was found lying on the side in the kitchen.  He was incontinent of stool and urine.  It is not clear how long has he been lying on the floor.  Initially, the patient was hypothermic, hypotensive, was in AFib with RVR and altered mental status.  Initially seen by intensivist team, they recommended him to go to floor as he has slightly improved.  The patient is getting a second bolus of Fluids.  He is still very confused and lethargic unable to answer to any of my questions.  According to the daughter, she does not know if he drinks Alcohol.  There was only 1 bottle of wine on his  side per daughter.    PAST MEDICAL HISTORY:    1. Type 2 diabetes.  2. History of tremors, questionable Parkinson disease.  3. Hyperlipidemia.  4. Hypertension.    SOCIOECONOMIC HISTORY:  Not available.    CURRENT MEDICATIONS:  Not available.    CODE STATUS:  Full code.    PHYSICAL EXAMINATION:  GENERAL:  The patient is a 79-year-old gentleman, who is confused.  VITAL SIGNS:  Reveal temperature of 97.6, blood pressure 135/77, pulse is 108, respiratory rate 16, saturating 97% on 4 L.  HEENT:  Reveals pupils  equally reactive to light and accommodation.  NECK:  Supple.  CHEST:  Clear.  No wheezing.  No crackles.  CARDIOVASCULAR SYSTEM:  S1 and S2.  Irregular.  Slightly tachycardic.  No murmur.  No S3.    ABDOMEN:  Reveals no significant tenderness.  No guarding.  No rigidity.  CNS:  Reveals the patient is confused.  He does not respond to any meaningful stimuli.  He does have rigidity bilaterally.  Detailed examination is not possible.    LABORATORY DATA:  Done in the ER reveal a white count of 12.6, hemoglobin of 15.6, hematocrit is 50, platelet count of 385,000, 81% neutrophils.    Procalcitonin 0.29.    TSH is 0.85.    Sodium 157, potassium 4, chloride 120, bicarb of 23, gap of 14, glucose 412, BUN is 130, creatinine of 2.69, calcium is 9.7, bilirubin is 1.7, ALT 76, AST 51, alkaline phosphatase 84, protein is 7, albumin is 2.9, globulin is 4.1.    Lipase is 26.    Magnesium 3.2.    High-sensitivity troponin is 103.    Total CPK is 232.    INR is 1.2.    CT of chest, abdomen and pelvis without contrast reveals no acute pathology.    EKG shows atrial fibrillation with rapid ventricular response.  ST-T changes are significant for inferior ischemia.    CT head without contrast reveals no acute abnormality.    CT cervical spine without contrast reveals inferior C7, C7-T1 disk level and T1 are not included in the field of view.  This study should be repeated for assessment of the cervicothoracic junction, otherwise within the visualized field of view.  There is no acute fracture or dislocation.    X-ray of the elbow reveals no acute finding.    Urinalysis reveals proteinuria, trace amount of ketone, trace amount of leukocyte esterase.    Chest x-ray shows no focal consolidation, pleural effusion.    ASSESSMENT AND PLAN:    The patient is a 79-year-old gentleman who has previous history significant for type 2 diabetes, hypertension, hyperlipidemia, questionable Parkinson disease, who is admitted with:    1. Hypotension.

## 2025-05-02 NOTE — PROGRESS NOTES
Hospitalist Progress Note      NAME:  Orlin San   :  1945  MRM:  [id number]    Date/Time: 2025  11:53 AM           Assessment / Plan:       The patient is a 79-year-old gentleman who has previous history significant for type 2 diabetes, hypertension, hyperlipidemia, questionable Parkinson disease, who is admitted with:      A-fib with RVR: No prior history of A-fib.  Noted A-fib with RVR in the ED.  Was started on Cardizem drip.  Heart rate improving but still elevated.  Cardiology following.  Plan to change IV diltiazem to IV amnio.  Continue with heparin drip.  TTE pending.  Continue to monitor on telemetry.    Minimally elevated troponin: Possibly due to A-fib with RVR.  TTE pending.  Cardio following.  Continue to monitor.    Hypotension: Possibly dehydration and A-fib w/RVR. Cannot rule out septic component.  Continue IV fluids for now.  May need to be upgraded to ICU for pressors if unresponsive to fluids.  Will closely monitor in the stepdown for now.    HAYLIE: Likely due to hypotension/IVVD.  May have underlying CKD.  US renal showed no acute findings.  Continue with hypotonic IV fluids.  Nephrology following.    Hypernatremia: Sodium trending up.  Nephrology following.  On hypotonic IV fluids.  Will continue to trend sodium levels.    Acute metabolic encephalopathy: Likely due to hyponatremia, HAYLIE, dehydration.  Possible underlying cognitive issues.  Rule out CVA.  CT head showed no acute findings.  MRI brain pending.  Neurology evaluation pending.  IV Zyprexa as needed for agitations.    Suspected sepsis: Patient was started on empiric antibiotics for possible sepsis.  No source of infection identified so far.  UA negative.  CT chest/abdomen/pelvis showed no evidence of infection.  Will continue empiric antibiotics for now.  Follow-up blood cultures.    Type 2 diabetes with hyperglycemia:  on presentation.  A1c 6.9%.  He takes metformin at home.  Will hold home medications for now.

## 2025-05-02 NOTE — ED NOTES
TRANSFER - OUT REPORT:    Verbal report given to ZACH Zavaleta on Orlin San  being transferred to [facility] for routine progression of patient care       Report consisted of patient's Situation, Background, Assessment and   Recommendations(SBAR).     Information from the following report(s) Nurse Handoff Report, ED Encounter Summary, ED SBAR, MAR, Recent Results, Cardiac Rhythm a fib, and Neuro Assessment was reviewed with the receiving nurse.    Mcloud Fall Assessment:    Presents to emergency department  because of falls (Syncope, seizure, or loss of consciousness): Yes  Age > 70: Yes  Altered Mental Status, Intoxication with alcohol or substance confusion (Disorientation, impaired judgment, poor safety awaremess, or inability to follow instructions): Yes  Impaired Mobility: Ambulates or transfers with assistive devices or assistance; Unable to ambulate or transer.: Yes  Nursing Judgement: Yes          Lines:   Peripheral IV 05/01/25 Left Antecubital (Active)       Peripheral IV 05/01/25 Right;Anterior Cephalic (Active)        Opportunity for questions and clarification was provided.      Patient transported with:  Monitor and Registered Nurse

## 2025-05-02 NOTE — CARE COORDINATION
Care Management Initial Assessment  5/2/2025 10:49 AM  If patient is discharged prior to next notation, then this note serves as note for discharge by case management.    Reason for Admission:   Dehydration [E86.0]  Lactic acidosis [E87.20]  Hypermagnesemia [E83.41]  Hypernatremia [E87.0]  Shock (HCC) [R57.9]  Hyperglycemia [R73.9]  Elevated troponin [R79.89]  Abrasions of multiple sites [T07.XXXA]  HAYLIE (acute kidney injury) [N17.9]  Altered mental status, unspecified altered mental status type [R41.82]  Atrial fibrillation, unspecified type (HCC) [I48.91]  Pressure injury of skin of right hip, unspecified injury stage [L89.219]         Patient Admission Status: Inpatient  Date Admitted to INP: 5/1/25  RUR: Readmission Risk Score: 12.3    Hospitalization in the last 30 days (Readmission):  no        Advance Care Planning:  Code Status: Full Code  Primary Healthcare Decision Maker:  NOK-daughter-Merlyn Lynn   Advance Directive: has NO advanced directive - not interested in additional information     __________________________________________________________________________  Assessment: Pt was found down @ home by his daughter  Pt has Gilbert's disease and hypernatremia  Afib with RVR  AMS  Dehydration  Lactic acidosis    Comments: sodium today is 160     Discharge Concerns: []Yes [x]No []Unknown   Describe:    Financial concerns/barriers: []Yes, explain: [x]No []Unknown/Not discussed  __________________________________________________________________________    Insurer:   Active Insurance as of 5/1/2025       Primary Coverage       Payor Plan Insurance Group Employer/Plan Group    MEDICARE MEDICARE PART A AND B        Payor Address Payor Phone Number Payor Fax Number Effective Dates    [address] [address] [phone]  10/1/2010 - None Entered    [facility] [id number]         Subscriber Name Subscriber Birth Date Member ID       KIMBERLEE HIGGINBOTHAM 1945 [id number]               Secondary Coverage       Payor Plan

## 2025-05-02 NOTE — PROGRESS NOTES
WOCN Note:     New consult by RN for \"sacrub, elbows, R hip. Images uploaded into media.\"   Seen in A466/01 with Carmen RN    79 y.o. y/o male admitted on 5/1/2025 from home  Admitted for    Dehydration [E86.0]  Lactic acidosis [E87.20]  Hypermagnesemia [E83.41]  Hypernatremia [E87.0]  Shock (HCC) [R57.9]  Hyperglycemia [R73.9]  Elevated troponin [R79.89]  Abrasions of multiple sites [T07.XXXA]  HAYLIE (acute kidney injury) [N17.9]  Altered mental status, unspecified altered mental status type [R41.82]  Atrial fibrillation, unspecified type (HCC) [I48.91]  Pressure injury of skin of right hip, unspecified injury stage [L89.219]    History of DM2, questionable Parkinson's, HTN, and HLD     Lab Results   Component Value Date/Time    WBC 9.3 05/02/2025 12:56 AM    LABA1C 6.9 (H) 05/02/2025 12:56 AM    POCGLU 185 (H) 05/02/2025 11:26 AM    POCGLU 229 (H) 05/02/2025 08:32 AM    HGB 13.4 05/02/2025 12:56 AM    HCT 43.1 05/02/2025 12:56 AM     05/02/2025 12:56 AM     No indication for wound culture    Diet: Diet NPO Exceptions are: Sips of Water with Meds, Ice Chips           Assessment:   Mumbling and groaned/grunts with movement and palpation to wounds.  Requires assists in repositioning. Turned onto Left Side.    Incontinent. External urinary device to suction.    Surface: BEREKET [place] Rom ICU mattress    Bilateral heels intact without erythema & offloaded with pillows.         1. POA MASD Sacrum  7 x 18 cm  20% slough, 80% non-blanchable redness with irregular margins.  Moderate yellow exudate; no malodor or purulence - no gross S&S of infection  Periwound intact & without erythema    Tx: Cleansed with Vashe, applied Triad cream, and covered with foam.       2. POA Unstageable Pressure Injury R Knee  5.2 x 4 cm  Black eschar with well defined erythematous edges.  No exudate; no malodor or purulence - no gross S&S of infection  Periwound intact & without erythema    Tx: Cleansed with Vashe and covered with

## 2025-05-02 NOTE — CONSULTS
INPATIENT NEUROLOGY CONSULT NOTE      NAME:     Orlin San    ADMIT DATE:    5/1/2025  3:10 PM    CONSULT DATE:  05/02/25      HPI: 79 y.o. male PMHx T2DM, HTN, HLD, questionable hx of Parkinson's (per admission H&P)    Neurology is asked to evaluate to patient for: AMS    Pertinent home meds include: no home meds listed    No prior Neurology Notes in [facility] EMR    Hx is from chart review and discussion with Daughter/ Son-in-Law and to a lesser extent patient (who is awake, very heard of hearing per daughter, and agitated at my questions).     Per ED Physician discussion with with Daughter yesterday, she/ Son-in-law reported that patient has hx of Parkinsonian symptoms (tremors, shuffling gait) and had been following with a Neurologist locally but no formal Dx of Parkinson's was made as the symptoms were static, non-progressive.  ED Physician was able to gather that last time Daughter spoke with patient was 11 days prior, after which he had stopped replying to text messages and phone calls.  She went over to check on him yesterday, found the door locked/ unlocked it, entered his house and found him lying on floor incontinent of urine and stool.  She reviewed his phone and noted that his phone usage seemed to drop off significantly about 7 days prior to yesterday.  She noted that whole house looked in disarray, not just where he was found, and there was a bottle of sitting upright near him, but top was closed. She, Son-in-Law note that he used to drink excessively but stopped drinking about more than a year ago other than occasional glass of wine when with family.  They called EMS and EMS noted his blood sugar to be 380.  He was hypotensive and hypothermic on arrival to ER and found to be in Afib with RVR (new dx to him), with elevated Na 157, Glu 412, Cr 2.69, GFR 23 at time of admission.  Ethanol was < 10. TSH, FT4 are normal.  Blood Cultures (1/2) +.  Separate blood Culture PCR is + for enterococcus and  4 mg, IntraVENous, Q6H PRN, Annemarie Butterfield MD    polyethylene glycol (GLYCOLAX) packet 17 g, 17 g, Oral, Daily PRN, Annemarie Butterfield MD    acetaminophen (TYLENOL) tablet 650 mg, 650 mg, Oral, Q6H PRN **OR** acetaminophen (TYLENOL) suppository 650 mg, 650 mg, Rectal, Q6H PRN, Annemarie Butterfield MD    0.45 % sodium chloride infusion, , IntraVENous, Continuous, Annemarie Butterfield MD, Last Rate: 125 mL/hr at 05/02/25 1615, Rate Verify at 05/02/25 1615    piperacillin-tazobactam (ZOSYN) 3,375 mg in sodium chloride 0.9 % 50 mL IVPB (addEASE), 3,375 mg, IntraVENous, q8h, Annemarie Butterfield MD, Last Rate: 12.5 mL/hr at 05/02/25 1636, 3,375 mg at 05/02/25 1636    heparin (porcine) injection 4,000 Units, 4,000 Units, IntraVENous, PRN, Anenmarie Butterfield MD    heparin (porcine) injection 2,000 Units, 2,000 Units, IntraVENous, PRN, Annemarie Butterfield MD, 2,000 Units at 05/02/25 1234    heparin 25,000 units in dextrose 5% 250 mL (premix) infusion, 5-30 Units/kg/hr, IntraVENous, Continuous, Annemarie Butterfield MD, Last Rate: 10.9 mL/hr at 05/02/25 1615, 13 Units/kg/hr at 05/02/25 1615    dilTIAZem 100 mg in sodium chloride 0.9 % 100 mL infusion (ADD-[place]), 2.5-15 mg/hr, IntraVENous, Continuous, Annemarie Butterfield MD, Stopped at 05/02/25 1018    =====================================    MEDICAL DECISION MAKING (2 of 3: A +/- B +/- C)    A) Number and Complexity of Problem(s) Addressed:  [] 1 stable, acute illness (Low)  [] 1 stable, chronic illness (Low)  [] 1 acute illness with systemic symptoms (Moderate)  [] 1 undiagnosed new problem with uncertain prognosis (Moderate)  [] 1 or more chronic illness with exacerbation, progression, or side effects of treatment (Moderate)  [] 1 or more chronic illnesses with severe exacerbation, progression, or side effects of treatment (High)  [x] 1 acute or chronic illness or injury that poses a threat to life or bodily function (High)    B) Amount/ Complexity of Data reviewed (1 of 3 categories = Moderate, 2 of 3

## 2025-05-03 ENCOUNTER — APPOINTMENT (OUTPATIENT)
Facility: HOSPITAL | Age: 80
DRG: 871 | End: 2025-05-03
Payer: MEDICARE

## 2025-05-03 LAB
AMMONIA PLAS-SCNC: <10 UMOL/L
ANION GAP SERPL CALC-SCNC: 3 MMOL/L (ref 2–12)
ANION GAP SERPL CALC-SCNC: 4 MMOL/L (ref 2–12)
ANION GAP SERPL CALC-SCNC: 5 MMOL/L (ref 2–12)
BASOPHILS # BLD: 0.02 K/UL (ref 0–0.1)
BASOPHILS NFR BLD: 0.3 % (ref 0–1)
BUN SERPL-MCNC: 72 MG/DL (ref 6–20)
BUN SERPL-MCNC: 77 MG/DL (ref 6–20)
BUN SERPL-MCNC: 86 MG/DL (ref 6–20)
BUN/CREAT SERPL: 42 (ref 12–20)
BUN/CREAT SERPL: 44 (ref 12–20)
BUN/CREAT SERPL: 48 (ref 12–20)
CALCIUM SERPL-MCNC: 7.7 MG/DL (ref 8.5–10.1)
CALCIUM SERPL-MCNC: 8 MG/DL (ref 8.5–10.1)
CALCIUM SERPL-MCNC: 8.1 MG/DL (ref 8.5–10.1)
CHLORIDE SERPL-SCNC: 122 MMOL/L (ref 97–108)
CHLORIDE SERPL-SCNC: 124 MMOL/L (ref 97–108)
CHLORIDE SERPL-SCNC: 127 MMOL/L (ref 97–108)
CO2 SERPL-SCNC: 28 MMOL/L (ref 21–32)
CO2 SERPL-SCNC: 30 MMOL/L (ref 21–32)
CO2 SERPL-SCNC: 30 MMOL/L (ref 21–32)
CREAT SERPL-MCNC: 1.73 MG/DL (ref 0.7–1.3)
CREAT SERPL-MCNC: 1.77 MG/DL (ref 0.7–1.3)
CREAT SERPL-MCNC: 1.8 MG/DL (ref 0.7–1.3)
DIFFERENTIAL METHOD BLD: ABNORMAL
ECHO AO ASC DIAM: 3.7 CM
ECHO AO ASCENDING AORTA INDEX: 1.9 CM/M2
ECHO AO ROOT DIAM: 3.3 CM
ECHO AO ROOT INDEX: 1.69 CM/M2
ECHO AV AREA PEAK VELOCITY: 0.5 CM2
ECHO AV AREA PLAN/BSA: 0.72 CM2/M2
ECHO AV AREA PLAN: 1.4 CM2
ECHO AV AREA VTI: 0.6 CM2
ECHO AV AREA/BSA PEAK VELOCITY: 0.3 CM2/M2
ECHO AV AREA/BSA VTI: 0.3 CM2/M2
ECHO AV MEAN GRADIENT: 25 MMHG
ECHO AV MEAN VELOCITY: 2.3 M/S
ECHO AV PEAK GRADIENT: 44 MMHG
ECHO AV PEAK VELOCITY: 3.3 M/S
ECHO AV VELOCITY RATIO: 0.18
ECHO AV VTI: 60.9 CM
ECHO BSA: 1.98 M2
ECHO EST RA PRESSURE: 3 MMHG
ECHO LA DIAMETER INDEX: 2.26 CM/M2
ECHO LA DIAMETER: 4.4 CM
ECHO LA TO AORTIC ROOT RATIO: 1.33
ECHO LA VOL A-L A2C: 59 ML (ref 18–58)
ECHO LA VOL A-L A4C: 46 ML (ref 18–58)
ECHO LA VOL BP: 46 ML (ref 18–58)
ECHO LA VOL MOD A2C: 55 ML (ref 18–58)
ECHO LA VOL MOD A4C: 39 ML (ref 18–58)
ECHO LA VOL/BSA BIPLANE: 24 ML/M2 (ref 16–34)
ECHO LA VOLUME AREA LENGTH: 53 ML
ECHO LA VOLUME INDEX A-L A2C: 30 ML/M2 (ref 16–34)
ECHO LA VOLUME INDEX A-L A4C: 24 ML/M2 (ref 16–34)
ECHO LA VOLUME INDEX AREA LENGTH: 27 ML/M2 (ref 16–34)
ECHO LA VOLUME INDEX MOD A2C: 28 ML/M2 (ref 16–34)
ECHO LA VOLUME INDEX MOD A4C: 20 ML/M2 (ref 16–34)
ECHO LV E' LATERAL VELOCITY: 8.47 CM/S
ECHO LV E' SEPTAL VELOCITY: 5.57 CM/S
ECHO LV EDV A2C: 70 ML
ECHO LV EDV A4C: 97 ML
ECHO LV EDV BP: 83 ML (ref 67–155)
ECHO LV EDV INDEX A4C: 50 ML/M2
ECHO LV EDV INDEX BP: 43 ML/M2
ECHO LV EDV NDEX A2C: 36 ML/M2
ECHO LV EF PHYSICIAN: 48 %
ECHO LV EJECTION FRACTION A2C: 49 %
ECHO LV EJECTION FRACTION A4C: 50 %
ECHO LV EJECTION FRACTION BIPLANE: 48 % (ref 55–100)
ECHO LV ESV A2C: 36 ML
ECHO LV ESV A4C: 48 ML
ECHO LV ESV BP: 43 ML (ref 22–58)
ECHO LV ESV INDEX A2C: 18 ML/M2
ECHO LV ESV INDEX A4C: 25 ML/M2
ECHO LV ESV INDEX BP: 22 ML/M2
ECHO LV FRACTIONAL SHORTENING: 20 % (ref 28–44)
ECHO LV INTERNAL DIMENSION DIASTOLE INDEX: 2.1 CM/M2
ECHO LV INTERNAL DIMENSION DIASTOLIC: 4.1 CM (ref 4.2–5.9)
ECHO LV INTERNAL DIMENSION SYSTOLIC INDEX: 1.69 CM/M2
ECHO LV INTERNAL DIMENSION SYSTOLIC: 3.3 CM
ECHO LV IVSD: 1.1 CM (ref 0.6–1)
ECHO LV MASS 2D: 141.5 G (ref 88–224)
ECHO LV MASS INDEX 2D: 72.6 G/M2 (ref 49–115)
ECHO LV POSTERIOR WALL DIASTOLIC: 1 CM (ref 0.6–1)
ECHO LV RELATIVE WALL THICKNESS RATIO: 0.49
ECHO LVOT AREA: 3.1 CM2
ECHO LVOT AV VTI INDEX: 0.2
ECHO LVOT DIAM: 2 CM
ECHO LVOT MEAN GRADIENT: 1 MMHG
ECHO LVOT PEAK GRADIENT: 1 MMHG
ECHO LVOT PEAK VELOCITY: 0.6 M/S
ECHO LVOT STROKE VOLUME INDEX: 19.5 ML/M2
ECHO LVOT SV: 38 ML
ECHO LVOT VTI: 12.1 CM
ECHO MV A VELOCITY: 0.28 M/S
ECHO MV E DECELERATION TIME (DT): 200 MS
ECHO MV E VELOCITY: 0.63 M/S
ECHO MV E/A RATIO: 2.25
ECHO MV E/E' LATERAL: 7.44
ECHO MV E/E' RATIO (AVERAGED): 9.37
ECHO MV E/E' SEPTAL: 11.31
ECHO MV REGURGITANT PEAK GRADIENT: 58 MMHG
ECHO MV REGURGITANT PEAK VELOCITY: 3.8 M/S
ECHO PULMONARY ARTERY END DIASTOLIC PRESSURE: 4 MMHG
ECHO PV MAX VELOCITY: 1 M/S
ECHO PV PEAK GRADIENT: 4 MMHG
ECHO PV REGURGITANT MAX VELOCITY: 1 M/S
ECHO RIGHT VENTRICULAR SYSTOLIC PRESSURE (RVSP): 32 MMHG
ECHO RV FREE WALL PEAK S': 7.3 CM/S
ECHO RV INTERNAL DIMENSION: 4.2 CM
ECHO RV TAPSE: 1 CM (ref 1.7–?)
ECHO TV REGURGITANT MAX VELOCITY: 2.68 M/S
ECHO TV REGURGITANT PEAK GRADIENT: 29 MMHG
EOSINOPHIL # BLD: 0.1 K/UL (ref 0–0.4)
EOSINOPHIL NFR BLD: 1.3 % (ref 0–7)
ERYTHROCYTE [DISTWIDTH] IN BLOOD BY AUTOMATED COUNT: 15.1 % (ref 11.5–14.5)
FOLATE SERPL-MCNC: 6.2 NG/ML (ref 5–21)
GLUCOSE BLD STRIP.AUTO-MCNC: 103 MG/DL (ref 65–117)
GLUCOSE BLD STRIP.AUTO-MCNC: 106 MG/DL (ref 65–117)
GLUCOSE BLD STRIP.AUTO-MCNC: 129 MG/DL (ref 65–117)
GLUCOSE BLD STRIP.AUTO-MCNC: 131 MG/DL (ref 65–117)
GLUCOSE BLD STRIP.AUTO-MCNC: 159 MG/DL (ref 65–117)
GLUCOSE BLD STRIP.AUTO-MCNC: 195 MG/DL (ref 65–117)
GLUCOSE BLD STRIP.AUTO-MCNC: 212 MG/DL (ref 65–117)
GLUCOSE BLD STRIP.AUTO-MCNC: 244 MG/DL (ref 65–117)
GLUCOSE BLD STRIP.AUTO-MCNC: 88 MG/DL (ref 65–117)
GLUCOSE SERPL-MCNC: 196 MG/DL (ref 65–100)
GLUCOSE SERPL-MCNC: 242 MG/DL (ref 65–100)
GLUCOSE SERPL-MCNC: 85 MG/DL (ref 65–100)
HCT VFR BLD AUTO: 39.6 % (ref 36.6–50.3)
HGB BLD-MCNC: 12.2 G/DL (ref 12.1–17)
IMM GRANULOCYTES # BLD AUTO: 0.12 K/UL (ref 0–0.04)
IMM GRANULOCYTES NFR BLD AUTO: 1.6 % (ref 0–0.5)
LYMPHOCYTES # BLD: 0.71 K/UL (ref 0.8–3.5)
LYMPHOCYTES NFR BLD: 9.4 % (ref 12–49)
MCH RBC QN AUTO: 28.4 PG (ref 26–34)
MCHC RBC AUTO-ENTMCNC: 30.8 G/DL (ref 30–36.5)
MCV RBC AUTO: 92.1 FL (ref 80–99)
MONOCYTES # BLD: 0.8 K/UL (ref 0–1)
MONOCYTES NFR BLD: 10.7 % (ref 5–13)
NEUTS SEG # BLD: 5.75 K/UL (ref 1.8–8)
NEUTS SEG NFR BLD: 76.7 % (ref 32–75)
NRBC # BLD: 0 K/UL (ref 0–0.01)
NRBC BLD-RTO: 0 PER 100 WBC
PLATELET # BLD AUTO: 240 K/UL (ref 150–400)
PMV BLD AUTO: 10.9 FL (ref 8.9–12.9)
POTASSIUM SERPL-SCNC: 3.2 MMOL/L (ref 3.5–5.1)
POTASSIUM SERPL-SCNC: 3.3 MMOL/L (ref 3.5–5.1)
POTASSIUM SERPL-SCNC: 3.6 MMOL/L (ref 3.5–5.1)
RBC # BLD AUTO: 4.3 M/UL (ref 4.1–5.7)
RBC MORPH BLD: ABNORMAL
SERVICE CMNT-IMP: ABNORMAL
SERVICE CMNT-IMP: NORMAL
SODIUM SERPL-SCNC: 155 MMOL/L (ref 136–145)
SODIUM SERPL-SCNC: 157 MMOL/L (ref 136–145)
SODIUM SERPL-SCNC: 161 MMOL/L (ref 136–145)
UFH PPP CHRO-ACNC: 0.25 IU/ML
UFH PPP CHRO-ACNC: 0.31 IU/ML
UFH PPP CHRO-ACNC: 0.31 IU/ML
VIT B12 SERPL-MCNC: >2000 PG/ML (ref 193–986)
WBC # BLD AUTO: 7.5 K/UL (ref 4.1–11.1)

## 2025-05-03 PROCEDURE — 2700000000 HC OXYGEN THERAPY PER DAY

## 2025-05-03 PROCEDURE — 82140 ASSAY OF AMMONIA: CPT

## 2025-05-03 PROCEDURE — 82962 GLUCOSE BLOOD TEST: CPT

## 2025-05-03 PROCEDURE — 2500000003 HC RX 250 WO HCPCS

## 2025-05-03 PROCEDURE — 36415 COLL VENOUS BLD VENIPUNCTURE: CPT

## 2025-05-03 PROCEDURE — 6360000002 HC RX W HCPCS

## 2025-05-03 PROCEDURE — 6360000002 HC RX W HCPCS: Performed by: HOSPITALIST

## 2025-05-03 PROCEDURE — 93306 TTE W/DOPPLER COMPLETE: CPT | Performed by: SPECIALIST

## 2025-05-03 PROCEDURE — 99233 SBSQ HOSP IP/OBS HIGH 50: CPT | Performed by: INTERNAL MEDICINE

## 2025-05-03 PROCEDURE — 94761 N-INVAS EAR/PLS OXIMETRY MLT: CPT

## 2025-05-03 PROCEDURE — 6370000000 HC RX 637 (ALT 250 FOR IP): Performed by: STUDENT IN AN ORGANIZED HEALTH CARE EDUCATION/TRAINING PROGRAM

## 2025-05-03 PROCEDURE — 82746 ASSAY OF FOLIC ACID SERUM: CPT

## 2025-05-03 PROCEDURE — 2580000003 HC RX 258: Performed by: NURSE PRACTITIONER

## 2025-05-03 PROCEDURE — 82607 VITAMIN B-12: CPT

## 2025-05-03 PROCEDURE — 93306 TTE W/DOPPLER COMPLETE: CPT

## 2025-05-03 PROCEDURE — 80048 BASIC METABOLIC PNL TOTAL CA: CPT

## 2025-05-03 PROCEDURE — 2500000003 HC RX 250 WO HCPCS: Performed by: HOSPITALIST

## 2025-05-03 PROCEDURE — 2060000000 HC ICU INTERMEDIATE R&B

## 2025-05-03 PROCEDURE — 85025 COMPLETE CBC W/AUTO DIFF WBC: CPT

## 2025-05-03 PROCEDURE — 6360000002 HC RX W HCPCS: Performed by: STUDENT IN AN ORGANIZED HEALTH CARE EDUCATION/TRAINING PROGRAM

## 2025-05-03 PROCEDURE — 2500000003 HC RX 250 WO HCPCS: Performed by: NURSE PRACTITIONER

## 2025-05-03 PROCEDURE — 6360000002 HC RX W HCPCS: Performed by: INTERNAL MEDICINE

## 2025-05-03 PROCEDURE — 85520 HEPARIN ASSAY: CPT

## 2025-05-03 PROCEDURE — 2580000003 HC RX 258: Performed by: HOSPITALIST

## 2025-05-03 RX ORDER — DEXTROSE MONOHYDRATE 50 MG/ML
INJECTION, SOLUTION INTRAVENOUS CONTINUOUS
Status: DISCONTINUED | OUTPATIENT
Start: 2025-05-03 | End: 2025-05-05

## 2025-05-03 RX ORDER — METOPROLOL TARTRATE 25 MG/1
25 TABLET, FILM COATED ORAL 2 TIMES DAILY
Status: DISCONTINUED | OUTPATIENT
Start: 2025-05-03 | End: 2025-05-05

## 2025-05-03 RX ORDER — POTASSIUM CHLORIDE 7.45 MG/ML
10 INJECTION INTRAVENOUS
Status: COMPLETED | OUTPATIENT
Start: 2025-05-03 | End: 2025-05-04

## 2025-05-03 RX ORDER — DIAZEPAM 10 MG/2ML
2.5 INJECTION, SOLUTION INTRAMUSCULAR; INTRAVENOUS ONCE
Status: COMPLETED | OUTPATIENT
Start: 2025-05-03 | End: 2025-05-03

## 2025-05-03 RX ADMIN — POTASSIUM CHLORIDE 10 MEQ: 7.46 INJECTION, SOLUTION INTRAVENOUS at 21:30

## 2025-05-03 RX ADMIN — POTASSIUM CHLORIDE 10 MEQ: 10 INJECTION, SOLUTION INTRAVENOUS at 00:27

## 2025-05-03 RX ADMIN — PIPERACILLIN AND TAZOBACTAM 3375 MG: 3; .375 INJECTION, POWDER, LYOPHILIZED, FOR SOLUTION INTRAVENOUS at 17:00

## 2025-05-03 RX ADMIN — DEXTROSE MONOHYDRATE: 5 INJECTION, SOLUTION INTRAVENOUS at 03:10

## 2025-05-03 RX ADMIN — WATER 5 MG: 1 INJECTION INTRAMUSCULAR; INTRAVENOUS; SUBCUTANEOUS at 03:06

## 2025-05-03 RX ADMIN — INSULIN LISPRO 2 UNITS: 100 INJECTION, SOLUTION INTRAVENOUS; SUBCUTANEOUS at 12:09

## 2025-05-03 RX ADMIN — PIPERACILLIN AND TAZOBACTAM 3375 MG: 3; .375 INJECTION, POWDER, LYOPHILIZED, FOR SOLUTION INTRAVENOUS at 09:28

## 2025-05-03 RX ADMIN — HEPARIN SODIUM 2000 UNITS: 1000 INJECTION INTRAVENOUS; SUBCUTANEOUS at 20:10

## 2025-05-03 RX ADMIN — AMIODARONE HYDROCHLORIDE 0.5 MG/MIN: 1.8 INJECTION, SOLUTION INTRAVENOUS at 11:28

## 2025-05-03 RX ADMIN — MORPHINE SULFATE 2 MG: 2 INJECTION, SOLUTION INTRAMUSCULAR; INTRAVENOUS at 13:42

## 2025-05-03 RX ADMIN — SODIUM CHLORIDE, PRESERVATIVE FREE 10 ML: 5 INJECTION INTRAVENOUS at 09:28

## 2025-05-03 RX ADMIN — DIAZEPAM 2.5 MG: 5 INJECTION, SOLUTION INTRAMUSCULAR; INTRAVENOUS at 22:48

## 2025-05-03 RX ADMIN — POTASSIUM CHLORIDE 10 MEQ: 7.46 INJECTION, SOLUTION INTRAVENOUS at 22:40

## 2025-05-03 RX ADMIN — DEXTROSE MONOHYDRATE: 5 INJECTION, SOLUTION INTRAVENOUS at 12:05

## 2025-05-03 RX ADMIN — AMIODARONE HYDROCHLORIDE 0.5 MG/MIN: 1.8 INJECTION, SOLUTION INTRAVENOUS at 23:34

## 2025-05-03 RX ADMIN — MORPHINE SULFATE 2 MG: 2 INJECTION, SOLUTION INTRAMUSCULAR; INTRAVENOUS at 01:52

## 2025-05-03 RX ADMIN — INSULIN LISPRO 4 UNITS: 100 INJECTION, SOLUTION INTRAVENOUS; SUBCUTANEOUS at 20:16

## 2025-05-03 RX ADMIN — COLLAGENASE SANTYL: 250 OINTMENT TOPICAL at 09:26

## 2025-05-03 RX ADMIN — POTASSIUM CHLORIDE 10 MEQ: 7.46 INJECTION, SOLUTION INTRAVENOUS at 23:41

## 2025-05-03 RX ADMIN — MORPHINE SULFATE 2 MG: 2 INJECTION, SOLUTION INTRAMUSCULAR; INTRAVENOUS at 20:25

## 2025-05-03 RX ADMIN — INSULIN LISPRO 4 UNITS: 100 INJECTION, SOLUTION INTRAVENOUS; SUBCUTANEOUS at 16:57

## 2025-05-03 RX ADMIN — PIPERACILLIN AND TAZOBACTAM 3375 MG: 3; .375 INJECTION, POWDER, LYOPHILIZED, FOR SOLUTION INTRAVENOUS at 02:03

## 2025-05-03 RX ADMIN — POTASSIUM CHLORIDE 10 MEQ: 7.46 INJECTION, SOLUTION INTRAVENOUS at 20:07

## 2025-05-03 ASSESSMENT — PAIN SCALES - GENERAL: PAINLEVEL_OUTOF10: 9

## 2025-05-03 ASSESSMENT — PAIN DESCRIPTION - LOCATION: LOCATION: SHOULDER

## 2025-05-03 NOTE — PROGRESS NOTES
[facility] CARDIOLOGY                    Cardiology Care Note     []Initial Encounter     [x]Follow-up    Patient Name: Orlin San - :1945 - MRN:[id number]  Primary Cardiologist: none   Consulting Cardiologist: Juhi Elmore MD     Reason for encounter: a fib    HPI:       Orlin San is a 79 y.o. male with PMH significant for type 2 diabetes, questionable Parkinson disease, hypertension, hyperlipidemia, who was brought to the ER after his daughter found him on the floor, last known contact was on  of last month.  According to the daughter, he stopped responding to text messages and phone calls.  When she went to his house, he was found lying on the side in the kitchen.  He was incontinent of stool and urine.  It is not clear how long has he been lying on the floor.  Initially, the patient was hypothermic, hypotensive, was in AFib with RVR and altered mental status.  Per dtr a fib is new      Subjective:      Orlin San reports none due to confusion.     Assessment and Plan     1 a fib RVR/minimally elevated troponin   - HR acceptable, goal <110 bpm  - if can take PO, would start Metoprolol tartrate 25 mg BID and wean off Diltiazem gtt  - cont IV amio gtt  - on heparin gtt, head CT scan negative for any bleed  - ECHO read pending  - TSH wnl  - all likely driven by metabolic derangement  - would recommend treating Hypernatremia per Hospitalist and Nephrology  - once metabolically stable, can consider DCCV and transition to PO OAC (likely Eliquis vs. Coumadin given HAYLIE)    2 Hypernatremia  Significant elevation at 161  - likely driving AMS  - will need to be carefully corrected per medicine team and Nephrology    3 HAYLIE:   - nephrology consulted    4 AMS:   Multifactorial, could be contribution from hyperNa  - Neurology consulted  - Head MRI ordered    Cardiology will follow on as needed basis, will check back in on Monday as metabolic abnormalities and mental status improves

## 2025-05-03 NOTE — PROGRESS NOTES
Repeat labs with Na 161 and glucose of 85. Was given D10 earlier for hypoglycemia.  Na increased from 153 to 161 while on 1/2NS.     PLAN  Change 1/2NS to D5W @ 125ml?hr

## 2025-05-03 NOTE — PROGRESS NOTES
Hospitalist Progress Note      NAME:  Orlin San   :  1945  MRM:  [id number]    Date/Time: 5/3/2025  9:43 AM           Assessment / Plan:       The patient is a 79-year-old gentleman who has previous history significant for type 2 diabetes, hypertension, hyperlipidemia, questionable Parkinson disease, who is admitted with:      A-fib with RVR: No prior history of A-fib.  Noted A-fib with RVR in the ED. initially on Cardizem drip, now on amnio drip as per cardio. HR improving.  TTE done this morning, report pending.  Anticoagulation as per cardiology.  Continue to monitor on telemetry.    Minimally elevated troponin: Possibly due to A-fib with RVR.  TTE pending.  Cardio following.  Continue to monitor.    Hypotension: Possibly dehydration and A-fib w/RVR. Cannot rule out septic component.  Continue IV fluids for now.  May need to be upgraded to ICU for pressors if unresponsive to fluids.  Will closely monitor in the stepdown unit for now.    Acute metabolic encephalopathy: Likely due to hyponatremia, HAYLIE, dehydration.  Possible underlying cognitive issues.  Rule out CVA.  CT head showed no acute findings.  MRI brain has been ordered but will be a challenge given AMS/agitations.  Will premedicate if needed.  Neurology following.   IV Zyprexa as needed for agitations.    Bacteremia/sepsis: Blood culture growing CoNS and Enterococcus faecalis.  Unclear source of bacteremia.  He has multiple wounds, pictures in the media, may be contributing to bacteremia.  Stopped vancomycin, can change to Unasyn but already on Zosyn.  Will get ID consult.  CT chest/abdomen/pelvis showed no evidence of infection.  TTE pending.    HAYLIE: Likely due to hypotension/IVVD.  May have underlying CKD.  US renal showed no acute findings.  Continue continue IV fluids as per nephro.  Avoid nephrotoxins.    Hypernatremia: Agree with changing 1/2NS to D5W.  Continue to monitor with serial BMPs.  Nephro on board.    Type 2 diabetes with

## 2025-05-04 LAB
AMMONIA PLAS-SCNC: <10 UMOL/L
ANION GAP SERPL CALC-SCNC: 6 MMOL/L (ref 2–12)
ANION GAP SERPL CALC-SCNC: 7 MMOL/L (ref 2–12)
ANION GAP SERPL CALC-SCNC: 8 MMOL/L (ref 2–12)
BACTERIA SPEC CULT: ABNORMAL
BUN SERPL-MCNC: 56 MG/DL (ref 6–20)
BUN SERPL-MCNC: 59 MG/DL (ref 6–20)
BUN SERPL-MCNC: 64 MG/DL (ref 6–20)
BUN/CREAT SERPL: 30 (ref 12–20)
BUN/CREAT SERPL: 33 (ref 12–20)
BUN/CREAT SERPL: 37 (ref 12–20)
CALCIUM SERPL-MCNC: 7.5 MG/DL (ref 8.5–10.1)
CALCIUM SERPL-MCNC: 7.7 MG/DL (ref 8.5–10.1)
CALCIUM SERPL-MCNC: 7.8 MG/DL (ref 8.5–10.1)
CHLORIDE SERPL-SCNC: 112 MMOL/L (ref 97–108)
CHLORIDE SERPL-SCNC: 118 MMOL/L (ref 97–108)
CHLORIDE SERPL-SCNC: 123 MMOL/L (ref 97–108)
CO2 SERPL-SCNC: 25 MMOL/L (ref 21–32)
CO2 SERPL-SCNC: 27 MMOL/L (ref 21–32)
CO2 SERPL-SCNC: 27 MMOL/L (ref 21–32)
COMMENT:: NORMAL
CREAT SERPL-MCNC: 1.73 MG/DL (ref 0.7–1.3)
CREAT SERPL-MCNC: 1.79 MG/DL (ref 0.7–1.3)
CREAT SERPL-MCNC: 1.88 MG/DL (ref 0.7–1.3)
GLUCOSE BLD STRIP.AUTO-MCNC: 176 MG/DL (ref 65–117)
GLUCOSE BLD STRIP.AUTO-MCNC: 194 MG/DL (ref 65–117)
GLUCOSE BLD STRIP.AUTO-MCNC: 198 MG/DL (ref 65–117)
GLUCOSE BLD STRIP.AUTO-MCNC: 220 MG/DL (ref 65–117)
GLUCOSE BLD STRIP.AUTO-MCNC: 239 MG/DL (ref 65–117)
GLUCOSE SERPL-MCNC: 160 MG/DL (ref 65–100)
GLUCOSE SERPL-MCNC: 162 MG/DL (ref 65–100)
GLUCOSE SERPL-MCNC: 408 MG/DL (ref 65–100)
POTASSIUM SERPL-SCNC: 3.3 MMOL/L (ref 3.5–5.1)
POTASSIUM SERPL-SCNC: 3.3 MMOL/L (ref 3.5–5.1)
POTASSIUM SERPL-SCNC: 4.7 MMOL/L (ref 3.5–5.1)
SERVICE CMNT-IMP: ABNORMAL
SODIUM SERPL-SCNC: 147 MMOL/L (ref 136–145)
SODIUM SERPL-SCNC: 151 MMOL/L (ref 136–145)
SODIUM SERPL-SCNC: 155 MMOL/L (ref 136–145)
SPECIMEN HOLD: NORMAL
UFH PPP CHRO-ACNC: 0.23 IU/ML
UFH PPP CHRO-ACNC: 0.37 IU/ML
UFH PPP CHRO-ACNC: 0.43 IU/ML

## 2025-05-04 PROCEDURE — 2580000003 HC RX 258: Performed by: NURSE PRACTITIONER

## 2025-05-04 PROCEDURE — 6360000002 HC RX W HCPCS: Performed by: INTERNAL MEDICINE

## 2025-05-04 PROCEDURE — 2580000003 HC RX 258: Performed by: HOSPITALIST

## 2025-05-04 PROCEDURE — 2500000003 HC RX 250 WO HCPCS: Performed by: NURSE PRACTITIONER

## 2025-05-04 PROCEDURE — 6360000002 HC RX W HCPCS: Performed by: HOSPITALIST

## 2025-05-04 PROCEDURE — 2500000003 HC RX 250 WO HCPCS: Performed by: HOSPITALIST

## 2025-05-04 PROCEDURE — 2060000000 HC ICU INTERMEDIATE R&B

## 2025-05-04 PROCEDURE — 6360000002 HC RX W HCPCS: Performed by: STUDENT IN AN ORGANIZED HEALTH CARE EDUCATION/TRAINING PROGRAM

## 2025-05-04 PROCEDURE — 82140 ASSAY OF AMMONIA: CPT

## 2025-05-04 PROCEDURE — 6360000002 HC RX W HCPCS

## 2025-05-04 PROCEDURE — 97161 PT EVAL LOW COMPLEX 20 MIN: CPT

## 2025-05-04 PROCEDURE — 36415 COLL VENOUS BLD VENIPUNCTURE: CPT

## 2025-05-04 PROCEDURE — 82962 GLUCOSE BLOOD TEST: CPT

## 2025-05-04 PROCEDURE — 80048 BASIC METABOLIC PNL TOTAL CA: CPT

## 2025-05-04 PROCEDURE — 85520 HEPARIN ASSAY: CPT

## 2025-05-04 PROCEDURE — 97530 THERAPEUTIC ACTIVITIES: CPT | Performed by: OCCUPATIONAL THERAPIST

## 2025-05-04 PROCEDURE — 97530 THERAPEUTIC ACTIVITIES: CPT

## 2025-05-04 PROCEDURE — 94761 N-INVAS EAR/PLS OXIMETRY MLT: CPT

## 2025-05-04 PROCEDURE — 2500000003 HC RX 250 WO HCPCS

## 2025-05-04 PROCEDURE — 97165 OT EVAL LOW COMPLEX 30 MIN: CPT | Performed by: OCCUPATIONAL THERAPIST

## 2025-05-04 PROCEDURE — 6370000000 HC RX 637 (ALT 250 FOR IP): Performed by: STUDENT IN AN ORGANIZED HEALTH CARE EDUCATION/TRAINING PROGRAM

## 2025-05-04 RX ORDER — VANCOMYCIN 2 GRAM/500 ML IN 0.9 % SODIUM CHLORIDE INTRAVENOUS
2000 ONCE
Status: COMPLETED | OUTPATIENT
Start: 2025-05-04 | End: 2025-05-04

## 2025-05-04 RX ORDER — POTASSIUM CHLORIDE 7.45 MG/ML
10 INJECTION INTRAVENOUS
Status: DISPENSED | OUTPATIENT
Start: 2025-05-04 | End: 2025-05-04

## 2025-05-04 RX ORDER — DIAZEPAM 10 MG/2ML
2.5 INJECTION, SOLUTION INTRAMUSCULAR; INTRAVENOUS ONCE
Status: COMPLETED | OUTPATIENT
Start: 2025-05-04 | End: 2025-05-04

## 2025-05-04 RX ADMIN — PIPERACILLIN AND TAZOBACTAM 3375 MG: 3; .375 INJECTION, POWDER, LYOPHILIZED, FOR SOLUTION INTRAVENOUS at 01:11

## 2025-05-04 RX ADMIN — POTASSIUM CHLORIDE 10 MEQ: 10 INJECTION, SOLUTION INTRAVENOUS at 21:45

## 2025-05-04 RX ADMIN — COLLAGENASE SANTYL: 250 OINTMENT TOPICAL at 09:28

## 2025-05-04 RX ADMIN — AMIODARONE HYDROCHLORIDE 0.5 MG/MIN: 1.8 INJECTION, SOLUTION INTRAVENOUS at 22:58

## 2025-05-04 RX ADMIN — DEXTROSE MONOHYDRATE: 5 INJECTION, SOLUTION INTRAVENOUS at 00:47

## 2025-05-04 RX ADMIN — INSULIN LISPRO 4 UNITS: 100 INJECTION, SOLUTION INTRAVENOUS; SUBCUTANEOUS at 09:15

## 2025-05-04 RX ADMIN — MORPHINE SULFATE 2 MG: 2 INJECTION, SOLUTION INTRAMUSCULAR; INTRAVENOUS at 20:50

## 2025-05-04 RX ADMIN — SODIUM CHLORIDE, PRESERVATIVE FREE 10 ML: 5 INJECTION INTRAVENOUS at 20:51

## 2025-05-04 RX ADMIN — AMIODARONE HYDROCHLORIDE 0.5 MG/MIN: 1.8 INJECTION, SOLUTION INTRAVENOUS at 11:09

## 2025-05-04 RX ADMIN — MORPHINE SULFATE 2 MG: 2 INJECTION, SOLUTION INTRAMUSCULAR; INTRAVENOUS at 10:25

## 2025-05-04 RX ADMIN — INSULIN LISPRO 4 UNITS: 100 INJECTION, SOLUTION INTRAVENOUS; SUBCUTANEOUS at 11:15

## 2025-05-04 RX ADMIN — WATER 5 MG: 1 INJECTION INTRAMUSCULAR; INTRAVENOUS; SUBCUTANEOUS at 05:28

## 2025-05-04 RX ADMIN — Medication 2000 MG: at 10:58

## 2025-05-04 RX ADMIN — INSULIN LISPRO 4 UNITS: 100 INJECTION, SOLUTION INTRAVENOUS; SUBCUTANEOUS at 15:53

## 2025-05-04 RX ADMIN — HEPARIN SODIUM 13 UNITS/KG/HR: 10000 INJECTION, SOLUTION INTRAVENOUS at 03:58

## 2025-05-04 RX ADMIN — PIPERACILLIN AND TAZOBACTAM 3375 MG: 3; .375 INJECTION, POWDER, LYOPHILIZED, FOR SOLUTION INTRAVENOUS at 09:25

## 2025-05-04 RX ADMIN — MORPHINE SULFATE 2 MG: 2 INJECTION, SOLUTION INTRAMUSCULAR; INTRAVENOUS at 15:53

## 2025-05-04 RX ADMIN — DIAZEPAM 2.5 MG: 5 INJECTION, SOLUTION INTRAMUSCULAR; INTRAVENOUS at 04:33

## 2025-05-04 RX ADMIN — SODIUM CHLORIDE: 0.9 INJECTION, SOLUTION INTRAVENOUS at 20:40

## 2025-05-04 RX ADMIN — DEXTROSE MONOHYDRATE: 5 INJECTION, SOLUTION INTRAVENOUS at 22:55

## 2025-05-04 RX ADMIN — HEPARIN SODIUM 2000 UNITS: 1000 INJECTION INTRAVENOUS; SUBCUTANEOUS at 10:52

## 2025-05-04 RX ADMIN — POTASSIUM CHLORIDE 10 MEQ: 7.46 INJECTION, SOLUTION INTRAVENOUS at 20:40

## 2025-05-04 ASSESSMENT — PAIN DESCRIPTION - LOCATION
LOCATION: GENERALIZED
LOCATION: GENERALIZED

## 2025-05-04 ASSESSMENT — PAIN SCALES - GENERAL
PAINLEVEL_OUTOF10: 7
PAINLEVEL_OUTOF10: 0
PAINLEVEL_OUTOF10: 7

## 2025-05-04 NOTE — PROGRESS NOTES
Pt oriented enough to give medication allergy info, which is penicillins. Pt specifically mentioned ampicillin, which had just recently been ordered for patient. It flagged once I put allergy info in, and I notified Dr. Vargas. He still wanted me to give med but pt and family refused. Reported that to Dr. Vargas and he is deferring to ID for abx recommendations.

## 2025-05-04 NOTE — CARE COORDINATION
Care Management Initial Assessment  5/4/2025 2:56 PM  If patient is discharged prior to next notation, then this note serves as note for discharge by case management.    Reason for Admission:   Dehydration [E86.0]  Lactic acidosis [E87.20]  Hypermagnesemia [E83.41]  Hypernatremia [E87.0]  Shock (HCC) [R57.9]  Hyperglycemia [R73.9]  Elevated troponin [R79.89]  Abrasions of multiple sites [T07.XXXA]  HAYLIE (acute kidney injury) [N17.9]  Altered mental status, unspecified altered mental status type [R41.82]  Atrial fibrillation, unspecified type (HCC) [I48.91]  Pressure injury of skin of right hip, unspecified injury stage [L89.219]         Patient Admission Status: Inpatient  Date Admitted to INP: 5/1/2025  RUR: Readmission Risk Score: 12.2    Hospitalization in the last 30 days (Readmission):  No        Advance Care Planning:  Code Status: Full Code  Primary Healthcare Decision Maker: (P) Legal Next of Kin   Advance Directive: has NO advanced directive - not interested in additional information     __________________________________________________________________________  Assessment:      05/04/25 1444   Service Assessment   Patient Orientation Alert and Oriented   Cognition Alert  (extremely [place])   History Provided By Child/Family  (patient agreed to CM completing hx with daughter)   Accompanied By/Relationship spoke with patient daughterMerlyn and sonJimmy   Support Systems Children   Patient's Healthcare Decision Maker is: Legal Next of Kin   PCP Verified by CM Yes  (Dr. Dov George)   Last Visit to PCP   (unsure of last visit)   Prior Functional Level Mobility;Housework   Current Functional Level Assistance with the following:;Mobility;Toileting;Bathing;Shopping;Cooking;Housework   Can patient return to prior living arrangement No   Ability to make needs known: Fair   Family able to assist with home care needs: No   Would you like for me to discuss the discharge plan with any other family

## 2025-05-04 NOTE — PROGRESS NOTES
1445 : TRANSFER - IN REPORT:    Verbal report received from Antoine SERRANO on Orlin San  being received from ICU for routine progression of patient care      Report consisted of patient's Situation, Background, Assessment and   Recommendations(SBAR).     Information from the following report(s) Nurse Handoff Report, Recent Results, Cardiac Rhythm Afib rate controlled, Neuro Assessment, and Event Log was reviewed with the receiving nurse.    Opportunity for questions and clarification was provided.      Assessment completed upon patient's arrival to unit and care assumed.       1900 : Bedside and Verbal shift change report given to Della SERRANO (oncoming nurse) by Sade SERRANO (offgoing nurse). Report included the following information Nurse Handoff Report, Recent Results, Cardiac Rhythm afib rate controlled , and Event Log.

## 2025-05-04 NOTE — PLAN OF CARE
Problem: Physical Therapy - Adult  Goal: By Discharge: Performs mobility at highest level of function for planned discharge setting.  See evaluation for individualized goals.  Description: FUNCTIONAL STATUS PRIOR TO ADMISSION: Patient was modified independent using a single point cane for functional mobility but daughter reports he has likely needed more support than the cane for some time. Patient lives alone in a home daughter states is \"likely unlivable\".  Daughter is a PT at [employer] and reports texting with dad every few days and usually sees him monthly.     Physical Therapy Goals  Initiated 5/4/2025  1.  Patient will move from supine to sit and sit to supine, scoot up and down, and roll side to side in bed with moderate assistance within 7 day(s).    2.  Patient will perform sit to stand with maximal assistance within 7 day(s).  3.  Patient will transfer from bed to chair and chair to bed with maximal assistance using the least restrictive device within 7 day(s).  4.  Patient will ambulate with maximal assistance for 5 feet with the least restrictive device within 7 day(s).   5.  Patient will sit up in chair 2 hours at a time to improve OOB tolerance within 7 day(s).        Outcome: Not Progressing   PHYSICAL THERAPY EVALUATION    Patient: Orlin San (79 y.o. male)  Date: 5/4/2025  Primary Diagnosis: Dehydration [E86.0]  Lactic acidosis [E87.20]  Hypermagnesemia [E83.41]  Hypernatremia [E87.0]  Shock (HCC) [R57.9]  Hyperglycemia [R73.9]  Elevated troponin [R79.89]  Abrasions of multiple sites [T07.XXXA]  HAYLIE (acute kidney injury) [N17.9]  Altered mental status, unspecified altered mental status type [R41.82]  Atrial fibrillation, unspecified type (HCC) [I48.91]  Pressure injury of skin of right hip, unspecified injury stage [L89.219]       Precautions: Restrictions/Precautions  Restrictions/Precautions: Fall Risk, Skin            ASSESSMENT :   DEFICITS/IMPAIRMENTS:   The patient is limited by decreased  is needed standing up from a chair using your arms?: Total  How much help is needed walking in hospital room?: Total  How much help is needed climbing 3-5 steps with a railing?: Total    -PAC Inpatient Mobility Raw Score : 8  AM-PAC Inpatient T-Scale Score : 28.52     Cutoff score <=171,2,3 had higher odds of discharging home with home health or need of SNF/IPR.    1. Michelle Yepez, Dorota Dela Cruz, Dante Hussein, Vandana Wu, Hernán Henson, Alexander Yepez.  Validity of the AM-PAC “6-Clicks” Inpatient Daily Activity and Basic Mobility Short Forms. Physical Therapy Mar 2014, 94 3) 379-391; DOI: 10.2522/ptj.79046586  2. Rene GOFF, Marcio GEE, Elissa GEE, Chan GEE. Association of AM-PAC \"6-Clicks\" Basic Mobility and Daily Activity Scores With Discharge Destination. Phys Ther. 2021 4;101(4):uvhm452. doi: 10.1093/ptj/stuu931. PMID: 95293551.  3. Sofia GEE, Joseph D, Kristyn S, Nancy K, Bud S. Activity Measure for Post-Acute Care \"6-Clicks\" Basic Mobility Scores Predict Discharge Destination After Acute Care Hospitalization in Select Patient Groups: A Retrospective, Observational Study. Arch Rehabil Res Clin Transl. 2022;4(3):401621. doi: 10.1016/j.arrct..229741. PMID: 77417692; PMCID: PBA5398029.  4. Marleni SEN, Linda S, Declan W, Justyna P. AM-PAC Short Forms Manual 4.0. Revised 2020.                                                                                                                                                                                                                               Pain Ratin-10/10 trunk and all extremities with any movement    Pain Intervention(s):   nursing notified and addressing and patient medicated for pain prior to session    Activity Tolerance:   Fair     After treatment:   Patient left in no apparent distress in bed and placed in chair position., Call bell within reach, Bed/ chair alarm activated, and Caregiver / family

## 2025-05-04 NOTE — PROGRESS NOTES
[facility] Dosing Services: Antimicrobial Stewardship Daily Doc  Consult for antibiotic dosing of vancomycin by Dr. Vargas  Indication: SSTI, bloodstream infection  Day of Therapy: 1 of 7   (Received one dose of vancomycin 2000 mg on 5/1)    Ht Readings from Last 1 Encounters:   05/03/25 1.702 m (5' 7\")        Wt Readings from Last 1 Encounters:   05/03/25 83 kg (183 lb)      Vancomycin therapy:  Loading dose: Vancomycin 2000 mg x1 dose now  Maintenance dose: Vancomycin 1000 mg IV every 24 hours   Dose calculated to approximate a           a. Target AUC/BHUMIKA of 400-600          b. Trough of 15-20 mcg/mL   Last level: n/a  Plan:   Scr 1.79 (stable); WBC WNL; afebrile; blood cx with CoNS and enterococcus faecalis; likely from multiple wounds; ID consulted  Regimen above predicts AUCss 500 , Tr 15.2 mg/L, T1/2 = 22.5 hr per Bayesian kinetics calculations   Level within 24-48 hours of first maintenance dose (not yet ordered)    Dose administration notes: new start    Other Antimicrobial   (not dosed by pharmacist) Unasyn 3 gm IV Q6H  Zosyn 5/2-5/4   Vanc x 1 dose on 5/1   Cultures 5/1 Blood - MRSE and enterococcus faecalis sens vanc FINAL  5/1 Resp panel - none detected FINAL   Serum Creatinine Lab Results   Component Value Date/Time    CREATININE 1.79 05/04/2025 09:21 AM      Creatinine Clearance Estimated Creatinine Clearance: 35 mL/min (A) (based on SCr of 1.79 mg/dL (H)).     Temp Temp: 98 °F (36.7 °C) (Axillary)       WBC Lab Results   Component Value Date/Time    WBC 7.5 05/03/2025 02:04 AM      Procalcitonin Lab Results   Component Value Date/Time    PROCAL 0.29 05/01/2025 03:42 PM      For Antifungals, Metronidazole and Nafcillin: Lab Results   Component Value Date/Time    ALT 63 05/02/2025 10:27 AM    AST 53 05/02/2025 10:27 AM        Thank you,  Gabino Monteiro, Pharm D, BCPS  [phone]

## 2025-05-04 NOTE — PROGRESS NOTES
[facility]    Orlin San  YOB: 1945          Assessment & Plan:     HAYLIE, baseline Cr unknown. Likely due to IVVD +/- ATN/sepsis. Cannot r/o CKD.Cr stable.  AMS  AF RVR  HTN  Gilbert's disease  Lactic acidosis  Hypernatremia improving  Hypokalemia  Staph epi and Enterococccal bactremia    Rec:  Continue hypotonic IVF, D5W  Kcl suppl  Avoid nephrotoxins  Abx per primary team  Dose vanco per level  Monitor serial labs  D/w daughter at bedside       Subjective:   CC: HAYLIE  Sleepy  No resp distress on O2    HPI: 79 WM is seen for HAYLIE. He  has a h/o HTN and Gilbert's disease. There is no known CKD per his daughter and no baseline Cr available. He was found down at home confused. In the ER, Na was 157 and Cr 2.69. With IVF, Na is 160 and Cr 2.31. CK is normal. Renal US was normal. UA showed 30 mg/dl protein and no blood and was +for ketones. CBC is unremarkable. Calcium is normal.   ROS: Unable to obtain due to AMS  PMH: HTN, Gilbert's disease  SH: lives alone  Current Facility-Administered Medications   Medication Dose Route Frequency    OLANZapine (ZyPREXA) 5 mg in sterile water 1 mL injection  5 mg IntraMUSCular TID PRN    [START ON 5/5/2025] vancomycin (VANCOCIN) 1,000 mg in sodium chloride 0.9 % 250 mL IVPB ([id number])  1,000 mg IntraVENous Q24H    dextrose 5 % solution   IntraVENous Continuous    metoprolol tartrate (LOPRESSOR) tablet 25 mg  25 mg Oral BID    morphine (PF) injection 2 mg  2 mg IntraVENous Q4H PRN    glucose chewable tablet 16 g  4 tablet Oral PRN    dextrose bolus 10% 125 mL  125 mL IntraVENous PRN    Or    dextrose bolus 10% 250 mL  250 mL IntraVENous PRN    glucagon injection 1 mg  1 mg SubCUTAneous PRN    dextrose 10 % infusion   IntraVENous Continuous PRN    amiodarone (NEXTERONE) 360 mg in dextrose 5% 200 ml  0.5 mg/min IntraVENous Continuous    collagenase ointment   Topical Daily    insulin lispro (HUMALOG,ADMELOG)    ECG/rhythm:    Data Review        Recent Labs     05/04/25  0224 05/04/25  0921 05/04/25  1724   * 147* 151*   K 4.7 3.3* 3.3*   * 112* 118*   CO2 25 27 27   BUN 64* 59* 56*   CREATININE 1.73* 1.79* 1.88*   GLUCOSE 160* 408* 162*   CALCIUM 7.5* 7.7* 7.8*           : Esther Doe MD  5/4/2025        [facility]:  [email or web address]  [email or web address]    [place] office:  [address], [address]  [place] [address]  Phone: [phone]  Fax :     [phone]    [place] office:  [address] [place] [address]  Phone - [phone]  Fax - [phone]

## 2025-05-04 NOTE — CARE COORDINATION
05/04/25    CM spoke with patient at bedside. Difficulty communicating with patient d/t extremely hard of hearing. CM communicated with patient by in writing, he agrees to CM contacting his daughter to discuss discharge plan.

## 2025-05-04 NOTE — PROGRESS NOTES
Hospitalist Progress Note      NAME:  Orlin San   :  1945  MRM:  [id number]    Date/Time: 2025  9:45 AM           Assessment / Plan:       The patient is a 79-year-old gentleman who has previous history significant for type 2 diabetes, hypertension, hyperlipidemia, questionable Parkinson disease, who is admitted with:      A-fib with RVR: No prior history of A-fib.  Noted A-fib with RVR in the ED. initially on Cardizem drip, now on amnio drip as per cardio. HR improving.  TTE showed EF 45 to 50%, mild global hypokinesis present, moderate to severe aortic stenosis.    Anticoagulation as per cardiology.  Can start on oral once no longer NPO.  Continue to monitor on telemetry.    Minimally elevated troponin: Possibly due to A-fib with RVR.  TTE as above.  Cardio following.  Continue to monitor.    Hypotension: Possibly dehydration and A-fib w/RVR. Cannot rule out septic component.  Received IV fluids.  BP is better.  Continue to monitor closely in the stepdown.    Acute metabolic encephalopathy: Likely due to hyponatremia, HAYLIE, dehydration.  Possible underlying cognitive issues.  Rule out CVA.  CT head showed no acute findings.  MRI brain has been ordered but will be a challenge given AMS/agitations.  Will premedicate if needed.  Neurology following.   IV Zyprexa as needed for agitations.    Bacteremia/sepsis/multiple infected wounds: Blood culture growing MRSE and Enterococcus faecalis.  Unclear source of bacteremia.  He has multiple wounds, pictures in the media, likely contributing to bacteremia.  Will restart vancomycin to cover for methicillin-resistant staph epi, and will change Zosyn to Unasyn to decrease risk for nephrotoxicity.  Will repeat blood cultures today.  ID consulted, appreciate recommendations. CT chest/abdomen/pelvis showed no evidence of infection.  TTE showed no vegetations as per echo report.    HAYLIE: Likely due to hypotension/IVVD.  May have underlying CKD.  US renal showed  voice, moist mucous membranes  Neck:  Supple, without masses, thyroid non-tender  Resp:  No accessory muscle use, clear breath sounds without wheezes rales or rhonchi  Card:  No murmurs, irregular rate and rhythm, tachycardic  Abd:  Soft, non-tender, non-distended, normoactive bowel sounds are present  Musc:  No cyanosis or clubbing  Skin: Black eschar with erythema on the right knee, right lateral foot, pressure injury right hip/right and left elbow  Neuro:  Cranial nerves 3-12 are grossly intact, generalized weakness, confused  Psych: Agitated       Telemetry reviewed:   AFIB, tachycardiac    Medications Reviewed: (see below)    Lab Data Reviewed: (see below)    ______________________________________________________________________    Medications:     Current Facility-Administered Medications   Medication Dose Route Frequency    OLANZapine (ZyPREXA) 5 mg in sterile water 1 mL injection  5 mg IntraMUSCular TID PRN    dextrose 5 % solution   IntraVENous Continuous    metoprolol tartrate (LOPRESSOR) tablet 25 mg  25 mg Oral BID    morphine (PF) injection 2 mg  2 mg IntraVENous Q4H PRN    glucose chewable tablet 16 g  4 tablet Oral PRN    dextrose bolus 10% 125 mL  125 mL IntraVENous PRN    Or    dextrose bolus 10% 250 mL  250 mL IntraVENous PRN    glucagon injection 1 mg  1 mg SubCUTAneous PRN    dextrose 10 % infusion   IntraVENous Continuous PRN    amiodarone (NEXTERONE) 360 mg in dextrose 5% 200 ml  0.5 mg/min IntraVENous Continuous    collagenase ointment   Topical Daily    insulin lispro (HUMALOG,ADMELOG) injection vial 0-16 Units  0-16 Units SubCUTAneous Q4H    sodium chloride flush 0.9 % injection 5-40 mL  5-40 mL IntraVENous 2 times per day    sodium chloride flush 0.9 % injection 5-40 mL  5-40 mL IntraVENous PRN    0.9 % sodium chloride infusion   IntraVENous PRN    potassium chloride (KLOR-CON) extended release tablet 40 mEq  40 mEq Oral PRN    Or    potassium bicarb-citric acid (EFFER-K) effervescent

## 2025-05-04 NOTE — PLAN OF CARE
Problem: Occupational Therapy - Adult  Goal: By Discharge: Performs self-care activities at highest level of function for planned discharge setting.  See evaluation for individualized goals.  Description: FUNCTIONAL STATUS PRIOR TO ADMISSION:  Pt lived alone and was independent with ADLs and IADLs.  Daughter spoke with pt on phone a couple times a week.  Per daughter, home is unlivable.   Prior Level of Assist for ADLs: Independent, Prior Level of Assist for Homemaking: Independent, Prior Level of Assist for Transfers: Independent, Active : Yes     HOME SUPPORT: Patient lived alone with alone with daughter local.  Daughter is a PT and [employer] OP.    Occupational Therapy Goals:  Initiated 5/4/2025  1.  Patient will perform self-feeding with Moderate Assist within 7 day(s).  2.  Patient will perform grooming with Moderate Assist within 7 day(s).  3.  Patient will perform toilet transfers with Maximal Assist and Assist x2  within 7 day(s).  4.  Patient will participate in upper extremity therapeutic exercise/activities with Minimal Assist for 10 minutes within 7 day(s).    5.  Patient will utilize energy conservation techniques during functional activities with verbal and visual cues within 7 day(s).    Outcome: Progressing     OCCUPATIONAL THERAPY EVALUATION    Patient: Orlin San (79 y.o. male)  Date: 5/4/2025  Primary Diagnosis: Dehydration [E86.0]  Lactic acidosis [E87.20]  Hypermagnesemia [E83.41]  Hypernatremia [E87.0]  Shock (HCC) [R57.9]  Hyperglycemia [R73.9]  Elevated troponin [R79.89]  Abrasions of multiple sites [T07.XXXA]  HAYLIE (acute kidney injury) [N17.9]  Altered mental status, unspecified altered mental status type [R41.82]  Atrial fibrillation, unspecified type (HCC) [I48.91]  Pressure injury of skin of right hip, unspecified injury stage [L89.219]         Precautions: Fall Risk, Skin                  ASSESSMENT :  The patient is limited by decreased functional mobility, independence in ADLs,

## 2025-05-05 PROBLEM — I48.91 ATRIAL FIBRILLATION (HCC): Status: ACTIVE | Noted: 2025-05-05

## 2025-05-05 PROBLEM — B95.7 BACTEREMIA DUE TO COAGULASE-NEGATIVE STAPHYLOCOCCUS: Status: ACTIVE | Noted: 2025-05-05

## 2025-05-05 PROBLEM — R78.81 BACTEREMIA DUE TO COAGULASE-NEGATIVE STAPHYLOCOCCUS: Status: ACTIVE | Noted: 2025-05-05

## 2025-05-05 PROBLEM — B95.2 BACTEREMIA DUE TO ENTEROCOCCUS: Status: ACTIVE | Noted: 2025-05-05

## 2025-05-05 PROBLEM — R78.81 BACTEREMIA DUE TO ENTEROCOCCUS: Status: ACTIVE | Noted: 2025-05-05

## 2025-05-05 PROBLEM — N17.9 AKI (ACUTE KIDNEY INJURY): Status: ACTIVE | Noted: 2025-05-05

## 2025-05-05 LAB
ANION GAP SERPL CALC-SCNC: 5 MMOL/L (ref 2–12)
BUN SERPL-MCNC: 53 MG/DL (ref 6–20)
BUN/CREAT SERPL: 29 (ref 12–20)
CALCIUM SERPL-MCNC: 7.5 MG/DL (ref 8.5–10.1)
CHLORIDE SERPL-SCNC: 113 MMOL/L (ref 97–108)
CO2 SERPL-SCNC: 28 MMOL/L (ref 21–32)
CREAT SERPL-MCNC: 1.85 MG/DL (ref 0.7–1.3)
ERYTHROCYTE [DISTWIDTH] IN BLOOD BY AUTOMATED COUNT: 14.7 % (ref 11.5–14.5)
GLUCOSE BLD STRIP.AUTO-MCNC: 176 MG/DL (ref 65–117)
GLUCOSE BLD STRIP.AUTO-MCNC: 189 MG/DL (ref 65–117)
GLUCOSE BLD STRIP.AUTO-MCNC: 201 MG/DL (ref 65–117)
GLUCOSE BLD STRIP.AUTO-MCNC: 212 MG/DL (ref 65–117)
GLUCOSE BLD STRIP.AUTO-MCNC: 314 MG/DL (ref 65–117)
GLUCOSE SERPL-MCNC: 318 MG/DL (ref 65–100)
HCT VFR BLD AUTO: 37.2 % (ref 36.6–50.3)
HGB BLD-MCNC: 11.5 G/DL (ref 12.1–17)
MCH RBC QN AUTO: 28.7 PG (ref 26–34)
MCHC RBC AUTO-ENTMCNC: 30.9 G/DL (ref 30–36.5)
MCV RBC AUTO: 92.8 FL (ref 80–99)
NRBC # BLD: 0 K/UL (ref 0–0.01)
NRBC BLD-RTO: 0 PER 100 WBC
PLATELET # BLD AUTO: 168 K/UL (ref 150–400)
PMV BLD AUTO: 11.7 FL (ref 8.9–12.9)
POTASSIUM SERPL-SCNC: 3.4 MMOL/L (ref 3.5–5.1)
RBC # BLD AUTO: 4.01 M/UL (ref 4.1–5.7)
SERVICE CMNT-IMP: ABNORMAL
SODIUM SERPL-SCNC: 146 MMOL/L (ref 136–145)
UFH PPP CHRO-ACNC: 0.37 IU/ML
UFH PPP CHRO-ACNC: 0.4 IU/ML
WBC # BLD AUTO: 6.1 K/UL (ref 4.1–11.1)

## 2025-05-05 PROCEDURE — 2500000003 HC RX 250 WO HCPCS: Performed by: NURSE PRACTITIONER

## 2025-05-05 PROCEDURE — 99232 SBSQ HOSP IP/OBS MODERATE 35: CPT | Performed by: SPECIALIST

## 2025-05-05 PROCEDURE — 99223 1ST HOSP IP/OBS HIGH 75: CPT | Performed by: INTERNAL MEDICINE

## 2025-05-05 PROCEDURE — 2580000003 HC RX 258: Performed by: NURSE PRACTITIONER

## 2025-05-05 PROCEDURE — 2060000000 HC ICU INTERMEDIATE R&B

## 2025-05-05 PROCEDURE — 6360000002 HC RX W HCPCS: Performed by: STUDENT IN AN ORGANIZED HEALTH CARE EDUCATION/TRAINING PROGRAM

## 2025-05-05 PROCEDURE — 6370000000 HC RX 637 (ALT 250 FOR IP): Performed by: STUDENT IN AN ORGANIZED HEALTH CARE EDUCATION/TRAINING PROGRAM

## 2025-05-05 PROCEDURE — 6360000002 HC RX W HCPCS: Performed by: HOSPITALIST

## 2025-05-05 PROCEDURE — 97530 THERAPEUTIC ACTIVITIES: CPT

## 2025-05-05 PROCEDURE — 2500000003 HC RX 250 WO HCPCS: Performed by: HOSPITALIST

## 2025-05-05 PROCEDURE — 82962 GLUCOSE BLOOD TEST: CPT

## 2025-05-05 PROCEDURE — 94761 N-INVAS EAR/PLS OXIMETRY MLT: CPT

## 2025-05-05 PROCEDURE — APPSS30 APP SPLIT SHARED TIME 16-30 MINUTES: Performed by: NURSE PRACTITIONER

## 2025-05-05 PROCEDURE — 80048 BASIC METABOLIC PNL TOTAL CA: CPT

## 2025-05-05 PROCEDURE — 97535 SELF CARE MNGMENT TRAINING: CPT

## 2025-05-05 PROCEDURE — 85520 HEPARIN ASSAY: CPT

## 2025-05-05 PROCEDURE — 36415 COLL VENOUS BLD VENIPUNCTURE: CPT

## 2025-05-05 PROCEDURE — 87040 BLOOD CULTURE FOR BACTERIA: CPT

## 2025-05-05 PROCEDURE — 2580000003 HC RX 258: Performed by: STUDENT IN AN ORGANIZED HEALTH CARE EDUCATION/TRAINING PROGRAM

## 2025-05-05 PROCEDURE — 6370000000 HC RX 637 (ALT 250 FOR IP): Performed by: SPECIALIST

## 2025-05-05 PROCEDURE — 92610 EVALUATE SWALLOWING FUNCTION: CPT

## 2025-05-05 PROCEDURE — 85027 COMPLETE CBC AUTOMATED: CPT

## 2025-05-05 RX ORDER — INSULIN GLARGINE 100 [IU]/ML
10 INJECTION, SOLUTION SUBCUTANEOUS NIGHTLY
Status: DISCONTINUED | OUTPATIENT
Start: 2025-05-05 | End: 2025-05-12 | Stop reason: HOSPADM

## 2025-05-05 RX ORDER — INSULIN LISPRO 100 [IU]/ML
0-16 INJECTION, SOLUTION INTRAVENOUS; SUBCUTANEOUS
Status: DISCONTINUED | OUTPATIENT
Start: 2025-05-05 | End: 2025-05-12 | Stop reason: HOSPADM

## 2025-05-05 RX ORDER — MORPHINE SULFATE 4 MG/ML
2 INJECTION, SOLUTION INTRAMUSCULAR; INTRAVENOUS EVERY 4 HOURS PRN
Status: ACTIVE | OUTPATIENT
Start: 2025-05-05 | End: 2025-05-07

## 2025-05-05 RX ORDER — INSULIN LISPRO 100 [IU]/ML
6 INJECTION, SOLUTION INTRAVENOUS; SUBCUTANEOUS
Status: DISCONTINUED | OUTPATIENT
Start: 2025-05-05 | End: 2025-05-12 | Stop reason: HOSPADM

## 2025-05-05 RX ORDER — METOPROLOL TARTRATE 50 MG
50 TABLET ORAL 2 TIMES DAILY
Status: DISCONTINUED | OUTPATIENT
Start: 2025-05-05 | End: 2025-05-12 | Stop reason: HOSPADM

## 2025-05-05 RX ADMIN — METOPROLOL 50 MG: 50 TABLET ORAL at 20:22

## 2025-05-05 RX ADMIN — HEPARIN SODIUM 15 UNITS/KG/HR: 10000 INJECTION, SOLUTION INTRAVENOUS at 02:48

## 2025-05-05 RX ADMIN — AMIODARONE HYDROCHLORIDE 0.5 MG/MIN: 1.8 INJECTION, SOLUTION INTRAVENOUS at 11:46

## 2025-05-05 RX ADMIN — MORPHINE SULFATE 2 MG: 2 INJECTION, SOLUTION INTRAMUSCULAR; INTRAVENOUS at 03:41

## 2025-05-05 RX ADMIN — DEXTROSE MONOHYDRATE: 5 INJECTION, SOLUTION INTRAVENOUS at 05:58

## 2025-05-05 RX ADMIN — INSULIN LISPRO 4 UNITS: 100 INJECTION, SOLUTION INTRAVENOUS; SUBCUTANEOUS at 03:41

## 2025-05-05 RX ADMIN — INSULIN LISPRO 2 UNITS: 100 INJECTION, SOLUTION INTRAVENOUS; SUBCUTANEOUS at 00:55

## 2025-05-05 RX ADMIN — COLLAGENASE SANTYL: 250 OINTMENT TOPICAL at 10:10

## 2025-05-05 RX ADMIN — INSULIN GLARGINE 10 UNITS: 100 INJECTION, SOLUTION SUBCUTANEOUS at 20:19

## 2025-05-05 RX ADMIN — INSULIN LISPRO 4 UNITS: 100 INJECTION, SOLUTION INTRAVENOUS; SUBCUTANEOUS at 13:38

## 2025-05-05 RX ADMIN — INSULIN LISPRO 4 UNITS: 100 INJECTION, SOLUTION INTRAVENOUS; SUBCUTANEOUS at 20:18

## 2025-05-05 RX ADMIN — INSULIN LISPRO 6 UNITS: 100 INJECTION, SOLUTION INTRAVENOUS; SUBCUTANEOUS at 17:38

## 2025-05-05 RX ADMIN — VANCOMYCIN HYDROCHLORIDE 1000 MG: 1 INJECTION, POWDER, LYOPHILIZED, FOR SOLUTION INTRAVENOUS at 11:52

## 2025-05-05 RX ADMIN — SODIUM CHLORIDE, PRESERVATIVE FREE 10 ML: 5 INJECTION INTRAVENOUS at 20:20

## 2025-05-05 ASSESSMENT — PAIN DESCRIPTION - LOCATION
LOCATION: GENERALIZED
LOCATION: GENERALIZED

## 2025-05-05 ASSESSMENT — PAIN SCALES - GENERAL
PAINLEVEL_OUTOF10: 3
PAINLEVEL_OUTOF10: 3

## 2025-05-05 NOTE — PLAN OF CARE
Problem: Occupational Therapy - Adult  Goal: By Discharge: Performs self-care activities at highest level of function for planned discharge setting.  See evaluation for individualized goals.  Description: FUNCTIONAL STATUS PRIOR TO ADMISSION:  Pt lived alone and was independent with ADLs and IADLs.  Daughter spoke with pt on phone a couple times a week.  Per daughter, home is unlivable.   Prior Level of Assist for ADLs: Independent, Prior Level of Assist for Homemaking: Independent, Prior Level of Assist for Transfers: Independent, Active : Yes     HOME SUPPORT: Patient lived alone with alone with daughter local.  Daughter is a PT and [employer] OP.    Occupational Therapy Goals:  Initiated 5/4/2025  1.  Patient will perform self-feeding with Moderate Assist within 7 day(s).  2.  Patient will perform grooming with Moderate Assist within 7 day(s).  3.  Patient will perform toilet transfers with Maximal Assist and Assist x2  within 7 day(s).  4.  Patient will participate in upper extremity therapeutic exercise/activities with Minimal Assist for 10 minutes within 7 day(s).    5.  Patient will utilize energy conservation techniques during functional activities with verbal and visual cues within 7 day(s).    Outcome: Progressing   OCCUPATIONAL THERAPY TREATMENT  Patient: Orlin San (79 y.o. male)  Date: 5/5/2025  Primary Diagnosis: Dehydration [E86.0]  Lactic acidosis [E87.20]  Hypermagnesemia [E83.41]  Hypernatremia [E87.0]  Shock (HCC) [R57.9]  Hyperglycemia [R73.9]  Elevated troponin [R79.89]  Abrasions of multiple sites [T07.XXXA]  HAYLIE (acute kidney injury) [N17.9]  Altered mental status, unspecified altered mental status type [R41.82]  Atrial fibrillation, unspecified type (HCC) [I48.91]  Pressure injury of skin of right hip, unspecified injury stage [L89.219]       Precautions: Fall Risk, Skin                Chart, occupational therapy assessment, plan of care, and goals were reviewed.    ASSESSMENT  Patient

## 2025-05-05 NOTE — PROGRESS NOTES
Bedside and Verbal shift change report given to Whitley Fuentes RN (oncoming nurse) by Della RN (offgoing nurse). Report included the following information Nurse Handoff Report, Intake/Output, MAR, Recent Results, Cardiac Rhythm NSR, Neuro Assessment, and Event Log.     Per night shift, pt failed swallow screen. NPO until after SLP eval.     1335 amio gtt STOPPED per order    Bedside and Verbal shift change report given to Kvng SERRANO (oncoming nurse) by Whitley Fuentes RN (offgoing nurse). Report included the following information Nurse Handoff Report, Intake/Output, MAR, Recent Results, Cardiac Rhythm AFIB, Neuro Assessment, and Event Log.

## 2025-05-05 NOTE — PROGRESS NOTES
[facility] CARDIOLOGY                    Cardiology Care Note     []Initial Encounter     [x]Follow-up    Patient Name: Orlin San - :1945 - MRN:[id number]  Primary Cardiologist: none   Consulting Cardiologist: Juhi Elmore MD     Reason for encounter: a fib    HPI:       Orlin San is a 79 y.o. male with PMH significant for type 2 diabetes, questionable Parkinson disease, hypertension, hyperlipidemia, who was brought to the ER after his daughter found him on the floor, last known contact was on  of last month.  According to the daughter, he stopped responding to text messages and phone calls.  When she went to his house, he was found lying on the side in the kitchen.  He was incontinent of stool and urine.  It is not clear how long has he been lying on the floor.  Initially, the patient was hypothermic, hypotensive, was in AFib with RVR and altered mental status.  Per dtr a fib is new      Subjective:      Orlin San reports none due to confusion, very [place].      Assessment and Plan     1 A fib RVR/minimally elevated troponin   - rate controlled, goal <110 bpm  - still not taking PO  - cont IV amio gtt  - on heparin gtt, head CT scan negative for any bleed  - TTE w/ EF 45-50%  - TSH wnl  - all likely driven by metabolic derangement  - would recommend treating Hypernatremia per Hospitalist and Nephrology  - can consider DCCV as outpt after ~4 weeks of AC    2 Hypernatremia  Significant elevation at 161 on admission  - likely driving AMS  - renal following  - Na 146 today     3 HAYLIE  - nephrology consulted  - Cr 1.85     4 AMS  Multifactorial, could be contribution from hyperNa  - Neurology consulted  - Head MRI ordered    5 LV dysfunction, mildly reduced EF, mod to severe AS  - EF 45-50%  - add/adjust GDMT once taking PO   - further testing pending clinical course       CARDIOLOGY ATTENDING  Patient personally seen and examined. All the elements of history and examination were  thickening.  APPENDIX: Unremarkable  PERITONEUM: No ascites or pneumoperitoneum.  RETROPERITONEUM: Atherosclerotic disease  REPRODUCTIVE ORGANS: Unremarkable  URINARY BLADDER: Unremarkable  BONES: Bilateral hip arthroplasties. Left shoulder arthroplasty. Severe  osteoarthritis of the right shoulder  ADDITIONAL COMMENTS: N/A    Impression  No acute pathology in the chest, abdomen, and pelvis. Incidental findings as  above      Electronically signed by Francisco Barber      Lab Results   Component Value Date    WBC 6.1 05/05/2025    HGB 11.5 (L) 05/05/2025    HCT 37.2 05/05/2025    MCV 92.8 05/05/2025     05/05/2025       No results for input(s): \"CHOL\", \"HDLC\", \"LDLC\", \"HBA1C\" in the last 72 hours.    Invalid input(s): \"TGL\"    Lab Results   Component Value Date/Time     05/05/2025 01:01 AM    K 3.4 05/05/2025 01:01 AM     05/05/2025 01:01 AM    CO2 28 05/05/2025 01:01 AM    BUN 53 05/05/2025 01:01 AM    CREATININE 1.85 05/05/2025 01:01 AM    GLUCOSE 318 05/05/2025 01:01 AM    CALCIUM 7.5 05/05/2025 01:01 AM    LABGLOM 37 05/05/2025 01:01 AM      No results found for: \"BNP\"        Current meds:    Current Facility-Administered Medications:     OLANZapine (ZyPREXA) 5 mg in sterile water 1 mL injection, 5 mg, IntraMUSCular, TID PRN, Maureen Jordan APRN - NP    vancomycin (VANCOCIN) 1,000 mg in sodium chloride 0.9 % 250 mL IVPB ([id number]), 1,000 mg, IntraVENous, Q24H, Greer Vargas MD    dextrose 5 % solution, , IntraVENous, Continuous, Chemo Paul APRN - NP, Last Rate: 125 mL/hr at 05/05/25 0558, New Bag at 05/05/25 0558    metoprolol tartrate (LOPRESSOR) tablet 25 mg, 25 mg, Oral, BID, Marleni Aponte MD    glucose chewable tablet 16 g, 4 tablet, Oral, PRN, Greer Vargas MD    dextrose bolus 10% 125 mL, 125 mL, IntraVENous, PRN, Stopped at 05/02/25 3783 **OR** dextrose bolus 10% 250 mL, 250 mL, IntraVENous, PRSam SHARPE Khan, MD    glucagon injection 1 mg, 1 mg, SubCUTAneous, PRN, Greer Vargas,

## 2025-05-05 NOTE — PLAN OF CARE
Problem: Physical Therapy - Adult  Goal: By Discharge: Performs mobility at highest level of function for planned discharge setting.  See evaluation for individualized goals.  Description: FUNCTIONAL STATUS PRIOR TO ADMISSION: Patient was modified independent using a single point cane for functional mobility but daughter reports he has likely needed more support than the cane for some time. Patient lives alone in a home daughter states is \"likely unlivable\".  Daughter is a PT at [employer] and reports texting with dad every few days and usually sees him monthly.     Physical Therapy Goals  Initiated 5/4/2025  1.  Patient will move from supine to sit and sit to supine, scoot up and down, and roll side to side in bed with moderate assistance within 7 day(s).    2.  Patient will perform sit to stand with maximal assistance within 7 day(s).  3.  Patient will transfer from bed to chair and chair to bed with maximal assistance using the least restrictive device within 7 day(s).  4.  Patient will ambulate with maximal assistance for 5 feet with the least restrictive device within 7 day(s).   5.  Patient will sit up in chair 2 hours at a time to improve OOB tolerance within 7 day(s).        Outcome: Progressing   PHYSICAL THERAPY TREATMENT    Patient: Orlin San (79 y.o. male)  Date: 5/5/2025  Diagnosis: Dehydration [E86.0]  Lactic acidosis [E87.20]  Hypermagnesemia [E83.41]  Hypernatremia [E87.0]  Shock (HCC) [R57.9]  Hyperglycemia [R73.9]  Elevated troponin [R79.89]  Abrasions of multiple sites [T07.XXXA]  HAYLIE (acute kidney injury) [N17.9]  Altered mental status, unspecified altered mental status type [R41.82]  Atrial fibrillation, unspecified type (HCC) [I48.91]  Pressure injury of skin of right hip, unspecified injury stage [L89.219] Hypernatremia      Precautions: Restrictions/Precautions  Restrictions/Precautions: Fall Risk, Skin            ASSESSMENT:  Patient continues to benefit from skilled PT services and is  Limits  Cognition  Arousal/Alertness: Appears intact  Following Commands: Follows one step commands consistently  Attention Span: Difficulty dividing attention;Attends with cues to redirect  Memory: Impaired  Safety Judgement: Decreased awareness of need for assistance;Decreased awareness of need for safety  Problem Solving: Assistance required to correct errors made;Assistance required to identify errors made  Insights: Decreased awareness of deficits  Initiation: Requires cues for some  Sequencing: Requires cues for some    Functional Mobility Training:  Bed Mobility:  Bed Mobility Training  Bed Mobility Training: Yes  Rolling: Substantial/Maximal assistance;2 Person assistance  Supine to Sit: Substantial/Maximal assistance;2 Person assistance  Sit to Supine: Substantial/Maximal assistance;2 Person assistance  Scooting: Substantial/Maximal assistance;2 Person assistance  Transfers:     Balance:  Balance  Sitting: Impaired  Sitting - Static: Poor (constant support);Fair (occasional)  Sitting - Dynamic: Poor (constant support)   Ambulation/Gait Training:              Neuro Re-Education:                    Pain Rating:  Not rated but pain reported in back and both knees with mobility    Pain Intervention(s):   patient medicated for pain prior to session and repositioning    Activity Tolerance:   Fair     After treatment:   Patient left in no apparent distress in bed, Call bell within reach, and Bed/ chair alarm activated Sitter present      COMMUNICATION/EDUCATION:   The patient's plan of care was discussed with: occupational therapist and registered nurse           Lauren Monsalve, PT  Minutes: 27

## 2025-05-05 NOTE — PLAN OF CARE
Speech LAnguage Pathology EVALUATION    Patient: Orlin San (79 y.o. male)  Date: 5/5/2025  Primary Diagnosis: Dehydration [E86.0]  Lactic acidosis [E87.20]  Hypermagnesemia [E83.41]  Hypernatremia [E87.0]  Shock (HCC) [R57.9]  Hyperglycemia [R73.9]  Elevated troponin [R79.89]  Abrasions of multiple sites [T07.XXXA]  HAYLIE (acute kidney injury) [N17.9]  Altered mental status, unspecified altered mental status type [R41.82]  Atrial fibrillation, unspecified type (HCC) [I48.91]  Pressure injury of skin of right hip, unspecified injury stage [L89.219]       Precautions:  Fall Risk, Skin         aspiration         ASSESSMENT :  Patient's bedside swallowing evaluation complicated by wet, nonproductive coughing, even when not eating.  He is currently not appropriate for FEES; may need MBS if not tolerating diet of soft, mildly thick liquids.   Admitted 5-1-25 when found after 1-11 days with fall, AMS.  CXR: CM only. Chest CT: negative. Head CT; negative. PMH: (B) hip replacement, HTN, DM, HLD,  OA, possible PD, [place].       Patient will benefit from skilled intervention to address the above impairments.     PLAN :  Recommendations and Planned Interventions:  Diet: Soft and bite sized and mildly thick liquids  Upright for all PO  Consider MBS if tolerating PT/OT         Acute SLP Services:  . Patient's rehabilitation potential is considered to be Fair.  Discharge Recommendations: Continue to assess pending progress     SUBJECTIVE:   Patient stated, “Oh really? .”    OBJECTIVE:   No past medical history on file.No past surgical history on file.  Prior Level of Function/Home Situation:   Social/Functional History  Lives With: Alone  Type of Home: House  Home Layout: Two level (family believes he is staying on the 1st level-possibly sleeping on sofa)  Home Access: Stairs to enter with rails  Entrance Stairs - Number of Steps: 6-7 steps  Entrance Stairs - Rails: Both  Bathroom Shower/Tub: Walk-in shower  Home Equipment:  Cane  Has the patient had two or more falls in the past year or any fall with injury in the past year?: Yes  Prior Level of Assist for ADLs: Independent  Prior Level of Assist for Homemaking: Independent  Prior Level of Assist for Ambulation: Independent household ambulator, with or without device  Prior Level of Assist for Transfers: Independent  Active : Yes  Mode of Transportation: Car  Occupation: Retired  Type of Occupation: Insurance    Baseline Assessment:     Current Liquid Diet : NPO  Prior Dysphagia History: unknown  Patient Complaint: hungry    Cognitive and Communication Status:  Neurologic State: Alert  Orientation Level: Oriented to person, Oriented to place, Disoriented to situation, and Disoriented to time  Cognition: Impaired decision making, Impulsive, Memory loss, and Poor safety awareness    Dysphagia:  Oral Assessment:  Oral Motor   Labial: Left droop (mild)  Dentition: Natural;Limited  Oral Hygiene Comments: mildly dry  Lingual: No impairment  Mandible: No impairment  P.O. Trials:  PO Trials  Assessment Method(s): Observation;Palpation  Patient Position: upright in bed  Vocal Quality:  (loud and harsh)  Consistency Presented: Thin;Soft & Bite Sized;Pureed;Mildly Thick  How Presented: SLP-fed/Presented;Self-fed/presented;Spoon;Straw;Successive Swallows  Bolus Acceptance:  (difficulty with self feeding at times due to UE tremors)  Bolus Formation/Control: Impaired (reduced chew)  Type of Impairment: Mastication  Propulsion: Delayed (# of seconds)  Oral Residue: None  Initiation of Swallow: Delayed (# of seconds)  Laryngeal Elevation: Weak  Aspiration Signs/Symptoms:  (immediate coughing s/p thins, multiple times. At other random times , he had wet, but nonproductive cough. Sitter reports he has had this even when not eating. difficult to r/o aspiraiton)            Motor Speech:     clear    Language Comprehension and Expression:         Able to communicate wants and needs.   [place]

## 2025-05-05 NOTE — PROGRESS NOTES
Hospitalist Progress Note      NAME:  Orlin San   :  1945  MRM:  [id number]    Date/Time: 2025  11:35 AM           Assessment / Plan:       The patient is a 79-year-old gentleman who has previous history significant for type 2 diabetes, hypertension, hyperlipidemia, questionable Parkinson disease, who is admitted with:      A-fib with RVR: No prior history of A-fib.  Noted A-fib with RVR in the ED. initially on Cardizem drip, now on amnio drip as per cardio. HR improving.  TTE showed EF 45 to 50%, mild global hypokinesis present, moderate to severe aortic stenosis.    Anticoagulation as per cardiology.  Will start on oral once passed swallow.  Continue to monitor on telemetry.       Minimally elevated troponin: Possibly due to A-fib with RVR.  TTE as above.  Cardio following.  Continue to monitor.    Hypotension: Possibly dehydration and A-fib w/RVR. Cannot rule out septic component.  Received IV fluids.  BP is better.  Continue to monitor closely in the stepdown.    Acute metabolic encephalopathy: Likely due to hypernatremia, HAYLIE, dehydration.  Possible underlying cognitive issues.  Rule out CVA.  CT head showed no acute findings.  MRI brain still pending. Will premedicate for MRI if needed.  Neurology following.   IV Zyprexa as needed for agitations.  Speech therapy consulted for swallow eval.    Bacteremia/sepsis/multiple infected wounds: Blood culture growing MRSE and Enterococcus faecalis.  Unclear source of bacteremia.  He has multiple wounds, pictures in the media, likely contributing to bacteremia.  Continue IV vancomycin for methicillin-resistant staph epi and Enterococcus, he is allergic to penicillin/ampicillin although he tolerated Zosyn.  Repeat blood cultures ordered.  ID consulted. Appreciate recommendations. CT chest/abdomen/pelvis showed no evidence of infection.  TTE showed no vegetations as per echo report.    HAYLIE: Likely due to hypotension/IVVD.  May have underlying CKD.  US

## 2025-05-05 NOTE — PROGRESS NOTES
PATRICIO EVANS [facility]    Orlin San  YOB: 1945          Assessment & Plan:     HAYLIE, baseline Cr unknown. Likely due to IVVD +/- ATN/sepsis. Cannot r/o CKD.Cr stable.  AMS  AF RVR  HTN  Gilbert's disease  Lactic acidosis  Hypernatremia improving  Hypokalemia  Staph epi and Enterococccal bactremia  CKD stage G3    Rec:  Cr 1.85  Kcl suppl  Avoid nephrotoxins  Abx per primary team  Dose vanco per level  Controlled BS  Monitor serial labs  D/w daughter at bedside  Need f/u upon DC home       Subjective:   CC: HAYLIE  Cr better 1.8 and K 3.4    ROS: Unable to obtain due to AMS  PMH: HTN, Gilbert's disease  SH: lives alone  Current Facility-Administered Medications   Medication Dose Route Frequency    morphine sulfate (PF) injection 2 mg  2 mg IntraVENous Q4H PRN    potassium bicarb-citric acid (EFFER-K) effervescent tablet 40 mEq  40 mEq Oral Once    [START ON 5/6/2025] Vancomycin Random Level: please draw with morning labs on 05/06/2025. Thank you.  1 each Other Once    OLANZapine (ZyPREXA) 5 mg in sterile water 1 mL injection  5 mg IntraMUSCular TID PRN    vancomycin (VANCOCIN) 1,000 mg in sodium chloride 0.9 % 250 mL IVPB (Fnev2Ylh)  1,000 mg IntraVENous Q24H    metoprolol tartrate (LOPRESSOR) tablet 25 mg  25 mg Oral BID    glucose chewable tablet 16 g  4 tablet Oral PRN    dextrose bolus 10% 125 mL  125 mL IntraVENous PRN    Or    dextrose bolus 10% 250 mL  250 mL IntraVENous PRN    glucagon injection 1 mg  1 mg SubCUTAneous PRN    dextrose 10 % infusion   IntraVENous Continuous PRN    amiodarone (NEXTERONE) 360 mg in dextrose 5% 200 ml  0.5 mg/min IntraVENous Continuous    collagenase ointment   Topical Daily    insulin lispro (HUMALOG,ADMELOG) injection vial 0-16 Units  0-16 Units SubCUTAneous Q4H    sodium chloride flush 0.9 % injection 5-40 mL  5-40 mL IntraVENous 2 times per day    sodium chloride flush 0.9 % injection 5-40 mL  5-40 mL IntraVENous  PRN    0.9 % sodium chloride infusion   IntraVENous PRN    potassium chloride (KLOR-CON) extended release tablet 40 mEq  40 mEq Oral PRN    Or    potassium bicarb-citric acid (EFFER-K) effervescent tablet 40 mEq  40 mEq Oral PRN    Or    potassium chloride 10 mEq/100 mL IVPB (Peripheral Line)  10 mEq IntraVENous PRN    magnesium sulfate 2000 mg in 50 mL IVPB premix  2,000 mg IntraVENous PRN    ondansetron (ZOFRAN-ODT) disintegrating tablet 4 mg  4 mg Oral Q8H PRN    Or    ondansetron (ZOFRAN) injection 4 mg  4 mg IntraVENous Q6H PRN    polyethylene glycol (GLYCOLAX) packet 17 g  17 g Oral Daily PRN    acetaminophen (TYLENOL) tablet 650 mg  650 mg Oral Q6H PRN    Or    acetaminophen (TYLENOL) suppository 650 mg  650 mg Rectal Q6H PRN    heparin (porcine) injection 4,000 Units  4,000 Units IntraVENous PRN    heparin (porcine) injection 2,000 Units  2,000 Units IntraVENous PRN    heparin 25,000 units in dextrose 5% 250 mL (premix) infusion  5-30 Units/kg/hr IntraVENous Continuous          Objective:     Vitals:  Blood pressure 127/80, pulse 67, temperature 98.1 °F (36.7 °C), temperature source Oral, resp. rate 18, height 1.702 m (5' 7\"), weight 83 kg (183 lb), SpO2 98%.  Temp (24hrs), Av.6 °F (36.4 °C), Min:97.3 °F (36.3 °C), Max:98.1 °F (36.7 °C)      Intake and Output:  No intake/output data recorded.   1901 -  0700  In: 3520.5 [I.V.:2477.6]  Out: 1200 [Urine:1200]    Physical Exam:               GENERAL ASSESSMENT: AMS, awake  HEENT: NC   CHEST: CTA  HEART: tachy, s1s2  ABDOMEN: Soft,NT  EXTREMITY: no EDEMA  NEURO: Grossly non focal          ECG/rhythm:    Data Review        Recent Labs     25  0921 25  1724 25  0101   * 151* 146*   K 3.3* 3.3* 3.4*   * 118* 113*   CO2 27 27 28   BUN 59* 56* 53*   CREATININE 1.79* 1.88* 1.85*   GLUCOSE 408* 162* 318*   CALCIUM 7.7* 7.8* 7.5*           : Speedy Alston MD  2025        [facility]

## 2025-05-05 NOTE — CARE COORDINATION
Care Management Progress Note    Reason for Admission:   Dehydration [E86.0]  Lactic acidosis [E87.20]  Hypermagnesemia [E83.41]  Hypernatremia [E87.0]  Shock (HCC) [R57.9]  Hyperglycemia [R73.9]  Elevated troponin [R79.89]  Abrasions of multiple sites [T07.XXXA]  HAYLIE (acute kidney injury) [N17.9]  Altered mental status, unspecified altered mental status type [R41.82]  Atrial fibrillation, unspecified type (HCC) [I48.91]  Pressure injury of skin of right hip, unspecified injury stage [L89.219]         Patient Admission Status: Inpatient  RUR:   Hospitalization in the last 30 days (Readmission):  No        Transition of care plan:  Medical - speech eval pending, transfer from ICU, ongoing medical management  DC plan - recs for SNF rehab, daughter Merlyn,  requested [facility] sent referrals,  gave daughter info for [facility] for placement post dc SNF rehab.  If SNF or IPR:  Date FOC offered:   Accepting facility:   Date authorization started with reference number:   Date authorization received and expires:   Discharge plan communicated with patient and/or discharge caregiver: Yes, spoke with poornima Zuleta   Date 1st IMM letter given:   Outpatient follow-up - per MD  Transport at discharge:  TBD       05/05/25 9952   Condition of Participation: Discharge Planning   The Patient and/or Patient Representative was provided with a Choice of Provider? Patient;Patient Representative   Name of the Patient Representative who was provided with the Choice of Provider and agrees with the Discharge Plan?  daughter Dorota   The Patient and/Or Patient Representative agree with the Discharge Plan? Yes   Freedom of Choice list was provided with basic dialogue that supports the patient's individualized plan of care/goals, treatment preferences, and shares the quality data associated with the providers?  Yes

## 2025-05-05 NOTE — PROGRESS NOTES
Comprehensive Nutrition Assessment    Type and Reason for Visit: Initial, Positive nutrition screen, Wound    Nutrition Recommendations/Plan:   Follow SLP rec's for diet consistency  Provide Ensure High Protein TID (480 kcal, 57 g carbs, 48 g protein)   Chocolate  Provide Vick BID to aid in wound healing (95 kcal, 9.8 g carbs, complete amino acids for wound healing 2.5 g)        Malnutrition Assessment:  Malnutrition Status:  No malnutrition (05/05/25 1217)         Nutrition Assessment:    78 yo male admitted for dehydration.  PMHx: DM, questionable Parkinson disease, HTN, HLD.      MST received for wounds.  Pt with multiple unstageable, stage II and III PI's.  WOCN following. Currently NPO.  Failed bedside swallow eval last night so waiting for SLP evaluation today.  Daughter present at bedside, states pt normally has a good appetite at home.  Currently pt is hungry and wanting to eat.  She does not think he has had any weight change PTA.  No weight hx available in EMR.  Denies difficulty chewing.  Discussed ONS options for when diet ordered, pt has had diabetic protein shakes in past and likes them, chocolate.  Reviewed high protein food options with daughter.  She is not sure he eats protein at all meals at home but she is looking at him going to a rehab facility so he should have meals prepared for him.  Labs: K+ 3.4, BG elevated, HgbA1c 6.9      Nutritionally Significant Medications:  Humalog, Effer-K      Estimated Daily Nutrient Needs:  Energy Requirements Based On: Kcal/kg  Weight Used for Energy Requirements: Current  Energy (kcal/day): 8991-0989 (25-30 kcals/kg)  Weight Used for Protein Requirements: Current  Protein (g/day): 108-124 (1.3-1.5 gm/kg)  Method Used for Fluid Requirements: 1 ml/kcal  Fluid (ml/day): 2080    Nutrition Related Findings:   Edema: Generalized, Right upper extremity, Left upper extremity, Right lower extremity, Left lower extremity      RUE Edema: +2  LUE Edema: +1  RLE Edema:

## 2025-05-05 NOTE — CONSULTS
Infectious Disease Consult    Impression/Plan   Polymicrobial bacteremia.  Blood culture growing different strains of coagulase-negative Staphylococcus plus Enterococcus faecalis in 1 bottle.  TTE negative for vegetation.  UA bland and no evidence of intra-abdominal infection on CT scanning.  Suspect contaminant but will repeat blood cultures for completeness.  Continue vancomycin.  Further recommendations to follow  Altered mental status.  Improving.  Possibly due to hypernatremia.  Neurology following  HAYLIE.  Possible CKD.  Nephrology following  Gilbert's disease.  Total bilirubin 1.7.  AST 51  Penicillin allergy.  This caused anaphylaxis in the past    Anti-infectives:   Vancomycin    Subjective:   Date of Consultation:  May 5, 2025  Date of Admission: 5/1/2025   Referring Physician:     Patient is a 79 y.o. male with a past medical history significant for diabetes mellitus, hypertension,, and dyslipidemia who is being seen for bacteremia.    The patient was found on the floor of his home by his daughter and brought to the ER for evaluation.  Per HPI: last known contact was on 28th of last month. According to the daughter, he stopped responding to text messages and phone calls. When she went to his house, he was found lying on the side in the kitchen. He was incontinent of stool and urine. It is not clear how long has he been lying on the floor. Initially, the patient was hypothermic, hypotensive, was in AFib with RVR and altered mental status\"    At the time of admission patient found to have slightly elevated WBC at 12.6.  He was hypothermic, hypotensive, and found to be in A-fib with RVR.    Blood cultures drawn at the time of admission have returned growing several colony types of coagulase-negative Staphylococcus and Enterococcus faecalis growing in 1 out of 4 bottles.  UA was bland and CT of the chest, abdomen, and pelvis was negative for any acute infectious process    Patient currently on vancomycin.  The

## 2025-05-06 ENCOUNTER — APPOINTMENT (OUTPATIENT)
Facility: HOSPITAL | Age: 80
DRG: 871 | End: 2025-05-06
Payer: MEDICARE

## 2025-05-06 LAB
ANION GAP SERPL CALC-SCNC: 8 MMOL/L (ref 2–12)
BUN SERPL-MCNC: 36 MG/DL (ref 6–20)
BUN/CREAT SERPL: 24 (ref 12–20)
CALCIUM SERPL-MCNC: 8.1 MG/DL (ref 8.5–10.1)
CHLORIDE SERPL-SCNC: 115 MMOL/L (ref 97–108)
CO2 SERPL-SCNC: 26 MMOL/L (ref 21–32)
CREAT SERPL-MCNC: 1.5 MG/DL (ref 0.7–1.3)
GLUCOSE BLD STRIP.AUTO-MCNC: 107 MG/DL (ref 65–117)
GLUCOSE BLD STRIP.AUTO-MCNC: 172 MG/DL (ref 65–117)
GLUCOSE BLD STRIP.AUTO-MCNC: 189 MG/DL (ref 65–117)
GLUCOSE BLD STRIP.AUTO-MCNC: 251 MG/DL (ref 65–117)
GLUCOSE SERPL-MCNC: 142 MG/DL (ref 65–100)
POTASSIUM SERPL-SCNC: 3.4 MMOL/L (ref 3.5–5.1)
SERVICE CMNT-IMP: ABNORMAL
SERVICE CMNT-IMP: NORMAL
SODIUM SERPL-SCNC: 149 MMOL/L (ref 136–145)
UFH PPP CHRO-ACNC: 0.32 IU/ML
VANCOMYCIN SERPL-MCNC: 17.5 UG/ML

## 2025-05-06 PROCEDURE — 70551 MRI BRAIN STEM W/O DYE: CPT

## 2025-05-06 PROCEDURE — 6360000002 HC RX W HCPCS: Performed by: HOSPITALIST

## 2025-05-06 PROCEDURE — 94761 N-INVAS EAR/PLS OXIMETRY MLT: CPT

## 2025-05-06 PROCEDURE — APPSS30 APP SPLIT SHARED TIME 16-30 MINUTES: Performed by: NURSE PRACTITIONER

## 2025-05-06 PROCEDURE — 6360000002 HC RX W HCPCS: Performed by: INTERNAL MEDICINE

## 2025-05-06 PROCEDURE — 2580000003 HC RX 258: Performed by: STUDENT IN AN ORGANIZED HEALTH CARE EDUCATION/TRAINING PROGRAM

## 2025-05-06 PROCEDURE — 36415 COLL VENOUS BLD VENIPUNCTURE: CPT

## 2025-05-06 PROCEDURE — 92526 ORAL FUNCTION THERAPY: CPT

## 2025-05-06 PROCEDURE — 2500000003 HC RX 250 WO HCPCS: Performed by: HOSPITALIST

## 2025-05-06 PROCEDURE — 80048 BASIC METABOLIC PNL TOTAL CA: CPT

## 2025-05-06 PROCEDURE — 6370000000 HC RX 637 (ALT 250 FOR IP): Performed by: INTERNAL MEDICINE

## 2025-05-06 PROCEDURE — 6370000000 HC RX 637 (ALT 250 FOR IP): Performed by: NURSE PRACTITIONER

## 2025-05-06 PROCEDURE — 99232 SBSQ HOSP IP/OBS MODERATE 35: CPT | Performed by: SPECIALIST

## 2025-05-06 PROCEDURE — 2500000003 HC RX 250 WO HCPCS

## 2025-05-06 PROCEDURE — 6360000002 HC RX W HCPCS

## 2025-05-06 PROCEDURE — 2060000000 HC ICU INTERMEDIATE R&B

## 2025-05-06 PROCEDURE — 6370000000 HC RX 637 (ALT 250 FOR IP): Performed by: STUDENT IN AN ORGANIZED HEALTH CARE EDUCATION/TRAINING PROGRAM

## 2025-05-06 PROCEDURE — 85520 HEPARIN ASSAY: CPT

## 2025-05-06 PROCEDURE — 6360000002 HC RX W HCPCS: Performed by: STUDENT IN AN ORGANIZED HEALTH CARE EDUCATION/TRAINING PROGRAM

## 2025-05-06 PROCEDURE — 6370000000 HC RX 637 (ALT 250 FOR IP): Performed by: SPECIALIST

## 2025-05-06 PROCEDURE — 82962 GLUCOSE BLOOD TEST: CPT

## 2025-05-06 PROCEDURE — 80202 ASSAY OF VANCOMYCIN: CPT

## 2025-05-06 RX ORDER — BUTALBITAL, ACETAMINOPHEN AND CAFFEINE 50; 325; 40 MG/1; MG/1; MG/1
1 TABLET ORAL EVERY 6 HOURS PRN
Status: DISCONTINUED | OUTPATIENT
Start: 2025-05-06 | End: 2025-05-12 | Stop reason: HOSPADM

## 2025-05-06 RX ORDER — DIAZEPAM 10 MG/2ML
2.5 INJECTION, SOLUTION INTRAMUSCULAR; INTRAVENOUS
Status: DISCONTINUED | OUTPATIENT
Start: 2025-05-06 | End: 2025-05-09

## 2025-05-06 RX ORDER — BISACODYL 10 MG
10 SUPPOSITORY, RECTAL RECTAL ONCE
Status: DISCONTINUED | OUTPATIENT
Start: 2025-05-06 | End: 2025-05-09

## 2025-05-06 RX ORDER — POTASSIUM CHLORIDE, DEXTROSE MONOHYDRATE 150; 5 MG/100ML; G/100ML
INJECTION, SOLUTION INTRAVENOUS CONTINUOUS
Status: DISCONTINUED | OUTPATIENT
Start: 2025-05-06 | End: 2025-05-07

## 2025-05-06 RX ADMIN — APIXABAN 5 MG: 5 TABLET, FILM COATED ORAL at 09:15

## 2025-05-06 RX ADMIN — INSULIN GLARGINE 10 UNITS: 100 INJECTION, SOLUTION SUBCUTANEOUS at 21:47

## 2025-05-06 RX ADMIN — INSULIN LISPRO 6 UNITS: 100 INJECTION, SOLUTION INTRAVENOUS; SUBCUTANEOUS at 09:13

## 2025-05-06 RX ADMIN — POTASSIUM BICARBONATE 20 MEQ: 391 TABLET, EFFERVESCENT ORAL at 09:14

## 2025-05-06 RX ADMIN — SODIUM CHLORIDE, PRESERVATIVE FREE 10 ML: 5 INJECTION INTRAVENOUS at 09:18

## 2025-05-06 RX ADMIN — INSULIN LISPRO 8 UNITS: 100 INJECTION, SOLUTION INTRAVENOUS; SUBCUTANEOUS at 21:56

## 2025-05-06 RX ADMIN — WATER 5 MG: 1 INJECTION INTRAMUSCULAR; INTRAVENOUS; SUBCUTANEOUS at 03:28

## 2025-05-06 RX ADMIN — HEPARIN SODIUM 15 UNITS/KG/HR: 10000 INJECTION, SOLUTION INTRAVENOUS at 01:06

## 2025-05-06 RX ADMIN — INSULIN LISPRO 4 UNITS: 100 INJECTION, SOLUTION INTRAVENOUS; SUBCUTANEOUS at 12:34

## 2025-05-06 RX ADMIN — SODIUM CHLORIDE, PRESERVATIVE FREE 10 ML: 5 INJECTION INTRAVENOUS at 21:47

## 2025-05-06 RX ADMIN — BUTALBITAL, ACETAMINOPHEN, AND CAFFEINE 1 TABLET: 325; 50; 40 TABLET ORAL at 09:15

## 2025-05-06 RX ADMIN — COLLAGENASE SANTYL: 250 OINTMENT TOPICAL at 09:15

## 2025-05-06 RX ADMIN — INSULIN LISPRO 6 UNITS: 100 INJECTION, SOLUTION INTRAVENOUS; SUBCUTANEOUS at 12:34

## 2025-05-06 RX ADMIN — WATER 5 MG: 1 INJECTION INTRAMUSCULAR; INTRAVENOUS; SUBCUTANEOUS at 16:09

## 2025-05-06 RX ADMIN — DEXTROSE MONOHYDRATE AND POTASSIUM CHLORIDE INJECTION, SOLUTION: 5; .149 INJECTION, SOLUTION INTRAVENOUS at 09:23

## 2025-05-06 RX ADMIN — METOPROLOL 50 MG: 50 TABLET ORAL at 09:15

## 2025-05-06 RX ADMIN — VANCOMYCIN HYDROCHLORIDE 1000 MG: 1 INJECTION, POWDER, LYOPHILIZED, FOR SOLUTION INTRAVENOUS at 12:39

## 2025-05-06 NOTE — PLAN OF CARE
Problem: SLP Adult - Impaired Swallowing  Goal: By Discharge: Advance to least restrictive diet without signs or symptoms of aspiration for planned discharge setting.  See evaluation for individualized goals.  Description: Swallowing goals initiated 5-5-25  1) tolerate soft and bites sized, mildly thick liquids without s/s aspiration by 5-8-25-met  2) tolerate ETC diet, mildly thick liquids without s/s aspiration by 5-9-25  3) MBS vs FEES if not tolerating.   5/6/2025 0917 by Juhi Colby, SLP  Outcome: Not Progressing     Problem: SLP Adult - Impaired Swallowing  Goal: By Discharge: Advance to least restrictive diet without signs or symptoms of aspiration for planned discharge setting.  See evaluation for individualized goals.  Description: Swallowing goals initiated 5-5-25  1) tolerate soft and bites sized, mildly thick liquids without s/s aspiration by 5-8-25-met  2) tolerate ETC diet, mildly thick liquids without s/s aspiration by 5-9-25  3) MBS vs FEES if not tolerating.   5/6/2025 0917 by Juhi Colby, SLP  Outcome: Not Progressing

## 2025-05-06 NOTE — PROGRESS NOTES
Comprehensive Nutrition Assessment    Type and Reason for Visit: Initial, Positive nutrition screen, Wound    Nutrition Recommendations/Plan:   Follow SLP rec's for diet consistency  Provide Mighty Shake BID to aid in meeting kcal/protein needs (440 kcal, 66 g carbs, 12 g protein) Chocolate  Will order chocolate Ensure High Protein and Vick once thin liquids are approved by SLP       Malnutrition Assessment:  Malnutrition Status:  No malnutrition (05/05/25 1217)         Nutrition Assessment:    78 yo male admitted for dehydration.  PMHx: DM, questionable Parkinson disease, HTN, HLD.      5/6:  Checked in for diet order.  S/P SLP eval yesterday and again this morning.  Currently recommending Easy to Chew with Mildly Thick liquids.  SLP notes pt will remain on thickened liquids until MBS can be completed.  He has been in too much pain when being moved to complete this yet.  Will go ahead and alter ONS order for now to get pt increased protein.  Will try Mighty Shakes while on thickened liquids.     5/5: MST received for wounds.  Pt with multiple unstageable, stage II and III PI's.  WOCN following. Currently NPO.  Failed bedside swallow eval last night so waiting for SLP evaluation today.  Daughter present at bedside, states pt normally has a good appetite at home.  Currently pt is hungry and wanting to eat.  She does not think he has had any weight change PTA.  No weight hx available in EMR.  Denies difficulty chewing.  Discussed ONS options for when diet ordered, pt has had diabetic protein shakes in past and likes them, chocolate.  Reviewed high protein food options with daughter.  She is not sure he eats protein at all meals at home but she is looking at him going to a rehab facility so he should have meals prepared for him.  Labs: K+ 3.4, BG elevated, HgbA1c 6.9      Nutritionally Significant Medications:  Humalog, Effer-K      Estimated Daily Nutrient Needs:  Energy Requirements Based On: Kcal/kg  Weight Used  wounds    Nutrition Interventions:   Food and/or Nutrient Delivery: Start Oral Diet, Start Oral Nutrition Supplement  Nutrition Education/Counseling: Education/Counseling initiated          Goals:     Goals: Initiate PO diet, Meet at least 75% of estimated needs, by next RD assessment       Nutrition Monitoring and Evaluation:   Behavioral-Environmental Outcomes: None Identified  Food/Nutrient Intake Outcomes: Diet Advancement/Tolerance, Food and Nutrient Intake, Supplement Intake  Physical Signs/Symptoms Outcomes: Biochemical Data, GI Status, Weight    Discharge Planning:    Too soon to determine     Alejandra Alcantara RD  Available via [employer]

## 2025-05-06 NOTE — PLAN OF CARE
Speech LAnguage Pathology TREATMENT    Patient: Orlin San (79 y.o. male)  Date: 5/6/2025  Primary Diagnosis: Dehydration [E86.0]  Lactic acidosis [E87.20]  Hypermagnesemia [E83.41]  Hypernatremia [E87.0]  Shock (HCC) [R57.9]  Hyperglycemia [R73.9]  Elevated troponin [R79.89]  Abrasions of multiple sites [T07.XXXA]  HAYLIE (acute kidney injury) [N17.9]  Altered mental status, unspecified altered mental status type [R41.82]  Atrial fibrillation, unspecified type (HCC) [I48.91]  Pressure injury of skin of right hip, unspecified injury stage [L89.219]       Precautions:  Fall Risk, Skin     aspiration             ASSESSMENT :  Patient tolerating soft and bite sized, mildly thick liquids without s/s aspiration.   Would like to complete MBS to r/o aspiration with thins. However, transfer to Radiology MBS chair and upright position will be difficult, as he has had 2 sessions with PT/OT with significant pain with movement.     Patient will benefit from skilled intervention to address the above impairments.     PLAN :  Recommendations and Planned Interventions:  Diet: Easy to chew and mildly thick liquids  MBS once he can transfer to MBS chair and sit up with min pain.         Acute SLP Services: Yes, SLP will continue to follow per plan of care.  Discharge Recommendations: Continue to assess pending progress     SUBJECTIVE:   Patient stated, “Don't feed me so fast. They're scared I might choke.”    OBJECTIVE:   No past medical history on file.No past surgical history on file.  Prior Level of Function/Home Situation:   Social/Functional History  Lives With: Alone  Type of Home: House  Home Layout: Two level (family believes he is staying on the 1st level-possibly sleeping on sofa)  Home Access: Stairs to enter with rails  Entrance Stairs - Number of Steps: 6-7 steps  Entrance Stairs - Rails: Both  Bathroom Shower/Tub: Walk-in shower  Home Equipment: Cane  Has the patient had two or more falls in the past year or any fall

## 2025-05-06 NOTE — PROGRESS NOTES
Accessed patients chart to administer medication in response to code SEB.   Patient insistent on ambulating to restroom to have a bowel movement.  Physical therapist day before deferred standing due to PT presentation   Patient behaviors included yelling, cursing and threats of bodily harm to staff. Bedside commode and bedpan presented as options. Pt unable to hold torso up, sit on edge of bed or stand. In addition Pt on IV heparin.  Per nursing judgement ambulation was deferred. Bedpan offered but refused.   Administration of Olanzapine deemed appropriate per PRN orders for agitation.

## 2025-05-06 NOTE — CARE COORDINATION
Care Management Progress Note    Reason for Admission:   Dehydration [E86.0]  Lactic acidosis [E87.20]  Hypermagnesemia [E83.41]  Hypernatremia [E87.0]  Shock (HCC) [R57.9]  Hyperglycemia [R73.9]  Elevated troponin [R79.89]  Abrasions of multiple sites [T07.XXXA]  HAYLIE (acute kidney injury) [N17.9]  Altered mental status, unspecified altered mental status type [R41.82]  Atrial fibrillation, unspecified type (HCC) [I48.91]  Pressure injury of skin of right hip, unspecified injury stage [L89.219]         Patient Admission Status: Inpatient  RUR:   Hospitalization in the last 30 days (Readmission):  No        Transition of care plan:  Medical - patient remains on hep gtt, cx results pending  DC plan - recs for SNF, referrals sent, no acceptance as yet  Discharge plan communicated with patient and/or discharge caregiver: Yes, with daughter    Date 1st IMM letter given:   Outpatient follow-up - per MD  Transport at discharge:  [place]

## 2025-05-06 NOTE — PROGRESS NOTES
[facility] Dosing Services: Antimicrobial Stewardship Daily Doc  Consult for antibiotic dosing of vancomycin by Dr. Vargas  Indication: SSTI, bloodstream infection  Day of Therapy: 3 of 7   (Received one dose of vancomycin 2,000 mg on )  Allergy note: Patient did not actually get a dose of Unasyn before it was discontinued. Patient tolerated 2-3 days of Piperacillin/Tazobactam without apparent issue.  On 2025, pharmacist discussed with Dr. Vargas. Dr. Vargas would like to await ID recs and continue vancomycin monotherapy for now. No new notes from ID yet.    Ht Readings from Last 1 Encounters:   25 1.702 m (5' 7\")        Wt Readings from Last 1 Encounters:   25 83 kg (183 lb)      Vancomycin therapy:  Loading dose: Vancomycin 2,000 mgIV x 1 dose administered at 10:58 on 2025  Maintenance dose: Vancomycin 1,000 mg IV every 24 hours   Dose calculated to approximate a target AUC/BHUMIKA of 400-600  Last level: 17.5 mcg/mL at 02:34 on 2025    Assessment/Plan: Temp (24hrs), Av.8 °F (36.6 °C), Min:97.3 °F (36.3 °C), Max:98.1 °F (36.7 °C)  WBCs - no new CBC today; Scr improved. Level today extrapolates to therapeutic steady state AUC of 502. Continue current regimen.   Dose administration notes: doses administered appropriately as scheduled.    Other Antimicrobial   (not dosed by pharmacist) Piperacillin/Tazobactam: 2025 - 2025   Vancomycin 2,000 mg IV x 1 dose on 2025   Cultures 2025 Blood: no growth x 15 hours (pending)  2025 Blood: no growth x 15 hours (pending)  2025 Respiratory viral panel: none detected  2025 Blood: coag neg staph 1 of 2 (final)  2025 Blood: e faecalis 1 of 2; coag neg staph 1 of 2 (final)  2025 Blood PCR: e faecalis; MRSE (final)   Serum Creatinine Lab Results   Component Value Date/Time    CREATININE 1.50 2025 02:34 AM      Creatinine Clearance Estimated Creatinine Clearance: 41 mL/min (A) (based on SCr of

## 2025-05-06 NOTE — PROGRESS NOTES
PATRICIO [facility]    Orlin San  YOB: 1945          Assessment & Plan:     HAYLIE, baseline Cr unknown. Likely due to IVVD +/- ATN/sepsis. Cannot r/o CKD.Cr stable.  AMS  AF RVR  HTN  Gilbert's disease  Lactic acidosis  Hypernatremia worsening  Hypokalemia  Staph epi and Enterococccal bactremia  CKD stage G3    Rec:  Cr 1.5  Start D5W+ 20 meq KCL @ 75 cc/hr  Avoid nephrotoxins  Abx per primary team  Dose vanco per level  Controlled BS  Monitor labs   Need f/u upon DC home       Subjective:   CC: HAYLIE  Cr better 1.5 and K 3.4,     ROS: Unable to obtain due to AMS  PMH: HTN, Gilbert's disease  SH: lives alone  Current Facility-Administered Medications   Medication Dose Route Frequency    potassium bicarb-citric acid (EFFER-K) effervescent tablet 20 mEq  20 mEq Oral Daily    butalbital-acetaminophen-caffeine (FIORICET, ESGIC) per tablet 1 tablet  1 tablet Oral Q6H PRN    apixaban (ELIQUIS) tablet 5 mg  5 mg Oral BID    dextrose 5 % with KCl 20 mEq infusion   IntraVENous Continuous    morphine sulfate (PF) injection 2 mg  2 mg IntraVENous Q4H PRN    potassium bicarb-citric acid (EFFER-K) effervescent tablet 40 mEq  40 mEq Oral Once    Vancomycin Random Level: please draw with morning labs on 05/06/2025. Thank you.  1 each Other Once    metoprolol tartrate (LOPRESSOR) tablet 50 mg  50 mg Oral BID    insulin glargine (LANTUS) injection vial 10 Units  10 Units SubCUTAneous Nightly    insulin lispro (HUMALOG,ADMELOG) injection vial 6 Units  6 Units SubCUTAneous TID WC    insulin lispro (HUMALOG,ADMELOG) injection vial 0-16 Units  0-16 Units SubCUTAneous 4x Daily AC & HS    OLANZapine (ZyPREXA) 5 mg in sterile water 1 mL injection  5 mg IntraMUSCular TID PRN    vancomycin (VANCOCIN) 1,000 mg in sodium chloride 0.9 % 250 mL IVPB ([id number])  1,000 mg IntraVENous Q24H    glucose chewable tablet 16 g  4 tablet Oral PRN    dextrose bolus 10% 125 mL  125 mL  7.8* 7.5* 8.1*           : Speedy Alston MD  5/6/2025        [facility]:  [email or web address]  [email or web address]    [place] office:  [address], [address]  [place] [address]  Phone: [phone]  Fax :     [phone]    [place] office:  [address] [place] [address]  Phone - [phone]  Fax - [phone]

## 2025-05-06 NOTE — PROGRESS NOTES
Hospitalist Progress Note      NAME:  Orlin San   :  1945  MRM:  [id number]    Date/Time: 2025  3:22 PM           Assessment / Plan:       The patient is a 79-year-old gentleman who has previous history significant for type 2 diabetes, hypertension, hyperlipidemia, questionable Parkinson disease, who is admitted with:      A-fib with RVR: No prior history of A-fib.  Noted A-fib with RVR in the ED. initially on Cardizem drip then on amnio drip. Now on BB.  TTE showed EF 45 to 50%, mild global hypokinesis present, moderate to severe aortic stenosis.    Transition from IV heparin drip to oral Eliquis today.  Cardiology following.    Minimally elevated troponin: Possibly due to A-fib with RVR.  TTE as above.  Cardio following.  Continue to monitor.    Hypotension/history of hypertension: Resolved.  Possibly dehydration and A-fib w/RVR. Cannot rule out septic component; status post IVF.  Beta-blocker as above.  Monitor.    Acute metabolic encephalopathy: Likely due to hypernatremia, HAYLIE, dehydration.  Possible underlying cognitive issues.  Rule out CVA.  CT head showed no acute findings.  MRI brain still pending. Neurology following.   IV Zyprexa as needed for agitations.  Speech therapy following    Bacteremia/sepsis/multiple infected wounds:  Unclear source of bacteremia.  Polymicrobial bacteremia including coagulase-negative staph and Enterococcus faecalis.  TTE negative for vegetation.  Urine culture with no significant growth.  CT chest abdomen with no overt infection.  Continue IV vancomycin per ID.  ID following.    HAYLIE: Likely due to hypotension/IVVD.  May have underlying CKD.  US renal showed no acute findings.  Continue continue IV fluids as per nephro.  Avoid nephrotoxins.    Hypernatremia: Improved with D5W.  Continue to monitor with serial BMPs.  Nephro on board.    Type 2 diabetes with hyperglycemia:  on presentation.  A1c 6.9%.  He takes metformin at home.  Will hold home

## 2025-05-07 LAB
ANION GAP SERPL CALC-SCNC: 7 MMOL/L (ref 2–12)
BASOPHILS # BLD: 0 K/UL (ref 0–0.1)
BASOPHILS NFR BLD: 0 % (ref 0–1)
BUN SERPL-MCNC: 23 MG/DL (ref 6–20)
BUN/CREAT SERPL: 19 (ref 12–20)
CALCIUM SERPL-MCNC: 8.1 MG/DL (ref 8.5–10.1)
CHLORIDE SERPL-SCNC: 113 MMOL/L (ref 97–108)
CO2 SERPL-SCNC: 26 MMOL/L (ref 21–32)
CREAT SERPL-MCNC: 1.18 MG/DL (ref 0.7–1.3)
DIFFERENTIAL METHOD BLD: ABNORMAL
EOSINOPHIL # BLD: 0.16 K/UL (ref 0–0.4)
EOSINOPHIL NFR BLD: 2 % (ref 0–7)
ERYTHROCYTE [DISTWIDTH] IN BLOOD BY AUTOMATED COUNT: 14.3 % (ref 11.5–14.5)
GLUCOSE BLD STRIP.AUTO-MCNC: 106 MG/DL (ref 65–117)
GLUCOSE BLD STRIP.AUTO-MCNC: 125 MG/DL (ref 65–117)
GLUCOSE BLD STRIP.AUTO-MCNC: 139 MG/DL (ref 65–117)
GLUCOSE BLD STRIP.AUTO-MCNC: 173 MG/DL (ref 65–117)
GLUCOSE SERPL-MCNC: 97 MG/DL (ref 65–100)
HCT VFR BLD AUTO: 34.3 % (ref 36.6–50.3)
HGB BLD-MCNC: 10.8 G/DL (ref 12.1–17)
IMM GRANULOCYTES # BLD AUTO: 0 K/UL (ref 0–0.04)
IMM GRANULOCYTES NFR BLD AUTO: 0 % (ref 0–0.5)
LYMPHOCYTES # BLD: 0.87 K/UL (ref 0.8–3.5)
LYMPHOCYTES NFR BLD: 11 % (ref 12–49)
MCH RBC QN AUTO: 28.3 PG (ref 26–34)
MCHC RBC AUTO-ENTMCNC: 31.5 G/DL (ref 30–36.5)
MCV RBC AUTO: 90 FL (ref 80–99)
MONOCYTES # BLD: 0.71 K/UL (ref 0–1)
MONOCYTES NFR BLD: 9 % (ref 5–13)
NEUTS SEG # BLD: 6.16 K/UL (ref 1.8–8)
NEUTS SEG NFR BLD: 78 % (ref 32–75)
NRBC # BLD: 0 K/UL (ref 0–0.01)
NRBC BLD-RTO: 0 PER 100 WBC
PLATELET # BLD AUTO: 157 K/UL (ref 150–400)
PMV BLD AUTO: 11.2 FL (ref 8.9–12.9)
POTASSIUM SERPL-SCNC: 3 MMOL/L (ref 3.5–5.1)
RBC # BLD AUTO: 3.81 M/UL (ref 4.1–5.7)
RBC MORPH BLD: ABNORMAL
SERVICE CMNT-IMP: ABNORMAL
SERVICE CMNT-IMP: NORMAL
SODIUM SERPL-SCNC: 146 MMOL/L (ref 136–145)
WBC # BLD AUTO: 7.9 K/UL (ref 4.1–11.1)

## 2025-05-07 PROCEDURE — 6360000002 HC RX W HCPCS: Performed by: INTERNAL MEDICINE

## 2025-05-07 PROCEDURE — 97530 THERAPEUTIC ACTIVITIES: CPT

## 2025-05-07 PROCEDURE — 2580000003 HC RX 258: Performed by: INTERNAL MEDICINE

## 2025-05-07 PROCEDURE — 6370000000 HC RX 637 (ALT 250 FOR IP): Performed by: STUDENT IN AN ORGANIZED HEALTH CARE EDUCATION/TRAINING PROGRAM

## 2025-05-07 PROCEDURE — 94761 N-INVAS EAR/PLS OXIMETRY MLT: CPT

## 2025-05-07 PROCEDURE — 6370000000 HC RX 637 (ALT 250 FOR IP): Performed by: SPECIALIST

## 2025-05-07 PROCEDURE — 99232 SBSQ HOSP IP/OBS MODERATE 35: CPT | Performed by: INTERNAL MEDICINE

## 2025-05-07 PROCEDURE — 80048 BASIC METABOLIC PNL TOTAL CA: CPT

## 2025-05-07 PROCEDURE — 6370000000 HC RX 637 (ALT 250 FOR IP): Performed by: NURSE PRACTITIONER

## 2025-05-07 PROCEDURE — 82962 GLUCOSE BLOOD TEST: CPT

## 2025-05-07 PROCEDURE — 1100000000 HC RM PRIVATE

## 2025-05-07 PROCEDURE — 2500000003 HC RX 250 WO HCPCS: Performed by: HOSPITALIST

## 2025-05-07 PROCEDURE — 85025 COMPLETE CBC W/AUTO DIFF WBC: CPT

## 2025-05-07 PROCEDURE — 6360000002 HC RX W HCPCS: Performed by: HOSPITALIST

## 2025-05-07 RX ADMIN — INSULIN LISPRO 6 UNITS: 100 INJECTION, SOLUTION INTRAVENOUS; SUBCUTANEOUS at 09:23

## 2025-05-07 RX ADMIN — POTASSIUM CHLORIDE 10 MEQ: 10 INJECTION, SOLUTION INTRAVENOUS at 08:24

## 2025-05-07 RX ADMIN — METOPROLOL 50 MG: 50 TABLET ORAL at 08:25

## 2025-05-07 RX ADMIN — SODIUM CHLORIDE 1250 MG: 0.9 INJECTION, SOLUTION INTRAVENOUS at 12:22

## 2025-05-07 RX ADMIN — POTASSIUM CHLORIDE 10 MEQ: 10 INJECTION, SOLUTION INTRAVENOUS at 09:30

## 2025-05-07 RX ADMIN — APIXABAN 5 MG: 5 TABLET, FILM COATED ORAL at 20:50

## 2025-05-07 RX ADMIN — POTASSIUM CHLORIDE 10 MEQ: 10 INJECTION, SOLUTION INTRAVENOUS at 03:50

## 2025-05-07 RX ADMIN — POTASSIUM CHLORIDE 10 MEQ: 10 INJECTION, SOLUTION INTRAVENOUS at 04:45

## 2025-05-07 RX ADMIN — COLLAGENASE SANTYL: 250 OINTMENT TOPICAL at 08:28

## 2025-05-07 RX ADMIN — SODIUM CHLORIDE, PRESERVATIVE FREE 10 ML: 5 INJECTION INTRAVENOUS at 08:27

## 2025-05-07 RX ADMIN — INSULIN GLARGINE 10 UNITS: 100 INJECTION, SOLUTION SUBCUTANEOUS at 21:10

## 2025-05-07 RX ADMIN — POTASSIUM CHLORIDE 10 MEQ: 10 INJECTION, SOLUTION INTRAVENOUS at 07:00

## 2025-05-07 RX ADMIN — DEXTROSE MONOHYDRATE AND POTASSIUM CHLORIDE INJECTION, SOLUTION: 5; .149 INJECTION, SOLUTION INTRAVENOUS at 00:13

## 2025-05-07 RX ADMIN — APIXABAN 5 MG: 5 TABLET, FILM COATED ORAL at 08:28

## 2025-05-07 RX ADMIN — SODIUM CHLORIDE, PRESERVATIVE FREE 10 ML: 5 INJECTION INTRAVENOUS at 20:50

## 2025-05-07 RX ADMIN — METOPROLOL 50 MG: 50 TABLET ORAL at 20:50

## 2025-05-07 RX ADMIN — POTASSIUM CHLORIDE 10 MEQ: 10 INJECTION, SOLUTION INTRAVENOUS at 06:00

## 2025-05-07 NOTE — PLAN OF CARE
Problem: Occupational Therapy - Adult  Goal: By Discharge: Performs self-care activities at highest level of function for planned discharge setting.  See evaluation for individualized goals.  Description: FUNCTIONAL STATUS PRIOR TO ADMISSION:  Pt lived alone and was independent with ADLs and IADLs.  Daughter spoke with pt on phone a couple times a week.  Per daughter, home is unlivable.   Prior Level of Assist for ADLs: Independent, Prior Level of Assist for Homemaking: Independent, Prior Level of Assist for Transfers: Independent, Active : Yes     HOME SUPPORT: Patient lived alone with alone with daughter local.  Daughter is a PT and [employer].    Occupational Therapy Goals:  Initiated 5/4/2025  1.  Patient will perform self-feeding with Moderate Assist within 7 day(s).  2.  Patient will perform grooming with Moderate Assist within 7 day(s).  3.  Patient will perform toilet transfers with Maximal Assist and Assist x2  within 7 day(s).  4.  Patient will participate in upper extremity therapeutic exercise/activities with Minimal Assist for 10 minutes within 7 day(s).    5.  Patient will utilize energy conservation techniques during functional activities with verbal and visual cues within 7 day(s).    Outcome: Not Progressing   OCCUPATIONAL THERAPY TREATMENT  Patient: Orlin San (79 y.o. male)  Date: 5/7/2025  Primary Diagnosis: Dehydration [E86.0]  Lactic acidosis [E87.20]  Hypermagnesemia [E83.41]  Hypernatremia [E87.0]  Shock (HCC) [R57.9]  Hyperglycemia [R73.9]  Elevated troponin [R79.89]  Abrasions of multiple sites [T07.XXXA]  HAYLIE (acute kidney injury) [N17.9]  Altered mental status, unspecified altered mental status type [R41.82]  Atrial fibrillation, unspecified type (HCC) [I48.91]  Pressure injury of skin of right hip, unspecified injury stage [L89.219]       Precautions: Fall Risk, Skin                Chart, occupational therapy assessment, plan of care, and goals were  reviewed.    ASSESSMENT  Patient continues to benefit from skilled OT services and is not progressing towards goals. Patient continues to be limited by generalized pain, weakness, impaired cognition including delayed processing, and poor activity tolerance.  Patient does best with direct, one step commands d/t poor processing and hearing deficit.  Patient yells out in pain with most movement but decreased with distraction and self guided movement.  He requires constant max verbal cuing for initiation and sequencing.  Patient required max A x2 for bed mobility tolerating rolling both directions x2 and scooting higher in bed.  D/t significant fatigue and above deficits, he was unable to progress to EOB or attempt OOB transfers.  Patient with fair to poor tolerance for UE AAROM.  He c/o specific R shoulder pain with attempted flexion and internal/external rotation.  Patient would benefit from continued skilled OT to progress towards goals and improve overall independence.        PLAN :  Patient continues to benefit from skilled intervention to address the above impairments.  Continue treatment per established plan of care to address goals.    Recommend with staff:   Recommend frequent positional changes; Encourage HOB elevation and bed in chair position.    Encourage patient involvement in personal care as able.    Encourage exercises frequently throughout the day.        Recommend next OT session: Continue towards set OT goals.    Recommendation for discharge: (in order for the patient to meet his/her long term goals):   Moderate intensity short-term skilled occupational therapy up to 5x/week    Other factors to consider for discharge: no additional factors    IF patient discharges home will need the following DME: none       SUBJECTIVE:   Patient agreeable to OT tx with max encouragement.      OBJECTIVE DATA SUMMARY:   Cognitive/Behavioral Status:  Orientation  Overall Orientation Status: Impaired  Orientation Level:

## 2025-05-07 NOTE — PROGRESS NOTES
PATRICIO EVANS [facility]    Orlin San  YOB: 1945          Assessment & Plan:     HAYLIE, baseline Cr unknown. Likely due to IVVD +/- ATN/sepsis. Cannot r/o CKD.Cr stable.  AMS  AF RVR  HTN  Gilbert's disease  Lactic acidosis  Hypernatremia worsening  Hypokalemia  Staph epi and Enterococccal bactremia  CKD stage G3?    Rec:  Cr 1.1  Stop IVF  Encourage oral intake  Continue KCL supplement   Avoid nephrotoxins  Abx per primary team  Dose vanco per level  Controlled BS  Monitor labs        Subjective:   CC: HAYLIE  Cr better 1.1 and K 3.0,     ROS: Unable to obtain due to AMS  PMH: HTN, Gilbert's disease  SH: lives alone  Current Facility-Administered Medications   Medication Dose Route Frequency    potassium bicarb-citric acid (EFFER-K) effervescent tablet 40 mEq  40 mEq Oral Daily    butalbital-acetaminophen-caffeine (FIORICET, ESGIC) per tablet 1 tablet  1 tablet Oral Q6H PRN    apixaban (ELIQUIS) tablet 5 mg  5 mg Oral BID    bisacodyl (DULCOLAX) suppository 10 mg  10 mg Rectal Once    diazePAM (VALIUM) injection 2.5 mg  2.5 mg IntraVENous Once PRN    potassium bicarb-citric acid (EFFER-K) effervescent tablet 40 mEq  40 mEq Oral Once    Vancomycin Random Level: please draw with morning labs on 05/06/2025. Thank you.  1 each Other Once    metoprolol tartrate (LOPRESSOR) tablet 50 mg  50 mg Oral BID    insulin glargine (LANTUS) injection vial 10 Units  10 Units SubCUTAneous Nightly    insulin lispro (HUMALOG,ADMELOG) injection vial 6 Units  6 Units SubCUTAneous TID WC    insulin lispro (HUMALOG,ADMELOG) injection vial 0-16 Units  0-16 Units SubCUTAneous 4x Daily AC & HS    OLANZapine (ZyPREXA) 5 mg in sterile water 1 mL injection  5 mg IntraMUSCular TID PRN    vancomycin (VANCOCIN) 1,000 mg in sodium chloride 0.9 % 250 mL IVPB ([id number])  1,000 mg IntraVENous Q24H    glucose chewable tablet 16 g  4 tablet Oral PRN    dextrose bolus 10% 125 mL  125 mL

## 2025-05-07 NOTE — PROGRESS NOTES
Hospitalist Progress Note      NAME:  Orlin San   :  1945  MRM:  [id number]    Date/Time: 2025  5:28 PM           Assessment / Plan:       The patient is a 79-year-old gentleman who has previous history significant for type 2 diabetes, hypertension, hyperlipidemia, questionable Parkinson disease, who is admitted with:      A-fib with RVR: No prior history of A-fib.  Noted A-fib with RVR in the ED. initially on Cardizem drip then on amnio drip. Now on BB.  TTE showed EF 45 to 50%, mild global hypokinesis present, moderate to severe aortic stenosis.    Transition from IV heparin drip to oral Eliquis today.  Cardiology evaluated     Minimally elevated troponin: Possibly due to A-fib with RVR.  TTE as above.  Cardio following.  Continue to monitor.    Hypotension/history of hypertension: Resolved.  Possibly dehydration and A-fib w/RVR. Cannot rule out septic component; status post IVF.  Beta-blocker as above.  Monitor.    Acute metabolic encephalopathy: improving. Likely due to hypernatremia, HAYLIE, dehydration.  Possible underlying cognitive issues.  Rule out CVA.  CT head showed no acute findings.  MRI brain w/ no acute concerns. Neurology following.   IV Zyprexa as needed for agitations.  Speech therapy     Bacteremia/sepsis/multiple infected wounds:  Unclear source of bacteremia.  Polymicrobial bacteremia including coagulase-negative staph and Enterococcus faecalis; follow repeat blood cx.  TTE negative for vegetation.  Urine culture with no significant growth.  CT chest abdomen with no overt infection.  Continue IV vancomycin per ID.  ID following.    HAYLIE: resolved. Likely due to hypotension/IVVD.  May have underlying CKD.  US renal showed no acute findings.  Continue continue IV fluids as per nephro.  Avoid nephrotoxins. Replete k as needed     Hypernatremia: Improved with D5W.  Continue to monitor with serial BMPs.  Nephro on board.    Type 2 diabetes with hyperglycemia:  on presentation.

## 2025-05-07 NOTE — PLAN OF CARE
Problem: Safety - Adult  Goal: Free from fall injury  Outcome: Progressing     Problem: Discharge Planning  Goal: Discharge to home or other facility with appropriate resources  Outcome: Progressing     Problem: Pain  Goal: Verbalizes/displays adequate comfort level or baseline comfort level  Outcome: Progressing     Problem: Chronic Conditions and Co-morbidities  Goal: Patient's chronic conditions and co-morbidity symptoms are monitored and maintained or improved  Outcome: Progressing     Problem: Skin/Tissue Integrity  Goal: Skin integrity remains intact  Description: 1.  Monitor for areas of redness and/or skin breakdown2.  Assess vascular access sites hourly3.  Every 4-6 hours minimum:  Change oxygen saturation probe site4.  Every 4-6 hours:  If on nasal continuous positive airway pressure, respiratory therapy assess nares and determine need for appliance change or resting period  Outcome: Progressing     Problem: Nutrition Deficit:  Goal: Optimize nutritional status  Outcome: Progressing     Problem: ABCDS Injury Assessment  Goal: Absence of physical injury  Outcome: Progressing     Problem: Confusion  Goal: Confusion, delirium, dementia, or psychosis is improved or at baseline  Description: INTERVENTIONS:1. Assess for possible contributors to thought disturbance, including medications, impaired vision or hearing, underlying metabolic abnormalities, dehydration, psychiatric diagnoses, and notify attending LIP2. [place] high risk fall precautions, as indicated3. Provide frequent short contacts to provide reality reorientation, refocusing and direction4. Decrease environmental stimuli, including noise as appropriate5. Monitor and intervene to maintain adequate nutrition, hydration, elimination, sleep and activity6. If unable to ensure safety without constant attention obtain sitter and review sitter guidelines with assigned personnel7. Initiate Psychosocial CNS and Spiritual Care consult, as

## 2025-05-07 NOTE — PLAN OF CARE
Problem: Physical Therapy - Adult  Goal: By Discharge: Performs mobility at highest level of function for planned discharge setting.  See evaluation for individualized goals.  Description: FUNCTIONAL STATUS PRIOR TO ADMISSION: Patient was modified independent using a single point cane for functional mobility but daughter reports he has likely needed more support than the cane for some time. Patient lives alone in a home daughter states is \"likely unlivable\".  Daughter is a PT at [employer] and reports texting with dad every few days and usually sees him monthly.     Physical Therapy Goals  Initiated 5/4/2025  1.  Patient will move from supine to sit and sit to supine, scoot up and down, and roll side to side in bed with moderate assistance within 7 day(s).    2.  Patient will perform sit to stand with maximal assistance within 7 day(s).  3.  Patient will transfer from bed to chair and chair to bed with maximal assistance using the least restrictive device within 7 day(s).  4.  Patient will ambulate with maximal assistance for 5 feet with the least restrictive device within 7 day(s).   5.  Patient will sit up in chair 2 hours at a time to improve OOB tolerance within 7 day(s).        Outcome: Progressing     PHYSICAL THERAPY TREATMENT    Patient: Orlin San (79 y.o. male)  Date: 5/7/2025  Diagnosis: Dehydration [E86.0]  Lactic acidosis [E87.20]  Hypermagnesemia [E83.41]  Hypernatremia [E87.0]  Shock (HCC) [R57.9]  Hyperglycemia [R73.9]  Elevated troponin [R79.89]  Abrasions of multiple sites [T07.XXXA]  HAYLIE (acute kidney injury) [N17.9]  Altered mental status, unspecified altered mental status type [R41.82]  Atrial fibrillation, unspecified type (HCC) [I48.91]  Pressure injury of skin of right hip, unspecified injury stage [L89.219] Hypernatremia      Precautions: Restrictions/Precautions  Restrictions/Precautions: Fall Risk, Skin            ASSESSMENT:  Patient continues to benefit from skilled PT services and is

## 2025-05-07 NOTE — PROGRESS NOTES
[facility] Progress Note  Dg Lewis DO  [phone] Office  [phone] Fax    2025      Assessment & Plan:     Polymicrobial bacteremia.  Blood culture growing different strains of coagulase-negative Staphylococcus plus Enterococcus faecalis in 1 bottle.  TTE negative for vegetation.  UA bland and no evidence of intra-abdominal infection on CT scanning.  Suspect contaminant.  Awaiting repeat blood cultures.  Continue vancomycin.  Further recommendations to follow  Altered mental status.  Improving.  Possibly due to hypernatremia.  Neurology following  HAYLIE.  Possible CKD.  Nephrology following  Gilbert's disease.  Total bilirubin 1.7.  AST 51  Penicillin allergy.  This caused anaphylaxis in the past          Subjective:     Much more alert today    Objective:     Vitals: /70   Pulse 55   Temp 97 °F (36.1 °C) (Axillary)   Resp 16   Ht 1.702 m (5' 7\")   Wt 83 kg (183 lb)   SpO2 97%   BMI 28.66 kg/m²      Tmax:  Temp (24hrs), Av.5 °F (36.4 °C), Min:97 °F (36.1 °C), Max:97.9 °F (36.6 °C)      Exam:   Patient is intubated:  no    Physical Examination:   General:  Alert, cooperative, no distress   Head:  Normocephalic, atraumatic.   Eyes:  Conjunctivae clear   Neck: Supple       Lungs:   No distress.   Chest wall:     Heart:     Abdomen:    non-distended   Extremities: Moves all.     Skin: No acute rash on exposed skin   Neurologic: CNII-XII intact.      Labs:        Invalid input(s): \"ITNL\"   No results for input(s): \"CPK\", \"CKMB\" in the last 72 hours.    Invalid input(s): \"TROIQ\"  Recent Labs     25  0101 25  0234 25  0211   * 149* 146*   K 3.4* 3.4* 3.0*   * 115* 113*   CO2 28 26 26   BUN 53* 36* 23*   WBC 6.1  --  7.9   HGB 11.5*  --  10.8*   HCT 37.2  --  34.3*     --  157     No results for input(s): \"INR\", \"APTT\" in the last 72 hours.    Invalid input(s): \"PTP\"  Needs: urine analysis, urine sodium, protein and

## 2025-05-07 NOTE — CARE COORDINATION
Care Management Progress Note    Reason for Admission:   Dehydration [E86.0]  Lactic acidosis [E87.20]  Hypermagnesemia [E83.41]  Hypernatremia [E87.0]  Shock (HCC) [R57.9]  Hyperglycemia [R73.9]  Elevated troponin [R79.89]  Abrasions of multiple sites [T07.XXXA]  HAYLIE (acute kidney injury) [N17.9]  Altered mental status, unspecified altered mental status type [R41.82]  Atrial fibrillation, unspecified type (HCC) [I48.91]  Pressure injury of skin of right hip, unspecified injury stage [L89.219]         Patient Admission Status: Inpatient  RUR:   Hospitalization in the last 30 days (Readmission):  No        Transition of care plan:  Medical - Sitter to dc today, ID final recs still pending  DC plan - SNF rehab when medically cleared to dc  If SNF or IPR:  Date FOC offered:   Accepting facility: [facility]  Date authorization started with reference number:   Date authorization received and expires:   Discharge plan communicated with patient and/or discharge caregiver: Yes, updated daughter Merlyn    Date 1st IMM letter given:   Outpatient follow-up.  Transport at discharge:  TBD

## 2025-05-08 LAB
ANION GAP SERPL CALC-SCNC: 5 MMOL/L (ref 2–12)
BASOPHILS # BLD: 0 K/UL (ref 0–0.1)
BASOPHILS NFR BLD: 0 % (ref 0–1)
BUN SERPL-MCNC: 18 MG/DL (ref 6–20)
BUN/CREAT SERPL: 16 (ref 12–20)
CALCIUM SERPL-MCNC: 7.8 MG/DL (ref 8.5–10.1)
CHLORIDE SERPL-SCNC: 112 MMOL/L (ref 97–108)
CO2 SERPL-SCNC: 28 MMOL/L (ref 21–32)
CREAT SERPL-MCNC: 1.1 MG/DL (ref 0.7–1.3)
DIFFERENTIAL METHOD BLD: ABNORMAL
EOSINOPHIL # BLD: 0.09 K/UL (ref 0–0.4)
EOSINOPHIL NFR BLD: 1 % (ref 0–7)
ERYTHROCYTE [DISTWIDTH] IN BLOOD BY AUTOMATED COUNT: 14.4 % (ref 11.5–14.5)
GLUCOSE BLD STRIP.AUTO-MCNC: 129 MG/DL (ref 65–117)
GLUCOSE BLD STRIP.AUTO-MCNC: 130 MG/DL (ref 65–117)
GLUCOSE BLD STRIP.AUTO-MCNC: 198 MG/DL (ref 65–117)
GLUCOSE BLD STRIP.AUTO-MCNC: 215 MG/DL (ref 65–117)
GLUCOSE SERPL-MCNC: 125 MG/DL (ref 65–100)
HCT VFR BLD AUTO: 34.4 % (ref 36.6–50.3)
HGB BLD-MCNC: 11.3 G/DL (ref 12.1–17)
IMM GRANULOCYTES # BLD AUTO: 0 K/UL (ref 0–0.04)
IMM GRANULOCYTES NFR BLD AUTO: 0 % (ref 0–0.5)
LYMPHOCYTES # BLD: 1.03 K/UL (ref 0.8–3.5)
LYMPHOCYTES NFR BLD: 12 % (ref 12–49)
MCH RBC QN AUTO: 28.9 PG (ref 26–34)
MCHC RBC AUTO-ENTMCNC: 32.8 G/DL (ref 30–36.5)
MCV RBC AUTO: 88 FL (ref 80–99)
MONOCYTES # BLD: 0.6 K/UL (ref 0–1)
MONOCYTES NFR BLD: 7 % (ref 5–13)
NEUTS SEG # BLD: 6.88 K/UL (ref 1.8–8)
NEUTS SEG NFR BLD: 80 % (ref 32–75)
NRBC # BLD: 0 K/UL (ref 0–0.01)
NRBC BLD-RTO: 0 PER 100 WBC
PLATELET # BLD AUTO: 176 K/UL (ref 150–400)
PMV BLD AUTO: 11.6 FL (ref 8.9–12.9)
POTASSIUM SERPL-SCNC: 3.2 MMOL/L (ref 3.5–5.1)
RBC # BLD AUTO: 3.91 M/UL (ref 4.1–5.7)
RBC MORPH BLD: ABNORMAL
SERVICE CMNT-IMP: ABNORMAL
SODIUM SERPL-SCNC: 145 MMOL/L (ref 136–145)
WBC # BLD AUTO: 8.6 K/UL (ref 4.1–11.1)

## 2025-05-08 PROCEDURE — 94761 N-INVAS EAR/PLS OXIMETRY MLT: CPT

## 2025-05-08 PROCEDURE — 6370000000 HC RX 637 (ALT 250 FOR IP): Performed by: SPECIALIST

## 2025-05-08 PROCEDURE — 6370000000 HC RX 637 (ALT 250 FOR IP): Performed by: NURSE PRACTITIONER

## 2025-05-08 PROCEDURE — 6360000002 HC RX W HCPCS: Performed by: INTERNAL MEDICINE

## 2025-05-08 PROCEDURE — 6370000000 HC RX 637 (ALT 250 FOR IP): Performed by: STUDENT IN AN ORGANIZED HEALTH CARE EDUCATION/TRAINING PROGRAM

## 2025-05-08 PROCEDURE — 82962 GLUCOSE BLOOD TEST: CPT

## 2025-05-08 PROCEDURE — 97530 THERAPEUTIC ACTIVITIES: CPT | Performed by: OCCUPATIONAL THERAPIST

## 2025-05-08 PROCEDURE — 1100000000 HC RM PRIVATE

## 2025-05-08 PROCEDURE — 2500000003 HC RX 250 WO HCPCS: Performed by: HOSPITALIST

## 2025-05-08 PROCEDURE — 2580000003 HC RX 258: Performed by: INTERNAL MEDICINE

## 2025-05-08 PROCEDURE — 97535 SELF CARE MNGMENT TRAINING: CPT | Performed by: OCCUPATIONAL THERAPIST

## 2025-05-08 PROCEDURE — 6370000000 HC RX 637 (ALT 250 FOR IP): Performed by: INTERNAL MEDICINE

## 2025-05-08 PROCEDURE — 85025 COMPLETE CBC W/AUTO DIFF WBC: CPT

## 2025-05-08 PROCEDURE — 36415 COLL VENOUS BLD VENIPUNCTURE: CPT

## 2025-05-08 PROCEDURE — 80048 BASIC METABOLIC PNL TOTAL CA: CPT

## 2025-05-08 PROCEDURE — 92526 ORAL FUNCTION THERAPY: CPT

## 2025-05-08 PROCEDURE — 99232 SBSQ HOSP IP/OBS MODERATE 35: CPT | Performed by: INTERNAL MEDICINE

## 2025-05-08 RX ORDER — POTASSIUM CHLORIDE 750 MG/1
40 TABLET, EXTENDED RELEASE ORAL ONCE
Status: DISCONTINUED | OUTPATIENT
Start: 2025-05-08 | End: 2025-05-08

## 2025-05-08 RX ADMIN — SODIUM CHLORIDE, PRESERVATIVE FREE 10 ML: 5 INJECTION INTRAVENOUS at 20:43

## 2025-05-08 RX ADMIN — INSULIN GLARGINE 10 UNITS: 100 INJECTION, SOLUTION SUBCUTANEOUS at 20:43

## 2025-05-08 RX ADMIN — INSULIN LISPRO 4 UNITS: 100 INJECTION, SOLUTION INTRAVENOUS; SUBCUTANEOUS at 20:43

## 2025-05-08 RX ADMIN — METOPROLOL 50 MG: 50 TABLET ORAL at 09:26

## 2025-05-08 RX ADMIN — POTASSIUM BICARBONATE 40 MEQ: 782 TABLET, EFFERVESCENT ORAL at 09:24

## 2025-05-08 RX ADMIN — SODIUM CHLORIDE 1250 MG: 0.9 INJECTION, SOLUTION INTRAVENOUS at 13:50

## 2025-05-08 RX ADMIN — INSULIN LISPRO 6 UNITS: 100 INJECTION, SOLUTION INTRAVENOUS; SUBCUTANEOUS at 13:44

## 2025-05-08 RX ADMIN — APIXABAN 5 MG: 5 TABLET, FILM COATED ORAL at 20:37

## 2025-05-08 RX ADMIN — INSULIN LISPRO 4 UNITS: 100 INJECTION, SOLUTION INTRAVENOUS; SUBCUTANEOUS at 17:11

## 2025-05-08 RX ADMIN — COLLAGENASE SANTYL: 250 OINTMENT TOPICAL at 10:00

## 2025-05-08 RX ADMIN — APIXABAN 5 MG: 5 TABLET, FILM COATED ORAL at 09:26

## 2025-05-08 RX ADMIN — METOPROLOL 50 MG: 50 TABLET ORAL at 20:37

## 2025-05-08 RX ADMIN — INSULIN LISPRO 6 UNITS: 100 INJECTION, SOLUTION INTRAVENOUS; SUBCUTANEOUS at 17:11

## 2025-05-08 RX ADMIN — SODIUM CHLORIDE, PRESERVATIVE FREE 10 ML: 5 INJECTION INTRAVENOUS at 10:11

## 2025-05-08 RX ADMIN — INSULIN LISPRO 6 UNITS: 100 INJECTION, SOLUTION INTRAVENOUS; SUBCUTANEOUS at 09:25

## 2025-05-08 ASSESSMENT — PAIN SCALES - GENERAL
PAINLEVEL_OUTOF10: 0
PAINLEVEL_OUTOF10: 0

## 2025-05-08 NOTE — CARE COORDINATION
Care Management Progress Note      Reason for Admission:   Dehydration [E86.0]  Lactic acidosis [E87.20]  Hypermagnesemia [E83.41]  Hypernatremia [E87.0]  Shock (HCC) [R57.9]  Hyperglycemia [R73.9]  Elevated troponin [R79.89]  Abrasions of multiple sites [T07.XXXA]  HAYLIE (acute kidney injury) [N17.9]  Altered mental status, unspecified altered mental status type [R41.82]  Atrial fibrillation, unspecified type (HCC) [I48.91]  Pressure injury of skin of right hip, unspecified injury stage [L89.219]         Patient Admission Status: Inpatient  RUR: 12%  Hospitalization in the last 30 days (Readmission):  No        Transition of care plan:  Patient was discussed in IDR and continues to be medically managed. Patient will need to remain sitter free for 24 hours.     Dispo: SNF.     [facility] accepted. [facility] referral remains pending.  [facility] declined. CM has left a HIPAA complaint voicemail for patient's daughter Merlyn [phone] to obtain final SNF preference.     If SNF or IPR:  Date FOC offered:   Accepting facility:   Date authorization started with reference number: Patient will not need insurance authorization under medicare and has met his 3 midnight requirement.   Date authorization received and expires:     Discharge plan communicated with patient and/or discharge caregiver: Yes      Date 1st IMM letter given: 5/1/25.    Outpatient follow-up.    Transport at discharge: stretcher based on pt's progress with therapy.         ___________________________________________   Stephani Westbrook RN Case Manager  5/8/2025   4:23 PM

## 2025-05-08 NOTE — PROGRESS NOTES
[facility] Dosing Services: Antimicrobial Stewardship Daily Doc  Consult for antibiotic dosing of Vancomycin by Dr. Vargas. ID following  Indication: Polymicrobial bacteremia  Day of Therapy: 5/7    Ht Readings from Last 1 Encounters:   05/05/25 1.702 m (5' 7\")        Wt Readings from Last 1 Encounters:   05/03/25 83 kg (183 lb)      Vancomycin therapy:  Loading dose: Vancomycin 2000 mgIV x 1 dose given on 5/4 @1058  Maintenance dose: Vancomycin 1250 mg IV q24hr   Dose calculated to approximate a           a. Target AUC/BHUMIKA of 400-600          b. Trough of 15-20 mcg/mL     Last level: 17.5 mg/l, therapeutic on 5/6 at 0234, hold off on ordering another level unless Scr changes drastically before 5/11    Plan:   - Empirically adjusted to 1250 mg IV q24hr due to improvement in Scr  - ID suspecting contaminant but awaiting repeat cultures, continue vanc through 5/11, if d/c prior -> linezolid 600mg po q12 through 5/11    Other Antimicrobial   (not dosed by pharmacist) N/A   Cultures 5/5 - Blood x2: NGTD x3 days - prelim  5/1-  Blood: 1/2 CoNS - final  5/1 - Blood: 1/2 CoNS, enterococcus faecalis (panS) - final   5/1 - Blood: PCR: shows MRSA/MRSE, enterococcus faecalis - final   Serum Creatinine Lab Results   Component Value Date/Time    CREATININE 1.18 05/07/2025 02:11 AM        Creatinine Clearance Estimated Creatinine Clearance: 52 mL/min (based on SCr of 1.18 mg/dL).     Temp Temp: 97 °F (36.1 °C) (Axillary)       WBC Lab Results   Component Value Date/Time    WBC 7.9 05/07/2025 02:11 AM        Procalcitonin Lab Results   Component Value Date/Time    PROCAL 0.29 05/01/2025 03:42 PM      For Antifungals, Metronidazole and Nafcillin: Lab Results   Component Value Date/Time    ALT 63 05/02/2025 10:27 AM    AST 53 05/02/2025 10:27 AM        Jordan Larry, PharmD  [phone]

## 2025-05-08 NOTE — PLAN OF CARE
Problem: Occupational Therapy - Adult  Goal: By Discharge: Performs self-care activities at highest level of function for planned discharge setting.  See evaluation for individualized goals.  Description: FUNCTIONAL STATUS PRIOR TO ADMISSION:  Pt lived alone and was independent with ADLs and IADLs.  Daughter spoke with pt on phone a couple times a week.  Per daughter, home is unlivable.   Prior Level of Assist for ADLs: Independent, Prior Level of Assist for Homemaking: Independent, Prior Level of Assist for Transfers: Independent, Active : Yes     HOME SUPPORT: Patient lived alone with alone with daughter local.  Daughter is a PT and [employer] OP.    Occupational Therapy Goals:  Initiated 5/4/2025  1.  Patient will perform self-feeding with Moderate Assist within 7 day(s).  2.  Patient will perform grooming with Moderate Assist within 7 day(s).  3.  Patient will perform toilet transfers with Maximal Assist and Assist x2  within 7 day(s).  4.  Patient will participate in upper extremity therapeutic exercise/activities with Minimal Assist for 10 minutes within 7 day(s).    5.  Patient will utilize energy conservation techniques during functional activities with verbal and visual cues within 7 day(s).    Outcome: Progressing    OCCUPATIONAL THERAPY TREATMENT  Patient: Orlin San (79 y.o. male)  Date: 5/8/2025  Primary Diagnosis: Dehydration [E86.0]  Lactic acidosis [E87.20]  Hypermagnesemia [E83.41]  Hypernatremia [E87.0]  Shock (HCC) [R57.9]  Hyperglycemia [R73.9]  Elevated troponin [R79.89]  Abrasions of multiple sites [T07.XXXA]  HAYLIE (acute kidney injury) [N17.9]  Altered mental status, unspecified altered mental status type [R41.82]  Atrial fibrillation, unspecified type (HCC) [I48.91]  Pressure injury of skin of right hip, unspecified injury stage [L89.219]       Precautions: Fall Risk, Skin                Chart, occupational therapy assessment, plan of care, and goals were reviewed.    ASSESSMENT  Patient

## 2025-05-08 NOTE — PLAN OF CARE
Speech LAnguage Pathology TREATMENT    Patient: Orlin San (79 y.o. male)  Date: 5/8/2025  Primary Diagnosis: Dehydration [E86.0]  Lactic acidosis [E87.20]  Hypermagnesemia [E83.41]  Hypernatremia [E87.0]  Shock (HCC) [R57.9]  Hyperglycemia [R73.9]  Elevated troponin [R79.89]  Abrasions of multiple sites [T07.XXXA]  HAYLIE (acute kidney injury) [N17.9]  Altered mental status, unspecified altered mental status type [R41.82]  Atrial fibrillation, unspecified type (HCC) [I48.91]  Pressure injury of skin of right hip, unspecified injury stage [L89.219]       Precautions:  Fall Risk, Skin     aspiration             ASSESSMENT :  Patient still with intermittent s/s aspiration on liquids, especially after foods.   Unable to r/o aspiration vs esophageal issues.   Patient still has pain with movement and touch and is NOT a candidate for MBS or FEES at this time.   Ok to discharge on reg diet, mildly thick liquids.    Patient will benefit from skilled intervention to address the above impairments.     PLAN :  Recommendations and Planned Interventions:  Diet: Regular and mildly thick liquids  Upright for all PO         Acute SLP Services: Yes, SLP will continue to follow per plan of care.  Discharge Recommendations: Yes, recommend SLP treatment at next level of care     SUBJECTIVE:   Patient stated, “put that pill down ! I'll take it later!.”    OBJECTIVE:   No past medical history on file.No past surgical history on file.  Prior Level of Function/Home Situation:   Social/Functional History  Lives With: Alone  Type of Home: House  Home Layout: Two level (family believes he is staying on the 1st level-possibly sleeping on sofa)  Home Access: Stairs to enter with rails  Entrance Stairs - Number of Steps: 6-7 steps  Entrance Stairs - Rails: Both  Bathroom Shower/Tub: Walk-in shower  Home Equipment: Cane  Has the patient had two or more falls in the past year or any fall with injury in the past year?: Yes  Prior Level of Assist  toward stated goals and plan of care    Thank you,  Juhi Colby, SLP    SLP Individual Minutes  Time In: 0900  Time Out: 0915  Minutes: 15     Problem: SLP Adult - Impaired Swallowing  Goal: By Discharge: Advance to least restrictive diet without signs or symptoms of aspiration for planned discharge setting.  See evaluation for individualized goals.  Description: Swallowing goals initiated 5-5-25  1) tolerate soft and bites sized, mildly thick liquids without s/s aspiration by 5-8-25-met  2) tolerate ETC diet, mildly thick liquids without s/s aspiration by 5-9-25-met  3) MBS vs FEES if not tolerating.   4_ tolerate reg diet, mildly thick liquids without s/s aspiration by 5-15-25  Outcome: Progressing

## 2025-05-08 NOTE — PROGRESS NOTES
[facility] Progress Note  Dg Lewis DO  [phone] Office  [phone] Fax    2025      Assessment & Plan:     Polymicrobial bacteremia.  Blood culture growing different strains of coagulase-negative Staphylococcus plus Enterococcus faecalis in 1 bottle.  TTE negative for vegetation.  UA bland and no evidence of intra-abdominal infection on CT scanning.  Continue vancomycin through .  If discharge planned before that time may discharge on linezolid 600 mg p.o. twice daily through    Altered mental status.  Improving.  Possibly due to hypernatremia.  Neurology following  HAYLIE.  Possible CKD.  Nephrology following  Gilbert's disease.  Total bilirubin 1.7.  AST 51  Penicillin allergy.  This caused anaphylaxis in the past          Subjective:     No complaints.  Sitting up in bed eating    Objective:     Vitals: BP (!) 143/83   Pulse 60   Temp 97.7 °F (36.5 °C) (Oral)   Resp 18   Ht 1.702 m (5' 7\")   Wt 83 kg (183 lb)   SpO2 98%   BMI 28.66 kg/m²      Tmax:  Temp (24hrs), Av.9 °F (36.6 °C), Min:97 °F (36.1 °C), Max:98.8 °F (37.1 °C)      Exam:   Patient is intubated:  no    Physical Examination:   General:  Alert, cooperative, no distress   Head:  Normocephalic, atraumatic.   Eyes:  Conjunctivae clear   Neck: Supple       Lungs:   No distress.   Chest wall:     Heart:     Abdomen:    non-distended   Extremities: Moves all.     Skin: No acute rash on exposed skin   Neurologic: CNII-XII intact.      Labs:        Invalid input(s): \"ITNL\"   No results for input(s): \"CPK\", \"CKMB\" in the last 72 hours.    Invalid input(s): \"TROIQ\"  Recent Labs     25  0234 25  0211   * 146*   K 3.4* 3.0*   * 113*   CO2 26 26   BUN 36* 23*   WBC  --  7.9   HGB  --  10.8*   HCT  --  34.3*   PLT  --  157     No results for input(s): \"INR\", \"APTT\" in the last 72 hours.    Invalid input(s): \"PTP\"  Needs: urine analysis, urine sodium, protein and creatinine  No

## 2025-05-08 NOTE — PROGRESS NOTES
Comprehensive Nutrition Assessment    Type and Reason for Visit:  Reassess    Nutrition Recommendations/Plan:   Continue diet texture per SLP  Continue Mighty Shake supplement - appropriate for mildly thick liquids  Monitor weight status     Malnutrition Assessment:  Malnutrition Status:  No malnutrition (05/05/25 1217)      Nutrition Assessment:    80 yo male admitted for dehydration.  PMHx: DM, questionable Parkinson disease, HTN, HLD.      5/8: Patient remains in pain with movement, not a candidate for MBS or FEES per SLP, will plan to discharge on regular/mildly thick liquids. He ate well for breakfast this AM & sitter was able to be discontinued today. Staff encouraging PO intakes as able, will continue ONS as it is appropriate for current thickened liquids. No n/v/abd pain noted. Last BM yesterday. No updated weight since 5/3.    5/6:  Checked in for diet order.  S/P SLP eval yesterday and again this morning.  Currently recommending Easy to Chew with Mildly Thick liquids.  SLP notes pt will remain on thickened liquids until MBS can be completed.  He has been in too much pain when being moved to complete this yet.  Will go ahead and alter ONS order for now to get pt increased protein.  Will try Mighty Shakes while on thickened liquids.      5/5: MST received for wounds.  Pt with multiple unstageable, stage II and III PI's.  WOCN following. Currently NPO.  Failed bedside swallow eval last night so waiting for SLP evaluation today.  Daughter present at bedside, states pt normally has a good appetite at home.  Currently pt is hungry and wanting to eat.  She does not think he has had any weight change PTA.  No weight hx available in EMR.  Denies difficulty chewing.  Discussed ONS options for when diet ordered, pt has had diabetic protein shakes in past and likes them, chocolate.  Reviewed high protein food options with daughter.  She is not sure he eats protein at all meals at home but she is looking at him going

## 2025-05-08 NOTE — PROGRESS NOTES
Attempted to work with the patient for Physical Therapy, chart reviewed and discussed with nurse patient just worked with OT. Patient declined stated he just worked with PT and feels tired.

## 2025-05-08 NOTE — PROGRESS NOTES
1000 per Dr. Vargas patient can have a one time dose of potassium 40meq for k of 3.0. nurse made MD aware that patient did receive two doses of IV replacement.

## 2025-05-08 NOTE — PLAN OF CARE
Problem: Safety - Adult  Goal: Free from fall injury  Outcome: Progressing     Problem: Discharge Planning  Goal: Discharge to home or other facility with appropriate resources  Outcome: Progressing  Flowsheets (Taken 5/7/2025 1915 by Elaina Lucia, RN)  Discharge to home or other facility with appropriate resources:   Identify barriers to discharge with patient and caregiver   Arrange for needed discharge resources and transportation as appropriate   Identify discharge learning needs (meds, wound care, etc)   Refer to discharge planning if patient needs post-hospital services based on physician order or complex needs related to functional status, cognitive ability or social support system     Problem: Pain  Goal: Verbalizes/displays adequate comfort level or baseline comfort level  Outcome: Progressing     Problem: Chronic Conditions and Co-morbidities  Goal: Patient's chronic conditions and co-morbidity symptoms are monitored and maintained or improved  Outcome: Progressing  Flowsheets (Taken 5/7/2025 1915 by Elaina Lucia RN)  Care Plan - Patient's Chronic Conditions and Co-Morbidity Symptoms are Monitored and Maintained or Improved:   Collaborate with multidisciplinary team to address chronic and comorbid conditions and prevent exacerbation or deterioration   Monitor and assess patient's chronic conditions and comorbid symptoms for stability, deterioration, or improvement   Update acute care plan with appropriate goals if chronic or comorbid symptoms are exacerbated and prevent overall improvement and discharge     Problem: Occupational Therapy - Adult  Goal: By Discharge: Performs self-care activities at highest level of function for planned discharge setting.  See evaluation for individualized goals.  Description: FUNCTIONAL STATUS PRIOR TO ADMISSION:  Pt lived alone and was independent with ADLs and IADLs.  Daughter spoke with pt on phone a couple times a week.  Per daughter, home is unlivable.

## 2025-05-08 NOTE — PROGRESS NOTES
Patient refused hygiene care and blood works this morning. Provider was informed of patient's decision. Education done on the importance of both procedures for their health and safety but patient declined to proceed.

## 2025-05-08 NOTE — PROGRESS NOTES
PATRICIO EVANS [facility]    Orlin San  YOB: 1945          Assessment & Plan:     HAYLIE, baseline Cr unknown. Likely due to IVVD +/- ATN/sepsis. Cannot r/o CKD.Cr stable.  AMS  AF RVR  HTN  Gilbert's disease  Lactic acidosis  Hypernatremia worsening  Hypokalemia  Staph epi and Enterococccal bactremia  CKD stage G3?    Rec:  Cr 1.1 from 5/7  Check BMP today  Encourage oral intake  Continue KCL supplement   Avoid nephrotoxins  Abx per primary team  Monitor labs        Subjective:   CC: HAYLIE  Feeling better  1:1 observation  No labs    ROS: Unable to obtain due to AMS  PMH: HTN, Gilbert's disease  SH: lives alone  Current Facility-Administered Medications   Medication Dose Route Frequency    potassium bicarb-citric acid (EFFER-K) effervescent tablet 40 mEq  40 mEq Oral Daily    vancomycin (VANCOCIN) 1,250 mg in sodium chloride 0.9 % 250 mL IVPB ([id number])  1,250 mg IntraVENous Q24H    butalbital-acetaminophen-caffeine (FIORICET, ESGIC) per tablet 1 tablet  1 tablet Oral Q6H PRN    apixaban (ELIQUIS) tablet 5 mg  5 mg Oral BID    bisacodyl (DULCOLAX) suppository 10 mg  10 mg Rectal Once    diazePAM (VALIUM) injection 2.5 mg  2.5 mg IntraVENous Once PRN    potassium bicarb-citric acid (EFFER-K) effervescent tablet 40 mEq  40 mEq Oral Once    metoprolol tartrate (LOPRESSOR) tablet 50 mg  50 mg Oral BID    insulin glargine (LANTUS) injection vial 10 Units  10 Units SubCUTAneous Nightly    insulin lispro (HUMALOG,ADMELOG) injection vial 6 Units  6 Units SubCUTAneous TID WC    insulin lispro (HUMALOG,ADMELOG) injection vial 0-16 Units  0-16 Units SubCUTAneous 4x Daily AC & HS    OLANZapine (ZyPREXA) 5 mg in sterile water 1 mL injection  5 mg IntraMUSCular TID PRN    glucose chewable tablet 16 g  4 tablet Oral PRN    dextrose bolus 10% 125 mL  125 mL IntraVENous PRN    Or    dextrose bolus 10% 250 mL  250 mL IntraVENous PRN    glucagon injection 1 mg  1 mg  office:  [address], [address]  [place] [address]  Phone: [phone]  Fax :     [phone]    [place] office:  [address] [place] [id number]  Phone - [phone]  Fax - [phone]

## 2025-05-08 NOTE — PROGRESS NOTES
Hospitalist Progress Note      NAME:  Orlin San   :  1945  MRM:  [id number]    Date/Time: 2025  1:09 PM           Assessment / Plan:       The patient is a 79-year-old gentleman who has previous history significant for type 2 diabetes, hypertension, hyperlipidemia, questionable Parkinson disease, who is admitted with:    A-fib with RVR: No prior history of A-fib.  Noted A-fib with RVR in the ED. initially on Cardizem drip then on amnio drip. Now on BB.  TTE showed EF 45 to 50%, mild global hypokinesis present, moderate to severe aortic stenosis.    Transition from IV heparin drip to oral Eliquis.  Cardiology evaluated; fu op     Minimally elevated troponin: Possibly due to A-fib with RVR.  TTE as above.  Cardio evaluated     Hypotension/history of hypertension: Resolved.  Possibly dehydration and A-fib w/RVR. Cannot rule out septic component; status post IVF.  Beta-blocker as above.  Monitor.    Acute metabolic encephalopathy: improving but not at baseline per daughter.  Likely due to delirium; initially hypernatremia, HAYLIE, dehydration possibly contributing.  Possible underlying cognitive issues. CT head showed no acute findings.  MRI brain w/ no acute concerns. Neurology evaluated.   IV Zyprexa as needed for agitations.  Discussed with speech no plans for MBS or further testing right now.  Recommend neurocognitive testing outpatient    Bacteremia/sepsis/multiple infected wounds:  Unclear source of bacteremia.  Polymicrobial bacteremia including coagulase-negative staph and Enterococcus faecalis; follow repeat blood cx.  TTE negative for vegetation.  Urine culture with no significant growth.  CT chest abdomen with no overt infection.  Continue IV vancomycin thru  per ID; if discharged before May 11 and can do linezolid 600 mg twice daily.  ID evaluated    HAYLIE: resolved. Likely due to hypotension/IVVD.  May have underlying CKD.  US renal showed no acute findings.  Avoid nephrotoxins. Replete  lateral foot, pressure injury right hip/right and left elbow  Neuro:  Cranial nerves 3-12 are grossly intact, generalized weakness, confused  Psych: Slightly agitated at times         Medications Reviewed: (see below)    Lab Data Reviewed: (see below)    ______________________________________________________________________    Medications:     Current Facility-Administered Medications   Medication Dose Route Frequency    potassium bicarb-citric acid (EFFER-K) effervescent tablet 40 mEq  40 mEq Oral Daily    vancomycin (VANCOCIN) 1,250 mg in sodium chloride 0.9 % 250 mL IVPB ([id number])  1,250 mg IntraVENous Q24H    butalbital-acetaminophen-caffeine (FIORICET, ESGIC) per tablet 1 tablet  1 tablet Oral Q6H PRN    apixaban (ELIQUIS) tablet 5 mg  5 mg Oral BID    bisacodyl (DULCOLAX) suppository 10 mg  10 mg Rectal Once    diazePAM (VALIUM) injection 2.5 mg  2.5 mg IntraVENous Once PRN    potassium bicarb-citric acid (EFFER-K) effervescent tablet 40 mEq  40 mEq Oral Once    metoprolol tartrate (LOPRESSOR) tablet 50 mg  50 mg Oral BID    insulin glargine (LANTUS) injection vial 10 Units  10 Units SubCUTAneous Nightly    insulin lispro (HUMALOG,ADMELOG) injection vial 6 Units  6 Units SubCUTAneous TID WC    insulin lispro (HUMALOG,ADMELOG) injection vial 0-16 Units  0-16 Units SubCUTAneous 4x Daily AC & HS    OLANZapine (ZyPREXA) 5 mg in sterile water 1 mL injection  5 mg IntraMUSCular TID PRN    glucose chewable tablet 16 g  4 tablet Oral PRN    dextrose bolus 10% 125 mL  125 mL IntraVENous PRN    Or    dextrose bolus 10% 250 mL  250 mL IntraVENous PRN    glucagon injection 1 mg  1 mg SubCUTAneous PRN    dextrose 10 % infusion   IntraVENous Continuous PRN    collagenase ointment   Topical Daily    sodium chloride flush 0.9 % injection 5-40 mL  5-40 mL IntraVENous 2 times per day    sodium chloride flush 0.9 % injection 5-40 mL  5-40 mL IntraVENous PRN    0.9 % sodium chloride infusion   IntraVENous PRN    potassium

## 2025-05-09 LAB
ANION GAP SERPL CALC-SCNC: 7 MMOL/L (ref 2–12)
BASOPHILS # BLD: 0 K/UL (ref 0–0.1)
BASOPHILS NFR BLD: 0 % (ref 0–1)
BUN SERPL-MCNC: 17 MG/DL (ref 6–20)
BUN/CREAT SERPL: 17 (ref 12–20)
CALCIUM SERPL-MCNC: 7.7 MG/DL (ref 8.5–10.1)
CHLORIDE SERPL-SCNC: 110 MMOL/L (ref 97–108)
CO2 SERPL-SCNC: 25 MMOL/L (ref 21–32)
CREAT SERPL-MCNC: 1 MG/DL (ref 0.7–1.3)
DIFFERENTIAL METHOD BLD: ABNORMAL
EOSINOPHIL # BLD: 0 K/UL (ref 0–0.4)
EOSINOPHIL NFR BLD: 0 % (ref 0–7)
ERYTHROCYTE [DISTWIDTH] IN BLOOD BY AUTOMATED COUNT: 14.6 % (ref 11.5–14.5)
GLUCOSE BLD STRIP.AUTO-MCNC: 142 MG/DL (ref 65–117)
GLUCOSE BLD STRIP.AUTO-MCNC: 158 MG/DL (ref 65–117)
GLUCOSE BLD STRIP.AUTO-MCNC: 161 MG/DL (ref 65–117)
GLUCOSE BLD STRIP.AUTO-MCNC: 170 MG/DL (ref 65–117)
GLUCOSE BLD STRIP.AUTO-MCNC: 197 MG/DL (ref 65–117)
GLUCOSE SERPL-MCNC: 116 MG/DL (ref 65–100)
HCT VFR BLD AUTO: 34.1 % (ref 36.6–50.3)
HGB BLD-MCNC: 10.8 G/DL (ref 12.1–17)
IMM GRANULOCYTES # BLD AUTO: 0 K/UL (ref 0–0.04)
IMM GRANULOCYTES NFR BLD AUTO: 0 % (ref 0–0.5)
LYMPHOCYTES # BLD: 1.34 K/UL (ref 0.8–3.5)
LYMPHOCYTES NFR BLD: 15 % (ref 12–49)
MCH RBC QN AUTO: 28.3 PG (ref 26–34)
MCHC RBC AUTO-ENTMCNC: 31.7 G/DL (ref 30–36.5)
MCV RBC AUTO: 89.5 FL (ref 80–99)
METAMYELOCYTES NFR BLD MANUAL: 2 %
MONOCYTES # BLD: 0.36 K/UL (ref 0–1)
MONOCYTES NFR BLD: 4 % (ref 5–13)
NEUTS BAND NFR BLD MANUAL: 1 %
NEUTS SEG # BLD: 7.03 K/UL (ref 1.8–8)
NEUTS SEG NFR BLD: 78 % (ref 32–75)
NRBC # BLD: 0 K/UL (ref 0–0.01)
NRBC BLD-RTO: 0 PER 100 WBC
PLATELET # BLD AUTO: 189 K/UL (ref 150–400)
PMV BLD AUTO: 11.4 FL (ref 8.9–12.9)
POTASSIUM SERPL-SCNC: 3.3 MMOL/L (ref 3.5–5.1)
RBC # BLD AUTO: 3.81 M/UL (ref 4.1–5.7)
RBC MORPH BLD: ABNORMAL
SERVICE CMNT-IMP: ABNORMAL
SODIUM SERPL-SCNC: 142 MMOL/L (ref 136–145)
WBC # BLD AUTO: 8.9 K/UL (ref 4.1–11.1)

## 2025-05-09 PROCEDURE — 6370000000 HC RX 637 (ALT 250 FOR IP): Performed by: SPECIALIST

## 2025-05-09 PROCEDURE — 6370000000 HC RX 637 (ALT 250 FOR IP): Performed by: STUDENT IN AN ORGANIZED HEALTH CARE EDUCATION/TRAINING PROGRAM

## 2025-05-09 PROCEDURE — 6370000000 HC RX 637 (ALT 250 FOR IP): Performed by: HOSPITALIST

## 2025-05-09 PROCEDURE — 2580000003 HC RX 258: Performed by: INTERNAL MEDICINE

## 2025-05-09 PROCEDURE — 80048 BASIC METABOLIC PNL TOTAL CA: CPT

## 2025-05-09 PROCEDURE — 97535 SELF CARE MNGMENT TRAINING: CPT

## 2025-05-09 PROCEDURE — 2500000003 HC RX 250 WO HCPCS

## 2025-05-09 PROCEDURE — 1100000000 HC RM PRIVATE

## 2025-05-09 PROCEDURE — 6360000002 HC RX W HCPCS: Performed by: HOSPITALIST

## 2025-05-09 PROCEDURE — 6360000002 HC RX W HCPCS

## 2025-05-09 PROCEDURE — 6370000000 HC RX 637 (ALT 250 FOR IP): Performed by: NURSE PRACTITIONER

## 2025-05-09 PROCEDURE — 85025 COMPLETE CBC W/AUTO DIFF WBC: CPT

## 2025-05-09 PROCEDURE — 99233 SBSQ HOSP IP/OBS HIGH 50: CPT | Performed by: PSYCHIATRY & NEUROLOGY

## 2025-05-09 PROCEDURE — 6360000002 HC RX W HCPCS: Performed by: INTERNAL MEDICINE

## 2025-05-09 PROCEDURE — 2500000003 HC RX 250 WO HCPCS: Performed by: HOSPITALIST

## 2025-05-09 PROCEDURE — 82962 GLUCOSE BLOOD TEST: CPT

## 2025-05-09 RX ADMIN — SODIUM CHLORIDE 1250 MG: 0.9 INJECTION, SOLUTION INTRAVENOUS at 13:33

## 2025-05-09 RX ADMIN — METOPROLOL 50 MG: 50 TABLET ORAL at 21:40

## 2025-05-09 RX ADMIN — POTASSIUM CHLORIDE 10 MEQ: 10 INJECTION, SOLUTION INTRAVENOUS at 15:57

## 2025-05-09 RX ADMIN — SODIUM CHLORIDE, PRESERVATIVE FREE 10 ML: 5 INJECTION INTRAVENOUS at 09:44

## 2025-05-09 RX ADMIN — SODIUM CHLORIDE, PRESERVATIVE FREE 10 ML: 5 INJECTION INTRAVENOUS at 21:41

## 2025-05-09 RX ADMIN — INSULIN LISPRO 6 UNITS: 100 INJECTION, SOLUTION INTRAVENOUS; SUBCUTANEOUS at 13:14

## 2025-05-09 RX ADMIN — POTASSIUM BICARBONATE 40 MEQ: 782 TABLET, EFFERVESCENT ORAL at 06:49

## 2025-05-09 RX ADMIN — POTASSIUM CHLORIDE 10 MEQ: 10 INJECTION, SOLUTION INTRAVENOUS at 16:58

## 2025-05-09 RX ADMIN — INSULIN GLARGINE 10 UNITS: 100 INJECTION, SOLUTION SUBCUTANEOUS at 21:40

## 2025-05-09 RX ADMIN — WATER 5 MG: 1 INJECTION INTRAMUSCULAR; INTRAVENOUS; SUBCUTANEOUS at 18:48

## 2025-05-09 RX ADMIN — APIXABAN 5 MG: 5 TABLET, FILM COATED ORAL at 09:38

## 2025-05-09 RX ADMIN — ACETAMINOPHEN 650 MG: 325 TABLET ORAL at 17:47

## 2025-05-09 RX ADMIN — METOPROLOL 50 MG: 50 TABLET ORAL at 09:39

## 2025-05-09 RX ADMIN — INSULIN LISPRO 4 UNITS: 100 INJECTION, SOLUTION INTRAVENOUS; SUBCUTANEOUS at 21:40

## 2025-05-09 RX ADMIN — INSULIN LISPRO 6 UNITS: 100 INJECTION, SOLUTION INTRAVENOUS; SUBCUTANEOUS at 09:38

## 2025-05-09 ASSESSMENT — PAIN SCALES - PAIN ASSESSMENT IN ADVANCED DEMENTIA (PAINAD)
BREATHING: NORMAL
FACIALEXPRESSION: SMILING OR INEXPRESSIVE
TOTALSCORE: 0
CONSOLABILITY: NO NEED TO CONSOLE
BODYLANGUAGE: RELAXED

## 2025-05-09 NOTE — PLAN OF CARE
Problem: Occupational Therapy - Adult  Goal: By Discharge: Performs self-care activities at highest level of function for planned discharge setting.  See evaluation for individualized goals.  Description: FUNCTIONAL STATUS PRIOR TO ADMISSION:  Pt lived alone and was independent with ADLs and IADLs.  Daughter spoke with pt on phone a couple times a week.  Per daughter, home is unlivable.   Prior Level of Assist for ADLs: Independent, Prior Level of Assist for Homemaking: Independent, Prior Level of Assist for Transfers: Independent, Active : Yes     HOME SUPPORT: Patient lived alone with alone with daughter local.  Daughter is a PT and [employer] OP.    Occupational Therapy Goals:  Initiated 5/4/2025  1.  Patient will perform self-feeding with Moderate Assist within 7 day(s).  2.  Patient will perform grooming with Moderate Assist within 7 day(s).  3.  Patient will perform toilet transfers with Maximal Assist and Assist x2  within 7 day(s).  4.  Patient will participate in upper extremity therapeutic exercise/activities with Minimal Assist for 10 minutes within 7 day(s).    5.  Patient will utilize energy conservation techniques during functional activities with verbal and visual cues within 7 day(s).    Outcome: Progressing   OCCUPATIONAL THERAPY TREATMENT  Patient: Orlin San (79 y.o. male)  Date: 5/9/2025  Primary Diagnosis: Dehydration [E86.0]  Lactic acidosis [E87.20]  Hypermagnesemia [E83.41]  Hypernatremia [E87.0]  Shock (HCC) [R57.9]  Hyperglycemia [R73.9]  Elevated troponin [R79.89]  Abrasions of multiple sites [T07.XXXA]  HAYLIE (acute kidney injury) [N17.9]  Altered mental status, unspecified altered mental status type [R41.82]  Atrial fibrillation, unspecified type (HCC) [I48.91]  Pressure injury of skin of right hip, unspecified injury stage [L89.219]       Precautions: Fall Risk, Skin                Chart, occupational therapy assessment, plan of care, and goals were reviewed.    ASSESSMENT  Patient

## 2025-05-09 NOTE — CARE COORDINATION
05/09/25  3:23 PM  CM discussed SNF preferences with pt's daughter Merlyn. [facility]. CM has selected facility in [facility] and sent updated clinicals. Patient will need to remain sitter free for 24 hours prior to discharge to SNF.                   Care Management Progress Note      Reason for Admission:   Dehydration [E86.0]  Lactic acidosis [E87.20]  Hypermagnesemia [E83.41]  Hypernatremia [E87.0]  Shock (HCC) [R57.9]  Hyperglycemia [R73.9]  Elevated troponin [R79.89]  Abrasions of multiple sites [T07.XXXA]  HAYLIE (acute kidney injury) [N17.9]  Altered mental status, unspecified altered mental status type [R41.82]  Atrial fibrillation, unspecified type (HCC) [I48.91]  Pressure injury of skin of right hip, unspecified injury stage [L89.219]         Patient Admission Status: Inpatient  RUR: 12%  Hospitalization in the last 30 days (Readmission):  No        Transition of care plan:  Patient was discussed in IDR and continues to be medically managed. Neuro is following.     Dispo: SNF. Patient will not need insurance authorization under medicare and has met his 3 midnight requirement.     [facility]. [facility] due to [facility]. [facility]. [facility] family's [facility]. CM has left a HIPAA complaint voicemail for pt's daughter Merlyn [phone] to obtain final SNF preference.     Discharge plan communicated with patient and/or discharge caregiver: Yes      Date 1st IMM letter given: 5/1/25.    Outpatient follow-up.    Transport at discharge:  stretcher based on pt's progress with therapy.       ___________________________________________   Stephani Westbrook RN Case Manager  5/9/2025   1:49 PM

## 2025-05-09 NOTE — PROGRESS NOTES
INPATIENT NEUROLOGY FOLLOW-UP NOTE    NAME:  Orlin San  MRN:  [id number]  : 1945      ADMIT DATE:   2025  3:10 PM    REASON FOR FOLLOW UP: Persistent encephalopathy    Impression/ Plan from Neurology Consult/ 2025/ Myself : Patient found down on floor at home, urinary and bowel incontinence.  Daughter last communicated him 11 days prior (patient stopped returning calls/ texts after that time). Found him lying on floor at home today.  Door was locked, not broken into.  House in disarray.  Patient with wine bottle next to him, upright, capped, and his serum EtOH < 10 x 2 (daughter says patient significantly reduced alcohol intake about 1 year ago; in light of that, it would make sense that serum EtOH < 10 x 2).   Bacteremic/ Sepsis/ Hypotensive/ Hypernatremic, new dx of A Fib with RVR.  ? Stroke causing weakness, fall, etc: Recommend checking MRI Brain w/o contrast when patient not agitated and can tolerate that study (sitting in MRI machine for 20-30 mins).  CT C-spine showed mult-level ddd and lower C-spine not visualized: Recommend checking MRI C-spine w/o contrast to eval for ? Cervical spinal stenosis as a cause of ?fall, ?weakness, unable to get off ground.  Continue supportive care and gradual correction of electrolyte disturbances/ hypernatremia, treatment of sepsis. Renal function seems improved compared to initial check. Added Ammonia level, B12, Folate to AM labs.  Too agitated to check EEG at present. If not becoming alert with above treatments, recommend checking EEG.  Will have Neurology NP [place] follow up on patient    ===========    25  Asked to revisit patient due to persistent encephalopathy.  Reviewed Hospitalist Progress note from today.  Active Dx include: 1) Hypovolemic, hypotension with shock (resolved with IVF), 2) Bacteremia/ Sepsis/ Multiple infected wounds (UA negative, UCx negative, CT Chest/ Abd/ Pelvis negative, Blood Culture with \"CoNS and enterococcus,\"

## 2025-05-09 NOTE — PROGRESS NOTES
Physical therapy    Pt declining to participate with Physical therapy after rolling in bed with nurisng for [place] care. PT will continue to follow

## 2025-05-09 NOTE — PROGRESS NOTES
Hospitalist Progress Note      NAME:  Orlin San   :  1945  MRM:  [id number]    Date/Time: 2025  7:09 AM           Assessment / Plan:     Mr. San is a 79-year-old gentleman who has previous history significant for type 2 diabetes, questionable Parkinson disease, hypertension, hyperlipidemia, who was brought to the ER on 25 after his daughter found him on the floor. Last known contact was 11 days prior to this, unknown total down time.     Hypovolemic Hypotension without shock   - Suspect largely due to dehydration, cannot r/o septic component   - S/p IVF. Resolved     Bacteremia   Sepsis   Multiple infected wounds   - UA negative. Urine culture negative. CT chest/abd/pelvis negative.   - Blood culture () with CoNS and enterococcus. Repeat () NGTD  - TTE without vegetation   - Wound care  - ID followed. Continue IV vancomycin thru  per ID; if discharged before May 11 and can do linezolid 600 mg twice daily.     Atrial fibrillation with RVR  - No history of A fib PTA. EKG in the ED showed A fib with RVR, . TSH wnl. Suspect due to dehydration, electrolyte derangements   - TTE showed EF 45 - 50%, mild global hypokinesis, moderate to severe AS  - S/p cardizem drip, then amio drip. Continue PO metoprolol. HR now well controlled  - S/p IV heparin, now on oral eliquis  - Cardiology followed care, will need OP follow up. Considerations for DCCV after 4 weeks of AC    Nonischemic cardiac injury   - Trop flat. Echo as above. Suspect due to RVR, hypotension on admission  - Evaluated by cardiology     HAYLIE  Hypernatremia   Hypokalemia   - Cr on admission 2.69. Suspect due to IVVD. Renal US with no acute findings   - Nephrology followed care  - Resolved with IVF    Acute metabolic encephalopathy   - CT head and MRI brain negative. Ammonia negative.  - Suspect multifactorial in setting of electrolyte derangements, dehydration, underlying cognitive decline, infection.   - Evaluated by

## 2025-05-10 LAB
GLUCOSE BLD STRIP.AUTO-MCNC: 111 MG/DL (ref 65–117)
GLUCOSE BLD STRIP.AUTO-MCNC: 122 MG/DL (ref 65–117)
GLUCOSE BLD STRIP.AUTO-MCNC: 228 MG/DL (ref 65–117)
SERVICE CMNT-IMP: ABNORMAL
SERVICE CMNT-IMP: ABNORMAL
SERVICE CMNT-IMP: NORMAL

## 2025-05-10 PROCEDURE — 6370000000 HC RX 637 (ALT 250 FOR IP): Performed by: HOSPITALIST

## 2025-05-10 PROCEDURE — 2500000003 HC RX 250 WO HCPCS: Performed by: STUDENT IN AN ORGANIZED HEALTH CARE EDUCATION/TRAINING PROGRAM

## 2025-05-10 PROCEDURE — 82962 GLUCOSE BLOOD TEST: CPT

## 2025-05-10 PROCEDURE — 2500000003 HC RX 250 WO HCPCS: Performed by: HOSPITALIST

## 2025-05-10 PROCEDURE — 6370000000 HC RX 637 (ALT 250 FOR IP): Performed by: SPECIALIST

## 2025-05-10 PROCEDURE — 6360000002 HC RX W HCPCS: Performed by: INTERNAL MEDICINE

## 2025-05-10 PROCEDURE — 6370000000 HC RX 637 (ALT 250 FOR IP): Performed by: STUDENT IN AN ORGANIZED HEALTH CARE EDUCATION/TRAINING PROGRAM

## 2025-05-10 PROCEDURE — 95706 EEG WO VID 2-12HR INTMT MNTR: CPT

## 2025-05-10 PROCEDURE — 1100000000 HC RM PRIVATE

## 2025-05-10 PROCEDURE — 94761 N-INVAS EAR/PLS OXIMETRY MLT: CPT

## 2025-05-10 PROCEDURE — 6360000002 HC RX W HCPCS: Performed by: STUDENT IN AN ORGANIZED HEALTH CARE EDUCATION/TRAINING PROGRAM

## 2025-05-10 PROCEDURE — 2580000003 HC RX 258: Performed by: INTERNAL MEDICINE

## 2025-05-10 RX ORDER — OLANZAPINE 5 MG/1
2.5 TABLET, FILM COATED ORAL 2 TIMES DAILY
Status: DISCONTINUED | OUTPATIENT
Start: 2025-05-10 | End: 2025-05-11

## 2025-05-10 RX ADMIN — COLLAGENASE SANTYL: 250 OINTMENT TOPICAL at 09:45

## 2025-05-10 RX ADMIN — SODIUM CHLORIDE 1250 MG: 0.9 INJECTION, SOLUTION INTRAVENOUS at 13:01

## 2025-05-10 RX ADMIN — ACETAMINOPHEN 650 MG: 325 TABLET ORAL at 20:40

## 2025-05-10 RX ADMIN — WATER 5 MG: 1 INJECTION INTRAMUSCULAR; INTRAVENOUS; SUBCUTANEOUS at 05:20

## 2025-05-10 RX ADMIN — OLANZAPINE 2.5 MG: 5 TABLET, FILM COATED ORAL at 20:41

## 2025-05-10 RX ADMIN — ACETAMINOPHEN 650 MG: 325 TABLET ORAL at 13:59

## 2025-05-10 RX ADMIN — METOPROLOL 50 MG: 50 TABLET ORAL at 20:41

## 2025-05-10 RX ADMIN — SODIUM CHLORIDE, PRESERVATIVE FREE 10 ML: 5 INJECTION INTRAVENOUS at 09:45

## 2025-05-10 RX ADMIN — OLANZAPINE 2.5 MG: 5 TABLET, FILM COATED ORAL at 09:42

## 2025-05-10 RX ADMIN — SODIUM CHLORIDE, PRESERVATIVE FREE 10 ML: 5 INJECTION INTRAVENOUS at 20:41

## 2025-05-10 RX ADMIN — INSULIN LISPRO 6 UNITS: 100 INJECTION, SOLUTION INTRAVENOUS; SUBCUTANEOUS at 17:24

## 2025-05-10 RX ADMIN — INSULIN GLARGINE 10 UNITS: 100 INJECTION, SOLUTION SUBCUTANEOUS at 21:42

## 2025-05-10 RX ADMIN — INSULIN LISPRO 4 UNITS: 100 INJECTION, SOLUTION INTRAVENOUS; SUBCUTANEOUS at 17:23

## 2025-05-10 ASSESSMENT — PAIN SCALES - GENERAL: PAINLEVEL_OUTOF10: 3

## 2025-05-10 ASSESSMENT — PAIN SCALES - WONG BAKER: WONGBAKER_NUMERICALRESPONSE: NO HURT

## 2025-05-10 ASSESSMENT — PAIN SCALES - PAIN ASSESSMENT IN ADVANCED DEMENTIA (PAINAD)
FACIALEXPRESSION: SMILING OR INEXPRESSIVE
CONSOLABILITY: NO NEED TO CONSOLE
NEGVOCALIZATION: OCCASIONAL MOAN/GROAN, LOW SPEECH, NEGATIVE/DISAPPROVING QUALITY
BODYLANGUAGE: RELAXED
TOTALSCORE: 1

## 2025-05-10 NOTE — PROGRESS NOTES
No record of Rapid EEG/ Ceribell being done or attempted yesterday. I messaged RN to attempt today if it was not attempted yesterday (ie, did patient refuse it?)

## 2025-05-10 NOTE — PLAN OF CARE
Problem: Safety - Adult  Goal: Free from fall injury  Outcome: Progressing     Problem: Discharge Planning  Goal: Discharge to home or other facility with appropriate resources  Outcome: Progressing     Problem: Pain  Goal: Verbalizes/displays adequate comfort level or baseline comfort level  Outcome: Progressing     Problem: Chronic Conditions and Co-morbidities  Goal: Patient's chronic conditions and co-morbidity symptoms are monitored and maintained or improved  Outcome: Progressing     Problem: Skin/Tissue Integrity  Goal: Skin integrity remains intact  Description: 1.  Monitor for areas of redness and/or skin breakdown2.  Assess vascular access sites hourly3.  Every 4-6 hours minimum:  Change oxygen saturation probe site4.  Every 4-6 hours:  If on nasal continuous positive airway pressure, respiratory therapy assess nares and determine need for appliance change or resting period  Outcome: Progressing     Problem: Nutrition Deficit:  Goal: Optimize nutritional status  Outcome: Progressing     Problem: ABCDS Injury Assessment  Goal: Absence of physical injury  Outcome: Progressing     Problem: Confusion  Goal: Confusion, delirium, dementia, or psychosis is improved or at baseline  Description: INTERVENTIONS:1. Assess for possible contributors to thought disturbance, including medications, impaired vision or hearing, underlying metabolic abnormalities, dehydration, psychiatric diagnoses, and notify attending LIP2. [place] high risk fall precautions, as indicated3. Provide frequent short contacts to provide reality reorientation, refocusing and direction4. Decrease environmental stimuli, including noise as appropriate5. Monitor and intervene to maintain adequate nutrition, hydration, elimination, sleep and activity6. If unable to ensure safety without constant attention obtain sitter and review sitter guidelines with assigned personnel7. Initiate Psychosocial CNS and Spiritual Care consult, as

## 2025-05-10 NOTE — PROGRESS NOTES
Hospitalist Progress Note      NAME:  Orlin San   :  1945  MRM:  [id number]    Date/Time: 5/10/2025  7:18 AM           Assessment / Plan:     Mr. San is a 79-year-old gentleman who has previous history significant for type 2 diabetes, questionable Parkinson disease, hypertension, hyperlipidemia, who was brought to the ER on 25 after his daughter found him on the floor. Last known contact was 11 days prior to this, unknown total down time.     Hypovolemic Hypotension without shock   - Suspect largely due to dehydration, cannot r/o septic component   - S/p IVF. Resolved     Bacteremia   Sepsis   Multiple infected wounds   - UA negative. Urine culture negative. CT chest/abd/pelvis negative.   - Blood culture () with CoNS and enterococcus. Repeat () NGTD  - TTE without vegetation   - Wound care  - ID followed. Continue IV vancomycin thru  per ID; if discharged before May 11 and can do linezolid 600 mg twice daily.     Acute metabolic encephalopathy   - CT head and MRI brain negative. Ammonia negative.  - Suspect multifactorial in setting of electrolyte derangements, dehydration, underlying cognitive decline, infection.   - Evaluated by neurology. Mental status not returned to baseline, re-eval requested. Suspect persistent metabolic encephalopathy. Ceribell ordered to assess for non-convulsive status epilepticus, patient may not tolerate. Monitor over weekend. If no improvement, considerations for LP on 25.   - Hold eliquis in case LP is warranted   - Start scheduled Zyprexa, low dose. Psych consult as well.   - Delirium precautions, IV Zeprexa PRN. Medication only if needed for severe agitation when patient is at risk of harming self or others.  - OP neurocognitive testing recommended   - SLP followed care. Intermittent s/s of aspiration on liquids. Not candidate for MBS or FEES due to pain. Recommend Dc on reg diet, mildly thickened liquids. Continue OP thrapy  - PT/OT  recommending IPR vs SNF    Atrial fibrillation with RVR  - No history of A fib PTA. EKG in the ED showed A fib with RVR, . TSH wnl. Suspect due to dehydration, electrolyte derangements   - TTE showed EF 45 - 50%, mild global hypokinesis, moderate to severe AS  - S/p cardizem drip, then amio drip. Continue PO metoprolol. HR now well controlled  - S/p IV heparin, now on oral eliquis. Held as above.   - Cardiology followed care, will need OP follow up. Considerations for DCCV after 4 weeks of AC    Nonischemic cardiac injury   - Trop flat. Echo as above. Suspect due to RVR, hypotension on admission  - Evaluated by cardiology     HAYLIE  Hypernatremia   Hypokalemia   - Cr on admission 2.69. Suspect due to IVVD. Renal US with no acute findings   - Nephrology followed care  - Resolved with IV  - Refused labs this AM.     Type 2 DM  - A1c 6.9%.  on admission.   - Hold home metformin   - Continue lantus + SSI. BG improved   - BG goal < 140 fasting, < 180 postprandial  - Correctional insulin, glucose monitoring, Hypoglycemic protocol     Moderate to severe AS  - Noted on echo   - OP cardiology follow up     Anemia   - Stable, suspect ACD. Normal B12, folate     History of Patton disease   - Bili mildly elevated, stable        #BMI (Calculated): 28.66    I have personally reviewed the radiographs, laboratory data in Epic and decisions and statements above are based partially on this personal interpretation.                 Care Plan discussed with: Patient, Family, Care Manager, Nursing Staff, and Consultant/Specialist    Discussed:  Care Plan and D/C Planning    Prophylaxis:   Eliquis     Disposition:  SNF/LTC    Total time spent with patient care: 30 Minutes **I personally saw and examined the patient during this time period**           ___________________________________________________    Attending Physician: Yordan green MD        Subjective:     Chief Complaint:  Refused AM labs. Sleeping currently.     Called

## 2025-05-11 PROBLEM — G93.40 ACUTE ENCEPHALOPATHY: Status: ACTIVE | Noted: 2025-05-11

## 2025-05-11 LAB
AMMONIA PLAS-SCNC: <10 UMOL/L
ANION GAP SERPL CALC-SCNC: 5 MMOL/L (ref 2–12)
BACTERIA SPEC CULT: NORMAL
BACTERIA SPEC CULT: NORMAL
BASOPHILS # BLD: 0.05 K/UL (ref 0–0.1)
BASOPHILS NFR BLD: 0.7 % (ref 0–1)
BUN SERPL-MCNC: 14 MG/DL (ref 6–20)
BUN/CREAT SERPL: 13 (ref 12–20)
CALCIUM SERPL-MCNC: 8.2 MG/DL (ref 8.5–10.1)
CHLORIDE SERPL-SCNC: 108 MMOL/L (ref 97–108)
CO2 SERPL-SCNC: 27 MMOL/L (ref 21–32)
CREAT SERPL-MCNC: 1.07 MG/DL (ref 0.7–1.3)
DIFFERENTIAL METHOD BLD: ABNORMAL
EOSINOPHIL # BLD: 0.11 K/UL (ref 0–0.4)
EOSINOPHIL NFR BLD: 1.6 % (ref 0–7)
ERYTHROCYTE [DISTWIDTH] IN BLOOD BY AUTOMATED COUNT: 14.8 % (ref 11.5–14.5)
GLUCOSE BLD STRIP.AUTO-MCNC: 138 MG/DL (ref 65–117)
GLUCOSE BLD STRIP.AUTO-MCNC: 184 MG/DL (ref 65–117)
GLUCOSE BLD STRIP.AUTO-MCNC: 207 MG/DL (ref 65–117)
GLUCOSE BLD STRIP.AUTO-MCNC: 80 MG/DL (ref 65–117)
GLUCOSE SERPL-MCNC: 112 MG/DL (ref 65–100)
HCT VFR BLD AUTO: 33.5 % (ref 36.6–50.3)
HGB BLD-MCNC: 10.6 G/DL (ref 12.1–17)
IMM GRANULOCYTES # BLD AUTO: 0.07 K/UL (ref 0–0.04)
IMM GRANULOCYTES NFR BLD AUTO: 1 % (ref 0–0.5)
LYMPHOCYTES # BLD: 0.76 K/UL (ref 0.8–3.5)
LYMPHOCYTES NFR BLD: 11.2 % (ref 12–49)
MCH RBC QN AUTO: 28.2 PG (ref 26–34)
MCHC RBC AUTO-ENTMCNC: 31.6 G/DL (ref 30–36.5)
MCV RBC AUTO: 89.1 FL (ref 80–99)
MONOCYTES # BLD: 0.65 K/UL (ref 0–1)
MONOCYTES NFR BLD: 9.5 % (ref 5–13)
NEUTS SEG # BLD: 5.17 K/UL (ref 1.8–8)
NEUTS SEG NFR BLD: 76 % (ref 32–75)
NRBC # BLD: 0 K/UL (ref 0–0.01)
NRBC BLD-RTO: 0 PER 100 WBC
PLATELET # BLD AUTO: 232 K/UL (ref 150–400)
PMV BLD AUTO: 11.4 FL (ref 8.9–12.9)
POTASSIUM SERPL-SCNC: 3.2 MMOL/L (ref 3.5–5.1)
RBC # BLD AUTO: 3.76 M/UL (ref 4.1–5.7)
RBC MORPH BLD: ABNORMAL
SERVICE CMNT-IMP: ABNORMAL
SERVICE CMNT-IMP: NORMAL
SODIUM SERPL-SCNC: 140 MMOL/L (ref 136–145)
WBC # BLD AUTO: 6.8 K/UL (ref 4.1–11.1)

## 2025-05-11 PROCEDURE — 2500000003 HC RX 250 WO HCPCS: Performed by: HOSPITALIST

## 2025-05-11 PROCEDURE — 82140 ASSAY OF AMMONIA: CPT

## 2025-05-11 PROCEDURE — 82962 GLUCOSE BLOOD TEST: CPT

## 2025-05-11 PROCEDURE — 85025 COMPLETE CBC W/AUTO DIFF WBC: CPT

## 2025-05-11 PROCEDURE — XX20X89 MONITORING OF BRAIN ELECTRICAL ACTIVITY, COMPUTER-AIDED DETECTION AND NOTIFICATION, NEW TECHNOLOGY GROUP 9: ICD-10-PCS | Performed by: PSYCHIATRY & NEUROLOGY

## 2025-05-11 PROCEDURE — 6370000000 HC RX 637 (ALT 250 FOR IP): Performed by: STUDENT IN AN ORGANIZED HEALTH CARE EDUCATION/TRAINING PROGRAM

## 2025-05-11 PROCEDURE — 99232 SBSQ HOSP IP/OBS MODERATE 35: CPT | Performed by: PSYCHIATRY & NEUROLOGY

## 2025-05-11 PROCEDURE — 95717 EEG PHYS/QHP 2-12 HR W/O VID: CPT | Performed by: PSYCHIATRY & NEUROLOGY

## 2025-05-11 PROCEDURE — 6360000002 HC RX W HCPCS: Performed by: STUDENT IN AN ORGANIZED HEALTH CARE EDUCATION/TRAINING PROGRAM

## 2025-05-11 PROCEDURE — 6370000000 HC RX 637 (ALT 250 FOR IP): Performed by: HOSPITALIST

## 2025-05-11 PROCEDURE — 6360000002 HC RX W HCPCS: Performed by: INTERNAL MEDICINE

## 2025-05-11 PROCEDURE — 6370000000 HC RX 637 (ALT 250 FOR IP): Performed by: SPECIALIST

## 2025-05-11 PROCEDURE — 1100000000 HC RM PRIVATE

## 2025-05-11 PROCEDURE — 2580000003 HC RX 258: Performed by: INTERNAL MEDICINE

## 2025-05-11 PROCEDURE — 36415 COLL VENOUS BLD VENIPUNCTURE: CPT

## 2025-05-11 PROCEDURE — 80048 BASIC METABOLIC PNL TOTAL CA: CPT

## 2025-05-11 RX ORDER — POTASSIUM CHLORIDE 7.45 MG/ML
10 INJECTION INTRAVENOUS
Status: COMPLETED | OUTPATIENT
Start: 2025-05-11 | End: 2025-05-11

## 2025-05-11 RX ORDER — OLANZAPINE 5 MG/1
2.5 TABLET, FILM COATED ORAL NIGHTLY
Status: DISCONTINUED | OUTPATIENT
Start: 2025-05-12 | End: 2025-05-12 | Stop reason: HOSPADM

## 2025-05-11 RX ADMIN — METOPROLOL 50 MG: 50 TABLET ORAL at 19:34

## 2025-05-11 RX ADMIN — INSULIN GLARGINE 10 UNITS: 100 INJECTION, SOLUTION SUBCUTANEOUS at 19:34

## 2025-05-11 RX ADMIN — SODIUM CHLORIDE, PRESERVATIVE FREE 10 ML: 5 INJECTION INTRAVENOUS at 19:35

## 2025-05-11 RX ADMIN — POTASSIUM CHLORIDE 10 MEQ: 7.46 INJECTION, SOLUTION INTRAVENOUS at 17:21

## 2025-05-11 RX ADMIN — POTASSIUM CHLORIDE 10 MEQ: 7.46 INJECTION, SOLUTION INTRAVENOUS at 15:07

## 2025-05-11 RX ADMIN — INSULIN LISPRO 6 UNITS: 100 INJECTION, SOLUTION INTRAVENOUS; SUBCUTANEOUS at 17:25

## 2025-05-11 RX ADMIN — POTASSIUM CHLORIDE 10 MEQ: 7.46 INJECTION, SOLUTION INTRAVENOUS at 14:03

## 2025-05-11 RX ADMIN — INSULIN LISPRO 4 UNITS: 100 INJECTION, SOLUTION INTRAVENOUS; SUBCUTANEOUS at 17:25

## 2025-05-11 RX ADMIN — METOPROLOL 50 MG: 50 TABLET ORAL at 08:07

## 2025-05-11 RX ADMIN — OLANZAPINE 2.5 MG: 5 TABLET, FILM COATED ORAL at 08:07

## 2025-05-11 RX ADMIN — SODIUM CHLORIDE, PRESERVATIVE FREE 10 ML: 5 INJECTION INTRAVENOUS at 08:08

## 2025-05-11 RX ADMIN — INSULIN LISPRO 6 UNITS: 100 INJECTION, SOLUTION INTRAVENOUS; SUBCUTANEOUS at 12:55

## 2025-05-11 RX ADMIN — COLLAGENASE SANTYL: 250 OINTMENT TOPICAL at 08:08

## 2025-05-11 RX ADMIN — ACETAMINOPHEN 650 MG: 325 TABLET ORAL at 19:34

## 2025-05-11 RX ADMIN — INSULIN LISPRO 4 UNITS: 100 INJECTION, SOLUTION INTRAVENOUS; SUBCUTANEOUS at 19:34

## 2025-05-11 RX ADMIN — POTASSIUM CHLORIDE 10 MEQ: 7.46 INJECTION, SOLUTION INTRAVENOUS at 16:16

## 2025-05-11 RX ADMIN — SODIUM CHLORIDE 1250 MG: 0.9 INJECTION, SOLUTION INTRAVENOUS at 13:00

## 2025-05-11 ASSESSMENT — PAIN SCALES - PAIN ASSESSMENT IN ADVANCED DEMENTIA (PAINAD)
BODYLANGUAGE: RELAXED
BREATHING: NORMAL
CONSOLABILITY: NO NEED TO CONSOLE
TOTALSCORE: 0
FACIALEXPRESSION: SMILING OR INEXPRESSIVE

## 2025-05-11 ASSESSMENT — PAIN DESCRIPTION - DESCRIPTORS: DESCRIPTORS: ACHING

## 2025-05-11 ASSESSMENT — PAIN DESCRIPTION - LOCATION: LOCATION: GENERALIZED

## 2025-05-11 ASSESSMENT — PAIN SCALES - GENERAL: PAINLEVEL_OUTOF10: 3

## 2025-05-11 NOTE — CONSULTS
Attempted to see patient for psychiatry consult.  Seen patient virtually in his room but patient was sedated and multiple attempts made to wake him up but he was unable to awake and provide history at this time.  Spoke with patient's nurse who stated patient just received Zyprexa and that is why he is probably sleep.  Please reconsult psychiatry when the patient is alert and awake to provide history.

## 2025-05-11 NOTE — PROGRESS NOTES
Hospitalist Progress Note      NAME:  Orlin San   :  1945  MRM:  [id number]    Date/Time: 2025  7:27 AM           Assessment / Plan:     Mr. San is a 79-year-old gentleman who has previous history significant for type 2 diabetes, questionable Parkinson disease, hypertension, hyperlipidemia, who was brought to the ER on 25 after his daughter found him on the floor. Last known contact was 11 days prior to this, unknown total down time.     Hypovolemic Hypotension without shock   - Suspect largely due to dehydration, cannot r/o septic component   - S/p IVF. Resolved     Bacteremia   Sepsis   Multiple infected wounds   - UA negative. Urine culture negative. CT chest/abd/pelvis negative.   - Blood culture () with CoNS and enterococcus. Repeat () NGTD  - TTE without vegetation   - Wound care  - ID followed. Continue IV vancomycin thru  per ID; if discharged before May 11 and can do linezolid 600 mg twice daily.     Acute metabolic encephalopathy   - CT head and MRI brain negative. Ammonia negative.  - Suspect multifactorial in setting of electrolyte derangements, dehydration, underlying cognitive decline, infection.   - Evaluated by neurology.   - Mental status not returned to baseline, re-eval with neuro requested. Suspect persistent metabolic encephalopathy from above. Ceribell ordered with no seizure discharges.   - Somewhat improved today. Monitor over weekend. If remains far from baseline, considerations for LP on 25.   - Hold eliquis in case LP is warranted   - Started on scheduled Zyprexa BID, low dose yesterday. Mental status improving; however may be oversedated. Will switch to qhs only. Psych consult as well.   - Delirium precautions  - OP neurocognitive testing recommended   - SLP followed care. Intermittent s/s of aspiration on liquids. Not candidate for MBS or FEES due to pain. Recommend Dc on reg diet, mildly thickened liquids. Continue OP thrapy  - PT/OT
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
## 2025-05-11 NOTE — PLAN OF CARE
Problem: Safety - Adult  Goal: Free from fall injury  Outcome: Progressing     Problem: Discharge Planning  Goal: Discharge to home or other facility with appropriate resources  Outcome: Progressing     Problem: Pain  Goal: Verbalizes/displays adequate comfort level or baseline comfort level  Outcome: Progressing     Problem: Chronic Conditions and Co-morbidities  Goal: Patient's chronic conditions and co-morbidity symptoms are monitored and maintained or improved  Outcome: Progressing     Problem: Skin/Tissue Integrity  Goal: Skin integrity remains intact  Description: 1.  Monitor for areas of redness and/or skin breakdown2.  Assess vascular access sites hourly3.  Every 4-6 hours minimum:  Change oxygen saturation probe site4.  Every 4-6 hours:  If on nasal continuous positive airway pressure, respiratory therapy assess nares and determine need for appliance change or resting period  Outcome: Progressing     Problem: Nutrition Deficit:  Goal: Optimize nutritional status  Outcome: Progressing     Problem: ABCDS Injury Assessment  Goal: Absence of physical injury  Outcome: Progressing     Problem: Confusion  Goal: Confusion, delirium, dementia, or psychosis is improved or at baseline  Description: INTERVENTIONS:1. Assess for possible contributors to thought disturbance, including medications, impaired vision or hearing, underlying metabolic abnormalities, dehydration, psychiatric diagnoses, and notify attending LIP2. [place] high risk fall precautions, as indicated3. Provide frequent short contacts to provide reality reorientation, refocusing and direction4. Decrease environmental stimuli, including noise as appropriate5. Monitor and intervene to maintain adequate nutrition, hydration, elimination, sleep and activity6. If unable to ensure safety without constant attention obtain sitter and review sitter guidelines with assigned personnel7. Initiate Psychosocial CNS and Spiritual Care consult, as

## 2025-05-11 NOTE — PROCEDURES
Ceribell/ Rapid EEG    [place], [place]        [phone] (Main)  [phone] (Medical Records)     Interpreting Physician:  Kim Kline MD    Date/ Time:     5- ( 1252 to 1331 then 1450 to 1654)  Total Duration:    4 hr 42 min  Date of Interpretation:  5/11/25    Indication:  79 y.o. male   Dehydration [E86.0]  Lactic acidosis [E87.20]  Hypermagnesemia [E83.41]  Hypernatremia [E87.0]  Shock (HCC) [R57.9]  Hyperglycemia [R73.9]  Elevated troponin [R79.89]  Abrasions of multiple sites [T07.XXXA]  HAYLIE (acute kidney injury) [N17.9]  Altered mental status, unspecified altered mental status type [R41.82]  Atrial fibrillation, unspecified type (HCC) [I48.91]  Pressure injury of skin of right hip, unspecified injury stage [L89.219]    Impression:      Mild abnormal awake, sleep Rapid EEG/ Ceribell recording.  There is mild generalized slowing during wakefulness suggestive of an encephalopathic process, not specific as to cause.  Potential etiologies include (but not limited to): toxic, metabolic, infectious, neuro-degenerative, iatrogenic (ie sedating medication) processes, or a post-ictal state.  No epileptiform discharges were seen during this recording.       The Clarity, [employer] seizure-burden software, identified 2 periods of possible seizure burden up to 3 %.  On manual review, there were no seizure discharges    Clinical and Neuro-Imaging correlation is necessary.      If there is any further concern for potential seizure-like activity, I recommend obtaining a full, 16-channel EEG with video (routine 20-30 minute EEG or prolonged EEG) to include parasagittal coverage and improved recording quality.      =================================  Technical: This EEG was obtained using a 10 lead, 8 channel system positioned circumferentially without parasagittal coverage. Computer selected EEG is reviewed as well as background features and all clinically significant events. Clarity  algorithm utilized and implemented to provide analysis of underlying activity and seizure detection used to facilitate reading.      PSHx lists History of Cranial Surgery: No  No past surgical history on file.    Interpretation:    The background is symmetric, low amplitude.  The PDR is 7 Hz on both sides during what appears to be wakefulness (symmetric muscle artifact in the frontal regions). There are occasional sleep spindles in combination with low amplitude mostly delta background suggestive of sleep.  No focal areas of slowing are seen. No seizure discharges are seen.       Clarity the automated seizure-burden detection software, estimates seizure burden during the following periods:     17:25:11 to 17:30:04 (up to 3 % seizure burden): on manual review, no seizure discharges seen    17:37:14 to 17:42:06 (up to 3 % seizure burden): on manual review, no seizure discharges seen    =================================    Current Meds:    Current Facility-Administered Medications:     OLANZapine (ZYPREXA) tablet 2.5 mg, 2.5 mg, Oral, BID, Yordan Baker MD, 2.5 mg at 05/11/25 0807    OLANZapine (ZyPREXA) 5 mg in sterile water 1 mL injection, 5 mg, IntraMUSCular, BID PRN, Yordan Baker MD    vancomycin (VANCOCIN) 1,250 mg in sodium chloride 0.9 % 250 mL IVPB ([id number]), 1,250 mg, IntraVENous, Q24H, Robbie, Alisha, DO, Stopped at 05/10/25 1434    butalbital-acetaminophen-caffeine (FIORICET, ESGIC) per tablet 1 tablet, 1 tablet, Oral, Q6H PRN, Robbie, Alisha, DO, 1 tablet at 05/06/25 0915    [Held by provider] apixaban (ELIQUIS) tablet 5 mg, 5 mg, Oral, BID, Lakisha Riddle APRN - NP, 5 mg at 05/09/25 0938    metoprolol tartrate (LOPRESSOR) tablet 50 mg, 50 mg, Oral, BID, Sarah Butterfield MD, 50 mg at 05/11/25 0807    insulin glargine (LANTUS) injection vial 10 Units, 10 Units, SubCUTAneous, Nightly, Greer Vargas MD, 10 Units at 05/10/25 2142    insulin lispro (HUMALOG,ADMELOG) injection vial 6 Units, 6 Units, SubCUTAneous, TID WC,

## 2025-05-11 NOTE — PROGRESS NOTES
INPATIENT NEUROLOGY FOLLOW-UP NOTE    NAME:  Orlin San  MRN:  [id number]  : 1945      ADMIT DATE:   2025  3:10 PM    REASON FOR FOLLOW UP: Persistent encephalopathy    Impression/ Plan from Neurology Consult/ 2025/ Myself : Patient found down on floor at home, urinary and bowel incontinence.  Daughter last communicated him 11 days prior (patient stopped returning calls/ texts after that time). Found him lying on floor at home today.  Door was locked, not broken into.  House in disarray.  Patient with wine bottle next to him, upright, capped, and his serum EtOH < 10 x 2 (daughter says patient significantly reduced alcohol intake about 1 year ago; in light of that, it would make sense that serum EtOH < 10 x 2).   Bacteremic/ Sepsis/ Hypotensive/ Hypernatremic, new dx of A Fib with RVR.  ? Stroke causing weakness, fall, etc: Recommend checking MRI Brain w/o contrast when patient not agitated and can tolerate that study (sitting in MRI machine for 20-30 mins).  CT C-spine showed mult-level ddd and lower C-spine not visualized: Recommend checking MRI C-spine w/o contrast to eval for ? Cervical spinal stenosis as a cause of ?fall, ?weakness, unable to get off ground.  Continue supportive care and gradual correction of electrolyte disturbances/ hypernatremia, treatment of sepsis. Renal function seems improved compared to initial check. Added Ammonia level, B12, Folate to AM labs.  Too agitated to check EEG at present. If not becoming alert with above treatments, recommend checking EEG.  Will have Neurology NP [place] follow up on patient    ===========    25  Asked to revisit patient due to persistent encephalopathy.  Reviewed Hospitalist Progress note from today.  Active Dx include: 1) Hypovolemic, hypotension with shock (resolved with IVF), 2) Bacteremia/ Sepsis/ Multiple infected wounds (UA negative, UCx negative, CT Chest/ Abd/ Pelvis negative, Blood Culture with \"CoNS and enterococcus,\"  Risk)  [] Yes   [] No  Prescription drug management (Moderate Risk):       [] Yes     [] No  Decision regarding minor surgery (Moderate Risk):      [] Yes      [] No

## 2025-05-12 ENCOUNTER — HOSPITAL ENCOUNTER (INPATIENT)
Facility: HOSPITAL | Age: 80
LOS: 9 days | Discharge: NURSING, REHAB OR LONG-TERM CARE | DRG: 640 | End: 2025-05-23
Attending: EMERGENCY MEDICINE | Admitting: STUDENT IN AN ORGANIZED HEALTH CARE EDUCATION/TRAINING PROGRAM
Payer: MEDICARE

## 2025-05-12 VITALS
HEIGHT: 67 IN | RESPIRATION RATE: 18 BRPM | TEMPERATURE: 97.7 F | WEIGHT: 183 LBS | BODY MASS INDEX: 28.72 KG/M2 | OXYGEN SATURATION: 95 % | HEART RATE: 72 BPM | SYSTOLIC BLOOD PRESSURE: 137 MMHG | DIASTOLIC BLOOD PRESSURE: 62 MMHG

## 2025-05-12 DIAGNOSIS — R41.0 DELIRIUM, ACUTE: Primary | ICD-10-CM

## 2025-05-12 PROBLEM — E87.6 HYPOKALEMIA: Status: ACTIVE | Noted: 2025-05-12

## 2025-05-12 LAB
ALBUMIN SERPL-MCNC: 2.1 G/DL (ref 3.5–5)
ALBUMIN/GLOB SERPL: 0.5 (ref 1.1–2.2)
ALP SERPL-CCNC: 162 U/L (ref 45–117)
ALT SERPL-CCNC: 47 U/L (ref 12–78)
ANION GAP SERPL CALC-SCNC: 5 MMOL/L (ref 2–12)
AST SERPL-CCNC: 29 U/L (ref 15–37)
BASOPHILS # BLD: 0.05 K/UL (ref 0–0.1)
BASOPHILS NFR BLD: 0.7 % (ref 0–1)
BILIRUB SERPL-MCNC: 0.7 MG/DL (ref 0.2–1)
BUN SERPL-MCNC: 15 MG/DL (ref 6–20)
BUN/CREAT SERPL: 14 (ref 12–20)
CALCIUM SERPL-MCNC: 8.1 MG/DL (ref 8.5–10.1)
CHLORIDE SERPL-SCNC: 107 MMOL/L (ref 97–108)
CO2 SERPL-SCNC: 28 MMOL/L (ref 21–32)
CREAT SERPL-MCNC: 1.05 MG/DL (ref 0.7–1.3)
DIFFERENTIAL METHOD BLD: ABNORMAL
EOSINOPHIL # BLD: 0.08 K/UL (ref 0–0.4)
EOSINOPHIL NFR BLD: 1.2 % (ref 0–7)
ERYTHROCYTE [DISTWIDTH] IN BLOOD BY AUTOMATED COUNT: 14.6 % (ref 11.5–14.5)
GLOBULIN SER CALC-MCNC: 4 G/DL (ref 2–4)
GLUCOSE BLD STRIP.AUTO-MCNC: 105 MG/DL (ref 65–117)
GLUCOSE BLD STRIP.AUTO-MCNC: 149 MG/DL (ref 65–117)
GLUCOSE SERPL-MCNC: 144 MG/DL (ref 65–100)
HCT VFR BLD AUTO: 32.6 % (ref 36.6–50.3)
HGB BLD-MCNC: 10.5 G/DL (ref 12.1–17)
IMM GRANULOCYTES # BLD AUTO: 0.07 K/UL (ref 0–0.04)
IMM GRANULOCYTES NFR BLD AUTO: 1 % (ref 0–0.5)
LYMPHOCYTES # BLD: 0.76 K/UL (ref 0.8–3.5)
LYMPHOCYTES NFR BLD: 11.4 % (ref 12–49)
MCH RBC QN AUTO: 28.7 PG (ref 26–34)
MCHC RBC AUTO-ENTMCNC: 32.2 G/DL (ref 30–36.5)
MCV RBC AUTO: 89.1 FL (ref 80–99)
MONOCYTES # BLD: 0.59 K/UL (ref 0–1)
MONOCYTES NFR BLD: 8.8 % (ref 5–13)
NEUTS SEG # BLD: 5.15 K/UL (ref 1.8–8)
NEUTS SEG NFR BLD: 76.9 % (ref 32–75)
NRBC # BLD: 0 K/UL (ref 0–0.01)
NRBC BLD-RTO: 0 PER 100 WBC
PLATELET # BLD AUTO: 291 K/UL (ref 150–400)
PMV BLD AUTO: 10.3 FL (ref 8.9–12.9)
POTASSIUM SERPL-SCNC: 3.3 MMOL/L (ref 3.5–5.1)
PROT SERPL-MCNC: 6.1 G/DL (ref 6.4–8.2)
RBC # BLD AUTO: 3.66 M/UL (ref 4.1–5.7)
RBC MORPH BLD: ABNORMAL
SERVICE CMNT-IMP: ABNORMAL
SERVICE CMNT-IMP: NORMAL
SODIUM SERPL-SCNC: 140 MMOL/L (ref 136–145)
WBC # BLD AUTO: 6.7 K/UL (ref 4.1–11.1)

## 2025-05-12 PROCEDURE — 82728 ASSAY OF FERRITIN: CPT

## 2025-05-12 PROCEDURE — 2500000003 HC RX 250 WO HCPCS: Performed by: HOSPITALIST

## 2025-05-12 PROCEDURE — 92526 ORAL FUNCTION THERAPY: CPT

## 2025-05-12 PROCEDURE — 83550 IRON BINDING TEST: CPT

## 2025-05-12 PROCEDURE — 85025 COMPLETE CBC W/AUTO DIFF WBC: CPT

## 2025-05-12 PROCEDURE — 97530 THERAPEUTIC ACTIVITIES: CPT

## 2025-05-12 PROCEDURE — 97535 SELF CARE MNGMENT TRAINING: CPT

## 2025-05-12 PROCEDURE — 84134 ASSAY OF PREALBUMIN: CPT

## 2025-05-12 PROCEDURE — 83735 ASSAY OF MAGNESIUM: CPT

## 2025-05-12 PROCEDURE — 80053 COMPREHEN METABOLIC PANEL: CPT

## 2025-05-12 PROCEDURE — 6370000000 HC RX 637 (ALT 250 FOR IP): Performed by: SPECIALIST

## 2025-05-12 PROCEDURE — 36415 COLL VENOUS BLD VENIPUNCTURE: CPT

## 2025-05-12 PROCEDURE — 99285 EMERGENCY DEPT VISIT HI MDM: CPT

## 2025-05-12 PROCEDURE — 83540 ASSAY OF IRON: CPT

## 2025-05-12 PROCEDURE — 82962 GLUCOSE BLOOD TEST: CPT

## 2025-05-12 RX ORDER — ACETAMINOPHEN 650 MG/1
650 SUPPOSITORY RECTAL EVERY 6 HOURS PRN
Status: DISCONTINUED | OUTPATIENT
Start: 2025-05-12 | End: 2025-05-23 | Stop reason: HOSPADM

## 2025-05-12 RX ORDER — SODIUM CHLORIDE 9 MG/ML
INJECTION, SOLUTION INTRAVENOUS CONTINUOUS
Status: DISPENSED | OUTPATIENT
Start: 2025-05-12 | End: 2025-05-13

## 2025-05-12 RX ORDER — METOPROLOL TARTRATE 50 MG
50 TABLET ORAL 2 TIMES DAILY
Status: DISCONTINUED | OUTPATIENT
Start: 2025-05-12 | End: 2025-05-23 | Stop reason: HOSPADM

## 2025-05-12 RX ORDER — EZETIMIBE 10 MG/1
10 TABLET ORAL DAILY
Qty: 30 TABLET | Refills: 0 | Status: SHIPPED
Start: 2025-05-12

## 2025-05-12 RX ORDER — SODIUM CHLORIDE 9 MG/ML
INJECTION, SOLUTION INTRAVENOUS PRN
Status: DISCONTINUED | OUTPATIENT
Start: 2025-05-12 | End: 2025-05-23 | Stop reason: HOSPADM

## 2025-05-12 RX ORDER — OLANZAPINE 2.5 MG/1
2.5 TABLET, FILM COATED ORAL NIGHTLY
Qty: 30 TABLET | Refills: 0 | Status: ON HOLD
Start: 2025-05-12 | End: 2025-05-23

## 2025-05-12 RX ORDER — BISACODYL 5 MG/1
5 TABLET, DELAYED RELEASE ORAL DAILY PRN
Status: DISCONTINUED | OUTPATIENT
Start: 2025-05-12 | End: 2025-05-23 | Stop reason: HOSPADM

## 2025-05-12 RX ORDER — MAGNESIUM SULFATE IN WATER 40 MG/ML
2000 INJECTION, SOLUTION INTRAVENOUS PRN
Status: DISCONTINUED | OUTPATIENT
Start: 2025-05-12 | End: 2025-05-18

## 2025-05-12 RX ORDER — DEXTROSE MONOHYDRATE 100 MG/ML
INJECTION, SOLUTION INTRAVENOUS CONTINUOUS PRN
Status: DISCONTINUED | OUTPATIENT
Start: 2025-05-12 | End: 2025-05-23 | Stop reason: HOSPADM

## 2025-05-12 RX ORDER — ACETAMINOPHEN 325 MG/1
650 TABLET ORAL EVERY 6 HOURS PRN
Status: DISCONTINUED | OUTPATIENT
Start: 2025-05-12 | End: 2025-05-23 | Stop reason: HOSPADM

## 2025-05-12 RX ORDER — SODIUM CHLORIDE 0.9 % (FLUSH) 0.9 %
5-40 SYRINGE (ML) INJECTION EVERY 12 HOURS SCHEDULED
Status: DISCONTINUED | OUTPATIENT
Start: 2025-05-12 | End: 2025-05-18

## 2025-05-12 RX ORDER — POTASSIUM CHLORIDE 750 MG/1
40 TABLET, EXTENDED RELEASE ORAL PRN
Status: DISCONTINUED | OUTPATIENT
Start: 2025-05-12 | End: 2025-05-18

## 2025-05-12 RX ORDER — POTASSIUM CHLORIDE 7.45 MG/ML
10 INJECTION INTRAVENOUS PRN
Status: DISCONTINUED | OUTPATIENT
Start: 2025-05-12 | End: 2025-05-18

## 2025-05-12 RX ORDER — EZETIMIBE 10 MG/1
10 TABLET ORAL DAILY
Status: DISCONTINUED | OUTPATIENT
Start: 2025-05-13 | End: 2025-05-23 | Stop reason: HOSPADM

## 2025-05-12 RX ORDER — FENOFIBRATE 160 MG/1
160 TABLET ORAL DAILY
Status: DISCONTINUED | OUTPATIENT
Start: 2025-05-13 | End: 2025-05-23 | Stop reason: HOSPADM

## 2025-05-12 RX ORDER — PROCHLORPERAZINE EDISYLATE 5 MG/ML
10 INJECTION INTRAMUSCULAR; INTRAVENOUS EVERY 6 HOURS PRN
Status: DISCONTINUED | OUTPATIENT
Start: 2025-05-12 | End: 2025-05-23 | Stop reason: HOSPADM

## 2025-05-12 RX ORDER — SODIUM CHLORIDE 0.9 % (FLUSH) 0.9 %
5-40 SYRINGE (ML) INJECTION PRN
Status: DISCONTINUED | OUTPATIENT
Start: 2025-05-12 | End: 2025-05-23 | Stop reason: HOSPADM

## 2025-05-12 RX ORDER — METOPROLOL TARTRATE 50 MG
50 TABLET ORAL 2 TIMES DAILY
Qty: 60 TABLET | Refills: 0 | Status: SHIPPED | OUTPATIENT
Start: 2025-05-12

## 2025-05-12 RX ORDER — OLANZAPINE 5 MG/1
2.5 TABLET, FILM COATED ORAL NIGHTLY
Status: DISCONTINUED | OUTPATIENT
Start: 2025-05-12 | End: 2025-05-15

## 2025-05-12 RX ORDER — FENOFIBRATE 160 MG/1
160 TABLET ORAL DAILY
Qty: 90 TABLET | Refills: 0 | Status: SHIPPED
Start: 2025-05-12

## 2025-05-12 RX ADMIN — SODIUM CHLORIDE, PRESERVATIVE FREE 10 ML: 5 INJECTION INTRAVENOUS at 09:18

## 2025-05-12 RX ADMIN — METOPROLOL 50 MG: 50 TABLET ORAL at 09:13

## 2025-05-12 RX ADMIN — COLLAGENASE SANTYL: 250 OINTMENT TOPICAL at 09:18

## 2025-05-12 ASSESSMENT — PAIN SCALES - GENERAL: PAINLEVEL_OUTOF10: 0

## 2025-05-12 ASSESSMENT — PAIN - FUNCTIONAL ASSESSMENT: PAIN_FUNCTIONAL_ASSESSMENT: 0-10

## 2025-05-12 NOTE — PLAN OF CARE
Speech LAnguage Pathology TREATMENT    Patient: Orlin San (79 y.o. male)  Date: 5/12/2025  Primary Diagnosis: Dehydration [E86.0]  Lactic acidosis [E87.20]  Hypermagnesemia [E83.41]  Hypernatremia [E87.0]  Shock (HCC) [R57.9]  Hyperglycemia [R73.9]  Elevated troponin [R79.89]  Abrasions of multiple sites [T07.XXXA]  HAYLIE (acute kidney injury) [N17.9]  Altered mental status, unspecified altered mental status type [R41.82]  Atrial fibrillation, unspecified type (HCC) [I48.91]  Pressure injury of skin of right hip, unspecified injury stage [L89.219]       Precautions:  Fall Risk, Skin          aspiration        ASSESSMENT :  Patient with occasional coughing on thins when trialed.   He has no known etiology for dysphagia and tolerating reg diet, mildly thick liquids for 12 days.   He is unable to participate in objective testing (such as MBS or FEES) due to irritability and screaming with movement.    Will trial thin liquids and follow for tolerance.     Patient will benefit from skilled intervention to address the above impairments.     PLAN :  Recommendations and Planned Interventions:  Diet: Regular and thin liquids  Upright for all PO   Please document any choking instances to help determine diet tolerance.          Acute SLP Services: Yes, SLP will continue to follow per plan of care.  Discharge Recommendations: Continue to assess pending progress     SUBJECTIVE:   Patient stated, “They want to do surgery on my spine (Lumbar puncture)  I don't think it's going to help.”    OBJECTIVE:   No past medical history on file.No past surgical history on file.  Prior Level of Function/Home Situation:   Social/Functional History  Lives With: Alone  Type of Home: House  Home Layout: Two level (family believes he is staying on the 1st level-possibly sleeping on sofa)  Home Access: Stairs to enter with rails  Entrance Stairs - Number of Steps: 6-7 steps  Entrance Stairs - Rails: Both  Bathroom Shower/Tub: Walk-in

## 2025-05-12 NOTE — PLAN OF CARE
Problem: Occupational Therapy - Adult  Goal: By Discharge: Performs self-care activities at highest level of function for planned discharge setting.  See evaluation for individualized goals.  Description: FUNCTIONAL STATUS PRIOR TO ADMISSION:  Pt lived alone and was independent with ADLs and IADLs.  Daughter spoke with pt on phone a couple times a week.  Per daughter, home is unlivable.   Prior Level of Assist for ADLs: Independent, Prior Level of Assist for Homemaking: Independent, Prior Level of Assist for Transfers: Independent, Active : Yes     HOME SUPPORT: Patient lived alone with alone with daughter local.  Daughter is a PT and [employer].    Occupational Therapy Goals:  Initiated 5/4/2025  1.  Patient will perform self-feeding with Moderate Assist within 7 day(s).  2.  Patient will perform grooming with Moderate Assist within 7 day(s).  3.  Patient will perform toilet transfers with Maximal Assist and Assist x2  within 7 day(s).  4.  Patient will participate in upper extremity therapeutic exercise/activities with Minimal Assist for 10 minutes within 7 day(s).    5.  Patient will utilize energy conservation techniques during functional activities with verbal and visual cues within 7 day(s).    Outcome: Progressing   OCCUPATIONAL THERAPY TREATMENT  Patient: Orlin San (79 y.o. male)  Date: 5/12/2025  Primary Diagnosis: Dehydration [E86.0]  Lactic acidosis [E87.20]  Hypermagnesemia [E83.41]  Hypernatremia [E87.0]  Shock (HCC) [R57.9]  Hyperglycemia [R73.9]  Elevated troponin [R79.89]  Abrasions of multiple sites [T07.XXXA]  HAYLIE (acute kidney injury) [N17.9]  Altered mental status, unspecified altered mental status type [R41.82]  Atrial fibrillation, unspecified type (HCC) [I48.91]  Pressure injury of skin of right hip, unspecified injury stage [L89.219]       Precautions: Fall Risk, Skin                Chart, occupational therapy assessment, plan of care, and goals were reviewed.    ASSESSMENT  Patient

## 2025-05-12 NOTE — DISCHARGE SUMMARY
Hospitalist Discharge Summary     Patient ID:  Orlin San  [id number]  79 y.o.  1945    Admit date: 5/1/2025    Discharge date and time: 5/12/2025    Admission Diagnoses: Dehydration [E86.0]  Lactic acidosis [E87.20]  Hypermagnesemia [E83.41]  Hypernatremia [E87.0]  Shock (HCC) [R57.9]  Hyperglycemia [R73.9]  Elevated troponin [R79.89]  Abrasions of multiple sites [T07.XXXA]  HAYLIE (acute kidney injury) [N17.9]  Altered mental status, unspecified altered mental status type [R41.82]  Atrial fibrillation, unspecified type (HCC) [I48.91]  Pressure injury of skin of right hip, unspecified injury stage [L89.219]    Discharge Diagnoses:    Principal Problem:    Hypernatremia  Active Problems:    Altered mental status    Atrial fibrillation (HCC)    HAYLIE (acute kidney injury)    Bacteremia due to coagulase-negative Staphylococcus    Bacteremia due to Enterococcus    Acute encephalopathy  Resolved Problems:    * No resolved hospital problems. *         Hospital Course:       Mr. San is a 79-year-old gentleman who has previous history significant for type 2 diabetes, questionable Parkinson disease, hypertension, hyperlipidemia, who was brought to the ER on 5/1/25 after his daughter found him on the floor. Last known contact was 11 days prior to this, unknown total down time.      Hypovolemic Hypotension without shock   - Suspect largely due to dehydration, cannot r/o septic component   - S/p IVF. Resolved   - PT/OT evaluated. DC to SNF.      Bacteremia   Sepsis   Multiple infected wounds   - UA negative. Urine culture negative. CT chest/abd/pelvis negative.   - Blood culture (5/1) with CoNS and enterococcus. Repeat (5/5) NGTD  - TTE without vegetation   - Wound care  - ID followed. S/p IV vancomycin thru 5/11     Acute metabolic encephalopathy   - CT head and MRI brain negative. Ammonia negative.  - Suspect multifactorial in setting of electrolyte derangements, dehydration, underlying cognitive decline,  infection.   - Evaluated by neurology.   - Mental status had not returned to baseline & neuro re-evaluated.. Suspecting persistent metabolic encephalopathy from above. Ceribell ordered with no seizure discharges.   - Started on zyprexa & mental status improving slowly. There were considerations for LP, but with improvement, patient declining LP, and clinical suspicion being relatively low, deferred LP.   - Continue zyprexa qhs  - Delirium precautions  - OP neurocognitive testing recommended. F/u with OP neurologist.   - SLP followed care. Intermittent s/s of aspiration on liquids. Not candidate for MBS or FEES due to pain. Recommend Dc on reg diet, mildly thickened liquids. Continue OP thrapy     Atrial fibrillation with RVR  - No history of A fib PTA. EKG in the ED showed A fib with RVR, . TSH wnl. Suspect due to dehydration, electrolyte derangements   - TTE showed EF 45 - 50%, mild global hypokinesis, moderate to severe AS  - S/p cardizem drip, then amio drip. Continue PO metoprolol. HR now well controlled  - Continue eliquis   - Cardiology followed care, will need OP follow up. Considerations for DCCV after 4 weeks of AC     Nonischemic cardiac injury   - Trop flat. Echo as above. Suspect due to RVR, hypotension on admission  - Evaluated by cardiology      HAYLIE  Hypernatremia   Hypokalemia   - Cr on admission 2.69. Suspect due to IVVD. Renal US with no acute findings   - Nephrology followed care  - HAYLIE resolved with IVF  - Often refusing labs. K has been low. DC with KCL 20 meq x 5 days. Was on KCL 10 meq QD PTA  - Repeat labs in 5 - 7 days at SNF     Type 2 DM  - A1c 6.9%.  on admission. Improved with insulin while IP. BG well controlled   - Resume home metformin   - Continue glucose monitoring     Moderate to severe AS  - Noted on echo   - OP cardiology follow up as above      Anemia   - Stable, suspect ACD. Normal B12, folate      History of [place] disease   - Bili mildly elevated,

## 2025-05-12 NOTE — CARE COORDINATION
Transition of Care Plan to SNF/Rehab    Communication to Patient/Family:   Met with patient and family and they are agreeable to the transition plan. The Plan for Transition of Care is related to the following treatment goals: Dehydration [E86.0]  Lactic acidosis [E87.20]  Hypermagnesemia [E83.41]  Hypernatremia [E87.0]  Shock (HCC) [R57.9]  Hyperglycemia [R73.9]  Elevated troponin [R79.89]  Abrasions of multiple sites [T07.XXXA]  HAYLIE (acute kidney injury) [N17.9]  Altered mental status, unspecified altered mental status type [R41.82]  Atrial fibrillation, unspecified type (HCC) [I48.91]  Pressure injury of skin of right hip, unspecified injury stage [L89.219]      The Patient and/or patient representative was provided with a choice of provider and agrees  with the discharge plan.      Yes [x] No []      A Freedom of choice list was provided with basic dialogue that supports the patient's individualized plan of care/goals and shares the quality data associated with the providers.       Yes [x] No []      SNF/Rehab Transition:  Patient has been accepted to [facility] and meets criteria for admission.       SNF reports auth has been received? []  Medicare 3 night stay satisfied? [x]   Inpatient Admission Dates: 5/1/25 - 5/12/25      Patient will be transported by Hospital to home stretcher and expected to leave at 1530. Trip has been booked with Michael at [facility] under pt's medicare policy. CM has updated pt's daughter Merlyn [phone] who remains in agreement with dc plan.   [x] Packet on chart (if needed)  [x] PCS completed (if applicable)         Communication to SNF/Rehab:  Bedside RN, Missy, has been notified to update the transition plan to the facility and call report ( [phone], room 504 ).  Discharge information has been updated on the AVS. And communicated to facility via [facility], or CC link.     Discharge instructions to be fax'd to facility via [] [employer] [x]

## 2025-05-12 NOTE — DISCHARGE INSTRUCTIONS
HOSPITALIST DISCHARGE INSTRUCTIONS  NAME:  Orlin San   :  1945   MRN:  [id number]     Date/Time:  2025 12:00 PM    ADMIT DATE: 2025     DISCHARGE DATE: 2025     DISCHARGE DIAGNOSIS:  Blood stream infection from body wounds. Severe dehydration. Confusion. Atrial fibrillation. Kidney injury.      DISCHARGE INSTRUCTIONS:  Thank you for allowing us to participate in your care. Your discharging Hospitalist is Yordan green MD. You were admitted for evaluation and treatment of the above.     For the bloodstream infection, you have completed antibiotic therapy.  No further antibiotics are needed.  The nurses at the facility will continue to help you with wound care for your wounds.    For the severe dehydration, you received IV fluids.  The dehydration led to issues with your electrolytes along with kidney injury.  This has resolved.  Please continue to take potassium supplementation.  They will recheck your labs in about 5 days at rehab.    For the confusion, you had a CT head and an MRI of your brain which were negative.  You had a seizure study which was negative.  You were seen by neurology team.  As we discussed, this is likely due to the severity of your illness when you came to the hospital along with developing cognitive decline.  It is recommended that you follow-up with your neurologist for neuropsychiatric testing.  I also recommend you continue to take the Zyprexa every evening.      The confusion led to issues with swallowing.  You can eat a regular diet, but all liquids must be mildly thickened.  Continue working with the therapy teams in rehab.    You were found to have an abnormal heart rhythm called atrial fibrillation.  This was likely due to the severe infection and dehydration.  You were seen by the cardiology team.  You were started on a medication called metoprolol which has improved your heart rate.  You were also started on a blood thinner to prevent strokes.  You need

## 2025-05-12 NOTE — PLAN OF CARE
Problem: Physical Therapy - Adult  Goal: By Discharge: Performs mobility at highest level of function for planned discharge setting.  See evaluation for individualized goals.  Description: FUNCTIONAL STATUS PRIOR TO ADMISSION: Patient was modified independent using a single point cane for functional mobility but daughter reports he has likely needed more support than the cane for some time. Patient lives alone in a home daughter states is \"likely unlivable\".  Daughter is a PT at [employer] and reports texting with dad every few days and usually sees him monthly.     Physical Therapy Goals  Initiated 5/4/2025  1.  Patient will move from supine to sit and sit to supine, scoot up and down, and roll side to side in bed with moderate assistance within 7 day(s).    2.  Patient will perform sit to stand with maximal assistance within 7 day(s).  3.  Patient will transfer from bed to chair and chair to bed with maximal assistance using the least restrictive device within 7 day(s).  4.  Patient will ambulate with maximal assistance for 5 feet with the least restrictive device within 7 day(s).   5.  Patient will sit up in chair 2 hours at a time to improve OOB tolerance within 7 day(s).        Outcome: Progressing   PHYSICAL THERAPY TREATMENT    Patient: Orlin San (79 y.o. male)  Date: 5/12/2025  Diagnosis: Dehydration [E86.0]  Lactic acidosis [E87.20]  Hypermagnesemia [E83.41]  Hypernatremia [E87.0]  Shock (HCC) [R57.9]  Hyperglycemia [R73.9]  Elevated troponin [R79.89]  Abrasions of multiple sites [T07.XXXA]  HAYLIE (acute kidney injury) [N17.9]  Altered mental status, unspecified altered mental status type [R41.82]  Atrial fibrillation, unspecified type (HCC) [I48.91]  Pressure injury of skin of right hip, unspecified injury stage [L89.219] Hypernatremia      Precautions: Restrictions/Precautions  Restrictions/Precautions: Fall Risk, Skin            ASSESSMENT:  Patient continues to benefit from skilled PT services and is  assistance  Stand to Sit: Substantial/Maximal assistance;2 Person assistance  Balance:  Balance  Sitting: Impaired  Sitting - Static: Fair (occasional)  Sitting - Dynamic: Fair (occasional);Poor (constant support)  Standing: Impaired   Ambulation/Gait Training:              Neuro Re-Education:                    Pain Ratin/10   Pain Intervention(s):       Activity Tolerance:   Good and requires rest breaks    After treatment:   Patient left in no apparent distress in bed and placed in chair position., Call bell within reach, Bed/ chair alarm activated, and Caregiver / family present      COMMUNICATION/EDUCATION:   The patient's plan of care was discussed with: registered nurse    Patient Education  Education Given To: Patient;Family  Education Provided: Role of Therapy;Mobility Training;Plan of Care;Energy Conservation;Fall Prevention Strategies;Home Exercise Program;IADL Safety;Precautions;Orientation;ADL Adaptive Strategies;Transfer Training  Education Method: Verbal  Barriers to Learning: Hearing  Education Outcome: Continued education needed      Alda Dill PTA  Minutes: 30

## 2025-05-13 PROBLEM — R41.82 AMS (ALTERED MENTAL STATUS): Status: ACTIVE | Noted: 2025-05-13

## 2025-05-13 LAB
25(OH)D3 SERPL-MCNC: <9 NG/ML (ref 30–100)
ALBUMIN SERPL-MCNC: 2.1 G/DL (ref 3.5–5)
ANION GAP SERPL CALC-SCNC: 4 MMOL/L (ref 2–12)
BUN SERPL-MCNC: 13 MG/DL (ref 6–20)
BUN/CREAT SERPL: 13 (ref 12–20)
CALCIUM SERPL-MCNC: 8.1 MG/DL (ref 8.5–10.1)
CHLORIDE SERPL-SCNC: 107 MMOL/L (ref 97–108)
CO2 SERPL-SCNC: 30 MMOL/L (ref 21–32)
CREAT SERPL-MCNC: 0.98 MG/DL (ref 0.7–1.3)
ERYTHROCYTE [DISTWIDTH] IN BLOOD BY AUTOMATED COUNT: 14.6 % (ref 11.5–14.5)
FERRITIN SERPL-MCNC: 470 NG/ML (ref 26–388)
GGT SERPL-CCNC: 121 U/L (ref 15–85)
GLUCOSE BLD STRIP.AUTO-MCNC: 114 MG/DL (ref 65–117)
GLUCOSE BLD STRIP.AUTO-MCNC: 120 MG/DL (ref 65–117)
GLUCOSE BLD STRIP.AUTO-MCNC: 150 MG/DL (ref 65–117)
GLUCOSE BLD STRIP.AUTO-MCNC: 187 MG/DL (ref 65–117)
GLUCOSE SERPL-MCNC: 125 MG/DL (ref 65–100)
HCT VFR BLD AUTO: 31.3 % (ref 36.6–50.3)
HGB BLD-MCNC: 9.9 G/DL (ref 12.1–17)
IRON SATN MFR SERPL: 14 % (ref 20–50)
IRON SERPL-MCNC: 28 UG/DL (ref 35–150)
MAGNESIUM SERPL-MCNC: 1.8 MG/DL (ref 1.6–2.4)
MAGNESIUM SERPL-MCNC: 1.8 MG/DL (ref 1.6–2.4)
MCH RBC QN AUTO: 28.4 PG (ref 26–34)
MCHC RBC AUTO-ENTMCNC: 31.6 G/DL (ref 30–36.5)
MCV RBC AUTO: 89.7 FL (ref 80–99)
NRBC # BLD: 0 K/UL (ref 0–0.01)
NRBC BLD-RTO: 0 PER 100 WBC
PLATELET # BLD AUTO: 280 K/UL (ref 150–400)
PMV BLD AUTO: 10.2 FL (ref 8.9–12.9)
POTASSIUM SERPL-SCNC: 3.3 MMOL/L (ref 3.5–5.1)
PREALB SERPL-MCNC: 14 MG/DL (ref 20–40)
RBC # BLD AUTO: 3.49 M/UL (ref 4.1–5.7)
SERVICE CMNT-IMP: ABNORMAL
SERVICE CMNT-IMP: NORMAL
SODIUM SERPL-SCNC: 141 MMOL/L (ref 136–145)
TIBC SERPL-MCNC: 204 UG/DL (ref 250–450)
WBC # BLD AUTO: 7 K/UL (ref 4.1–11.1)

## 2025-05-13 PROCEDURE — 97161 PT EVAL LOW COMPLEX 20 MIN: CPT

## 2025-05-13 PROCEDURE — 6370000000 HC RX 637 (ALT 250 FOR IP): Performed by: STUDENT IN AN ORGANIZED HEALTH CARE EDUCATION/TRAINING PROGRAM

## 2025-05-13 PROCEDURE — 97165 OT EVAL LOW COMPLEX 30 MIN: CPT

## 2025-05-13 PROCEDURE — 97530 THERAPEUTIC ACTIVITIES: CPT

## 2025-05-13 PROCEDURE — 36415 COLL VENOUS BLD VENIPUNCTURE: CPT

## 2025-05-13 PROCEDURE — 6370000000 HC RX 637 (ALT 250 FOR IP): Performed by: FAMILY MEDICINE

## 2025-05-13 PROCEDURE — 85027 COMPLETE CBC AUTOMATED: CPT

## 2025-05-13 PROCEDURE — 2580000003 HC RX 258: Performed by: FAMILY MEDICINE

## 2025-05-13 PROCEDURE — 80048 BASIC METABOLIC PNL TOTAL CA: CPT

## 2025-05-13 PROCEDURE — 94761 N-INVAS EAR/PLS OXIMETRY MLT: CPT

## 2025-05-13 PROCEDURE — 82306 VITAMIN D 25 HYDROXY: CPT

## 2025-05-13 PROCEDURE — G0378 HOSPITAL OBSERVATION PER HR: HCPCS

## 2025-05-13 PROCEDURE — 83735 ASSAY OF MAGNESIUM: CPT

## 2025-05-13 PROCEDURE — 82977 ASSAY OF GGT: CPT

## 2025-05-13 PROCEDURE — 97535 SELF CARE MNGMENT TRAINING: CPT

## 2025-05-13 PROCEDURE — 82040 ASSAY OF SERUM ALBUMIN: CPT

## 2025-05-13 PROCEDURE — 82962 GLUCOSE BLOOD TEST: CPT

## 2025-05-13 RX ORDER — INSULIN LISPRO 100 [IU]/ML
0-4 INJECTION, SOLUTION INTRAVENOUS; SUBCUTANEOUS
Status: DISCONTINUED | OUTPATIENT
Start: 2025-05-13 | End: 2025-05-19

## 2025-05-13 RX ORDER — POTASSIUM CHLORIDE 750 MG/1
40 TABLET, EXTENDED RELEASE ORAL ONCE
Status: COMPLETED | OUTPATIENT
Start: 2025-05-13 | End: 2025-05-13

## 2025-05-13 RX ADMIN — INSULIN LISPRO 1 UNITS: 100 INJECTION, SOLUTION INTRAVENOUS; SUBCUTANEOUS at 21:10

## 2025-05-13 RX ADMIN — METOPROLOL TARTRATE 50 MG: 50 TABLET, FILM COATED ORAL at 09:31

## 2025-05-13 RX ADMIN — CHOLECALCIFEROL (VITAMIN D3) 10 MCG (400 UNIT) TABLET 800 UNITS: at 15:20

## 2025-05-13 RX ADMIN — SODIUM CHLORIDE: 0.9 INJECTION, SOLUTION INTRAVENOUS at 00:16

## 2025-05-13 RX ADMIN — EZETIMIBE 10 MG: 10 TABLET ORAL at 09:31

## 2025-05-13 RX ADMIN — FENOFIBRATE 160 MG: 160 TABLET ORAL at 09:31

## 2025-05-13 RX ADMIN — Medication: at 00:25

## 2025-05-13 RX ADMIN — APIXABAN 5 MG: 5 TABLET, FILM COATED ORAL at 09:31

## 2025-05-13 RX ADMIN — OLANZAPINE 2.5 MG: 5 TABLET, FILM COATED ORAL at 20:41

## 2025-05-13 RX ADMIN — POTASSIUM BICARBONATE 40 MEQ: 782 TABLET, EFFERVESCENT ORAL at 09:31

## 2025-05-13 RX ADMIN — APIXABAN 5 MG: 5 TABLET, FILM COATED ORAL at 00:17

## 2025-05-13 RX ADMIN — POTASSIUM CHLORIDE 40 MEQ: 750 TABLET, FILM COATED, EXTENDED RELEASE ORAL at 12:35

## 2025-05-13 RX ADMIN — APIXABAN 5 MG: 5 TABLET, FILM COATED ORAL at 20:40

## 2025-05-13 RX ADMIN — METOPROLOL TARTRATE 50 MG: 50 TABLET, FILM COATED ORAL at 00:17

## 2025-05-13 RX ADMIN — OLANZAPINE 2.5 MG: 5 TABLET, FILM COATED ORAL at 00:18

## 2025-05-13 RX ADMIN — METOPROLOL TARTRATE 50 MG: 50 TABLET, FILM COATED ORAL at 20:40

## 2025-05-13 ASSESSMENT — PAIN SCALES - PAIN ASSESSMENT IN ADVANCED DEMENTIA (PAINAD)
BODYLANGUAGE: RELAXED
BREATHING: NORMAL
CONSOLABILITY: NO NEED TO CONSOLE
FACIALEXPRESSION: SMILING OR INEXPRESSIVE
TOTALSCORE: 0

## 2025-05-13 ASSESSMENT — PAIN SCALES - GENERAL: PAINLEVEL_OUTOF10: 2

## 2025-05-13 NOTE — ACP (ADVANCE CARE PLANNING)
Advanced Care Planning    Explanation of the purpose of Advance Care Planning has been discussed with Patient Orlin San Caregiver in person today.      The family was receptive to the discussion which focused largely on patient centric decision making.      Chronic conditions impacting prognosis and decision making include: atrial fibrillation, hyperlipidemia, type 2 diabetes, normocytic anemia, multiple wounds .    We discussed the emergency management of respiratory and cardiac arrest in the hospital and the patient prefers a code status of full code at this time.    Daughter states patient does have a living will/advanced directive at this time.  Patient has appointed daughter to speak on his behalf should he be unable to do so.  Patient is okay with advanced therapies and  invasive procedures, such as blood transfusions, central lines, A-line's, feeding tubes and parenteral feedings etc., should they be needed.    Time spent today in ACP is 16 minutes which is exclusive of other services provided.    Electronically signed by:  Katelynn Sevilla DO

## 2025-05-13 NOTE — PLAN OF CARE
Problem: Occupational Therapy - Adult  Goal: By Discharge: Performs self-care activities at highest level of function for planned discharge setting.  See evaluation for individualized goals.  Description: FUNCTIONAL STATUS PRIOR TO ADMISSION:  Patient with recent hospitalization from 5/1-5/12/2025.  Patient discharged to SNF and returned to hospital shortly after non compliance and refusing care at SNF.  Per chart review, patient lives lone in a 2 level home, which is likely unfit for patient return.  Patient was using only a SPC for mobility and managing all ADLs prior to short period prior to last admission.   ,  ,  ,  ,  ,  ,  ,  ,  ,  ,       HOME SUPPORT: Patient lived alone.  Daughter's checked in through text/calls often, but visited father less frequently.    Occupational Therapy Goals:  Initiated 5/13/2025    1. Patient will perform self-feeding with Moderate Assist within 7 day(s).  2. Patient will perform grooming with Moderate Assist within 7 day(s).  3. Patient will perform toilet transfers with Maximal Assist and Assist x2 within 7 day(s).  4. Patient will participate in upper extremity therapeutic exercise/activities with Minimal Assist for 10 minutes within 7 day(s).   5. Patient will utilize energy conservation techniques during functional activities with verbal and visual cues within 7 day(s).     5/13/2025 1335 by Inés Payne, OT  Outcome: Not Progressing  5/13/2025 1332 by Inés Payne, OT  Outcome: Progressing        OCCUPATIONAL THERAPY EVALUATION    Patient: Orlin San (79 y.o. male)  Date: 5/13/2025  Primary Diagnosis: Hypokalemia [E87.6]  Delirium, acute [R41.0]         Precautions:                    ASSESSMENT :  The patient is limited by decreased functional mobility, independence in ADLs, high-level IADLs, ROM, strength, body mechanics, activity tolerance, endurance, cognition, command following, attention/concentration, coordination, balance, posture, increased pain

## 2025-05-13 NOTE — PLAN OF CARE
Problem: Physical Therapy - Adult  Goal: By Discharge: Performs mobility at highest level of function for planned discharge setting.  See evaluation for individualized goals.  Description: FUNCTIONAL STATUS PRIOR TO ADMISSION: The patient was functional at the wheelchair level and required maximum assistance for transfers to the chair.    HOME SUPPORT PRIOR TO ADMISSION: recently discharge to SNF but after a few hours patient back in the hospital.    Physical Therapy Goals  Initiated 5/13/2025  1.  Patient will move from supine to sit and sit to supine, scoot up and down, and roll side to side in bed with moderate assistance within 7 day(s).    2.  Patient will perform sit to stand with moderate assistance within 7 day(s).  3.  Patient will transfer from bed to chair and chair to bed with moderate assistance using the least restrictive device within 7 day(s).     Outcome: Progressing     Problem: Occupational Therapy - Adult  Goal: By Discharge: Performs self-care activities at highest level of function for planned discharge setting.  See evaluation for individualized goals.  Description: FUNCTIONAL STATUS PRIOR TO ADMISSION:  Patient with recent hospitalization from 5/1-5/12/2025.  Patient discharged to SNF and returned to hospital shortly after non compliance and refusing care at SNF.  Per chart review, patient lives lone in a 2 level home, which is likely unfit for patient return.  Patient was using only a SPC for mobility and managing all ADLs prior to short period prior to last admission.   ,  ,  ,  ,  ,  ,  ,  ,  ,  ,       HOME SUPPORT: Patient lived alone.  Daughter's checked in through text/calls often, but visited father less frequently.  PHYSICAL THERAPY EVALUATION    Patient: Orlin San (79 y.o. male)  Date: 5/13/2025  Primary Diagnosis: Hypokalemia [E87.6]  Delirium, acute [R41.0]       Precautions:              ASSESSMENT :   DEFICITS/IMPAIRMENTS:   The patient is limited by decreased functional  Measure for Post-Acute Care \"6-Clicks\" Basic Mobility Scores Predict Discharge Destination After Acute Care Hospitalization in Select Patient Groups: A Retrospective, Observational Study. Arch Rehabil Res Clin Transl. 2022;4(3):229357. doi: 10.1016/j.arrct..149599. PMID: 60699883; PMCID: EEA1771932.  4. Marleni SEN, Linda S, Declan W, Justyna SCALES. AM-PAC Short Forms Manual 4.0. Revised 2020.                                                                                                                                                                                                                              Pain Ratin/10   Pain Intervention(s):   nursing notified and addressing    Activity Tolerance:   Fair     After treatment:   Patient left in no apparent distress in bed and placed in chair position., Call bell within reach, Bed/ chair alarm activated, Side rails x3, and Heels elevated for pressure relief    COMMUNICATION/EDUCATION:   The patient's plan of care was discussed with: occupational therapist, registered nurse, and     Patient Education  Education Given To: Patient  Education Provided: Role of Therapy;Transfer Training;Plan of Care;Mobility Training;Home Exercise Program;Energy Conservation;Precautions;IADL Safety;Fall Prevention Strategies;ADL Adaptive Strategies;Orientation  Education Method: Verbal  Barriers to Learning: Hearing;Readiness to Learn  Education Outcome: Continued education needed    Thank you for this referral.  MINDY VALDIVIA, PT,WCC.  Minutes: 30      Physical Therapy Evaluation Charge Determination   History Examination Presentation Decision-Making   HIGH Complexity :3+ comorbidities / personal factors will impact the outcome/ POC  HIGH Complexity : 4+ Standardized tests and measures addressing body structure, function, activity limitation and / or participation in recreation  HIGH Complexity : Unstable and unpredictable characteristics  AM-PAC  HIGH    Based

## 2025-05-13 NOTE — PROGRESS NOTES
Hospitalist Progress Note      NAME:  Orlin San   :  1945  MRM:  [id number]    Date/Time: 2025  11:04 AM           Assessment / Plan:   Orlin is a 79 y.o. male  with a medical history significant for delirium, atrial fibrillation, hypokalemia, hyperlipidemia, type 2 diabetes, normocytic anemia, multiple wounds, who presents to the ER with AMS.      #AMS: Recent brain MRI was unremarkable.  Patient is currently awake and oriented fully.  Continue with delirium precautions.  On Zyprexa 2.5 mg nightly.  Fall precautions.    #Hypokalemia: Continue to replete.        #Hypoalbuminemia   - Etiology unclear.  Risk factors for renal protein loss in the setting of ongoing diabetes and hypertension     # Hypocalcemia  -Calcium 8.1.  Corrected calcium 9.6  - Etiology most likely secondary to ongoing hypoalbuminemia  - Monitor with routine labs     # Elevated alk phos  - Alk phos 162  - Etiology unclear     # Normocytic Anemia  - Hemoglobin 10.5.  Appears to be at baseline.  - No reported dark urine or bloody stools  - Iron studies consistent with anemia of chronic disease  - Transfuse hemoglobin less than 7     # Atrial fibrillation  - Currently in atrial fibrillation HR 70s to 80s  - Continue home metoprolol and Eliquis as previously prescribed  - Telemetry as above     # Type 2 diabetes  -   - Hold home metformin  - Start low-dose ISS and Accu-Cheks q. ACHS  - Diabetic diet    # Vitamin D deficiency  -Start vitamin D supplementation     Chronic Condition as below:  Hyperlipidemia  -No acute exacerbation of the above chronic conditions  -Continue all home medication regimens or formulary equivalents for the above         #BMI (Calculated): 29.75    I have personally reviewed the radiographs, laboratory data in Epic and decisions and statements above are based partially on this personal interpretation.                 Care Plan discussed with: Patient, Care Manager, and Nursing Staff    Discussed:

## 2025-05-13 NOTE — ED NOTES
This RN and Saskia RN attempted to do a dual skin assessment on patient. Patient became agitated and refused to let us look at preexisting wounds.

## 2025-05-13 NOTE — ED NOTES
TRANSFER - OUT REPORT:    Verbal report given to Saskia RN on Orlin San  being transferred to 5th floor for Inpatient Admission       Report consisted of patient's Situation, Background, Assessment and   Recommendations(SBAR).     Information from the following report(s) Nurse Handoff Report, ED Encounter Summary, MAR, and Recent Results was reviewed with the receiving nurse.           Lines:   Peripheral IV 05/12/25 Proximal;Right Forearm (Active)        Opportunity for questions and clarification was provided.      Patient transported with:  Registered Nurse

## 2025-05-13 NOTE — CARE COORDINATION
Case Management Assessment  Initial Evaluation    Date/Time of Evaluation: 5/13/2025 12:06 PM  Assessment Completed by: MARIA VICTORIA HUNT    If patient is discharged prior to next notation, then this note serves as note for discharge by case management.    Patient Name: Orlin San                   YOB: 1945  Diagnosis: Hypokalemia [E87.6]  Delirium, acute [R41.0]                   Date / Time: 5/12/2025  8:10 PM    Patient Admission Status: Observation   Readmission Risk (Low < 19, Mod (19-27), High > 27): Readmission Risk Score: 13.5    Current PCP: Dov George MD  PCP verified by CM?      Chart Reviewed: Yes      History Provided by:    Patient Orientation:      Patient Cognition:      Hospitalization in the last 30 days (Readmission):  Yes    If yes, Readmission Assessment in CM Navigator will be completed.    Advance Directives:      Code Status: Full Code   Patient's Primary Decision Maker is:        Discharge Planning:    Patient lives with:   Type of Home:    Primary Care Giver:    Patient Support Systems include:     Current Financial resources:    Current community resources:    Current services prior to admission:              Current DME:              Type of Home Care services:       ADLS  Prior functional level:    Current functional level:      PT AM-PAC:   /24  OT AM-PAC:   /24    Family can provide assistance at DC:    Would you like Case Management to discuss the discharge plan with any other family members/significant others, and if so, who?    Plans to Return to Present Housing:    Other Identified Issues/Barriers to RETURNING to current housing: no  Potential Assistance needed at discharge:              Potential DME:    Patient expects to discharge to:    Plan for transportation at discharge:      Financial    Payor: MEDICARE / Plan: MEDICARE PART A AND B / Product Type: *No Product type* /     Does insurance require precert for SNF: No    Potential assistance Purchasing  Medications:    Meds-to-Beds request: Yes    No Pharmacies Listed    Notes:    Factors facilitating achievement of predicted outcomes: Family support    Barriers to discharge: Confusion      The Plan for Transition of Care is related to the following treatment goals of Hypokalemia [E87.6]  Delirium, acute [R41.0]    IF APPLICABLE: The Patient and/or patient representative Orlin and his family were provided with a choice of provider and agrees with the discharge plan. Freedom of choice list with basic dialogue that supports the patient's individualized plan of care/goals and shares the quality data associated with the providers was provided to:     Patient Representative Name:       The Patient and/or Patient Representative Agree with the Discharge Plan?        Reportedly, pt resides alone.  Dtr Merlyn Lynn [phone] is the primary family contact.    Transition Plan of Care:  Dtr would like pt to discharge to SNF and choices were provided ([facility], [facility], [facility], and [facility]).  PT/OT evals pending  Pt is Medicare so no auth is needed.  Pt is in OBS status but he already fulfilled a three night stay  Dtr states that pt has private funding for assisted living in the future  Outpatient follow up  Ambulance will be needed    CM will continue to follow pt for SNF  Ramon

## 2025-05-13 NOTE — ED TRIAGE NOTES
Patient arrives to the ED via EMS with complaints of AMS.    Pt was DC this morning to the [facility] and they are concerned of worsening confusion.    Pt is AOx3    Per daughter when patient got back to the facility he was refusing care and the nurse  told them that he could stay there or he could go back to the hospital.

## 2025-05-13 NOTE — ED PROVIDER NOTES
[facility] B5 MULTI-SPECIALTY ONCOLOGY 2  EMERGENCY DEPARTMENT ENCOUNTER      Pt Name: Orlin San  MRN: [id number]  Birthdate 1945  Date of evaluation: 5/12/2025  Provider: Armando Ibarra MD      HISTORY OF PRESENT ILLNESS      79-year-old male with history of dementia, A-fib on Eliquis, CHF presenting to the ER for delirium.  Patient was just discharged this afternoon from this [facility].  On arrival to his skilled nursing facility he developed delirium and paranoia.  He kept trying to get out of bed.  There are no bed alarms at the facility.  His daughter had to restrain him so he did not get out of bed and hurt himself.  The  on-call said he could either try to stay at the [facility] or he could go back to the hospital.  Patient is oriented x 3 but overall unable to provide a very informative history.  His daughter is at bedside who provides history              Nursing Notes were reviewed.    REVIEW OF SYSTEMS         Review of Systems   Unable to perform ROS: Dementia           PAST MEDICAL HISTORY   No past medical history on file.      SURGICAL HISTORY     No past surgical history on file.      CURRENT MEDICATIONS       Current Discharge Medication List        CONTINUE these medications which have NOT CHANGED    Details   apixaban (ELIQUIS) 5 MG TABS tablet Take 1 tablet by mouth 2 times daily  Qty: 60 tablet, Refills: 0      OLANZapine (ZYPREXA) 2.5 MG tablet Take 1 tablet by mouth nightly  Qty: 30 tablet, Refills: 0      metoprolol tartrate (LOPRESSOR) 50 MG tablet Take 1 tablet by mouth 2 times daily  Qty: 60 tablet, Refills: 0      collagenase 250 UNIT/GM ointment Apply topically daily.  Qty: 30 g, Refills: 0      potassium bicarb-citric acid (EFFER-K) 20 MEQ TBEF effervescent tablet Take 1 tablet by mouth daily for 5 days  Qty: 5 tablet, Refills: 0      metFORMIN (GLUCOPHAGE) 500 MG tablet Take 1 tablet by mouth 2 times daily (with meals)  Qty: 60 tablet, Refills: 0       ezetimibe (ZETIA) 10 MG tablet Take 1 tablet by mouth daily  Qty: 30 tablet, Refills: 0      fenofibrate 160 MG tablet Take 1 tablet by mouth daily  Qty: 90 tablet, Refills: 0             ALLERGIES     Penicillins    FAMILY HISTORY     No family history on file.       SOCIAL HISTORY       Social History     Socioeconomic History    Marital status:          PHYSICAL EXAM       ED Triage Vitals [05/12/25 2021]   BP Systolic BP Percentile Diastolic BP Percentile Temp Temp Source Pulse Respirations SpO2   (!) 150/71 -- -- 98 °F (36.7 °C) Oral 73 18 97 %      Height Weight - Scale         1.702 m (5' 7\") 86.2 kg (190 lb)             Body mass index is 29.76 kg/m².    Physical Exam  Vitals and nursing note reviewed.   Constitutional:       Appearance: He is obese.      Comments: Features of dementia, obese   Cardiovascular:      Rate and Rhythm: Normal rate.   Pulmonary:      Effort: Pulmonary effort is normal.      Breath sounds: Normal breath sounds.   Abdominal:      Palpations: Abdomen is soft.      Tenderness: There is no abdominal tenderness.   Musculoskeletal:         General: Normal range of motion.   Skin:     General: Skin is warm and dry.   Neurological:      Mental Status: He is oriented to person, place, and time.      Comments: Very hard of hearing, requires repetition to answer questions             EMERGENCY DEPARTMENT COURSE and DIFFERENTIAL DIAGNOSIS/MDM:   Vitals:    Vitals:    05/14/25 0353 05/14/25 0658 05/14/25 0838 05/14/25 1458   BP: (!) 151/73  (!) 148/77    Pulse: 76 73 80 68   Resp: 16  17    Temp: 97.7 °F (36.5 °C)  99 °F (37.2 °C)    TempSrc: Oral  Axillary    SpO2: 94%  96%    Weight:       Height:             Medical Decision Making  Amount and/or Complexity of Data Reviewed  Labs: ordered.    Risk  Decision regarding hospitalization.            REASSESSMENT     ED Course as of 05/14/25 1705   Mon May 12, 2025   2227 hospital bounce back, patient was just discharged this afternoon.

## 2025-05-13 NOTE — PROGRESS NOTES
Patient refused dual skin, glucose check, vitals, and shift assessment. Patient became agitated, irritable , and anxious. Patient refused the bath wipes and clean gown.

## 2025-05-13 NOTE — CARE COORDINATION
READMISSION:  Pt was admitted to  from 5/1 - 5/12 and he discharged to [place] for short term rehab.  Pt declined to remain at [facility] so he returned to  and was readmitted on 5/12 under OBS status.     05/13/25 1200   Readmission Assessment   Number of Days since last admission? 1-7 days  (pt discharged to SNF on 5/12 and was readmitted the same day)   Previous Disposition SNF  ([facility])   Who is being Interviewed Unable to Complete  (daughter)   What was the patient's/caregiver's perception as to why they think they needed to return back to the hospital?   (pt didn't want to remain at [facility])   Did you visit your Primary Care Physician after you left the hospital, before you returned this time? No   Why weren't you able to visit your PCP? Other (Comment)  (Pt was readmitted the same day as discharge)   Did you see a specialist, such as Cardiac, Pulmonary, Orthopedic Physician, etc. after you left the hospital? No   Who advised the patient to return to the hospital? Self-referral   Does the patient report anything that got in the way of taking their medications? No

## 2025-05-13 NOTE — H&P
Hospitalist Admission Note    NAME: Orlin San   :  1945   MRN:  [id number]     Date/Time:  2025 10:32 PM    Patient PCP: Dov George MD - Admission Date: 2025       Assessment & Plan:     Primary Problem:    # Hypokalemia  -Potassium 3.3  - Admit to observation on med/tele  - Check magnesium  - Increase daily KCl to 40 mEq p.o.  - Monitor and replete to maintain goal of K >= 4.0 in the setting of ongoing atrial fibrillation    Active Hospital Problems:    # Delirium   - Mentation currently not at previous baseline, however closer to baseline prior to discharge  - Suspect etiology of the exacerbation due to change in environment at inpatient rehab  - Continue with Zyprexa 2.5 nightly  - Neurochecks every 4 hours x 6 starting at 0600  - Fall precautions  - To consider repeat imaging studies if mentation does not improve to previous discharge baseline    # Hypoalbuminemia  - Albumin 2.1  - Etiology unclear.  Risk factors for renal protein loss in the setting of ongoing diabetes and hypertension  - 2025 UA protien 30  - Check prealbumin, UA, urine protein/creatinine, urine microalbumin  - Monitor with routine labs    # Hypocalcemia  -Calcium 8.1.  Corrected calcium 9.6  - Etiology most likely secondary to ongoing hypoalbuminemia  - Monitor with routine labs    # Elevated alk phos  - Alk phos 162  - Etiology unclear  - Check vitamin D, LDH, GGT    # Normocytic Anemia  - Hemoglobin 10.5.  Appears to be at baseline.  - No reported dark urine or bloody stools  - Folate and B12 studies were within normal limits  - Check iron studies and stool occult  - Monitor hemoglobin with routine labs  - Transfuse hemoglobin less than 7    # Atrial fibrillation  - Currently in atrial fibrillation HR 70s to 80s  - Continue home metoprolol and Eliquis as previously prescribed  - Telemetry as above    # Type 2 diabetes  -   - Hold home

## 2025-05-13 NOTE — PROGRESS NOTES
[facility] visit attempted.  Mr. San was asleep. No family was present.  Prayer for spiritual communion offered for his well-being.    Chaplain Tabor. BRICE Desouza, RN, ACSW, LCSW   Page:  [phone]

## 2025-05-14 LAB
GLUCOSE BLD STRIP.AUTO-MCNC: 115 MG/DL (ref 65–117)
GLUCOSE BLD STRIP.AUTO-MCNC: 167 MG/DL (ref 65–117)
GLUCOSE BLD STRIP.AUTO-MCNC: 259 MG/DL (ref 65–117)
SERVICE CMNT-IMP: ABNORMAL
SERVICE CMNT-IMP: ABNORMAL
SERVICE CMNT-IMP: NORMAL

## 2025-05-14 PROCEDURE — 94761 N-INVAS EAR/PLS OXIMETRY MLT: CPT

## 2025-05-14 PROCEDURE — 6370000000 HC RX 637 (ALT 250 FOR IP): Performed by: FAMILY MEDICINE

## 2025-05-14 PROCEDURE — 6370000000 HC RX 637 (ALT 250 FOR IP): Performed by: STUDENT IN AN ORGANIZED HEALTH CARE EDUCATION/TRAINING PROGRAM

## 2025-05-14 PROCEDURE — 1100000000 HC RM PRIVATE

## 2025-05-14 PROCEDURE — G0378 HOSPITAL OBSERVATION PER HR: HCPCS

## 2025-05-14 PROCEDURE — 82962 GLUCOSE BLOOD TEST: CPT

## 2025-05-14 RX ORDER — NIFEDIPINE 30 MG/1
30 TABLET, EXTENDED RELEASE ORAL DAILY
Status: DISCONTINUED | OUTPATIENT
Start: 2025-05-14 | End: 2025-05-22

## 2025-05-14 RX ADMIN — METOPROLOL TARTRATE 50 MG: 50 TABLET, FILM COATED ORAL at 20:50

## 2025-05-14 RX ADMIN — FENOFIBRATE 160 MG: 160 TABLET ORAL at 08:51

## 2025-05-14 RX ADMIN — INSULIN LISPRO 2 UNITS: 100 INJECTION, SOLUTION INTRAVENOUS; SUBCUTANEOUS at 20:53

## 2025-05-14 RX ADMIN — NIFEDIPINE 30 MG: 30 TABLET, FILM COATED, EXTENDED RELEASE ORAL at 08:55

## 2025-05-14 RX ADMIN — METOPROLOL TARTRATE 50 MG: 50 TABLET, FILM COATED ORAL at 08:51

## 2025-05-14 RX ADMIN — CHOLECALCIFEROL (VITAMIN D3) 10 MCG (400 UNIT) TABLET 800 UNITS: at 08:51

## 2025-05-14 RX ADMIN — POTASSIUM BICARBONATE 40 MEQ: 782 TABLET, EFFERVESCENT ORAL at 08:51

## 2025-05-14 RX ADMIN — EZETIMIBE 10 MG: 10 TABLET ORAL at 08:51

## 2025-05-14 RX ADMIN — OLANZAPINE 2.5 MG: 5 TABLET, FILM COATED ORAL at 20:50

## 2025-05-14 RX ADMIN — APIXABAN 5 MG: 5 TABLET, FILM COATED ORAL at 20:50

## 2025-05-14 RX ADMIN — APIXABAN 5 MG: 5 TABLET, FILM COATED ORAL at 08:51

## 2025-05-14 ASSESSMENT — PAIN SCALES - PAIN ASSESSMENT IN ADVANCED DEMENTIA (PAINAD)
BODYLANGUAGE: RELAXED
FACIALEXPRESSION: SMILING OR INEXPRESSIVE
TOTALSCORE: 0
CONSOLABILITY: NO NEED TO CONSOLE
BREATHING: NORMAL

## 2025-05-14 NOTE — PROGRESS NOTES
injection vial 0-4 Units  0-4 Units SubCUTAneous 4x Daily AC & HS    cholecalciferol (VITAMIN D3) tablet TABS 800 Units  800 Units Oral Daily    apixaban (ELIQUIS) tablet 5 mg  5 mg Oral BID    OLANZapine (ZYPREXA) tablet 2.5 mg  2.5 mg Oral Nightly    metoprolol tartrate (LOPRESSOR) tablet 50 mg  50 mg Oral BID    ezetimibe (ZETIA) tablet 10 mg  10 mg Oral Daily    fenofibrate tablet 160 mg  160 mg Oral Daily    potassium bicarb-citric acid (EFFER-K) effervescent tablet 40 mEq  40 mEq Oral Daily    prochlorperazine (COMPAZINE) injection 10 mg  10 mg IntraVENous Q6H PRN    glucose chewable tablet 16 g  4 tablet Oral PRN    dextrose bolus 10% 125 mL  125 mL IntraVENous PRN    Or    dextrose bolus 10% 250 mL  250 mL IntraVENous PRN    glucagon injection 1 mg  1 mg SubCUTAneous PRN    dextrose 10 % infusion   IntraVENous Continuous PRN    sodium chloride flush 0.9 % injection 5-40 mL  5-40 mL IntraVENous 2 times per day    sodium chloride flush 0.9 % injection 5-40 mL  5-40 mL IntraVENous PRN    0.9 % sodium chloride infusion   IntraVENous PRN    potassium chloride (KLOR-CON) extended release tablet 40 mEq  40 mEq Oral PRN    Or    potassium bicarb-citric acid (EFFER-K) effervescent tablet 40 mEq  40 mEq Oral PRN    Or    potassium chloride 10 mEq/100 mL IVPB (Peripheral Line)  10 mEq IntraVENous PRN    magnesium sulfate 2000 mg in 50 mL IVPB premix  2,000 mg IntraVENous PRN    acetaminophen (TYLENOL) tablet 650 mg  650 mg Oral Q6H PRN    Or    acetaminophen (TYLENOL) suppository 650 mg  650 mg Rectal Q6H PRN    melatonin tablet 6 mg  6 mg Oral Nightly PRN    bisacodyl (DULCOLAX) EC tablet 5 mg  5 mg Oral Daily PRN            Lab Review:     Recent Labs     05/12/25 2229 05/13/25  0608   WBC 6.7 7.0   HGB 10.5* 9.9*   HCT 32.6* 31.3*    280     Recent Labs     05/11/25 2229 05/12/25 2229 05/13/25  0608   NA  --  140 141   K  --  3.3* 3.3*   CL  --  107 107   CO2  --  28 30   BUN  --  15 13   MG 1.8  --  1.8    ALT  --  47  --      No components found for: \"GLPOC\"

## 2025-05-14 NOTE — CARE COORDINATION
Care Management Progress Note    Reason for Admission:   Hypokalemia [E87.6]  Delirium, acute [R41.0]  AMS (altered mental status) [R41.82]         Patient Admission Status: Observation  RUR:  OBS  Hospitalization in the last 30 days (Readmission):  No        READMISSION:  Pt was admitted to  from 5/1 - 5/12 and he discharged to [place] for short term rehab.  Pt declined to remain at SNF so he returned to  and was readmitted on 5/12 under OBS status.         Reportedly, pt resides alone.  Dtr Merlyn Lynn [phone] is the primary family contact.       Transition Plan of Care:  PT/OT evals complete; pt is max to total assistance on 5/13 and SNF was recommended.  SNF - referrals were sent to  [facility], [facility], [facility], and [facility] per choice  Pt is Medicare so no auth is needed.  Pt is in OBS status but he already fulfilled a three night stay  Dtr states that pt has private funding for assisted living in the future  Outpatient follow up  Ambulance will be needed     CM will continue to follow pt for SNF  Ramon

## 2025-05-15 LAB
ANION GAP SERPL CALC-SCNC: 6 MMOL/L (ref 2–12)
BASOPHILS # BLD: 0.04 K/UL (ref 0–0.1)
BASOPHILS NFR BLD: 0.7 % (ref 0–1)
BUN SERPL-MCNC: 12 MG/DL (ref 6–20)
BUN/CREAT SERPL: 14 (ref 12–20)
CALCIUM SERPL-MCNC: 8.4 MG/DL (ref 8.5–10.1)
CHLORIDE SERPL-SCNC: 106 MMOL/L (ref 97–108)
CO2 SERPL-SCNC: 28 MMOL/L (ref 21–32)
COMMENT:: NORMAL
CREAT SERPL-MCNC: 0.84 MG/DL (ref 0.7–1.3)
DIFFERENTIAL METHOD BLD: ABNORMAL
EOSINOPHIL # BLD: 0.11 K/UL (ref 0–0.4)
EOSINOPHIL NFR BLD: 2 % (ref 0–7)
ERYTHROCYTE [DISTWIDTH] IN BLOOD BY AUTOMATED COUNT: 14 % (ref 11.5–14.5)
GLUCOSE BLD STRIP.AUTO-MCNC: 128 MG/DL (ref 65–117)
GLUCOSE BLD STRIP.AUTO-MCNC: 143 MG/DL (ref 65–117)
GLUCOSE BLD STRIP.AUTO-MCNC: 159 MG/DL (ref 65–117)
GLUCOSE BLD STRIP.AUTO-MCNC: 189 MG/DL (ref 65–117)
GLUCOSE SERPL-MCNC: 112 MG/DL (ref 65–100)
HCT VFR BLD AUTO: 28.8 % (ref 36.6–50.3)
HGB BLD-MCNC: 9.2 G/DL (ref 12.1–17)
IMM GRANULOCYTES # BLD AUTO: 0.04 K/UL (ref 0–0.04)
IMM GRANULOCYTES NFR BLD AUTO: 0.7 % (ref 0–0.5)
LYMPHOCYTES # BLD: 0.82 K/UL (ref 0.8–3.5)
LYMPHOCYTES NFR BLD: 14.7 % (ref 12–49)
MCH RBC QN AUTO: 28.2 PG (ref 26–34)
MCHC RBC AUTO-ENTMCNC: 31.9 G/DL (ref 30–36.5)
MCV RBC AUTO: 88.3 FL (ref 80–99)
MONOCYTES # BLD: 0.58 K/UL (ref 0–1)
MONOCYTES NFR BLD: 10.4 % (ref 5–13)
NEUTS SEG # BLD: 4 K/UL (ref 1.8–8)
NEUTS SEG NFR BLD: 71.5 % (ref 32–75)
NRBC # BLD: 0 K/UL (ref 0–0.01)
NRBC BLD-RTO: 0 PER 100 WBC
PLATELET # BLD AUTO: 300 K/UL (ref 150–400)
PMV BLD AUTO: 10.1 FL (ref 8.9–12.9)
POTASSIUM SERPL-SCNC: 3.3 MMOL/L (ref 3.5–5.1)
RBC # BLD AUTO: 3.26 M/UL (ref 4.1–5.7)
SERVICE CMNT-IMP: ABNORMAL
SODIUM SERPL-SCNC: 140 MMOL/L (ref 136–145)
SPECIMEN HOLD: NORMAL
WBC # BLD AUTO: 5.6 K/UL (ref 4.1–11.1)

## 2025-05-15 PROCEDURE — 80048 BASIC METABOLIC PNL TOTAL CA: CPT

## 2025-05-15 PROCEDURE — 6370000000 HC RX 637 (ALT 250 FOR IP): Performed by: STUDENT IN AN ORGANIZED HEALTH CARE EDUCATION/TRAINING PROGRAM

## 2025-05-15 PROCEDURE — 6370000000 HC RX 637 (ALT 250 FOR IP): Performed by: FAMILY MEDICINE

## 2025-05-15 PROCEDURE — 1100000000 HC RM PRIVATE

## 2025-05-15 PROCEDURE — 6370000000 HC RX 637 (ALT 250 FOR IP): Performed by: INTERNAL MEDICINE

## 2025-05-15 PROCEDURE — 85025 COMPLETE CBC W/AUTO DIFF WBC: CPT

## 2025-05-15 PROCEDURE — 82962 GLUCOSE BLOOD TEST: CPT

## 2025-05-15 PROCEDURE — 94761 N-INVAS EAR/PLS OXIMETRY MLT: CPT

## 2025-05-15 PROCEDURE — 36415 COLL VENOUS BLD VENIPUNCTURE: CPT

## 2025-05-15 RX ORDER — OLANZAPINE 5 MG/1
5 TABLET, FILM COATED ORAL NIGHTLY
Status: DISCONTINUED | OUTPATIENT
Start: 2025-05-15 | End: 2025-05-15

## 2025-05-15 RX ORDER — OLANZAPINE 5 MG/1
5 TABLET, FILM COATED ORAL NIGHTLY
Status: DISCONTINUED | OUTPATIENT
Start: 2025-05-15 | End: 2025-05-23 | Stop reason: HOSPADM

## 2025-05-15 RX ADMIN — METOPROLOL TARTRATE 50 MG: 50 TABLET, FILM COATED ORAL at 11:04

## 2025-05-15 RX ADMIN — INSULIN LISPRO 1 UNITS: 100 INJECTION, SOLUTION INTRAVENOUS; SUBCUTANEOUS at 21:39

## 2025-05-15 RX ADMIN — FENOFIBRATE 160 MG: 160 TABLET ORAL at 11:08

## 2025-05-15 RX ADMIN — APIXABAN 5 MG: 5 TABLET, FILM COATED ORAL at 11:04

## 2025-05-15 RX ADMIN — APIXABAN 5 MG: 5 TABLET, FILM COATED ORAL at 21:39

## 2025-05-15 RX ADMIN — CHOLECALCIFEROL (VITAMIN D3) 10 MCG (400 UNIT) TABLET 800 UNITS: at 11:04

## 2025-05-15 RX ADMIN — EZETIMIBE 10 MG: 10 TABLET ORAL at 11:08

## 2025-05-15 RX ADMIN — POTASSIUM BICARBONATE 40 MEQ: 782 TABLET, EFFERVESCENT ORAL at 11:03

## 2025-05-15 RX ADMIN — OLANZAPINE 5 MG: 5 TABLET, FILM COATED ORAL at 21:39

## 2025-05-15 RX ADMIN — METOPROLOL TARTRATE 50 MG: 50 TABLET, FILM COATED ORAL at 21:39

## 2025-05-15 NOTE — PROGRESS NOTES
Spiritual Health History and Assessment/Progress Note  [facility]    Rituals, Rites and Sacraments,  ,  ,      Name: Orlin San MRN: [id number]    Age: 79 y.o.     Sex: male   Language: English   Hindu: Roman Catholic   Hypokalemia     Date: 5/15/2025            Total Time Calculated: 5 min              Spiritual Assessment began in [facility] B5 MULTI-SPECIALTY ONCOLOGY 2        Referral/Consult From: Clergy/   Encounter Overview/Reason: Rituals, Rites and Sacraments  Service Provided For: Patient    Gissell, Belief, Meaning:   Patient is connected with a gissell tradition or spiritual practice  Family/Friends No family/friends present      Importance and Influence:  Patient unable to assess at this time  Family/Friends No family/friends present    Community:  Patient Other: unknown  Family/Friends No family/friends present    Assessment and Plan of Care:     Patient Interventions include: Provided sacramental/Confucianist ritual  Family/Friends Interventions include: No family/friends present    Patient Plan of Care: Spiritual Care available upon further referral  Family/Friends Plan of Care: Spiritual Care available upon further referral    Electronically signed by Chaplain EVA on 5/15/2025 at 3:08 PM     [facility] attempted.  Mr. San is Roman Catholic. He was asleep at the time of the visit. Spiritual communion prayer offered for his well-being.     Sr. BRICE Desouza, RN, ACSW, LCSW   Page:  [phone]

## 2025-05-15 NOTE — PLAN OF CARE
Problem: Pain  Goal: Verbalizes/displays adequate comfort level or baseline comfort level  Outcome: Progressing     Problem: Skin/Tissue Integrity  Goal: Skin integrity remains intact  Description: 1.  Monitor for areas of redness and/or skin breakdown2.  Assess vascular access sites hourly3.  Every 4-6 hours minimum:  Change oxygen saturation probe site4.  Every 4-6 hours:  If on nasal continuous positive airway pressure, respiratory therapy assess nares and determine need for appliance change or resting period  Outcome: Progressing  Flowsheets (Taken 5/14/2025 2044)  Skin Integrity Remains Intact:   Monitor for areas of redness and/or skin breakdown   Assess vascular access sites hourly     Problem: Safety - Adult  Goal: Free from fall injury  Outcome: Progressing     Problem: ABCDS Injury Assessment  Goal: Absence of physical injury  Outcome: Progressing

## 2025-05-15 NOTE — PROGRESS NOTES
WOCN Note:     New consult by RN for ''R hip, elbow, knee, foot. L elbow, knee. back/sacrum ''   Seen in 535/01     79 y.o. y/o male admitted on 5/12/2025 from home  Admitted for    Hypokalemia [E87.6]  Delirium, acute [R41.0]  AMS (altered mental status) [R41.82]    History of DM2, questionable Parkinson's, HTN, and HLD     Lab Results   Component Value Date/Time    WBC 5.6 05/15/2025 08:39 AM    LABA1C 6.9 (H) 05/02/2025 12:56 AM    POCGLU 159 (H) 05/15/2025 11:51 AM    POCGLU 128 (H) 05/15/2025 08:37 AM    HGB 9.2 (L) 05/15/2025 08:39 AM    HCT 28.8 (L) 05/15/2025 08:39 AM     05/15/2025 08:39 AM     No indication for wound culture    Diet: ADULT DIET; Regular           Assessment:   Mumbling and groaned/grunts with movement and palpation to wounds.  Requires assists in repositioning. Turned onto Left Side.    Incontinent. External urinary device to suction.    Surface: BEREK[facility] [place] [facility] ICU mattress    Bilateral heels boggy with blanchable redness - heel boots applied.         1. POA MASD Sacrum  100% granulated wound bed. Blanchable redness on wound.   no malodor or purulence - no gross S&S of infection  Periwound intact & without erythema    Tx: Cleansed with Vashe, applied zinc cream, and covered with foam.       2. POA Unstageable Pressure Injury R Knee  5 x 3.5 cm  Black eschar with well defined erythematous edges.  No exudate; no malodor or purulence - no gross S&S of infection  Periwound intact & without erythema    Tx: Cleansed with Vashe and covered with foam.      3. POA Unstageable Pressure Injury R Lateral Foot  11 x 2 cm. 3 clusters.   Black eschar on distal x2 clusters. The proximal wound has 90% slough and undefined margins   No exudate; no malodor or purulence - no gross S&S of infection  Periwound intact & without erythema    Tx: Cleansed with Vashe applied honey and  covered with foam.      4. POA MASD  on R Hip  80% blanchable redness. 20% yellow slough.  Scant yellow exudate; no malodor or

## 2025-05-15 NOTE — PROGRESS NOTES
Physician Progress Note      PATIENT:               KIMBERLEE HIGGINBOTHAM  CSN #:                  [id number]  :                       1945  ADMIT DATE:       2025 8:10 PM  DISCH DATE:  RESPONDING  PROVIDER #:        Daphnie Ponce MD          QUERY TEXT:    Based on your medical judgment, please clarify these findings and document if   any of the following are being evaluated and/or treated:    The clinical indicators include:  - 79-year-old male with h/o Afib and DM  - Eliquis 5 mg 2 times daily per MAR  Options provided:  -- Secondary hypercoagulable state in a patient with atrial fibrillation  -- Other - I will add my own diagnosis  -- Disagree - Not applicable / Not valid  -- Disagree - Clinically unable to determine / Unknown  -- Refer to Clinical Documentation Reviewer    PROVIDER RESPONSE TEXT:    This patient has secondary hypercoagulable state in a patient with atrial   fibrillation.    Query created by: Fatmata Medrano on 2025 11:29 AM      Electronically signed by:  Daphnie Ponce MD 5/15/2025 4:04 PM

## 2025-05-15 NOTE — CARE COORDINATION
Care Management Progress Note    Reason for Admission:   Hypokalemia [E87.6]  Delirium, acute [R41.0]  AMS (altered mental status) [R41.82]         Patient Admission Status: Inpatient  RUR: 19%  Hospitalization in the last 30 days (Readmission):  Yes        READMISSION:  Pt was admitted to  from 5/1 - 5/12 and he discharged to [place] for short term rehab.  Pt declined to remain at SNF so he returned to  and was readmitted on 5/12 under OBS status.          Reportedly, pt resides alone.  Dtr Merlyn Lynn [phone] is the primary family contact.        Transition Plan of Care:  PT/OT evals complete; pt is max to total assistance on 5/13 and SNF was recommended.  SNF - referrals were sent to  [facility], [facility], [facility] and no acceptance.  [facility] is still reviewing.   Pt is Medicare so no auth is needed.  Pt is in OBS status but he already fulfilled a three night stay  Dtr states that pt has private funding for assisted living in the future  Outpatient follow up  Ambulance will be needed     CM will continue to follow pt for SNF  Ramon

## 2025-05-15 NOTE — PROGRESS NOTES
Discussed risks, benefits, and alternatives with patient.    Patient reason for declining vital signs, turning and blood works treatment: Patient said we are doing it too many  times and he is tired.     The following provider was notified of patient's intent to refuse: Marcelle LYONS    Feedback from provider: no new orders    Alternative interventions used in the meantime: none

## 2025-05-15 NOTE — PROGRESS NOTES
[facility]IC[facility]  [address] [place] [address] [phone]        Hospitalist Progress Note      NAME: Orlin San   :  1945  MRM:  [id number]    Date/Time: 5/15/2025  4:05 PM         Subjective:     Chief Complaint: \"I'm okay\"     Pt seen and examined. No complaints. Assisted to sit more upright during his breakfast session     ROS:  (bold if positive, if negative)            Objective:       Vitals:          Last 24hrs VS reviewed since prior progress note. Most recent are:    Vitals:    05/15/25 1511   BP: (!) 152/85   Pulse: 69   Resp:    Temp: 98.2 °F (36.8 °C)   SpO2: 97%     SpO2 Readings from Last 6 Encounters:   05/15/25 97%   25 95%          Intake/Output Summary (Last 24 hours) at 5/15/2025 1605  Last data filed at 5/15/2025 0944  Gross per 24 hour   Intake 450 ml   Output 1900 ml   Net -1450 ml          Exam:     Physical Exam:    Gen: disheveled. Not agitated   HEENT:  Pink conjunctivae, PERRL, hearing intact to voice, moist mucous membranes  Neck:  Supple, without masses, thyroid non-tender  Resp:  No accessory muscle use, clear breath sounds without wheezes rales or rhonchi  Card:  No murmurs, normal S1, S2 without thrills, bruits or peripheral edema  Abd:  Soft, non-tender, non-distended, normoactive bowel sounds are present  Musc:  No cyanosis or clubbing  Skin:  No rashes or ulcers, skin turgor is good  Neuro:  Cranial nerves 3-12 are grossly intact   Psych: confused       Medications Reviewed: (see below)    Lab Data Reviewed: (see below)    ______________________________________________________________________    Medications:     Current Facility-Administered Medications   Medication Dose Route Frequency    OLANZapine (ZYPREXA) tablet 5 mg  5 mg Oral Nightly    NIFEdipine (PROCARDIA XL) extended release tablet 30 mg  30 mg Oral Daily    insulin lispro (HUMALOG,ADMELOG) injection vial 0-4 Units  0-4 Units SubCUTAneous 4x Daily AC & HS

## 2025-05-16 ENCOUNTER — APPOINTMENT (OUTPATIENT)
Facility: HOSPITAL | Age: 80
DRG: 640 | End: 2025-05-16
Payer: MEDICARE

## 2025-05-16 LAB
ANION GAP SERPL CALC-SCNC: 4 MMOL/L (ref 2–12)
BASOPHILS # BLD: 0.07 K/UL (ref 0–0.1)
BASOPHILS NFR BLD: 1.3 % (ref 0–1)
BUN SERPL-MCNC: 12 MG/DL (ref 6–20)
BUN/CREAT SERPL: 15 (ref 12–20)
CALCIUM SERPL-MCNC: 8.6 MG/DL (ref 8.5–10.1)
CHLORIDE SERPL-SCNC: 105 MMOL/L (ref 97–108)
CO2 SERPL-SCNC: 31 MMOL/L (ref 21–32)
CREAT SERPL-MCNC: 0.81 MG/DL (ref 0.7–1.3)
DIFFERENTIAL METHOD BLD: ABNORMAL
EOSINOPHIL # BLD: 0.11 K/UL (ref 0–0.4)
EOSINOPHIL NFR BLD: 2 % (ref 0–7)
ERYTHROCYTE [DISTWIDTH] IN BLOOD BY AUTOMATED COUNT: 14 % (ref 11.5–14.5)
GGT SERPL-CCNC: 96 U/L (ref 15–85)
GLUCOSE BLD STRIP.AUTO-MCNC: 116 MG/DL (ref 65–117)
GLUCOSE BLD STRIP.AUTO-MCNC: 189 MG/DL (ref 65–117)
GLUCOSE BLD STRIP.AUTO-MCNC: 196 MG/DL (ref 65–117)
GLUCOSE BLD STRIP.AUTO-MCNC: 198 MG/DL (ref 65–117)
GLUCOSE SERPL-MCNC: 101 MG/DL (ref 65–100)
HCT VFR BLD AUTO: 31.6 % (ref 36.6–50.3)
HGB BLD-MCNC: 10 G/DL (ref 12.1–17)
IMM GRANULOCYTES # BLD AUTO: 0.06 K/UL (ref 0–0.04)
IMM GRANULOCYTES NFR BLD AUTO: 1.1 % (ref 0–0.5)
LYMPHOCYTES # BLD: 0.79 K/UL (ref 0.8–3.5)
LYMPHOCYTES NFR BLD: 14.7 % (ref 12–49)
MAGNESIUM SERPL-MCNC: 1.8 MG/DL (ref 1.6–2.4)
MCH RBC QN AUTO: 28.2 PG (ref 26–34)
MCHC RBC AUTO-ENTMCNC: 31.6 G/DL (ref 30–36.5)
MCV RBC AUTO: 89 FL (ref 80–99)
MONOCYTES # BLD: 0.51 K/UL (ref 0–1)
MONOCYTES NFR BLD: 9.5 % (ref 5–13)
NEUTS SEG # BLD: 3.86 K/UL (ref 1.8–8)
NEUTS SEG NFR BLD: 71.4 % (ref 32–75)
NRBC # BLD: 0 K/UL (ref 0–0.01)
NRBC BLD-RTO: 0 PER 100 WBC
PLATELET # BLD AUTO: 297 K/UL (ref 150–400)
PMV BLD AUTO: 9.9 FL (ref 8.9–12.9)
POTASSIUM SERPL-SCNC: 3.3 MMOL/L (ref 3.5–5.1)
PREALB SERPL-MCNC: 12.8 MG/DL (ref 20–40)
RBC # BLD AUTO: 3.55 M/UL (ref 4.1–5.7)
RBC MORPH BLD: ABNORMAL
SERVICE CMNT-IMP: ABNORMAL
SERVICE CMNT-IMP: NORMAL
SODIUM SERPL-SCNC: 140 MMOL/L (ref 136–145)
WBC # BLD AUTO: 5.4 K/UL (ref 4.1–11.1)

## 2025-05-16 PROCEDURE — 85025 COMPLETE CBC W/AUTO DIFF WBC: CPT

## 2025-05-16 PROCEDURE — 82962 GLUCOSE BLOOD TEST: CPT

## 2025-05-16 PROCEDURE — 84134 ASSAY OF PREALBUMIN: CPT

## 2025-05-16 PROCEDURE — 83735 ASSAY OF MAGNESIUM: CPT

## 2025-05-16 PROCEDURE — 80048 BASIC METABOLIC PNL TOTAL CA: CPT

## 2025-05-16 PROCEDURE — 6370000000 HC RX 637 (ALT 250 FOR IP): Performed by: FAMILY MEDICINE

## 2025-05-16 PROCEDURE — 94761 N-INVAS EAR/PLS OXIMETRY MLT: CPT

## 2025-05-16 PROCEDURE — 36415 COLL VENOUS BLD VENIPUNCTURE: CPT

## 2025-05-16 PROCEDURE — 6370000000 HC RX 637 (ALT 250 FOR IP): Performed by: STUDENT IN AN ORGANIZED HEALTH CARE EDUCATION/TRAINING PROGRAM

## 2025-05-16 PROCEDURE — 6370000000 HC RX 637 (ALT 250 FOR IP): Performed by: INTERNAL MEDICINE

## 2025-05-16 PROCEDURE — 1100000000 HC RM PRIVATE

## 2025-05-16 PROCEDURE — 82977 ASSAY OF GGT: CPT

## 2025-05-16 RX ADMIN — EZETIMIBE 10 MG: 10 TABLET ORAL at 09:51

## 2025-05-16 RX ADMIN — NIFEDIPINE 30 MG: 30 TABLET, FILM COATED, EXTENDED RELEASE ORAL at 09:51

## 2025-05-16 RX ADMIN — OLANZAPINE 5 MG: 5 TABLET, FILM COATED ORAL at 21:17

## 2025-05-16 RX ADMIN — METOPROLOL TARTRATE 50 MG: 50 TABLET, FILM COATED ORAL at 09:51

## 2025-05-16 RX ADMIN — APIXABAN 5 MG: 5 TABLET, FILM COATED ORAL at 09:51

## 2025-05-16 RX ADMIN — CHOLECALCIFEROL (VITAMIN D3) 10 MCG (400 UNIT) TABLET 800 UNITS: at 09:51

## 2025-05-16 RX ADMIN — POTASSIUM BICARBONATE 40 MEQ: 782 TABLET, EFFERVESCENT ORAL at 09:51

## 2025-05-16 RX ADMIN — POTASSIUM BICARBONATE 40 MEQ: 782 TABLET, EFFERVESCENT ORAL at 21:17

## 2025-05-16 RX ADMIN — APIXABAN 5 MG: 5 TABLET, FILM COATED ORAL at 21:17

## 2025-05-16 RX ADMIN — INSULIN LISPRO 1 UNITS: 100 INJECTION, SOLUTION INTRAVENOUS; SUBCUTANEOUS at 21:18

## 2025-05-16 RX ADMIN — COLLAGENASE SANTYL: 250 OINTMENT TOPICAL at 09:52

## 2025-05-16 RX ADMIN — METOPROLOL TARTRATE 50 MG: 50 TABLET, FILM COATED ORAL at 21:17

## 2025-05-16 RX ADMIN — FENOFIBRATE 160 MG: 160 TABLET ORAL at 09:51

## 2025-05-16 NOTE — PROGRESS NOTES
PAT[facility]IC[facility]  [address] [place] [address] [phone]        Hospitalist Progress Note      NAME: Orlin San   :  1945  MRM:  [id number]    Date/Time: 2025  3:38 PM         Subjective:     Chief Complaint: Pt choosing not to engage    Pt seen and examined. Opens eyes but not answering questions. When asked if able to speak he states \"I can\" but otherwise not talkative. Ate 100% of breakfast tray     ROS:  (bold if positive, if negative)            Objective:       Vitals:          Last 24hrs VS reviewed since prior progress note. Most recent are:    Vitals:    25 1203   BP: 139/72   Pulse: 92   Resp: 17   Temp: 97.9 °F (36.6 °C)   SpO2: 92%     SpO2 Readings from Last 6 Encounters:   25 92%   25 95%          Intake/Output Summary (Last 24 hours) at 2025 1538  Last data filed at 2025 1308  Gross per 24 hour   Intake --   Output 1800 ml   Net -1800 ml          Exam:     Physical Exam:    Gen: disheveled. Not agitated   HEENT:  Pink conjunctivae, PERRL, hearing intact to voice, moist mucous membranes  Neck:  Supple, without masses, thyroid non-tender  Resp:  No accessory muscle use, clear breath sounds without wheezes rales or rhonchi  Card:  No murmurs, normal S1, S2 without thrills, bruits or peripheral edema  Abd:  Soft, non-tender, non-distended, normoactive bowel sounds are present  Musc:  No cyanosis or clubbing  Skin:  No rashes or ulcers, skin turgor is good  Neuro:  Cranial nerves 3-12 are grossly intact   Psych: confused       Medications Reviewed: (see below)    Lab Data Reviewed: (see below)    ______________________________________________________________________    Medications:     Current Facility-Administered Medications   Medication Dose Route Frequency    OLANZapine (ZYPREXA) tablet 5 mg  5 mg Oral Nightly    collagenase ointment   Topical Daily    NIFEdipine (PROCARDIA XL) extended release tablet 30 mg  30 mg

## 2025-05-16 NOTE — CARE COORDINATION
Care Management Progress Note    Reason for Admission:   Hypokalemia [E87.6]  Delirium, acute [R41.0]  AMS (altered mental status) [R41.82]         Patient Admission Status: Inpatient  RUR:  19%  Hospitalization in the last 30 days (Readmission):  Yes        READMISSION:  Pt was admitted to  from 5/1 - 5/12 and he discharged to [facility] for short term rehab.  Pt declined to remain at [facility] so he returned to  and was readmitted on 5/12 under OBS status.          Reportedly, pt resides alone.  Dtr Merlyn Lynn [phone] is the primary family contact.        Transition Plan of Care:  SNF - multiple referrals were sent for review and await acceptance  Pt is Medicare so no auth is needed.  Pt already fulfilled a three night stay with previous hospital admission.   Dtr states that pt has ample finances for assisted living in the future.  CM encouraged dtr to start exploring her top three ALFs in case pt is unable to return home  Outpatient follow up  Ambulance will be needed     CM will continue to follow pt for SNF  [place]

## 2025-05-16 NOTE — PLAN OF CARE
Problem: Pain  Goal: Verbalizes/displays adequate comfort level or baseline comfort level  5/15/2025 2339 by Rebekah Hoover, RN  Outcome: Progressing  5/15/2025 1725 by Kala Churchill RN  Outcome: Progressing     Problem: Skin/Tissue Integrity  Goal: Skin integrity remains intact  Description: 1.  Monitor for areas of redness and/or skin breakdown2.  Assess vascular access sites hourly3.  Every 4-6 hours minimum:  Change oxygen saturation probe site4.  Every 4-6 hours:  If on nasal continuous positive airway pressure, respiratory therapy assess nares and determine need for appliance change or resting period  5/15/2025 2339 by Rebekah Hoover, RN  Outcome: Progressing  5/15/2025 1725 by Kala Churchill RN  Outcome: Progressing     Problem: Safety - Adult  Goal: Free from fall injury  5/15/2025 2339 by Rebekah Hoover, RN  Outcome: Progressing  5/15/2025 1725 by Kala Churchill RN  Outcome: Progressing     Problem: ABCDS Injury Assessment  Goal: Absence of physical injury  5/15/2025 2339 by Rebekah Hoover, RN  Outcome: Progressing  5/15/2025 1725 by Kala Churchill RN  Outcome: Progressing

## 2025-05-17 ENCOUNTER — APPOINTMENT (OUTPATIENT)
Facility: HOSPITAL | Age: 80
DRG: 640 | End: 2025-05-17
Payer: MEDICARE

## 2025-05-17 LAB
GLUCOSE BLD STRIP.AUTO-MCNC: 131 MG/DL (ref 65–117)
GLUCOSE BLD STRIP.AUTO-MCNC: 150 MG/DL (ref 65–117)
GLUCOSE BLD STRIP.AUTO-MCNC: 164 MG/DL (ref 65–117)
GLUCOSE BLD STRIP.AUTO-MCNC: 232 MG/DL (ref 65–117)
SERVICE CMNT-IMP: ABNORMAL

## 2025-05-17 PROCEDURE — 6370000000 HC RX 637 (ALT 250 FOR IP): Performed by: INTERNAL MEDICINE

## 2025-05-17 PROCEDURE — 94761 N-INVAS EAR/PLS OXIMETRY MLT: CPT

## 2025-05-17 PROCEDURE — 1100000000 HC RM PRIVATE

## 2025-05-17 PROCEDURE — 76705 ECHO EXAM OF ABDOMEN: CPT

## 2025-05-17 PROCEDURE — 6370000000 HC RX 637 (ALT 250 FOR IP): Performed by: FAMILY MEDICINE

## 2025-05-17 PROCEDURE — 82962 GLUCOSE BLOOD TEST: CPT

## 2025-05-17 RX ADMIN — INSULIN LISPRO 1 UNITS: 100 INJECTION, SOLUTION INTRAVENOUS; SUBCUTANEOUS at 21:44

## 2025-05-17 NOTE — PROGRESS NOTES
.The patient has decided to refuse aspects of his care.   The patient has normal mental status and adequate capacity to make medical decisions.  The patient refuses the following medical treatments/interventions wound care and morning medications.  The risks have been explained to the patient, including worsening illness, chronic pain, permanent disability, and death.  The benefits of treatment have also been explained, including the availability and proximity of nurses, physicians, monitoring, diagnostic testing, and treatment.

## 2025-05-17 NOTE — PLAN OF CARE
Problem: Pain  Goal: Verbalizes/displays adequate comfort level or baseline comfort level  Outcome: Progressing     Problem: Skin/Tissue Integrity  Goal: Skin integrity remains intact  Description: 1.  Monitor for areas of redness and/or skin breakdown2.  Assess vascular access sites hourly3.  Every 4-6 hours minimum:  Change oxygen saturation probe site4.  Every 4-6 hours:  If on nasal continuous positive airway pressure, respiratory therapy assess nares and determine need for appliance change or resting period  Outcome: Progressing     Problem: Safety - Adult  Goal: Free from fall injury  Outcome: Progressing     Problem: ABCDS Injury Assessment  Goal: Absence of physical injury  Outcome: Progressing

## 2025-05-17 NOTE — PROGRESS NOTES
[facility]  [address] [place] [address] [phone]        Hospitalist Progress Note      NAME: Orlin San   :  1945  MRM:  [id number]    Date/Time: 2025  1:46 PM         Subjective:     Chief Complaint: \"I'm fine\"     Pt seen and examined. Eating breakfast. Making jokes about his stomach. No complaints     ROS:  (bold if positive, if negative)            Objective:       Vitals:          Last 24hrs VS reviewed since prior progress note. Most recent are:    Vitals:    25 1239   BP: 126/71   Pulse: 78   Resp: 19   Temp: 99 °F (37.2 °C)   SpO2: 93%     SpO2 Readings from Last 6 Encounters:   25 93%   25 95%          Intake/Output Summary (Last 24 hours) at 2025 1346  Last data filed at 2025 1239  Gross per 24 hour   Intake 100 ml   Output 1300 ml   Net -1200 ml          Exam:     Physical Exam:    Gen: disheveled. Not agitated   HEENT:  Pink conjunctivae, PERRL, hearing intact to voice, moist mucous membranes  Neck:  Supple, without masses, thyroid non-tender  Resp:  No accessory muscle use, clear breath sounds without wheezes rales or rhonchi  Card:  No murmurs, normal S1, S2 without thrills, bruits or peripheral edema  Abd:  Soft, non-tender, non-distended, normoactive bowel sounds are present  Musc:  No cyanosis or clubbing  Skin:  No rashes or ulcers, skin turgor is good  Neuro:  Cranial nerves 3-12 are grossly intact   Psych: oriented to place, year and thought president was \"Dov\" but could not think of Trump      Medications Reviewed: (see below)    Lab Data Reviewed: (see below)    ______________________________________________________________________    Medications:     Current Facility-Administered Medications   Medication Dose Route Frequency    potassium bicarb-citric acid (EFFER-K) effervescent tablet 40 mEq  40 mEq Oral BID    OLANZapine (ZYPREXA) tablet 5 mg  5 mg Oral Nightly    collagenase ointment

## 2025-05-18 LAB
GLUCOSE BLD STRIP.AUTO-MCNC: 152 MG/DL (ref 65–117)
GLUCOSE BLD STRIP.AUTO-MCNC: 227 MG/DL (ref 65–117)
SERVICE CMNT-IMP: ABNORMAL
SERVICE CMNT-IMP: ABNORMAL

## 2025-05-18 PROCEDURE — 82962 GLUCOSE BLOOD TEST: CPT

## 2025-05-18 PROCEDURE — 6370000000 HC RX 637 (ALT 250 FOR IP): Performed by: STUDENT IN AN ORGANIZED HEALTH CARE EDUCATION/TRAINING PROGRAM

## 2025-05-18 PROCEDURE — 94761 N-INVAS EAR/PLS OXIMETRY MLT: CPT

## 2025-05-18 PROCEDURE — 6370000000 HC RX 637 (ALT 250 FOR IP): Performed by: FAMILY MEDICINE

## 2025-05-18 PROCEDURE — 1100000000 HC RM PRIVATE

## 2025-05-18 PROCEDURE — 6370000000 HC RX 637 (ALT 250 FOR IP): Performed by: INTERNAL MEDICINE

## 2025-05-18 PROCEDURE — 2500000003 HC RX 250 WO HCPCS: Performed by: INTERNAL MEDICINE

## 2025-05-18 RX ORDER — SODIUM CHLORIDE 0.9 % (FLUSH) 0.9 %
5-40 SYRINGE (ML) INJECTION EVERY 12 HOURS
Status: DISCONTINUED | OUTPATIENT
Start: 2025-05-18 | End: 2025-05-23 | Stop reason: HOSPADM

## 2025-05-18 RX ADMIN — COLLAGENASE SANTYL: 250 OINTMENT TOPICAL at 12:19

## 2025-05-18 RX ADMIN — EZETIMIBE 10 MG: 10 TABLET ORAL at 09:48

## 2025-05-18 RX ADMIN — SODIUM CHLORIDE, PRESERVATIVE FREE 5 ML: 5 INJECTION INTRAVENOUS at 10:39

## 2025-05-18 RX ADMIN — NIFEDIPINE 30 MG: 30 TABLET, FILM COATED, EXTENDED RELEASE ORAL at 09:48

## 2025-05-18 RX ADMIN — APIXABAN 5 MG: 5 TABLET, FILM COATED ORAL at 09:48

## 2025-05-18 RX ADMIN — METOPROLOL TARTRATE 50 MG: 50 TABLET, FILM COATED ORAL at 09:49

## 2025-05-18 RX ADMIN — FENOFIBRATE 160 MG: 160 TABLET ORAL at 09:49

## 2025-05-18 RX ADMIN — METFORMIN HYDROCHLORIDE 1000 MG: 500 TABLET ORAL at 16:36

## 2025-05-18 RX ADMIN — METFORMIN HYDROCHLORIDE 1000 MG: 500 TABLET ORAL at 09:48

## 2025-05-18 RX ADMIN — CHOLECALCIFEROL (VITAMIN D3) 10 MCG (400 UNIT) TABLET 800 UNITS: at 09:48

## 2025-05-18 NOTE — PROGRESS NOTES
PATRIC[facility]  [address] [place] [address] [phone]        Hospitalist Progress Note      NAME: Orlin San   :  1945  MRM:  [id number]    Date/Time: 2025  2:45 PM         Subjective:     Chief Complaint: \"yes\"     Pt seen and examined. Per RN interactive this AM. At time of my eval not wanting to open eyes but responded yes to evaluator     ROS:  (bold if positive, if negative)            Objective:       Vitals:          Last 24hrs VS reviewed since prior progress note. Most recent are:    Vitals:    25 1339   BP: (!) 149/77   Pulse: 77   Resp: 14   Temp: 97.7 °F (36.5 °C)   SpO2: 97%     SpO2 Readings from Last 6 Encounters:   25 97%   25 95%          Intake/Output Summary (Last 24 hours) at 2025 1445  Last data filed at 2025 1632  Gross per 24 hour   Intake --   Output 600 ml   Net -600 ml          Exam:     Physical Exam:    Gen: disheveled. Not agitated   HEENT:  Pink conjunctivae, PERRL, hearing intact to voice, moist mucous membranes  Neck:  Supple, without masses, thyroid non-tender  Resp:  No accessory muscle use, clear breath sounds without wheezes rales or rhonchi  Card:  No murmurs, normal S1, S2 without thrills, bruits or peripheral edema  Abd:  Soft, non-tender, non-distended, normoactive bowel sounds are present  Musc:  No cyanosis or clubbing  Skin:  No rashes or ulcers, skin turgor is good  Neuro:  Cranial nerves 3-12 are grossly intact   Psych: oriented to place, year and thought president was \"Dov\" but could not think of Trump      Medications Reviewed: (see below)    Lab Data Reviewed: (see below)    ______________________________________________________________________    Medications:     Current Facility-Administered Medications   Medication Dose Route Frequency    metFORMIN (GLUCOPHAGE) tablet 1,000 mg  1,000 mg Oral BID WC    sodium chloride flush 0.9 % injection 5-40 mL  5-40 mL IntraVENous Q12H     OLANZapine (ZYPREXA) tablet 5 mg  5 mg Oral Nightly    collagenase ointment   Topical Daily    NIFEdipine (PROCARDIA XL) extended release tablet 30 mg  30 mg Oral Daily    insulin lispro (HUMALOG,ADMELOG) injection vial 0-4 Units  0-4 Units SubCUTAneous 4x Daily AC & HS    cholecalciferol (VITAMIN D3) tablet TABS 800 Units  800 Units Oral Daily    apixaban (ELIQUIS) tablet 5 mg  5 mg Oral BID    metoprolol tartrate (LOPRESSOR) tablet 50 mg  50 mg Oral BID    ezetimibe (ZETIA) tablet 10 mg  10 mg Oral Daily    fenofibrate tablet 160 mg  160 mg Oral Daily    prochlorperazine (COMPAZINE) injection 10 mg  10 mg IntraVENous Q6H PRN    glucose chewable tablet 16 g  4 tablet Oral PRN    dextrose bolus 10% 125 mL  125 mL IntraVENous PRN    Or    dextrose bolus 10% 250 mL  250 mL IntraVENous PRN    glucagon injection 1 mg  1 mg SubCUTAneous PRN    dextrose 10 % infusion   IntraVENous Continuous PRN    sodium chloride flush 0.9 % injection 5-40 mL  5-40 mL IntraVENous PRN    0.9 % sodium chloride infusion   IntraVENous PRN    acetaminophen (TYLENOL) tablet 650 mg  650 mg Oral Q6H PRN    Or    acetaminophen (TYLENOL) suppository 650 mg  650 mg Rectal Q6H PRN    melatonin tablet 6 mg  6 mg Oral Nightly PRN    bisacodyl (DULCOLAX) EC tablet 5 mg  5 mg Oral Daily PRN            Lab Review:     Recent Labs     05/16/25  0840   WBC 5.4   HGB 10.0*   HCT 31.6*        Recent Labs     05/16/25  0840      K 3.3*      CO2 31   BUN 12   MG 1.8     No components found for: \"GLPOC\"         Assessment / Plan:   Orlin is a 79 y.o. male  with a medical history significant for delirium, atrial fibrillation, hypokalemia, hyperlipidemia, type 2 diabetes, normocytic anemia, multiple wounds, who presents to the ER with AMS.      #AMS: Recent brain MRI was unremarkable. Suspect 2/2 delirium on ?MCI. TSH/ammonia/B12 WNL. UA not c/w UTI   -continue zyprexa qHS; dose increased to 5mg    -awaiting placement      #Hypokalemia:

## 2025-05-19 LAB
GLUCOSE BLD STRIP.AUTO-MCNC: 129 MG/DL (ref 65–117)
GLUCOSE BLD STRIP.AUTO-MCNC: 157 MG/DL (ref 65–117)
GLUCOSE BLD STRIP.AUTO-MCNC: 170 MG/DL (ref 65–117)
GLUCOSE BLD STRIP.AUTO-MCNC: 190 MG/DL (ref 65–117)
SERVICE CMNT-IMP: ABNORMAL

## 2025-05-19 PROCEDURE — 94761 N-INVAS EAR/PLS OXIMETRY MLT: CPT

## 2025-05-19 PROCEDURE — 82962 GLUCOSE BLOOD TEST: CPT

## 2025-05-19 PROCEDURE — 97535 SELF CARE MNGMENT TRAINING: CPT | Performed by: OCCUPATIONAL THERAPIST

## 2025-05-19 PROCEDURE — 1100000000 HC RM PRIVATE

## 2025-05-19 PROCEDURE — 6370000000 HC RX 637 (ALT 250 FOR IP): Performed by: FAMILY MEDICINE

## 2025-05-19 PROCEDURE — 97530 THERAPEUTIC ACTIVITIES: CPT

## 2025-05-19 PROCEDURE — 6370000000 HC RX 637 (ALT 250 FOR IP): Performed by: INTERNAL MEDICINE

## 2025-05-19 PROCEDURE — 2500000003 HC RX 250 WO HCPCS: Performed by: INTERNAL MEDICINE

## 2025-05-19 PROCEDURE — 6370000000 HC RX 637 (ALT 250 FOR IP): Performed by: STUDENT IN AN ORGANIZED HEALTH CARE EDUCATION/TRAINING PROGRAM

## 2025-05-19 PROCEDURE — 97530 THERAPEUTIC ACTIVITIES: CPT | Performed by: OCCUPATIONAL THERAPIST

## 2025-05-19 RX ORDER — INSULIN LISPRO 100 [IU]/ML
0-8 INJECTION, SOLUTION INTRAVENOUS; SUBCUTANEOUS
Status: DISCONTINUED | OUTPATIENT
Start: 2025-05-19 | End: 2025-05-23 | Stop reason: HOSPADM

## 2025-05-19 RX ADMIN — EZETIMIBE 10 MG: 10 TABLET ORAL at 09:47

## 2025-05-19 RX ADMIN — SODIUM CHLORIDE, PRESERVATIVE FREE 10 ML: 5 INJECTION INTRAVENOUS at 20:56

## 2025-05-19 RX ADMIN — NIFEDIPINE 30 MG: 30 TABLET, FILM COATED, EXTENDED RELEASE ORAL at 09:46

## 2025-05-19 RX ADMIN — METOPROLOL TARTRATE 50 MG: 50 TABLET, FILM COATED ORAL at 09:47

## 2025-05-19 RX ADMIN — OLANZAPINE 5 MG: 5 TABLET, FILM COATED ORAL at 20:56

## 2025-05-19 RX ADMIN — SODIUM CHLORIDE, PRESERVATIVE FREE 10 ML: 5 INJECTION INTRAVENOUS at 09:50

## 2025-05-19 RX ADMIN — APIXABAN 5 MG: 5 TABLET, FILM COATED ORAL at 20:56

## 2025-05-19 RX ADMIN — METOPROLOL TARTRATE 50 MG: 50 TABLET, FILM COATED ORAL at 20:56

## 2025-05-19 RX ADMIN — FENOFIBRATE 160 MG: 160 TABLET ORAL at 09:47

## 2025-05-19 RX ADMIN — COLLAGENASE SANTYL: 250 OINTMENT TOPICAL at 09:49

## 2025-05-19 RX ADMIN — APIXABAN 5 MG: 5 TABLET, FILM COATED ORAL at 09:47

## 2025-05-19 RX ADMIN — CHOLECALCIFEROL (VITAMIN D3) 10 MCG (400 UNIT) TABLET 800 UNITS: at 09:54

## 2025-05-19 RX ADMIN — METFORMIN HYDROCHLORIDE 1000 MG: 500 TABLET ORAL at 17:36

## 2025-05-19 RX ADMIN — INSULIN LISPRO 2 UNITS: 100 INJECTION, SOLUTION INTRAVENOUS; SUBCUTANEOUS at 17:36

## 2025-05-19 RX ADMIN — METFORMIN HYDROCHLORIDE 1000 MG: 500 TABLET ORAL at 09:47

## 2025-05-19 NOTE — CARE COORDINATION
5/19/2025  1:34 PM  Care Management Progress Note    Reason for Admission:   Hypokalemia [E87.6]  Delirium, acute [R41.0]  AMS (altered mental status) [R41.82]         Patient Admission Status: Inpatient  RUR: 16%   Hospitalization in the last 30 days (Readmission):  Yes        Transition of care plan:  Pt discussed in IDR is continuing to require medical management, Wound Care following   [facility]. Pending [facility]  Accepting facility:   Date authorization started with reference number: not required,  Pt has met 3 MN qualifying SNF stay   Liaison for [facility] will assess at bedside today   Discharge plan communicated with patient and/or discharge caregiver: No    Date 1st IMM letter given: 5/19  Outpatient follow-up.  Transport at discharge: TBD pending progress   CM will follow and assist  ODILON Coats

## 2025-05-19 NOTE — PROGRESS NOTES
2032- RN turned on small light over the sink to be able to see patient to provide care and patient says, \"turn that fucking light off!\" Patient allows RN to take BP but refuses all other vitals. Primary nurse made aware.

## 2025-05-19 NOTE — PROGRESS NOTES
[facility]  [address] [place] [address] [phone]        Hospitalist Progress Note      NAME: Orlin San   :  1945  MRM:  [id number]    Date/Time: 2025  11:20 AM         Subjective:     Chief Complaint:   No complaints.           Objective:       Vitals:          Last 24hrs VS reviewed since prior progress note. Most recent are:    Vitals:    25 1048   BP: (!) 154/78   Pulse: 92   Resp: 18   Temp: 98.1 °F (36.7 °C)   SpO2: 96%     SpO2 Readings from Last 6 Encounters:   25 96%   25 95%          Intake/Output Summary (Last 24 hours) at 2025 1120  Last data filed at 2025 0745  Gross per 24 hour   Intake 250 ml   Output 700 ml   Net -450 ml          Exam:     Physical Exam:    Gen: disheveled. Not agitated   HEENT:  Pink conjunctivae, PERRL, hearing intact to voice, moist mucous membranes  Neck:  Supple, without masses, thyroid non-tender  Resp:  No accessory muscle use, clear breath sounds without wheezes rales or rhonchi  Card:  No murmurs, normal S1, S2 without thrills, bruits or peripheral edema  Abd:  Soft, non-tender, non-distended, normoactive bowel sounds are present  Musc:  No cyanosis or clubbing  Skin:  No rashes or ulcers, skin turgor is good  Neuro:  Cranial nerves 3-12 are grossly intact   Psych: oriented to place, year and thought president was \"Dov\" but could not think of Trump      Medications Reviewed: (see below)    Lab Data Reviewed: (see below)    ______________________________________________________________________    Medications:     Current Facility-Administered Medications   Medication Dose Route Frequency    metFORMIN (GLUCOPHAGE) tablet 1,000 mg  1,000 mg Oral BID WC    sodium chloride flush 0.9 % injection 5-40 mL  5-40 mL IntraVENous Q12H    OLANZapine (ZYPREXA) tablet 5 mg  5 mg Oral Nightly    collagenase ointment   Topical Daily    NIFEdipine (PROCARDIA XL) extended release tablet 30 mg   proteinuria on UA  -prealbumin low; encourage supplementation   -CT liver without overt steatosis or cirrhosis      # Hypocalcemia - corrected is normal. Vit D low. On vitamin D      # Elevated alk phos - GGT elevated. US completed; showing cholelithiasis but no e/o cholecystitis     # Normocytic Anemia - likely ACD. Stable      # Atrial fibrillation  -on metoprolol and eliquis      # Type 2 diabetes - A1c 6.9  -ISS   -BG checks AC TID and qHS   -resume metformin at home dosing     # Vitamin D deficiency  -on vitamin D     # Hyperlipidemia  -on Zetia    #HTN  -on metoprolol and nifedipine      Total time spent with patient: 35 Minutes                  Care Plan discussed with: Patient and Care Manager    Discussed:  Care Plan    Prophylaxis: eliquis    Disposition:  SNF/LTC           ___________________________________________________    Attending Physician: Greer Vargas MD

## 2025-05-19 NOTE — PROGRESS NOTES
Physician Progress Note      PATIENT:               KIMBERLEE HIGGINBOTHAM  CSN #:                  [id number]  :                       1945  ADMIT DATE:       2025 3:10 PM  DISCH DATE:        2025 3:47 PM  RESPONDING  PROVIDER #:        Yordan Baker MD          QUERY TEXT:    Metabolic encephalopathy is documented in the medical record. Per the head to   toe flow sheet patient was still having confusion at discharge. Neurology   noted that patient's mentation improved some with Zyprexa.  Per    Neurology: Persistent confusion.  Please provide additional clinical   indicators supportive of your documentation. Or please document if the   diagnosis of metabolic encephalopathy has been ruled out after study.    The clinical indicators include:  Pt admitted  with AMS.  Metabolic encephalopathy is documented.  Per   Discharge Summary: Mental status had not returned to baseline & Neuro   re-evaluated. Per the head to toe flow sheet patient was still having   confusion at discharge. Neurology noted that patient's mentation improved some   with Zyprexa.  Per  Neurology: Persistent confusion.  Options provided:  -- Metabolic encephalopathy was ruled out as pt still has confusion on   discharged  -- Metabolic encephalopathy present as evidenced by, Please document evidence.  -- Other - I will add my own diagnosis  -- Disagree - Not applicable / Not valid  -- Disagree - Clinically unable to determine / Unknown  -- Refer to Clinical Documentation Reviewer    PROVIDER RESPONSE TEXT:    Metabolic encephalopathy is present as evidenced by [place]ing/waning mental   status    Query created by: Lyndon Rivera on 2025 12:38 PM      Electronically signed by:  Yordan Baker MD 2025 12:42 PM

## 2025-05-19 NOTE — PLAN OF CARE
Problem: Physical Therapy - Adult  Goal: By Discharge: Performs mobility at highest level of function for planned discharge setting.  See evaluation for individualized goals.  Description: FUNCTIONAL STATUS PRIOR TO ADMISSION: The patient was functional at the wheelchair level and required maximum assistance for transfers to the chair.    HOME SUPPORT PRIOR TO ADMISSION: recently discharge to SNF but after a few hours patient back in the hospital.    Physical Therapy Goals  Initiated 5/13/2025  1.  Patient will move from supine to sit and sit to supine, scoot up and down, and roll side to side in bed with moderate assistance within 7 day(s).    2.  Patient will perform sit to stand with moderate assistance within 7 day(s).  3.  Patient will transfer from bed to chair and chair to bed with moderate assistance using the least restrictive device within 7 day(s).     Outcome: Progressing    PHYSICAL THERAPY TREATMENT    Patient: Orlin San (79 y.o. male)  Date: 5/19/2025  Diagnosis: Hypokalemia [E87.6]  Delirium, acute [R41.0]  AMS (altered mental status) [R41.82] Hypokalemia    ASSESSMENT:  Patient continues to benefit from skilled PT services and is slowly progressing towards goals. Pt requires encouragement to participate but will with consistent guidance and re-direction. Pt benefits from one step, direct cuing and commands during today's session. Pt able to perform bed mobility for toileting/pericare, supine<>sit, and sit<>stand using Shawna Stedy with mod-max A x 2. Limited by generalized weakness and B LE pain with mov't and palpation.          PLAN:  Patient continues to benefit from skilled intervention to address the above impairments.  Continue treatment per established plan of care.    Recommendation for discharge: (in order for the patient to meet his/her long term goals):   Moderate intensity short-term skilled physical therapy up to 5x/week    IF patient discharges home will need the following DME:  height    Balance:  Balance  Sitting: Impaired  Sitting - Static: Good (unsupported)  Sitting - Dynamic: Fair (occasional)  Standing: Impaired;With support  Standing - Static: Constant support;Poor (in Shawna Stedy, lean to left and able to correct with cues)       Pain Rating:  Pt reports pain in B LE, back, R shoulder with activity but does not rate. Performs activity as tolerated and left in comfortable, resting position.     Pain Intervention(s):   repositioning and pain is at a level acceptable to the patient    Activity Tolerance:   Fair     After treatment:   Patient left in no apparent distress in bed, Call bell within reach, Bed/ chair alarm activated, and Side rails x3      COMMUNICATION/EDUCATION:   The patient's plan of care was discussed with: occupational therapist and registered nurse           Vivien Swan, PT  Minutes: 40

## 2025-05-19 NOTE — PROGRESS NOTES
Physician Progress Note      PATIENT:               KIMBERLEE HIGGINBOTHAM  CSN #:                  [id number]  :                       1945  ADMIT DATE:       2025 3:10 PM  DISCH DATE:        2025 3:47 PM  RESPONDING  PROVIDER #:        Yordan Baker MD          QUERY TEXT:    Sepsis is documented in the medical record.  Please clarify the source of   sepsis:    Sepsis is documented in the medical record. Discharge summary noted,   \"Discharge Diagnoses: Principal Problem: Hypernatremia... Hypovolemic   Hypotension without shock - Suspect largely due to dehydration,  cannot r/o septic component - S/p IVF. Resolved...Bacteremia Sepsis Multiple   infected wounds -  UA negative. Urine culture negative. CT chest/abd/pelvis negative. - Blood   culture () with CoNS and enterococcus. Repeat () NGTD- TTE without   vegetation - Wound care- ID followed. S/p IV vancomycin thru .  Wound care   consult on  noted wounds were without signs or symptoms of infection. On   the day of admission  patient was noted to have: LA 3.12, cr 2.69, WBC   12.6, temp 96 rectal, , 131, RR 34, 27. & BP 98/43. Please provide   additional clinical indicators supportive of your documentation. Or please   document if the diagnosis of sepsis has been ruled out after study.    The clinical indicators include:  Pt admitted  with AMS.  On the day of admission  patient was noted to   have: LA 3.12, cr 2.69, WBC 12.6, temp 96 rectal, , 131, RR 34, 27. & BP   98/43, 88/39.  Pt has PMH of DM2, questionable Parkinson's disease, HTH, HLD.  Options provided:  -- Sepsis, present on admission, likely related to, Please document likely   infectious source.  -- After study, sepsis is ruled out  -- Other - I will add my own diagnosis  -- Disagree - Not applicable / Not valid  -- Disagree - Clinically unable to determine / Unknown  -- Refer to Clinical Documentation Reviewer    PROVIDER RESPONSE TEXT:    This patient has

## 2025-05-19 NOTE — PLAN OF CARE
Problem: Occupational Therapy - Adult  Goal: By Discharge: Performs self-care activities at highest level of function for planned discharge setting.  See evaluation for individualized goals.  Description: FUNCTIONAL STATUS PRIOR TO ADMISSION:  Patient with recent hospitalization from 5/1-5/12/2025.  Patient discharged to SNF and returned to hospital shortly after non compliance and refusing care at SNF.  Per chart review, patient lives lone in a 2 level home, which is likely unfit for patient return.  Patient was using only a SPC for mobility and managing all ADLs prior to short period prior to last admission.   ,  ,  ,  ,  ,  ,  ,  ,  ,  ,       HOME SUPPORT: Patient lived alone.  Daughter's checked in through text/calls often, but visited father less frequently.    Occupational Therapy Goals:  Initiated 5/13/2025    1. Patient will perform self-feeding with Moderate Assist within 7 day(s).  2. Patient will perform grooming with Moderate Assist within 7 day(s).  3. Patient will perform toilet transfers with Maximal Assist and Assist x2 within 7 day(s).  4. Patient will participate in upper extremity therapeutic exercise/activities with Minimal Assist for 10 minutes within 7 day(s).   5. Patient will utilize energy conservation techniques during functional activities with verbal and visual cues within 7 day(s).     Outcome: Progressing     OCCUPATIONAL THERAPY TREATMENT  Patient: Orlin San (79 y.o. male)  Date: 5/19/2025  Primary Diagnosis: Hypokalemia [E87.6]  Delirium, acute [R41.0]  AMS (altered mental status) [R41.82]       Precautions:                  Chart, occupational therapy assessment, plan of care, and goals were reviewed.    ASSESSMENT  Patient continues to benefit from skilled OT services and is progressing towards goals. Patient demonstrates ability to feed himself, perform UE and LE ROM and benefits from simple direct cues for each movement needed for rolling and bed mobility. Patient aware he  assistance;Substantial/Maximal assistance (mod assist to right, max assist to left, max cues to initiate ridge and reach for bedrail)  Supine to Sit: 2 Person assistance;Substantial/Maximal assistance  Sit to Supine: Substantial/Maximal assistance  Scooting: Substantial/Maximal assistance;2 Person assistance     Transfers:   Transfer Training  Transfer Training: Yes  Interventions: Safety awareness training;Verbal cues;Weight shifting training/pressure relief;Manual cues  Sit to Stand: Substantial/Maximal assistance;Partial/Moderate assistance;2 Person assistance (pulling to stand on Shawna Stedy)  Stand to Sit: Substantial/Maximal assistance;2 Person assistance           Balance:     Balance  Sitting: Impaired  Sitting - Static: Good (unsupported)  Sitting - Dynamic: Fair (occasional)  Standing: Impaired;With support  Standing - Static: Constant support;Poor (in Shawna Stedy, lean to left and able to correct with cues)      ADL Intervention:         Feeding: Setup   Feeding Skilled Clinical Factors: improved initiation to perform at bed level, instructed on positioniing, benefit of support UE's right > left due to decreased AROM right shoulder, uses left hand to eat and drink            Toileting: Dependent/Total  Toileting Skilled Clinical Factors: bed level for toileting hygiene, rolling left and right for catalina-care  Rolling to right 3 x's for placement of bed pan, noted to volitionally flex left LE into bridge to reposition and increase comfort.         Educated on benefit of increased activity and participation in therapy    Educated on skin protection    Instructed on positioning of bilateral Ue's at rest and joint alignment right UE due to hx of shoulder pain, instructed in performance of right UE AROM at elbow wrist and hand all planes as this will not increase pain in shoulder, required max education to increase understanding of instruction.         Pain Rating:  Not rated, calling out with transfer sit to

## 2025-05-19 NOTE — PROGRESS NOTES
The patient has decided to refuse aspects of his care.   The patient has normal mental status and adequate capacity to make medical decisions.  The patient refuses the following medical treatments/interventions Nighttime medications, head to toe assessment, wound care.  The risks have been explained to the patient, including worsening illness, chronic pain, permanent disability, and death.  The benefits of treatment have also been explained, including the availability and proximity of nurses, physicians, monitoring, diagnostic testing, and treatment.

## 2025-05-20 LAB
GLUCOSE BLD STRIP.AUTO-MCNC: 109 MG/DL (ref 65–117)
GLUCOSE BLD STRIP.AUTO-MCNC: 130 MG/DL (ref 65–117)
SERVICE CMNT-IMP: ABNORMAL
SERVICE CMNT-IMP: NORMAL

## 2025-05-20 PROCEDURE — 1100000000 HC RM PRIVATE

## 2025-05-20 PROCEDURE — 97530 THERAPEUTIC ACTIVITIES: CPT | Performed by: PHYSICAL THERAPIST

## 2025-05-20 PROCEDURE — 2500000003 HC RX 250 WO HCPCS: Performed by: INTERNAL MEDICINE

## 2025-05-20 PROCEDURE — 6370000000 HC RX 637 (ALT 250 FOR IP): Performed by: STUDENT IN AN ORGANIZED HEALTH CARE EDUCATION/TRAINING PROGRAM

## 2025-05-20 PROCEDURE — 97535 SELF CARE MNGMENT TRAINING: CPT

## 2025-05-20 PROCEDURE — 6370000000 HC RX 637 (ALT 250 FOR IP): Performed by: INTERNAL MEDICINE

## 2025-05-20 PROCEDURE — 94761 N-INVAS EAR/PLS OXIMETRY MLT: CPT

## 2025-05-20 PROCEDURE — 6370000000 HC RX 637 (ALT 250 FOR IP): Performed by: FAMILY MEDICINE

## 2025-05-20 PROCEDURE — 82962 GLUCOSE BLOOD TEST: CPT

## 2025-05-20 RX ADMIN — NIFEDIPINE 30 MG: 30 TABLET, FILM COATED, EXTENDED RELEASE ORAL at 09:21

## 2025-05-20 RX ADMIN — FENOFIBRATE 160 MG: 160 TABLET ORAL at 09:22

## 2025-05-20 RX ADMIN — SODIUM CHLORIDE, PRESERVATIVE FREE 10 ML: 5 INJECTION INTRAVENOUS at 09:25

## 2025-05-20 RX ADMIN — METOPROLOL TARTRATE 50 MG: 50 TABLET, FILM COATED ORAL at 09:22

## 2025-05-20 RX ADMIN — EZETIMIBE 10 MG: 10 TABLET ORAL at 09:21

## 2025-05-20 RX ADMIN — COLLAGENASE SANTYL: 250 OINTMENT TOPICAL at 09:25

## 2025-05-20 RX ADMIN — METFORMIN HYDROCHLORIDE 1000 MG: 500 TABLET ORAL at 18:30

## 2025-05-20 RX ADMIN — CHOLECALCIFEROL (VITAMIN D3) 10 MCG (400 UNIT) TABLET 800 UNITS: at 09:24

## 2025-05-20 RX ADMIN — APIXABAN 5 MG: 5 TABLET, FILM COATED ORAL at 09:21

## 2025-05-20 RX ADMIN — METFORMIN HYDROCHLORIDE 1000 MG: 500 TABLET ORAL at 09:22

## 2025-05-20 NOTE — PLAN OF CARE
Problem: Pain  Goal: Verbalizes/displays adequate comfort level or baseline comfort level  Outcome: Progressing     Problem: Skin/Tissue Integrity  Goal: Skin integrity remains intact  Description: 1.  Monitor for areas of redness and/or skin breakdown2.  Assess vascular access sites hourly3.  Every 4-6 hours minimum:  Change oxygen saturation probe site4.  Every 4-6 hours:  If on nasal continuous positive airway pressure, respiratory therapy assess nares and determine need for appliance change or resting period  Outcome: Progressing     Problem: Safety - Adult  Goal: Free from fall injury  Outcome: Progressing     Problem: ABCDS Injury Assessment  Goal: Absence of physical injury  Outcome: Progressing     Problem: Occupational Therapy - Adult  Goal: By Discharge: Performs self-care activities at highest level of function for planned discharge setting.  See evaluation for individualized goals.  Description: FUNCTIONAL STATUS PRIOR TO ADMISSION:  Patient with recent hospitalization from 5/1-5/12/2025.  Patient discharged to SNF and returned to hospital shortly after non compliance and refusing care at SNF.  Per chart review, patient lives lone in a 2 level home, which is likely unfit for patient return.  Patient was using only a SPC for mobility and managing all ADLs prior to short period prior to last admission.   ,  ,  ,  ,  ,  ,  ,  ,  ,  ,       HOME SUPPORT: Patient lived alone.  Daughter's checked in through text/calls often, but visited father less frequently.    Occupational Therapy Goals:  Initiated 5/13/2025    1. Patient will perform self-feeding with Moderate Assist within 7 day(s).  2. Patient will perform grooming with Moderate Assist within 7 day(s).  3. Patient will perform toilet transfers with Maximal Assist and Assist x2 within 7 day(s).  4. Patient will participate in upper extremity therapeutic exercise/activities with Minimal Assist for 10 minutes within 7 day(s).   5. Patient will utilize

## 2025-05-20 NOTE — PLAN OF CARE
Problem: Physical Therapy - Adult  Goal: By Discharge: Performs mobility at highest level of function for planned discharge setting.  See evaluation for individualized goals.  Description: FUNCTIONAL STATUS PRIOR TO ADMISSION: The patient was functional at the wheelchair level and required maximum assistance for transfers to the chair.    HOME SUPPORT PRIOR TO ADMISSION: recently discharge to SNF but after a few hours patient back in the hospital.    Physical Therapy Goals  Initiated 5/13/2025.  Goals reviewed 5/20/25, continue x 7 days  1.  Patient will move from supine to sit and sit to supine, scoot up and down, and roll side to side in bed with moderate assistance within 7 day(s).    2.  Patient will perform sit to stand with moderate assistance within 7 day(s).  3.  Patient will transfer from bed to chair and chair to bed with moderate assistance using the least restrictive device within 7 day(s).     Outcome: Not Progressing   PHYSICAL THERAPY TREATMENT: WEEKLY REASSESSMENT    Patient: Orlin San (79 y.o. male)  Date: 5/20/2025  Primary Diagnosis: Hypokalemia [E87.6]  Delirium, acute [R41.0]  AMS (altered mental status) [R41.82]       Precautions: Restrictions/Precautions  Restrictions/Precautions: Fall Risk          ASSESSMENT :  Patient continues to benefit from skilled PT services and is slowly progressing towards goals. Patient overall displays low motivation and is resistant to therapy intervention.  Slow to respond to all questions and assists minimally with mobility.  Overall dependent for supine to sit transfers.  Sitting EOB and with Shaista-CGA.  No progress toward goals. C ontinues to be appropriate for moderate level rehab with potential transition to LTC.    Patient's progression toward goals since last assessment: continue all goals           PLAN :  Goals have been updated based on progression since last assessment.  Patient continues to benefit from skilled intervention to address the above

## 2025-05-20 NOTE — PROGRESS NOTES
Comprehensive Nutrition Assessment    Type and Reason for Visit:  Initial, LOS    Nutrition Recommendations/Plan:   Continue current diet: Regular  Continue to provide Vick BID to aid in wound healing (95 kcal, 9.8 g carbs, complete amino acids for wound healing 2.5 g)     Malnutrition Assessment:  Malnutrition Status:  No malnutrition (05/20/25 1038)      Nutrition Assessment:     Patient is a 79 year old male admitted with Hypokalemia [E87.6]  Delirium, acute [R41.0]  AMS (altered mental status) [R41.82] and  has no past medical history on file.    Screened for LOS. Met with patient who was hard of hearing and unable to reliably answer questions. Documented intake suggest strong PO intake and appetite, which pt confirms. Unable to assess Vick intake. Pt unable to provide UBW - limited documented wt hx. New wt obtained today measures 192# bed scale. Up 2# since admission. Pt denies any N/V/D. Last BM yesterday. He does not remember using any ONS at home. Pt was previously admitted earlier this month and had poor appetite and intake. This seems to no longer be the case. No new labs since 5.16. BG is being managed well. Will continue with regular diet and Vick ONS order while monitoring intake.     Nutrition Related Findings:      Wound Type: Multiple       Meal Intake:   Patient Vitals for the past 168 hrs:   PO Meals Eaten (%)   05/20/25 1007 51 - 75%   05/19/25 1400 76 - 100%   05/19/25 0745 76 - 100%   05/16/25 2107 51 - 75%   05/14/25 1712 76 - 100%   05/14/25 1215 76 - 100%   05/14/25 0839 76 - 100%     Supplement Intake:  No data found.    Wt Readings from Last 10 Encounters:   05/12/25 86.2 kg (190 lb)   05/03/25 83 kg (183 lb)     Last BM: 05/19/25  Edema: Generalized   Edema Generalized: Non-pitting  RUE Edema: Non-pitting  LUE Edema: Non-pitting  RLE Edema: Trace  LLE Edema: Trace    Nutr. Labs:    Lab Results   Component Value Date    CREATININE 0.81 05/16/2025    BUN 12 05/16/2025     05/16/2025

## 2025-05-20 NOTE — PLAN OF CARE
Problem: Occupational Therapy - Adult  Goal: By Discharge: Performs self-care activities at highest level of function for planned discharge setting.  See evaluation for individualized goals.  Description: FUNCTIONAL STATUS PRIOR TO ADMISSION:  Patient with recent hospitalization from 5/1-5/12/2025.  Patient discharged to SNF and returned to hospital shortly after non compliance and refusing care at SNF.  Per chart review, patient lives lone in a 2 level home, which is likely unfit for patient return.  Patient was using only a SPC for mobility and managing all ADLs prior to short period prior to last admission.   ,  ,  ,  ,  ,  ,  ,  ,  ,  ,       HOME SUPPORT: Patient lived alone.  Daughter's checked in through text/calls often, but visited father less frequently.    Occupational Therapy Goals:  Weekly re assessment 5/20/2025- continue goals.  Met goal 2, will keep for consistency  Initiated 5/13/2025    1. Patient will perform self-feeding with Moderate Assist within 7 day(s).  2. Patient will perform grooming with Moderate Assist within 7 day(s).  3. Patient will perform toilet transfers with Maximal Assist and Assist x2 within 7 day(s).  4. Patient will participate in upper extremity therapeutic exercise/activities with Minimal Assist for 10 minutes within 7 day(s).   5. Patient will utilize energy conservation techniques during functional activities with verbal and visual cues within 7 day(s).     Outcome: Not Progressing    OCCUPATIONAL THERAPY TREATMENT: WEEKLY REASSESSMENT    Patient: Orlin San (79 y.o. male)  Date: 5/20/2025  Primary Diagnosis: Hypokalemia [E87.6]  Delirium, acute [R41.0]  AMS (altered mental status) [R41.82]       Precautions: Fall Risk                Chart, occupational therapy assessment, plan of care, and goals were reviewed.    ASSESSMENT  Patient continues to benefit from skilled OT services and is not progressing towards goals. Patient received supine in bed with HOB elevated.

## 2025-05-20 NOTE — PROGRESS NOTES
PATRICIO EVANS [facility]  [address] [place] [address] [phone]        Hospitalist Progress Note      NAME: Orlin San   :  1945  MRM:  [id number]    Date/Time: 2025  11:42 AM         Subjective:     Chief Complaint:   Resting in bed comfortably.  No complaints at this time.       Objective:       Vitals:          Last 24hrs VS reviewed since prior progress note. Most recent are:    Vitals:    25 0828   BP: (!) 144/78   Pulse: 77   Resp: 18   Temp: 97.5 °F (36.4 °C)   SpO2: 98%     SpO2 Readings from Last 6 Encounters:   25 98%   25 95%          Intake/Output Summary (Last 24 hours) at 2025 1142  Last data filed at 2025 1007  Gross per 24 hour   Intake 560 ml   Output 1400 ml   Net -840 ml          Exam:     Physical Exam:    Gen: disheveled. Not agitated   HEENT:  Pink conjunctivae, PERRL, hearing intact to voice, moist mucous membranes  Neck:  Supple, without masses, thyroid non-tender  Resp:  No accessory muscle use, clear breath sounds without wheezes rales or rhonchi  Card:  No murmurs, normal S1, S2 without thrills, bruits or peripheral edema  Abd:  Soft, non-tender, non-distended, normoactive bowel sounds are present  Musc:  No cyanosis or clubbing  Skin:  No rashes or ulcers, skin turgor is good  Neuro:  Cranial nerves 3-12 are grossly intact   Psych: poor insight      Medications Reviewed: (see below)    Lab Data Reviewed: (see below)    ______________________________________________________________________    Medications:     Current Facility-Administered Medications   Medication Dose Route Frequency    insulin lispro (HUMALOG,ADMELOG) injection vial 0-8 Units  0-8 Units SubCUTAneous 4x Daily AC & HS    metFORMIN (GLUCOPHAGE) tablet 1,000 mg  1,000 mg Oral BID WC    sodium chloride flush 0.9 % injection 5-40 mL  5-40 mL IntraVENous Q12H    OLANZapine (ZYPREXA) tablet 5 mg  5 mg Oral Nightly    collagenase ointment   Topical

## 2025-05-20 NOTE — PROGRESS NOTES
Spiritual Health History and Assessment/Progress Note  [facility]    Rituals, Rites and Sacraments,  ,  ,      Name: Orlin San MRN: [id number]    Age: 79 y.o.     Sex: male   Language: English   Jain: Baptism   Hypokalemia     Date: 5/20/2025            Total Time Calculated: 5 min              Spiritual Assessment continued in [facility] B5 MULTI-SPECIALTY ONCOLOGY 2        Referral/Consult From: Clergy/   Encounter Overview/Reason: Rituals, Rites and Sacraments  Service Provided For: Patient    Gissell, Belief, Meaning:   Patient is connected with a gissell tradition or spiritual practice  Family/Friends No family/friends present      Importance and Influence:  Patient unable to assess at this time  Family/Friends No family/friends present    Community:  Patient Other: unknown  Family/Friends No family/friends present    Assessment and Plan of Care:     Patient Interventions include: Provided sacramental/Roman Catholic ritual  Family/Friends Interventions include: No family/friends present    Patient Plan of Care: Spiritual Care available upon further referral  Family/Friends Plan of Care: Spiritual Care available upon further referral    Electronically signed by Chaplain EVA on 5/20/2025 at 12:48 PM     app2you visit attempted.  Mr. San is Baptism. He was not available for a visit because PT was attending to his needs. Prayer for spiritual communion offered outside his room.     Sr. BRICE Desouza, RN, ACSW, LCSW   Page:  [phone]

## 2025-05-21 LAB
GLUCOSE BLD STRIP.AUTO-MCNC: 128 MG/DL (ref 65–117)
GLUCOSE BLD STRIP.AUTO-MCNC: 130 MG/DL (ref 65–117)
GLUCOSE BLD STRIP.AUTO-MCNC: 135 MG/DL (ref 65–117)
SERVICE CMNT-IMP: ABNORMAL

## 2025-05-21 PROCEDURE — 2500000003 HC RX 250 WO HCPCS: Performed by: INTERNAL MEDICINE

## 2025-05-21 PROCEDURE — 1100000000 HC RM PRIVATE

## 2025-05-21 PROCEDURE — 6370000000 HC RX 637 (ALT 250 FOR IP): Performed by: STUDENT IN AN ORGANIZED HEALTH CARE EDUCATION/TRAINING PROGRAM

## 2025-05-21 PROCEDURE — 6370000000 HC RX 637 (ALT 250 FOR IP): Performed by: INTERNAL MEDICINE

## 2025-05-21 PROCEDURE — 82962 GLUCOSE BLOOD TEST: CPT

## 2025-05-21 PROCEDURE — 6370000000 HC RX 637 (ALT 250 FOR IP): Performed by: FAMILY MEDICINE

## 2025-05-21 NOTE — PROGRESS NOTES
PATRICIO EVANS [facility]  [address] [place] [address] [phone]        Hospitalist Progress Note      NAME: Orlin San   :  1945  MRM:  [id number]    Date/Time: 2025  12:10 PM         Subjective:     Chief Complaint:   Resting in bed comfortably.  Refusing labs and vitals         Objective:       Vitals:          Last 24hrs VS reviewed since prior progress note. Most recent are:    Vitals:    25 1143   BP: (!) 147/90   Pulse: 83   Resp: 16   Temp: 97.5 °F (36.4 °C)   SpO2: 98%     SpO2 Readings from Last 6 Encounters:   25 98%   25 95%          Intake/Output Summary (Last 24 hours) at 2025 1210  Last data filed at 2025 0620  Gross per 24 hour   Intake 240 ml   Output 1200 ml   Net -960 ml          Exam:     Physical Exam:    Gen: disheveled. Not agitated   HEENT:  Pink conjunctivae, PERRL, hearing intact to voice, moist mucous membranes  Neck:  Supple, without masses, thyroid non-tender  Resp:  No accessory muscle use, clear breath sounds without wheezes rales or rhonchi  Card:  No murmurs, normal S1, S2 without thrills, bruits or peripheral edema  Abd:  Soft, non-tender, non-distended, normoactive bowel sounds are present  Musc:  No cyanosis or clubbing  Skin:  No rashes or ulcers, skin turgor is good  Neuro:  Cranial nerves 3-12 are grossly intact   Psych: poor insight      Medications Reviewed: (see below)    Lab Data Reviewed: (see below)    ______________________________________________________________________    Medications:     Current Facility-Administered Medications   Medication Dose Route Frequency    insulin lispro (HUMALOG,ADMELOG) injection vial 0-8 Units  0-8 Units SubCUTAneous 4x Daily AC & HS    metFORMIN (GLUCOPHAGE) tablet 1,000 mg  1,000 mg Oral BID WC    sodium chloride flush 0.9 % injection 5-40 mL  5-40 mL IntraVENous Q12H    OLANZapine (ZYPREXA) tablet 5 mg  5 mg Oral Nightly    collagenase ointment   Topical  Daily    NIFEdipine (PROCARDIA XL) extended release tablet 30 mg  30 mg Oral Daily    cholecalciferol (VITAMIN D3) tablet TABS 800 Units  800 Units Oral Daily    apixaban (ELIQUIS) tablet 5 mg  5 mg Oral BID    metoprolol tartrate (LOPRESSOR) tablet 50 mg  50 mg Oral BID    ezetimibe (ZETIA) tablet 10 mg  10 mg Oral Daily    fenofibrate tablet 160 mg  160 mg Oral Daily    prochlorperazine (COMPAZINE) injection 10 mg  10 mg IntraVENous Q6H PRN    glucose chewable tablet 16 g  4 tablet Oral PRN    dextrose bolus 10% 125 mL  125 mL IntraVENous PRN    Or    dextrose bolus 10% 250 mL  250 mL IntraVENous PRN    glucagon injection 1 mg  1 mg SubCUTAneous PRN    dextrose 10 % infusion   IntraVENous Continuous PRN    sodium chloride flush 0.9 % injection 5-40 mL  5-40 mL IntraVENous PRN    0.9 % sodium chloride infusion   IntraVENous PRN    acetaminophen (TYLENOL) tablet 650 mg  650 mg Oral Q6H PRN    Or    acetaminophen (TYLENOL) suppository 650 mg  650 mg Rectal Q6H PRN    melatonin tablet 6 mg  6 mg Oral Nightly PRN    bisacodyl (DULCOLAX) EC tablet 5 mg  5 mg Oral Daily PRN            Lab Review:     No results for input(s): \"WBC\", \"HGB\", \"HCT\", \"PLT\" in the last 72 hours.    No results for input(s): \"NA\", \"K\", \"CL\", \"CO2\", \"GLU\", \"BUN\", \"MG\", \"PHOS\", \"ALT\", \"INR\" in the last 72 hours.    Invalid input(s): \"CREA\", \"CA\", \"ALB\", \"TBIL\", \"SGOT\"    No components found for: \"GLPOC\"         Assessment / Plan:   Orlin is a 79 y.o. male  with a medical history significant for delirium, atrial fibrillation, hypokalemia, hyperlipidemia, type 2 diabetes, normocytic anemia, multiple wounds, who presents to the ER with AMS.      #AMS: Recent brain MRI was unremarkable. Suspect 2/2 delirium on ?MCI. TSH/ammonia/B12 WNL. UA not c/w UTI   -continue zyprexa qHS; dose increased to 5mg    -awaiting placement      #Hypokalemia:   - Repleted.  Repeat BMP pending, patient refusing labs.    #Hypoalbuminemia   -mild proteinuria on

## 2025-05-21 NOTE — PROGRESS NOTES
The patient has decided to refuse aspects of his care.   The patient has normal mental status and adequate capacity to make medical decisions.  The patient refuses the following medical treatments/interventions Nighttime medications, vital signs, turning/ repositioning and wound care  The risks have been explained to the patient, including worsening illness, chronic pain, permanent disability, and death.  The benefits of treatment have also been explained, including the availability and proximity of nurses, physicians, monitoring, diagnostic testing, and treatment.

## 2025-05-21 NOTE — PROGRESS NOTES
Physical Therapy Note:  Pt received slumped low in the bed with saturated brief.  Very [place].  Consulted RN to have pt cleaned before PT tx.  RN stating pt non-compliant and spitting out his meds this morning as well as verbalizing profanities.  Pt was Total Ax2 and in need of Shawna Stovall last PT tx.  Carmen Villar, PT

## 2025-05-21 NOTE — PLAN OF CARE
Problem: Pain  Goal: Verbalizes/displays adequate comfort level or baseline comfort level  Outcome: Progressing     Problem: Skin/Tissue Integrity  Goal: Skin integrity remains intact  Description: 1.  Monitor for areas of redness and/or skin breakdown2.  Assess vascular access sites hourly3.  Every 4-6 hours minimum:  Change oxygen saturation probe site4.  Every 4-6 hours:  If on nasal continuous positive airway pressure, respiratory therapy assess nares and determine need for appliance change or resting period  Outcome: Progressing     Problem: Safety - Adult  Goal: Free from fall injury  Outcome: Progressing     Problem: ABCDS Injury Assessment  Goal: Absence of physical injury  Outcome: Progressing     Problem: Occupational Therapy - Adult  Goal: By Discharge: Performs self-care activities at highest level of function for planned discharge setting.  See evaluation for individualized goals.  Description: FUNCTIONAL STATUS PRIOR TO ADMISSION:  Patient with recent hospitalization from 5/1-5/12/2025.  Patient discharged to SNF and returned to hospital shortly after non compliance and refusing care at SNF.  Per chart review, patient lives lone in a 2 level home, which is likely unfit for patient return.  Patient was using only a SPC for mobility and managing all ADLs prior to short period prior to last admission.   ,  ,  ,  ,  ,  ,  ,  ,  ,  ,       HOME SUPPORT: Patient lived alone.  Daughter's checked in through text/calls often, but visited father less frequently.    Occupational Therapy Goals:  Weekly re assessment 5/20/2025- continue goals.  Met goal 2, will keep for consistency  Initiated 5/13/2025    1. Patient will perform self-feeding with Moderate Assist within 7 day(s).  2. Patient will perform grooming with Moderate Assist within 7 day(s).  3. Patient will perform toilet transfers with Maximal Assist and Assist x2 within 7 day(s).  4. Patient will participate in upper extremity therapeutic

## 2025-05-21 NOTE — CARE COORDINATION
5/21/2025  4:25 PM  MONTY faxed [phone],  additional wound care notes and nutrition note to Chani LAYNE [facility] for review   ODILON oCats      3:56 PM  Chani Robert LAYNE [facility] was on site for bedside eval today.    CM provided H&P, most recent PT/OT notes as well as current MD note.  DON will review w/ admissions team to determine if pt is appropriate for their facility.  Ms toledo confirmed they have on site therapy services through [facility] and pt would receive services 5 days/wk combining therapies.  CM received intake packet for half-way   ODILON Coats       12:27 PM  Care Management Progress Note    Reason for Admission:   Hypokalemia [E87.6]  Delirium, acute [R41.0]  AMS (altered mental status) [R41.82]         Patient Admission Status: Inpatient  RUR: 16%   Hospitalization in the last 30 days (Readmission):  Yes        Transition of care plan:  Pt discussed in IDR, stable for DC pending dispo  PT/OT treating, recc SNF, family declining, CM requested therapy recc for any DME needed for DC   SNF - referrals pending to [facility], pt accepted to [facility] [place] [facility]  If SNF pt has met 3MN qualifying stay     Discharge plan communicated with patient and/or discharge caregiver: Yes.  CM spoke w/ pt's Dtr Merlyn, she is declining SNF placement, she feels pt will transition better to half-way facility w/ HH services.  She has chosen [facility] and staff will be here today for bedside eval     Date 1st IMM letter given: 5/19  Outpatient follow-up.  Transport at discharge: [facility]   CM will follow and assist  ODILON Coats

## 2025-05-22 LAB
GLUCOSE BLD STRIP.AUTO-MCNC: 100 MG/DL (ref 65–117)
GLUCOSE BLD STRIP.AUTO-MCNC: 103 MG/DL (ref 65–117)
GLUCOSE BLD STRIP.AUTO-MCNC: 156 MG/DL (ref 65–117)
SERVICE CMNT-IMP: ABNORMAL
SERVICE CMNT-IMP: NORMAL
SERVICE CMNT-IMP: NORMAL

## 2025-05-22 PROCEDURE — 82962 GLUCOSE BLOOD TEST: CPT

## 2025-05-22 PROCEDURE — 94761 N-INVAS EAR/PLS OXIMETRY MLT: CPT

## 2025-05-22 PROCEDURE — 1100000000 HC RM PRIVATE

## 2025-05-22 RX ORDER — NIFEDIPINE 30 MG/1
60 TABLET, EXTENDED RELEASE ORAL DAILY
Status: DISCONTINUED | OUTPATIENT
Start: 2025-05-22 | End: 2025-05-23 | Stop reason: HOSPADM

## 2025-05-22 RX ADMIN — COLLAGENASE SANTYL: 250 OINTMENT TOPICAL at 10:37

## 2025-05-22 NOTE — PROGRESS NOTES
PATRICIO EVANS [facility]  [address] [place] [address] [phone]        Hospitalist Progress Note      NAME: Orlin San   :  1945  MRM:  [id number]    Date/Time: 2025  1:13 PM         Subjective:     Chief Complaint:   Resting in bed comfortably.  Agitated when asked orientation questions.  Refusing labs and vitals         Objective:       Vitals:          Last 24hrs VS reviewed since prior progress note. Most recent are:    Vitals:    25 1056   BP: (!) 141/95   Pulse: 96   Resp: 17   Temp: 98.1 °F (36.7 °C)   SpO2: 92%     SpO2 Readings from Last 6 Encounters:   25 92%   25 95%          Intake/Output Summary (Last 24 hours) at 2025 1313  Last data filed at 2025 0638  Gross per 24 hour   Intake 620 ml   Output 1650 ml   Net -1030 ml          Exam:     Physical Exam:    Gen: disheveled. Not agitated   HEENT:  Pink conjunctivae, PERRL, hearing intact to voice, moist mucous membranes  Neck:  Supple, without masses, thyroid non-tender  Resp:  No accessory muscle use, clear breath sounds without wheezes rales or rhonchi  Card:  No murmurs, normal S1, S2 without thrills, bruits or peripheral edema  Abd:  Soft, non-tender, non-distended, normoactive bowel sounds are present  Musc:  No cyanosis or clubbing  Skin:  No rashes or ulcers, skin turgor is good  Neuro:  Cranial nerves 3-12 are grossly intact   Psych: poor insight      Medications Reviewed: (see below)    Lab Data Reviewed: (see below)    ______________________________________________________________________    Medications:     Current Facility-Administered Medications   Medication Dose Route Frequency    NIFEdipine (PROCARDIA XL) extended release tablet 60 mg  60 mg Oral Daily    insulin lispro (HUMALOG,ADMELOG) injection vial 0-8 Units  0-8 Units SubCUTAneous 4x Daily AC & HS    metFORMIN (GLUCOPHAGE) tablet 1,000 mg  1,000 mg Oral BID WC    sodium chloride flush 0.9 % injection 5-40  mL  5-40 mL IntraVENous Q12H    OLANZapine (ZYPREXA) tablet 5 mg  5 mg Oral Nightly    collagenase ointment   Topical Daily    cholecalciferol (VITAMIN D3) tablet TABS 800 Units  800 Units Oral Daily    apixaban (ELIQUIS) tablet 5 mg  5 mg Oral BID    metoprolol tartrate (LOPRESSOR) tablet 50 mg  50 mg Oral BID    ezetimibe (ZETIA) tablet 10 mg  10 mg Oral Daily    fenofibrate tablet 160 mg  160 mg Oral Daily    prochlorperazine (COMPAZINE) injection 10 mg  10 mg IntraVENous Q6H PRN    glucose chewable tablet 16 g  4 tablet Oral PRN    dextrose bolus 10% 125 mL  125 mL IntraVENous PRN    Or    dextrose bolus 10% 250 mL  250 mL IntraVENous PRN    glucagon injection 1 mg  1 mg SubCUTAneous PRN    dextrose 10 % infusion   IntraVENous Continuous PRN    sodium chloride flush 0.9 % injection 5-40 mL  5-40 mL IntraVENous PRN    0.9 % sodium chloride infusion   IntraVENous PRN    acetaminophen (TYLENOL) tablet 650 mg  650 mg Oral Q6H PRN    Or    acetaminophen (TYLENOL) suppository 650 mg  650 mg Rectal Q6H PRN    melatonin tablet 6 mg  6 mg Oral Nightly PRN    bisacodyl (DULCOLAX) EC tablet 5 mg  5 mg Oral Daily PRN            Lab Review:     No results for input(s): \"WBC\", \"HGB\", \"HCT\", \"PLT\" in the last 72 hours.    No results for input(s): \"NA\", \"K\", \"CL\", \"CO2\", \"GLU\", \"BUN\", \"MG\", \"PHOS\", \"ALT\", \"INR\" in the last 72 hours.    Invalid input(s): \"CREA\", \"CA\", \"ALB\", \"TBIL\", \"SGOT\"    No components found for: \"GLPOC\"         Assessment / Plan:   Orlin is a 79 y.o. male  with a medical history significant for delirium, atrial fibrillation, hypokalemia, hyperlipidemia, type 2 diabetes, normocytic anemia, multiple wounds, who presents to the ER with AMS.      #AMS: Recent brain MRI was unremarkable. Suspect 2/2 delirium on ?MCI. TSH/ammonia/B12 WNL. UA not c/w UTI   -continue zyprexa qHS; dose increased to 5mg    -awaiting placement      #Hypokalemia:   - Repleted.  Repeat BMP pending, patient refusing

## 2025-05-22 NOTE — PROGRESS NOTES
WOCN Note:     Follow up consult for wounds  Seen in 535/01     79 y.o. y/o male admitted on 5/12/2025   Admitted for    Hypokalemia [E87.6]  Delirium, acute [R41.0]  AMS (altered mental status) [R41.82]    History of DM2, questionable Parkinson's, HTN, and HLD     Lab Results   Component Value Date/Time    WBC 5.4 05/16/2025 08:40 AM    LABA1C 6.9 (H) 05/02/2025 12:56 AM    POCGLU 100 05/22/2025 10:57 AM    POCGLU 103 05/22/2025 07:38 AM    HGB 10.0 (L) 05/16/2025 08:40 AM    HCT 31.6 (L) 05/16/2025 08:40 AM     05/16/2025 08:40 AM     No indication for wound culture    Diet: ADULT DIET; Regular  ADULT ORAL NUTRITION SUPPLEMENT; Lunch, Dinner; Wound Healing Oral Supplement           Assessment:   Pt able to communicate. Pain with movement or repositioning.   Requires moderate assist in repositioning. After assessment turned to  Left Side.    Incontinent wearing briefs, and External urinary device to suction.    Surface: [place] Gel with blower connected to bed     Bilateral heels intact with blanchable redness - heels elevated with pillows         1. POA MASD Sacrum  100% granulated wound bed. Blanchable redness on wound.   no malodor or purulence - no gross S&S of infection  Periwound intact & without erythema    Tx: Cleansed with Vashe, applied zinc cream, and covered with foam.       2. POA Unstageable Pressure Injury R Knee  4.8 x 3.5 cm  Black eschar with well defined erythematous edges.  No exudate; no malodor or purulence - no gross S&S of infection  Periwound intact & without erythema    Tx: Cleansed with Vashe and covered with foam.      3. POA Unstageable Pressure Injury R Lateral Foot  Black eschar on distal x2 clusters. The proximal wound has 40% slough and undefined margins   No exudate; no malodor or purulence - no gross S&S of infection  Periwound intact & without erythema    Tx: Cleansed with Vashe applied collagenase and  covered with foam.      4. POA MASD  on R Hip  90% blanchable redness.

## 2025-05-22 NOTE — CARE COORDINATION
5/22/2025  1:20 PM  Care Management Progress Note    Reason for Admission:   Hypokalemia [E87.6]  Delirium, acute [R41.0]  AMS (altered mental status) [R41.82]         Patient Admission Status: Inpatient  RUR: 16%  Hospitalization in the last 30 days (Readmission):  Yes        Transition of care plan:  Pt discussed in IDR, medically stable for DC   PT/OT treating [facility] pt for [facility]  Pt's Dtr Merlyn is declining SNF placement, she feels the pt will adjust better to [facility] w/ HH services.  [facility] performed bedside eval 5/21.  CM placed TC to Anthony Jones for [facility] decision  Discharge plan communicated with patient and/or discharge caregiver: No    Date 1st [facility] letter given: 5/19  Outpatient follow-up.  Transport at discharge: BLS/Stretcher   CM will continue to follow and assist  ODILON Coats

## 2025-05-22 NOTE — PLAN OF CARE
Problem: Pain  Goal: Verbalizes/displays adequate comfort level or baseline comfort level  Outcome: Progressing     Problem: Skin/Tissue Integrity  Goal: Skin integrity remains intact  Description: 1.  Monitor for areas of redness and/or skin breakdown2.  Assess vascular access sites hourly3.  Every 4-6 hours minimum:  Change oxygen saturation probe site4.  Every 4-6 hours:  If on nasal continuous positive airway pressure, respiratory therapy assess nares and determine need for appliance change or resting period  Outcome: Progressing     Problem: Safety - Adult  Goal: Free from fall injury  Outcome: Progressing     Problem: ABCDS Injury Assessment  Goal: Absence of physical injury  Outcome: Progressing     Problem: Nutrition Deficit:  Goal: Optimize nutritional status  Outcome: Progressing

## 2025-05-22 NOTE — PROGRESS NOTES
Spiritual Health History and Assessment/Progress Note  [facility]    Rituals, Rites and Sacraments,  ,  ,      Name: Orlin San MRN: [id number]    Age: 79 y.o.     Sex: male   Language: English   Hinduism: Yarsani   Hypokalemia     Date: 5/22/2025            Total Time Calculated: 5 min              Spiritual Assessment continued in SF B5 MULTI-SPECIALTY ONCOLOGY 2        Referral/Consult From: Clergy/   Encounter Overview/Reason: Rituals, Rites and Sacraments  Service Provided For: Patient    Gissell, Belief, Meaning:   Patient is connected with a gissell tradition or spiritual practice  Family/Friends No family/friends present      Importance and Influence:  Patient has spiritual/personal beliefs that influence decisions regarding their health  Family/Friends No family/friends present    Community:  Patient is connected with a spiritual community  Family/Friends No family/friends present    Assessment and Plan of Care:     Patient Interventions include: Provided sacramental/Christianity ritual  Family/Friends Interventions include: No family/friends present    Patient Plan of Care: Spiritual Care available upon further referral  Family/Friends Plan of Care: Spiritual Care available upon further referral    Electronically signed by Chaplain EVA on 5/22/2025 at 2:13 PM     [facility] ministry visit.  [facility]  visited today and Mr. San verified that he had communion. Prayer offered for his well-being.    Chaplain Tabor. BRICE Desouza, RN, ACSW, LCSW   Page:  [phone]

## 2025-05-23 VITALS
DIASTOLIC BLOOD PRESSURE: 89 MMHG | TEMPERATURE: 97.9 F | BODY MASS INDEX: 29.82 KG/M2 | HEIGHT: 67 IN | OXYGEN SATURATION: 95 % | WEIGHT: 190 LBS | SYSTOLIC BLOOD PRESSURE: 143 MMHG | RESPIRATION RATE: 16 BRPM | HEART RATE: 71 BPM

## 2025-05-23 PROBLEM — E11.9 DM (DIABETES MELLITUS), TYPE 2 (HCC): Status: ACTIVE | Noted: 2025-05-23

## 2025-05-23 PROBLEM — I10 HTN (HYPERTENSION), BENIGN: Status: ACTIVE | Noted: 2025-05-23

## 2025-05-23 PROBLEM — I48.19 PERSISTENT ATRIAL FIBRILLATION (HCC): Status: ACTIVE | Noted: 2025-05-05

## 2025-05-23 PROBLEM — T07.XXXA MULTIPLE WOUNDS: Status: ACTIVE | Noted: 2025-05-23

## 2025-05-23 PROBLEM — G93.41 ACUTE METABOLIC ENCEPHALOPATHY: Status: ACTIVE | Noted: 2025-05-13

## 2025-05-23 LAB
GLUCOSE BLD STRIP.AUTO-MCNC: 120 MG/DL (ref 65–117)
GLUCOSE BLD STRIP.AUTO-MCNC: 178 MG/DL (ref 65–117)
GLUCOSE BLD STRIP.AUTO-MCNC: 190 MG/DL (ref 65–117)
SERVICE CMNT-IMP: ABNORMAL

## 2025-05-23 PROCEDURE — 6370000000 HC RX 637 (ALT 250 FOR IP): Performed by: STUDENT IN AN ORGANIZED HEALTH CARE EDUCATION/TRAINING PROGRAM

## 2025-05-23 PROCEDURE — 2500000003 HC RX 250 WO HCPCS: Performed by: INTERNAL MEDICINE

## 2025-05-23 PROCEDURE — 6370000000 HC RX 637 (ALT 250 FOR IP): Performed by: FAMILY MEDICINE

## 2025-05-23 PROCEDURE — 82962 GLUCOSE BLOOD TEST: CPT

## 2025-05-23 PROCEDURE — 6370000000 HC RX 637 (ALT 250 FOR IP): Performed by: INTERNAL MEDICINE

## 2025-05-23 RX ORDER — OLANZAPINE 5 MG/1
5 TABLET, FILM COATED ORAL NIGHTLY
Qty: 30 TABLET | Refills: 0 | Status: SHIPPED | OUTPATIENT
Start: 2025-05-23 | End: 2025-06-22

## 2025-05-23 RX ORDER — NIFEDIPINE 30 MG/1
60 TABLET, EXTENDED RELEASE ORAL DAILY
Qty: 30 TABLET | Refills: 0 | Status: SHIPPED | OUTPATIENT
Start: 2025-05-24

## 2025-05-23 RX ADMIN — SODIUM CHLORIDE, PRESERVATIVE FREE 10 ML: 5 INJECTION INTRAVENOUS at 09:54

## 2025-05-23 RX ADMIN — CHOLECALCIFEROL (VITAMIN D3) 10 MCG (400 UNIT) TABLET 800 UNITS: at 09:51

## 2025-05-23 RX ADMIN — EZETIMIBE 10 MG: 10 TABLET ORAL at 09:51

## 2025-05-23 RX ADMIN — NIFEDIPINE 60 MG: 30 TABLET, FILM COATED, EXTENDED RELEASE ORAL at 09:51

## 2025-05-23 RX ADMIN — METOPROLOL TARTRATE 50 MG: 50 TABLET, FILM COATED ORAL at 09:52

## 2025-05-23 RX ADMIN — APIXABAN 5 MG: 5 TABLET, FILM COATED ORAL at 09:51

## 2025-05-23 RX ADMIN — METFORMIN HYDROCHLORIDE 1000 MG: 500 TABLET ORAL at 09:52

## 2025-05-23 RX ADMIN — FENOFIBRATE 160 MG: 160 TABLET ORAL at 09:51

## 2025-05-23 NOTE — FLOWSHEET NOTE
1245: Report called to Ama at [facility]. Opportunity for questions provided.

## 2025-05-23 NOTE — DISCHARGE INSTRUCTIONS
TRANSFER  INSTRUCTIONS    NAME: Orlin San   :  1945   MRN:  [id number]     Date:    2025     ADMIT DATE: 2025     TRANSFER DATE: 2025     DISPOSITION: SNF    DISCHARGE DIAGNOSIS:  [unfilled]    MEDICATIONS: See medication list on the AVS    Recommended diet:  diabetic diet    Recommended activity: Activity as tolerated    Follow Up:    [facility]  [address] [address]  [phone]  Go to  Skilled Nursing Facility    Dov George MD  [address] [id number] [phone]    Schedule an appointment as soon as possible for a visit  To schedule follow up after rehab    Dov George MD  [address] [address]  [address]  [facility] [address] [phone]            Information obtained by :  I understand that if any problems occur once I am at home I am to contact my physician.    I understand and acknowledge receipt of the instructions indicated above.                                                                                                                                           Physician's or R.N.'s Signature                                                                  Date/Time                                                                                                                                              Patient or Representative Signature                                                          Date/Time

## 2025-05-23 NOTE — DISCHARGE SUMMARY
[facility]  [address] [place] [address]  Tel: [phone]    Hospital Medicine Discharge Summary    Patient ID:    Orlin San  Age:              79 y.o.    : 1945  MRN:             [id number]     PCP: Dov George MD     Date of Admission: 2025    Date of Discharge:  2025    Discharge Diagnoses:  Principal Problem:    Acute metabolic encephalopathy    Persistent atrial fibrillation (HCC)    Hypokalemia    DM (diabetes mellitus), type 2 (HCC)    HTN (hypertension), benign    Multiple wounds    Reason for admission:    Hypokalemia [E87.6]  Delirium, acute [R41.0]  AMS (altered mental status) [R41.82]    Diagnostic testing:    Laboratory data reviewed and independently interpreted:    No results for input(s): \"WBC\", \"HGB\", \"HCT\", \"RBC\", \"MCV\", \"MCH\", \"PLT\" in the last 72 hours.  No results found for: \"LACTA\"  No results for input(s): \"NA\", \"K\", \"CL\", \"CO2\", \"GLUCOSE\", \"BUN\", \"CREATININE\", \"CALCIUM\", \"MG\", \"PHOS\", \"BILITOT\", \"ALKPHOS\", \"AST\", \"ALT\", \"INR\" in the last 72 hours.    Invalid input(s): \"PROT\", \"ALB\"  No components found for: \"GLUCOSEPOC\"  No results found for: \"CHOL\", \"TRIG\", \"HDL\"    Imaging data reviewed:    US ABDOMEN LIMITED Specify organ? GALLBLADDER  Result Date: 2025  Cholelithiasis without pericholecystic fluid or significant gallbladder wall thickening. Electronically signed by [facility]:     Mr. San is a 79 y.o. admitted to [facility] and treated for the following:    #AMS: this has resolved. Recent brain MRI was unremarkable. Likely due to delirium on MCI. TSH/ammonia/B12 WNL. UA not c/w UTI. Symptoms stable. Continue Zyprexa qHS. Discharged in stable condition. May need a neuropsychological evaluation as an out patient.        #Hypokalemia: this was repleted. However, could not be re-checked given he declined to have any lab draws.

## 2025-05-23 NOTE — CARE COORDINATION
Transition of Care Plan to SNF/Rehab    Communication to Patient/Family:   Met with patient and family and they are agreeable to the transition plan. The Plan for Transition of Care is related to the following treatment goals: Hypokalemia [E87.6]  Delirium, acute [R41.0]  AMS (altered mental status) [R41.82]      The Patient and/or patient representative was provided with a choice of provider and agrees  with the discharge plan.      Yes [x] No []    A Freedom of choice list was provided with basic dialogue that supports the patient's individualized plan of care/goals and shares the quality data associated with the providers.       Yes [x] No []    SNF/Rehab Transition:  Patient has been accepted to [facility] and meets criteria for admission.     SNF reports auth has been received? NA  Medicare 3 night stay satisfied? [x]   Inpatient Admission Dates: 05/14/2025 - 05/23/2025    Patient will be transported by [facility] and expected to leave at 5pm.   [x] Packet on chart (if needed)  [x] PCS completed (if applicable)     Communication to SNF/Rehab:  Bedside RN, Rebekah, has been notified to update the transition plan to the facility and call report (phone number). Patient to go to room 413 on the [facility].  Nurse report is called to [phone].    Discharge information has been updated on the AVS. And communicated to facility via [employer], or CC link.     Discharge instructions to be fax'd to facility via [] [employer] [x] CCLink    Nursing Please include all hard scripts for controlled substances, med rec and dc summary, and AVS in packet.     Nursing, please discuss the following applicable information with the facility's nursing during report:     Dg with (X) only those applicable:  Medication:  [x]Medications are available at the facility  []IV Antibiotics    []Controlled Substance - hard copies available sent.  []Weekly Labs    Equipment:  []CPAP/BiPAP  []Wound Vacuum  []Dawn or Urinary  Device  []PICC/Central Line  []Nebulizer  []Ventilator    Treatment:  []Isolation (for MRSA, VRE, etc.)  []Surgical Drain Management  []Tracheostomy Care  []Dressing Changes  []Dialysis with transportation  []PEG Care  []Oxygen  []Daily Weights for Heart Failure    Dietary:  []Any diet limitations  []Tube Feedings   []Total Parenteral Management (TPN)    Financial Resources:    []UAI Completed and copy given to facility or patient/family.     []A screening has previously been completed by external provider; therefore, facility must request UAI directly from external provider.    [] Private pay individual who will not become   financially eligible for Medicaid within 6 months from admission to a [facility].     [] Individual refused to have screening conducted, or patient reports intent to return home following rehab.       Advanced Care Plan:  []Surrogate Decision Maker of Care  []POA  []Communicated Code Status and copy sent.    Other:         Debbie Perez RN, [facility]  [facility] Care Management    Available via [employer] ([employer])

## 2025-07-02 ENCOUNTER — OFFICE VISIT (OUTPATIENT)
Age: 80
End: 2025-07-02
Payer: MEDICARE

## 2025-07-02 VITALS
SYSTOLIC BLOOD PRESSURE: 110 MMHG | BODY MASS INDEX: 29.76 KG/M2 | OXYGEN SATURATION: 99 % | HEART RATE: 74 BPM | HEIGHT: 67 IN | DIASTOLIC BLOOD PRESSURE: 68 MMHG

## 2025-07-02 DIAGNOSIS — E87.6 HYPOKALEMIA: Primary | ICD-10-CM

## 2025-07-02 DIAGNOSIS — E87.6 HYPOKALEMIA: ICD-10-CM

## 2025-07-02 DIAGNOSIS — I48.19 PERSISTENT ATRIAL FIBRILLATION (HCC): ICD-10-CM

## 2025-07-02 DIAGNOSIS — I50.20 HFREF (HEART FAILURE WITH REDUCED EJECTION FRACTION) (HCC): ICD-10-CM

## 2025-07-02 DIAGNOSIS — I35.0 AORTIC VALVE STENOSIS, ETIOLOGY OF CARDIAC VALVE DISEASE UNSPECIFIED: ICD-10-CM

## 2025-07-02 PROCEDURE — 1123F ACP DISCUSS/DSCN MKR DOCD: CPT

## 2025-07-02 PROCEDURE — 93000 ELECTROCARDIOGRAM COMPLETE: CPT

## 2025-07-02 PROCEDURE — 99214 OFFICE O/P EST MOD 30 MIN: CPT

## 2025-07-02 PROCEDURE — 3078F DIAST BP <80 MM HG: CPT

## 2025-07-02 PROCEDURE — 1036F TOBACCO NON-USER: CPT

## 2025-07-02 PROCEDURE — G8427 DOCREV CUR MEDS BY ELIG CLIN: HCPCS

## 2025-07-02 PROCEDURE — 3074F SYST BP LT 130 MM HG: CPT

## 2025-07-02 PROCEDURE — G8419 CALC BMI OUT NRM PARAM NOF/U: HCPCS

## 2025-07-02 PROCEDURE — 1159F MED LIST DOCD IN RCRD: CPT

## 2025-07-02 PROCEDURE — 1126F AMNT PAIN NOTED NONE PRSNT: CPT

## 2025-07-02 RX ORDER — ACETAMINOPHEN 500 MG
500 TABLET ORAL EVERY 6 HOURS PRN
COMMUNITY

## 2025-07-02 RX ORDER — MAG HYDROX/ALUMINUM HYD/SIMETH 400-400-40
SUSPENSION, ORAL (FINAL DOSE FORM) ORAL
COMMUNITY

## 2025-07-02 ASSESSMENT — PATIENT HEALTH QUESTIONNAIRE - PHQ9
SUM OF ALL RESPONSES TO PHQ QUESTIONS 1-9: 0
1. LITTLE INTEREST OR PLEASURE IN DOING THINGS: NOT AT ALL
2. FEELING DOWN, DEPRESSED OR HOPELESS: NOT AT ALL
SUM OF ALL RESPONSES TO PHQ QUESTIONS 1-9: 0

## 2025-07-02 NOTE — PROGRESS NOTES
Patient: Orlin San  : 1945    Primary Cardiologist:Dr. DIXON Elmore  EP Cardiologist:NONE   PCP: Dov George MD    Today's Date: 2025      ASSESSMENT AND PLAN:     Assessment and Plan:    A fib  RVR during admission after found down for indeterminate amount of time  TTE 5/3/25 EF 48%  TSH wnl, Na 157, K nml on admission, but subsequently low  -check labs with low potassium on last admission labs  -No bleeding on eliquis and no sense of palpitations   -heart rate well controlled on metoprolol    Hypernatremia/hypokalemia  On admission 25 Na 157  Na 140 on discharge, K+ 3.3, Mg 1.8  will recheck labs today      HFrEF  EF 5/3/25 48%, moderate to severe AS  In the setting of shock, dehydration, electrolyte abnormalities, afib RVR  Continue metoprolol for rate control  Repeat echo    Aortic stenosis  Echo 5/3/25 EF 48% mod to severe AS, mean gradient 25 mmHg  No significant edema, no SOB      He moved to assisted living facility yesterday and deteriming if care will be provided in house or continue with PCP    Follow up with NP after echo      ICD-10-CM    1. Hypokalemia  E87.6 Basic Metabolic Panel     EKG 12 Lead      2. Persistent atrial fibrillation (HCC)  I48.19       3. HFrEF (heart failure with reduced ejection fraction) (HCC)  I50.20       4. Aortic valve stenosis, etiology of cardiac valve disease unspecified  I35.0             HISTORY OF PRESENT ILLNESS:     History of Present Illness:  Orlin San is a 79 y.o. male     He established care during hospitalization 25-25 for shock. He was found down on floor by daughter and last known well time was 11 days prior. He was diagnosed with shock, significant electrolyte abnormalities, AMS, multiple wound infections. Had elevated troponin and started on heparin. Cards consulted for afib RVR TTE 45-50%, plan for rate control, OAC, consider DCC outpatient as appropriate. Troponin felt to be related to other ongoing

## 2025-07-03 LAB
ANION GAP SERPL CALC-SCNC: 7 MMOL/L (ref 2–12)
BUN SERPL-MCNC: 21 MG/DL (ref 6–20)
BUN/CREAT SERPL: 22 (ref 12–20)
CALCIUM SERPL-MCNC: 9 MG/DL (ref 8.5–10.1)
CHLORIDE SERPL-SCNC: 106 MMOL/L (ref 97–108)
CO2 SERPL-SCNC: 27 MMOL/L (ref 21–32)
CREAT SERPL-MCNC: 0.96 MG/DL (ref 0.7–1.3)
GLUCOSE SERPL-MCNC: 111 MG/DL (ref 65–100)
POTASSIUM SERPL-SCNC: 3.8 MMOL/L (ref 3.5–5.1)
SODIUM SERPL-SCNC: 140 MMOL/L (ref 136–145)

## 2025-07-03 NOTE — PROGRESS NOTES
Physician Progress Note      PATIENT:               KIMBERLEE HIGGINBOTHAM  CSN #:                  [id number]  :                       1945  ADMIT DATE:       2025 8:10 PM  DISCH DATE:        2025 5:43 PM  RESPONDING  PROVIDER #:        Magen Stone MD          QUERY TEXT:    Noted documentation of \"multiple wounds\" on H&P and DCS with 5/15 WOCN noting   pressure injury.  Based on your medical judgment, please clarify these   findings and document if any of the following are being evaluated and/or   treated:    The clinical indicators include:  - H&P notes: \"Wound (Multiple wound sites) present.\"  - 5/15 WOCN: POA?Unstageable Pressure Injury R Knee  POA?Unstageable Pressure Injury R Lateral Foot  POA Stage 3 Pressure Injury L Knee  - DCS notes: \"Multiple wounds: on his sacrum, right heel, right elbow, right   lateral foot, right knee, left knee. Continue wound care\"  - WOCN consult, cleansed with Vashe and covered with foam per WOCN  Options provided:  -- Pressure injury of Right knee, Right lateral foot, and Left knee confirmed   present on admission  -- Other - I will add my own diagnosis  -- Disagree - Not applicable / Not valid  -- Disagree - Clinically unable to determine / Unknown  -- Refer to Clinical Documentation Reviewer    PROVIDER RESPONSE TEXT:    The diagnosis of pressure injury of Right knee, Right lateral foot, and Left   knee was confirmed to be present on admission.    Query created by: Fatmata Medrano on 2025 2:12 PM      Electronically signed by:  Magen Stone MD 7/3/2025 11:34 AM

## 2025-07-07 ENCOUNTER — RESULTS FOLLOW-UP (OUTPATIENT)
Age: 80
End: 2025-07-07

## 2025-07-08 ENCOUNTER — TELEPHONE (OUTPATIENT)
Age: 80
End: 2025-07-08

## 2025-07-08 NOTE — TELEPHONE ENCOUNTER
Attempted to reach patient by telephone. A message was left for return call.   Mailed a letter with patient's result.

## 2025-07-08 NOTE — TELEPHONE ENCOUNTER
Telephone call made to patient's daughter per PHI. Two Identifiers used.    Explained to patient's daughter that I was calling to tell the patient about his lab results and per NP Mike that his potassium returned back to normal on his repeat lab. I also explained to the daughter that since I did call three times that I sent a result letter to patient address on file. Patient's daughter verbalized understanding and has no further questions.      Future Appointments   Date Time Provider Department Center   7/15/2025  3:00 PM [facility] SHORT PUMP ECHO 1 [facility] AMB   7/31/2025  3:00 PM Haylie Real ACNP [facility] AMB

## 2025-07-16 ENCOUNTER — TELEPHONE (OUTPATIENT)
Age: 80
End: 2025-07-16

## 2025-07-16 NOTE — TELEPHONE ENCOUNTER
Hi-    Can you let him that his heart pump function has normalized. He can discuss further with Haylie at follow-up.    Mike

## 2025-07-17 NOTE — TELEPHONE ENCOUNTER
Attempted to reach patient's daughter per PHI by telephone. A message was left for return call.

## 2025-07-17 NOTE — TELEPHONE ENCOUNTER
Received call from patient daughter Merlyn per [facility]. Two identifiers used.Patient daughter stated she goes by Merlyn even though it says Dorota. Explained to her that per NP Mike Singletary-     Can you let him that his heart pump function has normalized. He can discuss further with Haylie at follow-up.     Mike      Patient's daughter asked if we could reschedule the follow up to Mondays since she can take him and they were having transportation issues with him living in an assisted living.. I changed the appointment. Patient verbalized understanding and has no further questions.    Future Appointments   Date Time Provider Department Center   8/4/2025 11:20 AM Haylie Real ACNP [facility] AMB

## 2025-08-04 ENCOUNTER — OFFICE VISIT (OUTPATIENT)
Age: 80
End: 2025-08-04
Payer: MEDICARE

## 2025-08-04 VITALS
SYSTOLIC BLOOD PRESSURE: 106 MMHG | HEART RATE: 69 BPM | DIASTOLIC BLOOD PRESSURE: 66 MMHG | OXYGEN SATURATION: 96 % | BODY MASS INDEX: 29.76 KG/M2 | HEIGHT: 67 IN

## 2025-08-04 DIAGNOSIS — E11.69 TYPE 2 DIABETES MELLITUS WITH OTHER SPECIFIED COMPLICATION, UNSPECIFIED WHETHER LONG TERM INSULIN USE (HCC): ICD-10-CM

## 2025-08-04 DIAGNOSIS — I10 HTN (HYPERTENSION), BENIGN: ICD-10-CM

## 2025-08-04 DIAGNOSIS — I48.19 PERSISTENT ATRIAL FIBRILLATION (HCC): Primary | ICD-10-CM

## 2025-08-04 PROCEDURE — 1036F TOBACCO NON-USER: CPT | Performed by: NURSE PRACTITIONER

## 2025-08-04 PROCEDURE — 1159F MED LIST DOCD IN RCRD: CPT | Performed by: NURSE PRACTITIONER

## 2025-08-04 PROCEDURE — G8419 CALC BMI OUT NRM PARAM NOF/U: HCPCS | Performed by: NURSE PRACTITIONER

## 2025-08-04 PROCEDURE — 1126F AMNT PAIN NOTED NONE PRSNT: CPT | Performed by: NURSE PRACTITIONER

## 2025-08-04 PROCEDURE — 3044F HG A1C LEVEL LT 7.0%: CPT | Performed by: NURSE PRACTITIONER

## 2025-08-04 PROCEDURE — G8427 DOCREV CUR MEDS BY ELIG CLIN: HCPCS | Performed by: NURSE PRACTITIONER

## 2025-08-04 PROCEDURE — 1160F RVW MEDS BY RX/DR IN RCRD: CPT | Performed by: NURSE PRACTITIONER

## 2025-08-04 PROCEDURE — 3078F DIAST BP <80 MM HG: CPT | Performed by: NURSE PRACTITIONER

## 2025-08-04 PROCEDURE — 3074F SYST BP LT 130 MM HG: CPT | Performed by: NURSE PRACTITIONER

## 2025-08-04 PROCEDURE — 1123F ACP DISCUSS/DSCN MKR DOCD: CPT | Performed by: NURSE PRACTITIONER

## 2025-08-04 PROCEDURE — 99214 OFFICE O/P EST MOD 30 MIN: CPT | Performed by: NURSE PRACTITIONER

## 2025-08-04 RX ORDER — HYDROXYZINE HYDROCHLORIDE 25 MG/1
25 TABLET, FILM COATED ORAL
COMMUNITY
Start: 2025-07-18

## 2025-08-04 RX ORDER — LIDOCAINE 4 G/G
1 PATCH TOPICAL DAILY
COMMUNITY

## 2025-08-04 ASSESSMENT — PATIENT HEALTH QUESTIONNAIRE - PHQ9
2. FEELING DOWN, DEPRESSED OR HOPELESS: NOT AT ALL
SUM OF ALL RESPONSES TO PHQ QUESTIONS 1-9: 0
SUM OF ALL RESPONSES TO PHQ QUESTIONS 1-9: 0
1. LITTLE INTEREST OR PLEASURE IN DOING THINGS: NOT AT ALL
SUM OF ALL RESPONSES TO PHQ QUESTIONS 1-9: 0
SUM OF ALL RESPONSES TO PHQ QUESTIONS 1-9: 0

## 2025-08-20 ENCOUNTER — APPOINTMENT (OUTPATIENT)
Facility: HOSPITAL | Age: 80
End: 2025-08-20
Payer: MEDICARE

## 2025-08-20 ENCOUNTER — HOSPITAL ENCOUNTER (EMERGENCY)
Facility: HOSPITAL | Age: 80
Discharge: HOME | End: 2025-08-20
Attending: STUDENT IN AN ORGANIZED HEALTH CARE EDUCATION/TRAINING PROGRAM
Payer: MEDICARE

## 2025-08-20 VITALS
OXYGEN SATURATION: 98 % | DIASTOLIC BLOOD PRESSURE: 65 MMHG | SYSTOLIC BLOOD PRESSURE: 145 MMHG | TEMPERATURE: 98 F | RESPIRATION RATE: 16 BRPM | HEART RATE: 80 BPM

## 2025-08-20 DIAGNOSIS — S00.81XA ABRASION OF FOREHEAD, INITIAL ENCOUNTER: ICD-10-CM

## 2025-08-20 DIAGNOSIS — S09.90XA INJURY OF HEAD, INITIAL ENCOUNTER: Primary | ICD-10-CM

## 2025-08-20 PROCEDURE — 6370000000 HC RX 637 (ALT 250 FOR IP): Performed by: STUDENT IN AN ORGANIZED HEALTH CARE EDUCATION/TRAINING PROGRAM

## 2025-08-20 PROCEDURE — 99284 EMERGENCY DEPT VISIT MOD MDM: CPT

## 2025-08-20 PROCEDURE — 90714 TD VACC NO PRESV 7 YRS+ IM: CPT | Performed by: STUDENT IN AN ORGANIZED HEALTH CARE EDUCATION/TRAINING PROGRAM

## 2025-08-20 PROCEDURE — 90471 IMMUNIZATION ADMIN: CPT | Performed by: STUDENT IN AN ORGANIZED HEALTH CARE EDUCATION/TRAINING PROGRAM

## 2025-08-20 PROCEDURE — 72125 CT NECK SPINE W/O DYE: CPT

## 2025-08-20 PROCEDURE — 94761 N-INVAS EAR/PLS OXIMETRY MLT: CPT

## 2025-08-20 PROCEDURE — 6360000002 HC RX W HCPCS: Performed by: STUDENT IN AN ORGANIZED HEALTH CARE EDUCATION/TRAINING PROGRAM

## 2025-08-20 PROCEDURE — 70450 CT HEAD/BRAIN W/O DYE: CPT

## 2025-08-20 RX ORDER — ACETAMINOPHEN 325 MG/1
650 TABLET ORAL
Status: COMPLETED | OUTPATIENT
Start: 2025-08-20 | End: 2025-08-20

## 2025-08-20 RX ADMIN — CLOSTRIDIUM TETANI TOXOID ANTIGEN (FORMALDEHYDE INACTIVATED) AND CORYNEBACTERIUM DIPHTHERIAE TOXOID ANTIGEN (FORMALDEHYDE INACTIVATED) 0.5 ML: 5; 2 INJECTION, SUSPENSION INTRAMUSCULAR at 14:20

## 2025-08-20 RX ADMIN — ACETAMINOPHEN 650 MG: 325 TABLET ORAL at 14:19

## 2025-08-28 ENCOUNTER — HOSPITAL ENCOUNTER (INPATIENT)
Facility: HOSPITAL | Age: 80
LOS: 8 days | Discharge: HOME | End: 2025-09-05
Attending: EMERGENCY MEDICINE | Admitting: HOSPITALIST
Payer: MEDICARE

## 2025-08-28 ENCOUNTER — HOSPITAL ENCOUNTER (INPATIENT)
Facility: HOSPITAL | Age: 80
Discharge: HOME | End: 2025-08-30
Payer: MEDICARE

## 2025-08-28 ENCOUNTER — APPOINTMENT (OUTPATIENT)
Facility: HOSPITAL | Age: 80
End: 2025-08-28
Payer: MEDICARE

## 2025-08-28 VITALS
SYSTOLIC BLOOD PRESSURE: 145 MMHG | DIASTOLIC BLOOD PRESSURE: 64 MMHG | HEART RATE: 70 BPM | HEIGHT: 67 IN | WEIGHT: 216 LBS | RESPIRATION RATE: 25 BRPM | BODY MASS INDEX: 33.9 KG/M2

## 2025-08-28 DIAGNOSIS — J18.9 COMMUNITY ACQUIRED PNEUMONIA, UNSPECIFIED LATERALITY: ICD-10-CM

## 2025-08-28 DIAGNOSIS — J96.01 ACUTE RESPIRATORY FAILURE WITH HYPOXIA (HCC): ICD-10-CM

## 2025-08-28 DIAGNOSIS — I50.9 ACUTE CONGESTIVE HEART FAILURE, UNSPECIFIED HEART FAILURE TYPE (HCC): Primary | ICD-10-CM

## 2025-08-28 DIAGNOSIS — G89.4 CHRONIC PAIN SYNDROME: ICD-10-CM

## 2025-08-28 DIAGNOSIS — R06.02 SHORTNESS OF BREATH: ICD-10-CM

## 2025-08-28 LAB
ALBUMIN SERPL-MCNC: 3.7 G/DL (ref 3.5–5.2)
ALBUMIN/GLOB SERPL: 0.9 (ref 1.1–2.2)
ALP SERPL-CCNC: 58 U/L (ref 40–129)
ALT SERPL-CCNC: 15 U/L (ref 10–50)
ANION GAP SERPL CALC-SCNC: 15 MMOL/L (ref 2–14)
ARTERIAL PATENCY WRIST A: POSITIVE
AST SERPL-CCNC: 25 U/L (ref 10–50)
B PERT DNA SPEC QL NAA+PROBE: NOT DETECTED
BASE EXCESS BLD CALC-SCNC: 0.6 MMOL/L
BASOPHILS # BLD: 0.04 K/UL (ref 0–0.1)
BASOPHILS NFR BLD: 0.5 % (ref 0–1)
BDY SITE: ABNORMAL
BILIRUB SERPL-MCNC: 0.7 MG/DL (ref 0–1.2)
BORDETELLA PARAPERTUSSIS BY PCR: NOT DETECTED
BUN SERPL-MCNC: 21 MG/DL (ref 8–23)
BUN/CREAT SERPL: 19 (ref 12–20)
C PNEUM DNA SPEC QL NAA+PROBE: NOT DETECTED
CALCIUM SERPL-MCNC: 9.6 MG/DL (ref 8.8–10.2)
CHLORIDE SERPL-SCNC: 102 MMOL/L (ref 98–107)
CO2 SERPL-SCNC: 24 MMOL/L (ref 20–29)
COMMENT:: NORMAL
CREAT SERPL-MCNC: 1.15 MG/DL (ref 0.7–1.2)
DIFFERENTIAL METHOD BLD: ABNORMAL
ECHO AO ASC DIAM: 3.6 CM
ECHO AO ASCENDING AORTA INDEX: 1.72 CM/M2
ECHO AO ROOT DIAM: 3.5 CM
ECHO AO ROOT INDEX: 1.67 CM/M2
ECHO AV AREA PEAK VELOCITY: 1.1 CM2
ECHO AV AREA VTI: 1 CM2
ECHO AV AREA/BSA PEAK VELOCITY: 0.5 CM2/M2
ECHO AV AREA/BSA VTI: 0.5 CM2/M2
ECHO AV MEAN GRADIENT: 17 MMHG
ECHO AV MEAN VELOCITY: 2 M/S
ECHO AV PEAK GRADIENT: 31 MMHG
ECHO AV PEAK VELOCITY: 2.8 M/S
ECHO AV VELOCITY RATIO: 0.29
ECHO AV VTI: 61.6 CM
ECHO BSA: 2.15 M2
ECHO EST RA PRESSURE: 15 MMHG
ECHO LA DIAMETER INDEX: 2.06 CM/M2
ECHO LA DIAMETER: 4.3 CM
ECHO LA TO AORTIC ROOT RATIO: 1.23
ECHO LA VOL A-L A2C: 93 ML (ref 18–58)
ECHO LA VOL A-L A4C: 133 ML (ref 18–58)
ECHO LA VOL BP: 107 ML (ref 18–58)
ECHO LA VOL MOD A2C: 90 ML (ref 18–58)
ECHO LA VOL MOD A4C: 119 ML (ref 18–58)
ECHO LA VOL/BSA BIPLANE: 51 ML/M2 (ref 16–34)
ECHO LA VOLUME AREA LENGTH: 116 ML
ECHO LA VOLUME INDEX A-L A2C: 44 ML/M2 (ref 16–34)
ECHO LA VOLUME INDEX A-L A4C: 64 ML/M2 (ref 16–34)
ECHO LA VOLUME INDEX AREA LENGTH: 56 ML/M2 (ref 16–34)
ECHO LA VOLUME INDEX MOD A2C: 43 ML/M2 (ref 16–34)
ECHO LA VOLUME INDEX MOD A4C: 57 ML/M2 (ref 16–34)
ECHO LV EDV A2C: 68 ML
ECHO LV EDV A4C: 102 ML
ECHO LV EDV BP: 87 ML (ref 67–155)
ECHO LV EDV INDEX A4C: 49 ML/M2
ECHO LV EDV INDEX BP: 42 ML/M2
ECHO LV EDV NDEX A2C: 33 ML/M2
ECHO LV EF PHYSICIAN: 50 %
ECHO LV EJECTION FRACTION A2C: 51 %
ECHO LV EJECTION FRACTION A4C: 53 %
ECHO LV ESV A2C: 34 ML
ECHO LV ESV A4C: 48 ML
ECHO LV ESV BP: 41 ML (ref 22–58)
ECHO LV ESV INDEX A2C: 16 ML/M2
ECHO LV ESV INDEX A4C: 23 ML/M2
ECHO LV ESV INDEX BP: 20 ML/M2
ECHO LV FRACTIONAL SHORTENING: 29 % (ref 28–44)
ECHO LV INTERNAL DIMENSION DIASTOLE INDEX: 2.01 CM/M2
ECHO LV INTERNAL DIMENSION DIASTOLIC: 4.2 CM (ref 4.2–5.9)
ECHO LV INTERNAL DIMENSION SYSTOLIC INDEX: 1.44 CM/M2
ECHO LV INTERNAL DIMENSION SYSTOLIC: 3 CM
ECHO LV IVSD: 1 CM (ref 0.6–1)
ECHO LV MASS 2D: 137.2 G (ref 88–224)
ECHO LV MASS INDEX 2D: 65.7 G/M2 (ref 49–115)
ECHO LV POSTERIOR WALL DIASTOLIC: 1 CM (ref 0.6–1)
ECHO LV RELATIVE WALL THICKNESS RATIO: 0.48
ECHO LVOT AREA: 3.8 CM2
ECHO LVOT AV VTI INDEX: 0.25
ECHO LVOT DIAM: 2.2 CM
ECHO LVOT MEAN GRADIENT: 1 MMHG
ECHO LVOT PEAK GRADIENT: 3 MMHG
ECHO LVOT PEAK VELOCITY: 0.8 M/S
ECHO LVOT STROKE VOLUME INDEX: 28.2 ML/M2
ECHO LVOT SV: 58.9 ML
ECHO LVOT VTI: 15.5 CM
ECHO MV A VELOCITY: 0.41 M/S
ECHO MV E DECELERATION TIME (DT): 107.2 MS
ECHO MV E VELOCITY: 1.13 M/S
ECHO MV E/A RATIO: 2.76
ECHO PV MAX VELOCITY: 1 M/S
ECHO PV MEAN GRADIENT: 2 MMHG
ECHO PV MEAN VELOCITY: 0.7 M/S
ECHO PV PEAK GRADIENT: 4 MMHG
ECHO RIGHT VENTRICULAR SYSTOLIC PRESSURE (RVSP): 53 MMHG
ECHO RV FREE WALL PEAK S': 8.2 CM/S
ECHO RV INTERNAL DIMENSION: 3.4 CM
ECHO RV TAPSE: 1.7 CM (ref 1.7–?)
ECHO RVOT MEAN GRADIENT: 1 MMHG
ECHO RVOT PEAK GRADIENT: 2 MMHG
ECHO RVOT PEAK VELOCITY: 0.7 M/S
ECHO RVOT VTI: 15.6 CM
ECHO TV REGURGITANT MAX VELOCITY: 3.1 M/S
ECHO TV REGURGITANT PEAK GRADIENT: 38 MMHG
EOSINOPHIL # BLD: 0.06 K/UL (ref 0–0.4)
EOSINOPHIL NFR BLD: 0.7 % (ref 0–7)
ERYTHROCYTE [DISTWIDTH] IN BLOOD BY AUTOMATED COUNT: 14.9 % (ref 11.5–14.5)
FLUAV RNA SPEC QL NAA+PROBE: NOT DETECTED
FLUAV SUBTYP SPEC NAA+PROBE: NOT DETECTED
FLUBV RNA SPEC QL NAA+PROBE: NOT DETECTED
FLUBV RNA SPEC QL NAA+PROBE: NOT DETECTED
GAS FLOW.O2 O2 DELIVERY SYS: ABNORMAL
GLOBULIN SER CALC-MCNC: 4.2 G/DL (ref 2–4)
GLUCOSE BLD STRIP.AUTO-MCNC: 116 MG/DL (ref 65–117)
GLUCOSE BLD STRIP.AUTO-MCNC: 141 MG/DL (ref 65–117)
GLUCOSE BLD STRIP.AUTO-MCNC: 147 MG/DL (ref 65–117)
GLUCOSE BLD STRIP.AUTO-MCNC: 147 MG/DL (ref 65–117)
GLUCOSE SERPL-MCNC: 113 MG/DL (ref 65–100)
HADV DNA SPEC QL NAA+PROBE: NOT DETECTED
HCO3 BLD-SCNC: 23.3 MMOL/L (ref 21–28)
HCOV 229E RNA SPEC QL NAA+PROBE: NOT DETECTED
HCOV HKU1 RNA SPEC QL NAA+PROBE: NOT DETECTED
HCOV NL63 RNA SPEC QL NAA+PROBE: NOT DETECTED
HCOV OC43 RNA SPEC QL NAA+PROBE: NOT DETECTED
HCT VFR BLD AUTO: 31 % (ref 36.6–50.3)
HGB BLD-MCNC: 9.8 G/DL (ref 12.1–17)
HMPV RNA SPEC QL NAA+PROBE: NOT DETECTED
HPIV1 RNA SPEC QL NAA+PROBE: NOT DETECTED
HPIV2 RNA SPEC QL NAA+PROBE: NOT DETECTED
HPIV3 RNA SPEC QL NAA+PROBE: NOT DETECTED
HPIV4 RNA SPEC QL NAA+PROBE: NOT DETECTED
IMM GRANULOCYTES # BLD AUTO: 0.09 K/UL (ref 0–0.04)
IMM GRANULOCYTES NFR BLD AUTO: 1 % (ref 0–0.5)
LACTATE BLD-SCNC: 1.21 MMOL/L (ref 0.4–2)
LYMPHOCYTES # BLD: 0.28 K/UL (ref 0.8–3.5)
LYMPHOCYTES NFR BLD: 3.2 % (ref 12–49)
M PNEUMO DNA SPEC QL NAA+PROBE: NOT DETECTED
MAGNESIUM SERPL-MCNC: 1.8 MG/DL (ref 1.6–2.4)
MCH RBC QN AUTO: 28.4 PG (ref 26–34)
MCHC RBC AUTO-ENTMCNC: 31.6 G/DL (ref 30–36.5)
MCV RBC AUTO: 89.9 FL (ref 80–99)
MONOCYTES # BLD: 0.5 K/UL (ref 0–1)
MONOCYTES NFR BLD: 5.7 % (ref 5–13)
NEUTS SEG # BLD: 7.82 K/UL (ref 1.8–8)
NEUTS SEG NFR BLD: 88.9 % (ref 32–75)
NRBC # BLD: 0 K/UL (ref 0–0.01)
NRBC BLD-RTO: 0 PER 100 WBC
NT PRO BNP: 5284 PG/ML (ref 0–450)
O2/TOTAL GAS SETTING VFR VENT: 35 %
PCO2 BLD: 30.1 MMHG (ref 35–48)
PH BLD: 7.5 (ref 7.35–7.45)
PLATELET # BLD AUTO: 361 K/UL (ref 150–400)
PMV BLD AUTO: 9.9 FL (ref 8.9–12.9)
PO2 BLD: 77 MMHG (ref 83–108)
POTASSIUM SERPL-SCNC: 3.6 MMOL/L (ref 3.5–5.1)
PROCALCITONIN SERPL-MCNC: 0.08 NG/ML
PROT SERPL-MCNC: 7.9 G/DL (ref 6.4–8.3)
RBC # BLD AUTO: 3.45 M/UL (ref 4.1–5.7)
RBC MORPH BLD: ABNORMAL
RSV RNA SPEC QL NAA+PROBE: NOT DETECTED
RV+EV RNA SPEC QL NAA+PROBE: DETECTED
SAO2 % BLD: 96.5 % (ref 92–97)
SARS-COV-2 RNA RESP QL NAA+PROBE: NOT DETECTED
SARS-COV-2 RNA RESP QL NAA+PROBE: NOT DETECTED
SERVICE CMNT-IMP: ABNORMAL
SERVICE CMNT-IMP: NORMAL
SODIUM SERPL-SCNC: 141 MMOL/L (ref 136–145)
SOURCE: NORMAL
SPECIMEN HOLD: NORMAL
SPECIMEN TYPE: ABNORMAL
TROPONIN T SERPL HS-MCNC: 41 NG/L (ref 0–22)
WBC # BLD AUTO: 8.8 K/UL (ref 4.1–11.1)

## 2025-08-28 PROCEDURE — 82803 BLOOD GASES ANY COMBINATION: CPT

## 2025-08-28 PROCEDURE — 84145 PROCALCITONIN (PCT): CPT

## 2025-08-28 PROCEDURE — 83880 ASSAY OF NATRIURETIC PEPTIDE: CPT

## 2025-08-28 PROCEDURE — 36415 COLL VENOUS BLD VENIPUNCTURE: CPT

## 2025-08-28 PROCEDURE — 6360000002 HC RX W HCPCS: Performed by: EMERGENCY MEDICINE

## 2025-08-28 PROCEDURE — 2000000000 HC ICU R&B

## 2025-08-28 PROCEDURE — 2700000000 HC OXYGEN THERAPY PER DAY

## 2025-08-28 PROCEDURE — 83605 ASSAY OF LACTIC ACID: CPT

## 2025-08-28 PROCEDURE — 36600 WITHDRAWAL OF ARTERIAL BLOOD: CPT

## 2025-08-28 PROCEDURE — 82962 GLUCOSE BLOOD TEST: CPT

## 2025-08-28 PROCEDURE — 71045 X-RAY EXAM CHEST 1 VIEW: CPT

## 2025-08-28 PROCEDURE — 94660 CPAP INITIATION&MGMT: CPT

## 2025-08-28 PROCEDURE — 71250 CT THORAX DX C-: CPT

## 2025-08-28 PROCEDURE — 85025 COMPLETE CBC W/AUTO DIFF WBC: CPT

## 2025-08-28 PROCEDURE — 96374 THER/PROPH/DIAG INJ IV PUSH: CPT

## 2025-08-28 PROCEDURE — 99285 EMERGENCY DEPT VISIT HI MDM: CPT

## 2025-08-28 PROCEDURE — 80053 COMPREHEN METABOLIC PANEL: CPT

## 2025-08-28 PROCEDURE — 93306 TTE W/DOPPLER COMPLETE: CPT

## 2025-08-28 PROCEDURE — 84484 ASSAY OF TROPONIN QUANT: CPT

## 2025-08-28 PROCEDURE — 87040 BLOOD CULTURE FOR BACTERIA: CPT

## 2025-08-28 PROCEDURE — 6370000000 HC RX 637 (ALT 250 FOR IP): Performed by: EMERGENCY MEDICINE

## 2025-08-28 PROCEDURE — 83735 ASSAY OF MAGNESIUM: CPT

## 2025-08-28 PROCEDURE — 87636 SARSCOV2 & INF A&B AMP PRB: CPT

## 2025-08-28 PROCEDURE — 2060000000 HC ICU INTERMEDIATE R&B

## 2025-08-28 PROCEDURE — 6360000002 HC RX W HCPCS: Performed by: NURSE PRACTITIONER

## 2025-08-28 PROCEDURE — 94761 N-INVAS EAR/PLS OXIMETRY MLT: CPT

## 2025-08-28 PROCEDURE — 6370000000 HC RX 637 (ALT 250 FOR IP): Performed by: HOSPITALIST

## 2025-08-28 PROCEDURE — 2500000003 HC RX 250 WO HCPCS: Performed by: HOSPITALIST

## 2025-08-28 PROCEDURE — 93306 TTE W/DOPPLER COMPLETE: CPT | Performed by: SPECIALIST

## 2025-08-28 PROCEDURE — 0202U NFCT DS 22 TRGT SARS-COV-2: CPT

## 2025-08-28 RX ORDER — ONDANSETRON 2 MG/ML
4 INJECTION INTRAMUSCULAR; INTRAVENOUS EVERY 6 HOURS PRN
Status: DISCONTINUED | OUTPATIENT
Start: 2025-08-28 | End: 2025-09-05 | Stop reason: HOSPADM

## 2025-08-28 RX ORDER — METOPROLOL TARTRATE 50 MG
50 TABLET ORAL 2 TIMES DAILY
Status: DISCONTINUED | OUTPATIENT
Start: 2025-08-28 | End: 2025-08-29

## 2025-08-28 RX ORDER — OLANZAPINE 5 MG/1
5 TABLET, FILM COATED ORAL 2 TIMES DAILY
Status: DISCONTINUED | OUTPATIENT
Start: 2025-08-28 | End: 2025-08-29

## 2025-08-28 RX ORDER — INSULIN LISPRO 100 [IU]/ML
0-4 INJECTION, SOLUTION INTRAVENOUS; SUBCUTANEOUS
Status: DISCONTINUED | OUTPATIENT
Start: 2025-08-28 | End: 2025-09-05 | Stop reason: HOSPADM

## 2025-08-28 RX ORDER — NIFEDIPINE 30 MG/1
60 TABLET, EXTENDED RELEASE ORAL DAILY
Status: DISCONTINUED | OUTPATIENT
Start: 2025-08-29 | End: 2025-09-05 | Stop reason: HOSPADM

## 2025-08-28 RX ORDER — SODIUM CHLORIDE 9 MG/ML
INJECTION, SOLUTION INTRAVENOUS PRN
Status: DISCONTINUED | OUTPATIENT
Start: 2025-08-28 | End: 2025-09-05 | Stop reason: HOSPADM

## 2025-08-28 RX ORDER — ACETAMINOPHEN 325 MG/1
650 TABLET ORAL EVERY 6 HOURS PRN
Status: DISCONTINUED | OUTPATIENT
Start: 2025-08-28 | End: 2025-09-05 | Stop reason: HOSPADM

## 2025-08-28 RX ORDER — EZETIMIBE 10 MG/1
10 TABLET ORAL DAILY
Status: DISCONTINUED | OUTPATIENT
Start: 2025-08-29 | End: 2025-09-05 | Stop reason: HOSPADM

## 2025-08-28 RX ORDER — POTASSIUM CHLORIDE 750 MG/1
40 TABLET, EXTENDED RELEASE ORAL PRN
Status: DISCONTINUED | OUTPATIENT
Start: 2025-08-28 | End: 2025-08-29

## 2025-08-28 RX ORDER — ACETAMINOPHEN 650 MG/1
650 SUPPOSITORY RECTAL EVERY 6 HOURS PRN
Status: DISCONTINUED | OUTPATIENT
Start: 2025-08-28 | End: 2025-09-05 | Stop reason: HOSPADM

## 2025-08-28 RX ORDER — GABAPENTIN 100 MG/1
100 CAPSULE ORAL 2 TIMES DAILY
Status: ON HOLD | COMMUNITY
End: 2025-09-05

## 2025-08-28 RX ORDER — POTASSIUM CHLORIDE 7.45 MG/ML
10 INJECTION INTRAVENOUS PRN
Status: DISCONTINUED | OUTPATIENT
Start: 2025-08-28 | End: 2025-08-29

## 2025-08-28 RX ORDER — FENOFIBRATE 160 MG/1
160 TABLET ORAL DAILY
Status: DISCONTINUED | OUTPATIENT
Start: 2025-08-29 | End: 2025-08-28

## 2025-08-28 RX ORDER — FENOFIBRATE 54 MG/1
54 TABLET ORAL DAILY
Status: DISCONTINUED | OUTPATIENT
Start: 2025-08-29 | End: 2025-09-05 | Stop reason: HOSPADM

## 2025-08-28 RX ORDER — SODIUM CHLORIDE 0.9 % (FLUSH) 0.9 %
5-40 SYRINGE (ML) INJECTION EVERY 12 HOURS SCHEDULED
Status: DISCONTINUED | OUTPATIENT
Start: 2025-08-28 | End: 2025-09-05 | Stop reason: HOSPADM

## 2025-08-28 RX ORDER — LEVOFLOXACIN 5 MG/ML
750 INJECTION, SOLUTION INTRAVENOUS
Status: COMPLETED | OUTPATIENT
Start: 2025-08-28 | End: 2025-08-28

## 2025-08-28 RX ORDER — ONDANSETRON 4 MG/1
4 TABLET, ORALLY DISINTEGRATING ORAL EVERY 8 HOURS PRN
Status: DISCONTINUED | OUTPATIENT
Start: 2025-08-28 | End: 2025-09-05 | Stop reason: HOSPADM

## 2025-08-28 RX ORDER — MAGNESIUM SULFATE IN WATER 40 MG/ML
2000 INJECTION, SOLUTION INTRAVENOUS PRN
Status: DISCONTINUED | OUTPATIENT
Start: 2025-08-28 | End: 2025-08-29

## 2025-08-28 RX ORDER — NAPROXEN SODIUM 220 MG/1
220 TABLET, FILM COATED ORAL 2 TIMES DAILY PRN
Status: ON HOLD | COMMUNITY
End: 2025-09-05 | Stop reason: HOSPADM

## 2025-08-28 RX ORDER — ACETAMINOPHEN 650 MG/1
650 SUPPOSITORY RECTAL
Status: COMPLETED | OUTPATIENT
Start: 2025-08-28 | End: 2025-08-28

## 2025-08-28 RX ORDER — FUROSEMIDE 10 MG/ML
40 INJECTION INTRAMUSCULAR; INTRAVENOUS ONCE
Status: COMPLETED | OUTPATIENT
Start: 2025-08-28 | End: 2025-08-28

## 2025-08-28 RX ORDER — POLYETHYLENE GLYCOL 3350 17 G/17G
17 POWDER, FOR SOLUTION ORAL DAILY PRN
Status: DISCONTINUED | OUTPATIENT
Start: 2025-08-28 | End: 2025-09-05 | Stop reason: HOSPADM

## 2025-08-28 RX ORDER — SODIUM CHLORIDE 0.9 % (FLUSH) 0.9 %
5-40 SYRINGE (ML) INJECTION PRN
Status: DISCONTINUED | OUTPATIENT
Start: 2025-08-28 | End: 2025-09-05 | Stop reason: HOSPADM

## 2025-08-28 RX ORDER — DEXTROSE MONOHYDRATE 100 MG/ML
INJECTION, SOLUTION INTRAVENOUS CONTINUOUS PRN
Status: DISCONTINUED | OUTPATIENT
Start: 2025-08-28 | End: 2025-09-05 | Stop reason: HOSPADM

## 2025-08-28 RX ORDER — LEVOFLOXACIN 5 MG/ML
750 INJECTION, SOLUTION INTRAVENOUS EVERY 24 HOURS
Status: DISPENSED | OUTPATIENT
Start: 2025-08-29 | End: 2025-09-02

## 2025-08-28 RX ADMIN — SODIUM CHLORIDE, PRESERVATIVE FREE 10 ML: 5 INJECTION INTRAVENOUS at 21:31

## 2025-08-28 RX ADMIN — METOPROLOL TARTRATE 50 MG: 50 TABLET, FILM COATED ORAL at 21:30

## 2025-08-28 RX ADMIN — OLANZAPINE 5 MG: 5 TABLET, FILM COATED ORAL at 16:44

## 2025-08-28 RX ADMIN — LEVOFLOXACIN 750 MG: 5 INJECTION, SOLUTION INTRAVENOUS at 09:54

## 2025-08-28 RX ADMIN — ACETAMINOPHEN 650 MG: 650 SUPPOSITORY RECTAL at 09:47

## 2025-08-28 RX ADMIN — APIXABAN 5 MG: 5 TABLET, FILM COATED ORAL at 21:30

## 2025-08-28 RX ADMIN — FUROSEMIDE 40 MG: 10 INJECTION, SOLUTION INTRAMUSCULAR; INTRAVENOUS at 14:43

## 2025-08-28 ASSESSMENT — PAIN SCALES - GENERAL
PAINLEVEL_OUTOF10: 0
PAINLEVEL_OUTOF10: 0

## 2025-08-29 PROBLEM — I50.9 ACUTE CONGESTIVE HEART FAILURE (HCC): Status: ACTIVE | Noted: 2025-08-29

## 2025-08-29 LAB
ANION GAP SERPL CALC-SCNC: 12 MMOL/L (ref 2–14)
APPEARANCE UR: CLEAR
BACTERIA URNS QL MICRO: NEGATIVE /HPF
BASOPHILS # BLD: 0.03 K/UL (ref 0–0.1)
BASOPHILS NFR BLD: 0.7 % (ref 0–1)
BILIRUB UR QL: NEGATIVE
BUN SERPL-MCNC: 20 MG/DL (ref 8–23)
BUN/CREAT SERPL: 20 (ref 12–20)
CALCIUM SERPL-MCNC: 8.7 MG/DL (ref 8.8–10.2)
CHLORIDE SERPL-SCNC: 103 MMOL/L (ref 98–107)
CO2 SERPL-SCNC: 27 MMOL/L (ref 20–29)
COLOR UR: ABNORMAL
CREAT SERPL-MCNC: 1.01 MG/DL (ref 0.7–1.2)
DIFFERENTIAL METHOD BLD: ABNORMAL
EOSINOPHIL # BLD: 0.04 K/UL (ref 0–0.4)
EOSINOPHIL NFR BLD: 0.9 % (ref 0–7)
EPITH CASTS URNS QL MICRO: ABNORMAL /LPF
ERYTHROCYTE [DISTWIDTH] IN BLOOD BY AUTOMATED COUNT: 14.9 % (ref 11.5–14.5)
EST. AVERAGE GLUCOSE BLD GHB EST-MCNC: 129 MG/DL
GLUCOSE BLD STRIP.AUTO-MCNC: 118 MG/DL (ref 65–117)
GLUCOSE BLD STRIP.AUTO-MCNC: 136 MG/DL (ref 65–117)
GLUCOSE SERPL-MCNC: 142 MG/DL (ref 65–100)
GLUCOSE UR STRIP.AUTO-MCNC: NEGATIVE MG/DL
HBA1C MFR BLD: 6.1 % (ref 4–5.6)
HCT VFR BLD AUTO: 26.8 % (ref 36.6–50.3)
HGB BLD-MCNC: 8.5 G/DL (ref 12.1–17)
HGB UR QL STRIP: NEGATIVE
HYALINE CASTS URNS QL MICRO: ABNORMAL /LPF (ref 0–2)
IMM GRANULOCYTES # BLD AUTO: 0.03 K/UL (ref 0–0.04)
IMM GRANULOCYTES NFR BLD AUTO: 0.7 % (ref 0–0.5)
KETONES UR QL STRIP.AUTO: NEGATIVE MG/DL
LEUKOCYTE ESTERASE UR QL STRIP.AUTO: ABNORMAL
LYMPHOCYTES # BLD: 0.38 K/UL (ref 0.8–3.5)
LYMPHOCYTES NFR BLD: 8.4 % (ref 12–49)
MAGNESIUM SERPL-MCNC: 1.7 MG/DL (ref 1.6–2.4)
MCH RBC QN AUTO: 27.9 PG (ref 26–34)
MCHC RBC AUTO-ENTMCNC: 31.7 G/DL (ref 30–36.5)
MCV RBC AUTO: 87.9 FL (ref 80–99)
MONOCYTES # BLD: 0.58 K/UL (ref 0–1)
MONOCYTES NFR BLD: 12.9 % (ref 5–13)
NEUTS SEG # BLD: 3.45 K/UL (ref 1.8–8)
NEUTS SEG NFR BLD: 76.4 % (ref 32–75)
NITRITE UR QL STRIP.AUTO: NEGATIVE
NRBC # BLD: 0 K/UL (ref 0–0.01)
NRBC BLD-RTO: 0 PER 100 WBC
PH UR STRIP: 6 (ref 5–8)
PLATELET # BLD AUTO: 260 K/UL (ref 150–400)
PMV BLD AUTO: 9.2 FL (ref 8.9–12.9)
POTASSIUM SERPL-SCNC: 3.1 MMOL/L (ref 3.5–5.1)
PROT UR STRIP-MCNC: ABNORMAL MG/DL
RBC # BLD AUTO: 3.05 M/UL (ref 4.1–5.7)
RBC #/AREA URNS HPF: ABNORMAL /HPF (ref 0–5)
SERVICE CMNT-IMP: ABNORMAL
SERVICE CMNT-IMP: ABNORMAL
SODIUM SERPL-SCNC: 142 MMOL/L (ref 136–145)
SP GR UR REFRACTOMETRY: 1.01 (ref 1–1.03)
TROPONIN T SERPL HS-MCNC: 40.5 NG/L (ref 0–22)
URINE CULTURE IF INDICATED: ABNORMAL
UROBILINOGEN UR QL STRIP.AUTO: 1 EU/DL (ref 0.2–1)
WBC # BLD AUTO: 4.5 K/UL (ref 4.1–11.1)
WBC URNS QL MICRO: ABNORMAL /HPF (ref 0–4)

## 2025-08-29 PROCEDURE — 6370000000 HC RX 637 (ALT 250 FOR IP): Performed by: NURSE PRACTITIONER

## 2025-08-29 PROCEDURE — 6360000002 HC RX W HCPCS: Performed by: HOSPITALIST

## 2025-08-29 PROCEDURE — 1100000000 HC RM PRIVATE

## 2025-08-29 PROCEDURE — 6370000000 HC RX 637 (ALT 250 FOR IP): Performed by: INTERNAL MEDICINE

## 2025-08-29 PROCEDURE — 6370000000 HC RX 637 (ALT 250 FOR IP): Performed by: HOSPITALIST

## 2025-08-29 PROCEDURE — APPSS30 APP SPLIT SHARED TIME 16-30 MINUTES: Performed by: NURSE PRACTITIONER

## 2025-08-29 PROCEDURE — 83735 ASSAY OF MAGNESIUM: CPT

## 2025-08-29 PROCEDURE — 2500000003 HC RX 250 WO HCPCS: Performed by: HOSPITALIST

## 2025-08-29 PROCEDURE — 80048 BASIC METABOLIC PNL TOTAL CA: CPT

## 2025-08-29 PROCEDURE — 99222 1ST HOSP IP/OBS MODERATE 55: CPT | Performed by: SPECIALIST

## 2025-08-29 PROCEDURE — 6360000002 HC RX W HCPCS: Performed by: INTERNAL MEDICINE

## 2025-08-29 PROCEDURE — 2580000003 HC RX 258: Performed by: HOSPITALIST

## 2025-08-29 PROCEDURE — 85025 COMPLETE CBC W/AUTO DIFF WBC: CPT

## 2025-08-29 PROCEDURE — 94761 N-INVAS EAR/PLS OXIMETRY MLT: CPT

## 2025-08-29 PROCEDURE — 82962 GLUCOSE BLOOD TEST: CPT

## 2025-08-29 PROCEDURE — 36415 COLL VENOUS BLD VENIPUNCTURE: CPT

## 2025-08-29 PROCEDURE — 2700000000 HC OXYGEN THERAPY PER DAY

## 2025-08-29 PROCEDURE — 84484 ASSAY OF TROPONIN QUANT: CPT

## 2025-08-29 PROCEDURE — 83036 HEMOGLOBIN GLYCOSYLATED A1C: CPT

## 2025-08-29 PROCEDURE — 81001 URINALYSIS AUTO W/SCOPE: CPT

## 2025-08-29 RX ORDER — OLANZAPINE 5 MG/1
5 TABLET, FILM COATED ORAL 2 TIMES DAILY
Status: DISCONTINUED | OUTPATIENT
Start: 2025-08-29 | End: 2025-09-05 | Stop reason: HOSPADM

## 2025-08-29 RX ORDER — FUROSEMIDE 10 MG/ML
40 INJECTION INTRAMUSCULAR; INTRAVENOUS 2 TIMES DAILY
Status: DISCONTINUED | OUTPATIENT
Start: 2025-08-29 | End: 2025-08-31

## 2025-08-29 RX ORDER — BENZONATATE 100 MG/1
100 CAPSULE ORAL 3 TIMES DAILY PRN
Status: DISCONTINUED | OUTPATIENT
Start: 2025-08-29 | End: 2025-09-05 | Stop reason: HOSPADM

## 2025-08-29 RX ORDER — HALOPERIDOL 5 MG/ML
2 INJECTION INTRAMUSCULAR EVERY 6 HOURS PRN
Status: DISCONTINUED | OUTPATIENT
Start: 2025-08-29 | End: 2025-09-05 | Stop reason: HOSPADM

## 2025-08-29 RX ORDER — GABAPENTIN 100 MG/1
100 CAPSULE ORAL 2 TIMES DAILY
Status: DISCONTINUED | OUTPATIENT
Start: 2025-08-29 | End: 2025-09-05 | Stop reason: HOSPADM

## 2025-08-29 RX ORDER — DIAZEPAM 10 MG/2ML
2.5 INJECTION, SOLUTION INTRAMUSCULAR; INTRAVENOUS EVERY 4 HOURS PRN
Status: DISCONTINUED | OUTPATIENT
Start: 2025-08-29 | End: 2025-08-29

## 2025-08-29 RX ORDER — MAGNESIUM SULFATE IN WATER 40 MG/ML
2000 INJECTION, SOLUTION INTRAVENOUS ONCE
Status: COMPLETED | OUTPATIENT
Start: 2025-08-29 | End: 2025-08-29

## 2025-08-29 RX ORDER — POTASSIUM CHLORIDE 750 MG/1
40 TABLET, EXTENDED RELEASE ORAL ONCE
Status: COMPLETED | OUTPATIENT
Start: 2025-08-29 | End: 2025-08-29

## 2025-08-29 RX ORDER — METOPROLOL TARTRATE 25 MG/1
25 TABLET, FILM COATED ORAL 2 TIMES DAILY
Status: DISCONTINUED | OUTPATIENT
Start: 2025-08-29 | End: 2025-09-05 | Stop reason: HOSPADM

## 2025-08-29 RX ORDER — OLANZAPINE 5 MG/1
5 TABLET, FILM COATED ORAL ONCE
Status: COMPLETED | OUTPATIENT
Start: 2025-08-29 | End: 2025-08-29

## 2025-08-29 RX ADMIN — FENOFIBRATE 54 MG: 54 TABLET ORAL at 08:54

## 2025-08-29 RX ADMIN — SODIUM CHLORIDE, PRESERVATIVE FREE 10 ML: 5 INJECTION INTRAVENOUS at 20:16

## 2025-08-29 RX ADMIN — MAGNESIUM SULFATE HEPTAHYDRATE 2000 MG: 40 INJECTION, SOLUTION INTRAVENOUS at 14:37

## 2025-08-29 RX ADMIN — GABAPENTIN 100 MG: 100 CAPSULE ORAL at 20:15

## 2025-08-29 RX ADMIN — METOPROLOL TARTRATE 25 MG: 25 TABLET, FILM COATED ORAL at 20:16

## 2025-08-29 RX ADMIN — SODIUM CHLORIDE: 0.9 INJECTION, SOLUTION INTRAVENOUS at 10:54

## 2025-08-29 RX ADMIN — APIXABAN 5 MG: 5 TABLET, FILM COATED ORAL at 08:54

## 2025-08-29 RX ADMIN — POTASSIUM CHLORIDE 40 MEQ: 750 TABLET, FILM COATED, EXTENDED RELEASE ORAL at 08:54

## 2025-08-29 RX ADMIN — GABAPENTIN 100 MG: 100 CAPSULE ORAL at 14:35

## 2025-08-29 RX ADMIN — OLANZAPINE 5 MG: 5 TABLET, FILM COATED ORAL at 20:19

## 2025-08-29 RX ADMIN — EZETIMIBE 10 MG: 10 TABLET ORAL at 08:54

## 2025-08-29 RX ADMIN — OLANZAPINE 5 MG: 5 TABLET, FILM COATED ORAL at 08:54

## 2025-08-29 RX ADMIN — APIXABAN 5 MG: 5 TABLET, FILM COATED ORAL at 20:15

## 2025-08-29 RX ADMIN — FUROSEMIDE 40 MG: 10 INJECTION, SOLUTION INTRAMUSCULAR; INTRAVENOUS at 08:54

## 2025-08-29 RX ADMIN — OLANZAPINE 5 MG: 5 TABLET, FILM COATED ORAL at 14:35

## 2025-08-29 RX ADMIN — FUROSEMIDE 40 MG: 10 INJECTION, SOLUTION INTRAMUSCULAR; INTRAVENOUS at 17:22

## 2025-08-29 RX ADMIN — NIFEDIPINE 60 MG: 30 TABLET, FILM COATED, EXTENDED RELEASE ORAL at 08:54

## 2025-08-29 RX ADMIN — SODIUM CHLORIDE, PRESERVATIVE FREE 10 ML: 5 INJECTION INTRAVENOUS at 08:55

## 2025-08-29 RX ADMIN — SERTRALINE 50 MG: 50 TABLET, FILM COATED ORAL at 08:54

## 2025-08-29 ASSESSMENT — PAIN SCALES - GENERAL
PAINLEVEL_OUTOF10: 0

## 2025-08-30 LAB
AMMONIA PLAS-SCNC: 37 UMOL/L (ref 16–60)
ANION GAP SERPL CALC-SCNC: 13 MMOL/L (ref 2–14)
BASOPHILS # BLD: 0.03 K/UL (ref 0–0.1)
BASOPHILS NFR BLD: 0.5 % (ref 0–1)
BUN SERPL-MCNC: 22 MG/DL (ref 8–23)
BUN/CREAT SERPL: 22 (ref 12–20)
CALCIUM SERPL-MCNC: 9.1 MG/DL (ref 8.8–10.2)
CHLORIDE SERPL-SCNC: 100 MMOL/L (ref 98–107)
CO2 SERPL-SCNC: 29 MMOL/L (ref 20–29)
CREAT SERPL-MCNC: 1.01 MG/DL (ref 0.7–1.2)
DIFFERENTIAL METHOD BLD: ABNORMAL
EOSINOPHIL # BLD: 0.15 K/UL (ref 0–0.4)
EOSINOPHIL NFR BLD: 2.7 % (ref 0–7)
ERYTHROCYTE [DISTWIDTH] IN BLOOD BY AUTOMATED COUNT: 14.6 % (ref 11.5–14.5)
GLUCOSE BLD STRIP.AUTO-MCNC: 108 MG/DL (ref 65–117)
GLUCOSE BLD STRIP.AUTO-MCNC: 110 MG/DL (ref 65–117)
GLUCOSE BLD STRIP.AUTO-MCNC: 112 MG/DL (ref 65–117)
GLUCOSE BLD STRIP.AUTO-MCNC: 191 MG/DL (ref 65–117)
GLUCOSE SERPL-MCNC: 108 MG/DL (ref 65–100)
HCT VFR BLD AUTO: 31.6 % (ref 36.6–50.3)
HGB BLD-MCNC: 10.1 G/DL (ref 12.1–17)
IMM GRANULOCYTES # BLD AUTO: 0.05 K/UL (ref 0–0.04)
IMM GRANULOCYTES NFR BLD AUTO: 0.9 % (ref 0–0.5)
LYMPHOCYTES # BLD: 0.77 K/UL (ref 0.8–3.5)
LYMPHOCYTES NFR BLD: 13.8 % (ref 12–49)
MAGNESIUM SERPL-MCNC: 1.8 MG/DL (ref 1.6–2.4)
MCH RBC QN AUTO: 28 PG (ref 26–34)
MCHC RBC AUTO-ENTMCNC: 32 G/DL (ref 30–36.5)
MCV RBC AUTO: 87.5 FL (ref 80–99)
MONOCYTES # BLD: 0.63 K/UL (ref 0–1)
MONOCYTES NFR BLD: 11.3 % (ref 5–13)
NEUTS SEG # BLD: 3.93 K/UL (ref 1.8–8)
NEUTS SEG NFR BLD: 70.8 % (ref 32–75)
NRBC # BLD: 0 K/UL (ref 0–0.01)
NRBC BLD-RTO: 0 PER 100 WBC
PLATELET # BLD AUTO: 342 K/UL (ref 150–400)
PMV BLD AUTO: 9.3 FL (ref 8.9–12.9)
POTASSIUM SERPL-SCNC: 3.1 MMOL/L (ref 3.5–5.1)
RBC # BLD AUTO: 3.61 M/UL (ref 4.1–5.7)
SERVICE CMNT-IMP: ABNORMAL
SERVICE CMNT-IMP: NORMAL
SODIUM SERPL-SCNC: 142 MMOL/L (ref 136–145)
TSH, 3RD GENERATION: 2.09 UIU/ML (ref 0.27–4.2)
VIT B12 SERPL-MCNC: 500 PG/ML (ref 232–1245)
WBC # BLD AUTO: 5.6 K/UL (ref 4.1–11.1)

## 2025-08-30 PROCEDURE — 6370000000 HC RX 637 (ALT 250 FOR IP): Performed by: INTERNAL MEDICINE

## 2025-08-30 PROCEDURE — 83735 ASSAY OF MAGNESIUM: CPT

## 2025-08-30 PROCEDURE — 6360000002 HC RX W HCPCS: Performed by: HOSPITALIST

## 2025-08-30 PROCEDURE — 6370000000 HC RX 637 (ALT 250 FOR IP): Performed by: NURSE PRACTITIONER

## 2025-08-30 PROCEDURE — 6360000002 HC RX W HCPCS: Performed by: INTERNAL MEDICINE

## 2025-08-30 PROCEDURE — 2500000003 HC RX 250 WO HCPCS: Performed by: HOSPITALIST

## 2025-08-30 PROCEDURE — 84443 ASSAY THYROID STIM HORMONE: CPT

## 2025-08-30 PROCEDURE — 82607 VITAMIN B-12: CPT

## 2025-08-30 PROCEDURE — APPSS30 APP SPLIT SHARED TIME 16-30 MINUTES: Performed by: NURSE PRACTITIONER

## 2025-08-30 PROCEDURE — 36415 COLL VENOUS BLD VENIPUNCTURE: CPT

## 2025-08-30 PROCEDURE — 2700000000 HC OXYGEN THERAPY PER DAY

## 2025-08-30 PROCEDURE — 82962 GLUCOSE BLOOD TEST: CPT

## 2025-08-30 PROCEDURE — 99233 SBSQ HOSP IP/OBS HIGH 50: CPT | Performed by: INTERNAL MEDICINE

## 2025-08-30 PROCEDURE — 1100000000 HC RM PRIVATE

## 2025-08-30 PROCEDURE — 82140 ASSAY OF AMMONIA: CPT

## 2025-08-30 PROCEDURE — 85025 COMPLETE CBC W/AUTO DIFF WBC: CPT

## 2025-08-30 PROCEDURE — 94761 N-INVAS EAR/PLS OXIMETRY MLT: CPT

## 2025-08-30 PROCEDURE — 6370000000 HC RX 637 (ALT 250 FOR IP): Performed by: HOSPITALIST

## 2025-08-30 PROCEDURE — 80048 BASIC METABOLIC PNL TOTAL CA: CPT

## 2025-08-30 RX ADMIN — SERTRALINE 50 MG: 50 TABLET, FILM COATED ORAL at 09:23

## 2025-08-30 RX ADMIN — LEVOFLOXACIN 750 MG: 5 INJECTION, SOLUTION INTRAVENOUS at 09:54

## 2025-08-30 RX ADMIN — SODIUM CHLORIDE, PRESERVATIVE FREE 10 ML: 5 INJECTION INTRAVENOUS at 20:05

## 2025-08-30 RX ADMIN — METOPROLOL TARTRATE 25 MG: 25 TABLET, FILM COATED ORAL at 20:05

## 2025-08-30 RX ADMIN — OLANZAPINE 5 MG: 5 TABLET, FILM COATED ORAL at 17:22

## 2025-08-30 RX ADMIN — METOPROLOL TARTRATE 25 MG: 25 TABLET, FILM COATED ORAL at 09:24

## 2025-08-30 RX ADMIN — EZETIMIBE 10 MG: 10 TABLET ORAL at 09:23

## 2025-08-30 RX ADMIN — APIXABAN 5 MG: 5 TABLET, FILM COATED ORAL at 09:23

## 2025-08-30 RX ADMIN — GABAPENTIN 100 MG: 100 CAPSULE ORAL at 09:24

## 2025-08-30 RX ADMIN — FENOFIBRATE 54 MG: 54 TABLET ORAL at 09:24

## 2025-08-30 RX ADMIN — GABAPENTIN 100 MG: 100 CAPSULE ORAL at 20:05

## 2025-08-30 RX ADMIN — NIFEDIPINE 60 MG: 30 TABLET, FILM COATED, EXTENDED RELEASE ORAL at 09:23

## 2025-08-30 RX ADMIN — OLANZAPINE 5 MG: 5 TABLET, FILM COATED ORAL at 09:23

## 2025-08-30 RX ADMIN — SODIUM CHLORIDE, PRESERVATIVE FREE 10 ML: 5 INJECTION INTRAVENOUS at 09:24

## 2025-08-30 RX ADMIN — FUROSEMIDE 40 MG: 10 INJECTION, SOLUTION INTRAMUSCULAR; INTRAVENOUS at 17:23

## 2025-08-30 RX ADMIN — INSULIN LISPRO 1 UNITS: 100 INJECTION, SOLUTION INTRAVENOUS; SUBCUTANEOUS at 20:05

## 2025-08-30 RX ADMIN — FUROSEMIDE 40 MG: 10 INJECTION, SOLUTION INTRAMUSCULAR; INTRAVENOUS at 09:24

## 2025-08-30 RX ADMIN — APIXABAN 5 MG: 5 TABLET, FILM COATED ORAL at 20:05

## 2025-08-30 ASSESSMENT — PAIN SCALES - GENERAL: PAINLEVEL_OUTOF10: 0

## 2025-08-31 LAB
ANION GAP SERPL CALC-SCNC: 14 MMOL/L (ref 2–14)
BASOPHILS # BLD: 0.03 K/UL (ref 0–0.1)
BASOPHILS NFR BLD: 0.4 % (ref 0–1)
BUN SERPL-MCNC: 27 MG/DL (ref 8–23)
BUN/CREAT SERPL: 24 (ref 12–20)
CALCIUM SERPL-MCNC: 9.2 MG/DL (ref 8.8–10.2)
CHLORIDE SERPL-SCNC: 98 MMOL/L (ref 98–107)
CO2 SERPL-SCNC: 28 MMOL/L (ref 20–29)
CREAT SERPL-MCNC: 1.13 MG/DL (ref 0.7–1.2)
DIFFERENTIAL METHOD BLD: ABNORMAL
EOSINOPHIL # BLD: 0.08 K/UL (ref 0–0.4)
EOSINOPHIL NFR BLD: 1.1 % (ref 0–7)
ERYTHROCYTE [DISTWIDTH] IN BLOOD BY AUTOMATED COUNT: 14.4 % (ref 11.5–14.5)
GLUCOSE BLD STRIP.AUTO-MCNC: 129 MG/DL (ref 65–117)
GLUCOSE BLD STRIP.AUTO-MCNC: 165 MG/DL (ref 65–117)
GLUCOSE BLD STRIP.AUTO-MCNC: 168 MG/DL (ref 65–117)
GLUCOSE BLD STRIP.AUTO-MCNC: 198 MG/DL (ref 65–117)
GLUCOSE SERPL-MCNC: 137 MG/DL (ref 65–100)
HCT VFR BLD AUTO: 33.3 % (ref 36.6–50.3)
HGB BLD-MCNC: 10.8 G/DL (ref 12.1–17)
IMM GRANULOCYTES # BLD AUTO: 0.03 K/UL (ref 0–0.04)
IMM GRANULOCYTES NFR BLD AUTO: 0.4 % (ref 0–0.5)
LYMPHOCYTES # BLD: 0.65 K/UL (ref 0.8–3.5)
LYMPHOCYTES NFR BLD: 9.3 % (ref 12–49)
MCH RBC QN AUTO: 28 PG (ref 26–34)
MCHC RBC AUTO-ENTMCNC: 32.4 G/DL (ref 30–36.5)
MCV RBC AUTO: 86.3 FL (ref 80–99)
MONOCYTES # BLD: 0.67 K/UL (ref 0–1)
MONOCYTES NFR BLD: 9.5 % (ref 5–13)
NEUTS SEG # BLD: 5.55 K/UL (ref 1.8–8)
NEUTS SEG NFR BLD: 79.3 % (ref 32–75)
NRBC # BLD: 0 K/UL (ref 0–0.01)
NRBC BLD-RTO: 0 PER 100 WBC
PLATELET # BLD AUTO: 352 K/UL (ref 150–400)
PMV BLD AUTO: 9.3 FL (ref 8.9–12.9)
POTASSIUM SERPL-SCNC: 3 MMOL/L (ref 3.5–5.1)
RBC # BLD AUTO: 3.86 M/UL (ref 4.1–5.7)
RBC MORPH BLD: ABNORMAL
SERVICE CMNT-IMP: ABNORMAL
SODIUM SERPL-SCNC: 140 MMOL/L (ref 136–145)
WBC # BLD AUTO: 7 K/UL (ref 4.1–11.1)

## 2025-08-31 PROCEDURE — 85025 COMPLETE CBC W/AUTO DIFF WBC: CPT

## 2025-08-31 PROCEDURE — 97530 THERAPEUTIC ACTIVITIES: CPT

## 2025-08-31 PROCEDURE — 80048 BASIC METABOLIC PNL TOTAL CA: CPT

## 2025-08-31 PROCEDURE — 2700000000 HC OXYGEN THERAPY PER DAY

## 2025-08-31 PROCEDURE — 97535 SELF CARE MNGMENT TRAINING: CPT | Performed by: OCCUPATIONAL THERAPIST

## 2025-08-31 PROCEDURE — 1100000000 HC RM PRIVATE

## 2025-08-31 PROCEDURE — 6370000000 HC RX 637 (ALT 250 FOR IP): Performed by: NURSE PRACTITIONER

## 2025-08-31 PROCEDURE — 2500000003 HC RX 250 WO HCPCS: Performed by: HOSPITALIST

## 2025-08-31 PROCEDURE — 97530 THERAPEUTIC ACTIVITIES: CPT | Performed by: OCCUPATIONAL THERAPIST

## 2025-08-31 PROCEDURE — 97161 PT EVAL LOW COMPLEX 20 MIN: CPT

## 2025-08-31 PROCEDURE — 97165 OT EVAL LOW COMPLEX 30 MIN: CPT | Performed by: OCCUPATIONAL THERAPIST

## 2025-08-31 PROCEDURE — 82962 GLUCOSE BLOOD TEST: CPT

## 2025-08-31 PROCEDURE — 6370000000 HC RX 637 (ALT 250 FOR IP): Performed by: INTERNAL MEDICINE

## 2025-08-31 PROCEDURE — 36415 COLL VENOUS BLD VENIPUNCTURE: CPT

## 2025-08-31 PROCEDURE — 6370000000 HC RX 637 (ALT 250 FOR IP): Performed by: HOSPITALIST

## 2025-08-31 PROCEDURE — 6360000002 HC RX W HCPCS: Performed by: INTERNAL MEDICINE

## 2025-08-31 PROCEDURE — 6360000002 HC RX W HCPCS: Performed by: HOSPITALIST

## 2025-08-31 PROCEDURE — 94761 N-INVAS EAR/PLS OXIMETRY MLT: CPT

## 2025-08-31 RX ORDER — FUROSEMIDE 40 MG/1
40 TABLET ORAL 2 TIMES DAILY
Status: DISCONTINUED | OUTPATIENT
Start: 2025-09-01 | End: 2025-09-05 | Stop reason: HOSPADM

## 2025-08-31 RX ADMIN — POTASSIUM BICARBONATE 40 MEQ: 391 TABLET, EFFERVESCENT ORAL at 15:03

## 2025-08-31 RX ADMIN — LEVOFLOXACIN 750 MG: 5 INJECTION, SOLUTION INTRAVENOUS at 09:30

## 2025-08-31 RX ADMIN — GABAPENTIN 100 MG: 100 CAPSULE ORAL at 19:39

## 2025-08-31 RX ADMIN — INSULIN LISPRO 1 UNITS: 100 INJECTION, SOLUTION INTRAVENOUS; SUBCUTANEOUS at 17:07

## 2025-08-31 RX ADMIN — NIFEDIPINE 60 MG: 30 TABLET, FILM COATED, EXTENDED RELEASE ORAL at 08:08

## 2025-08-31 RX ADMIN — SODIUM CHLORIDE, PRESERVATIVE FREE 10 ML: 5 INJECTION INTRAVENOUS at 08:09

## 2025-08-31 RX ADMIN — EZETIMIBE 10 MG: 10 TABLET ORAL at 08:08

## 2025-08-31 RX ADMIN — FUROSEMIDE 40 MG: 10 INJECTION, SOLUTION INTRAMUSCULAR; INTRAVENOUS at 17:07

## 2025-08-31 RX ADMIN — METOPROLOL TARTRATE 25 MG: 25 TABLET, FILM COATED ORAL at 19:39

## 2025-08-31 RX ADMIN — FENOFIBRATE 54 MG: 54 TABLET ORAL at 08:08

## 2025-08-31 RX ADMIN — SODIUM CHLORIDE, PRESERVATIVE FREE 10 ML: 5 INJECTION INTRAVENOUS at 19:39

## 2025-08-31 RX ADMIN — APIXABAN 5 MG: 5 TABLET, FILM COATED ORAL at 19:39

## 2025-08-31 RX ADMIN — OLANZAPINE 5 MG: 5 TABLET, FILM COATED ORAL at 08:08

## 2025-08-31 RX ADMIN — GABAPENTIN 100 MG: 100 CAPSULE ORAL at 08:09

## 2025-08-31 RX ADMIN — APIXABAN 5 MG: 5 TABLET, FILM COATED ORAL at 08:08

## 2025-08-31 RX ADMIN — FUROSEMIDE 40 MG: 10 INJECTION, SOLUTION INTRAMUSCULAR; INTRAVENOUS at 08:09

## 2025-08-31 RX ADMIN — SERTRALINE 50 MG: 50 TABLET, FILM COATED ORAL at 08:08

## 2025-08-31 RX ADMIN — OLANZAPINE 5 MG: 5 TABLET, FILM COATED ORAL at 17:07

## 2025-08-31 RX ADMIN — METOPROLOL TARTRATE 25 MG: 25 TABLET, FILM COATED ORAL at 08:08

## 2025-08-31 ASSESSMENT — PAIN SCALES - GENERAL: PAINLEVEL_OUTOF10: 0

## 2025-09-01 LAB
ANION GAP SERPL CALC-SCNC: 14 MMOL/L (ref 2–14)
BUN SERPL-MCNC: 28 MG/DL (ref 8–23)
BUN/CREAT SERPL: 27 (ref 12–20)
CALCIUM SERPL-MCNC: 8.7 MG/DL (ref 8.8–10.2)
CHLORIDE SERPL-SCNC: 101 MMOL/L (ref 98–107)
CO2 SERPL-SCNC: 29 MMOL/L (ref 20–29)
CREAT SERPL-MCNC: 1.04 MG/DL (ref 0.7–1.2)
GLUCOSE BLD STRIP.AUTO-MCNC: 125 MG/DL (ref 65–117)
GLUCOSE BLD STRIP.AUTO-MCNC: 167 MG/DL (ref 65–117)
GLUCOSE BLD STRIP.AUTO-MCNC: 175 MG/DL (ref 65–117)
GLUCOSE SERPL-MCNC: 115 MG/DL (ref 65–100)
POTASSIUM SERPL-SCNC: 3 MMOL/L (ref 3.5–5.1)
SERVICE CMNT-IMP: ABNORMAL
SODIUM SERPL-SCNC: 144 MMOL/L (ref 136–145)

## 2025-09-01 PROCEDURE — 6370000000 HC RX 637 (ALT 250 FOR IP): Performed by: INTERNAL MEDICINE

## 2025-09-01 PROCEDURE — 1100000000 HC RM PRIVATE

## 2025-09-01 PROCEDURE — 2500000003 HC RX 250 WO HCPCS: Performed by: HOSPITALIST

## 2025-09-01 PROCEDURE — 80048 BASIC METABOLIC PNL TOTAL CA: CPT

## 2025-09-01 PROCEDURE — 6360000002 HC RX W HCPCS: Performed by: HOSPITALIST

## 2025-09-01 PROCEDURE — 6370000000 HC RX 637 (ALT 250 FOR IP): Performed by: NURSE PRACTITIONER

## 2025-09-01 PROCEDURE — 36415 COLL VENOUS BLD VENIPUNCTURE: CPT

## 2025-09-01 PROCEDURE — 2700000000 HC OXYGEN THERAPY PER DAY

## 2025-09-01 PROCEDURE — 82962 GLUCOSE BLOOD TEST: CPT

## 2025-09-01 PROCEDURE — 94761 N-INVAS EAR/PLS OXIMETRY MLT: CPT

## 2025-09-01 PROCEDURE — 6370000000 HC RX 637 (ALT 250 FOR IP): Performed by: HOSPITALIST

## 2025-09-01 RX ADMIN — APIXABAN 5 MG: 5 TABLET, FILM COATED ORAL at 20:46

## 2025-09-01 RX ADMIN — SODIUM CHLORIDE, PRESERVATIVE FREE 10 ML: 5 INJECTION INTRAVENOUS at 20:47

## 2025-09-01 RX ADMIN — LEVOFLOXACIN 750 MG: 5 INJECTION, SOLUTION INTRAVENOUS at 09:35

## 2025-09-01 RX ADMIN — OLANZAPINE 5 MG: 5 TABLET, FILM COATED ORAL at 08:09

## 2025-09-01 RX ADMIN — METOPROLOL TARTRATE 25 MG: 25 TABLET, FILM COATED ORAL at 20:46

## 2025-09-01 RX ADMIN — SERTRALINE 50 MG: 50 TABLET, FILM COATED ORAL at 08:09

## 2025-09-01 RX ADMIN — NIFEDIPINE 60 MG: 30 TABLET, FILM COATED, EXTENDED RELEASE ORAL at 08:09

## 2025-09-01 RX ADMIN — FENOFIBRATE 54 MG: 54 TABLET ORAL at 08:09

## 2025-09-01 RX ADMIN — POTASSIUM BICARBONATE 40 MEQ: 391 TABLET, EFFERVESCENT ORAL at 16:57

## 2025-09-01 RX ADMIN — METOPROLOL TARTRATE 25 MG: 25 TABLET, FILM COATED ORAL at 08:09

## 2025-09-01 RX ADMIN — FUROSEMIDE 40 MG: 40 TABLET ORAL at 16:58

## 2025-09-01 RX ADMIN — POTASSIUM BICARBONATE 40 MEQ: 391 TABLET, EFFERVESCENT ORAL at 11:16

## 2025-09-01 RX ADMIN — FUROSEMIDE 40 MG: 40 TABLET ORAL at 08:09

## 2025-09-01 RX ADMIN — GABAPENTIN 100 MG: 100 CAPSULE ORAL at 20:46

## 2025-09-01 RX ADMIN — EZETIMIBE 10 MG: 10 TABLET ORAL at 08:09

## 2025-09-01 RX ADMIN — SODIUM CHLORIDE, PRESERVATIVE FREE 10 ML: 5 INJECTION INTRAVENOUS at 08:09

## 2025-09-01 RX ADMIN — OLANZAPINE 5 MG: 5 TABLET, FILM COATED ORAL at 16:57

## 2025-09-01 RX ADMIN — APIXABAN 5 MG: 5 TABLET, FILM COATED ORAL at 08:09

## 2025-09-01 RX ADMIN — GABAPENTIN 100 MG: 100 CAPSULE ORAL at 08:09

## 2025-09-01 ASSESSMENT — PAIN SCALES - GENERAL
PAINLEVEL_OUTOF10: 0
PAINLEVEL_OUTOF10: 0

## 2025-09-02 LAB
GLUCOSE BLD STRIP.AUTO-MCNC: 142 MG/DL (ref 65–117)
GLUCOSE BLD STRIP.AUTO-MCNC: 186 MG/DL (ref 65–117)
GLUCOSE BLD STRIP.AUTO-MCNC: 199 MG/DL (ref 65–117)
GLUCOSE BLD STRIP.AUTO-MCNC: 247 MG/DL (ref 65–117)
GLUCOSE BLD STRIP.AUTO-MCNC: NORMAL MG/DL (ref 65–117)
SERVICE CMNT-IMP: ABNORMAL
SERVICE CMNT-IMP: NORMAL

## 2025-09-02 PROCEDURE — 6370000000 HC RX 637 (ALT 250 FOR IP): Performed by: INTERNAL MEDICINE

## 2025-09-02 PROCEDURE — 6370000000 HC RX 637 (ALT 250 FOR IP): Performed by: HOSPITALIST

## 2025-09-02 PROCEDURE — 2500000003 HC RX 250 WO HCPCS: Performed by: HOSPITALIST

## 2025-09-02 PROCEDURE — 97530 THERAPEUTIC ACTIVITIES: CPT

## 2025-09-02 PROCEDURE — 2700000000 HC OXYGEN THERAPY PER DAY

## 2025-09-02 PROCEDURE — 82962 GLUCOSE BLOOD TEST: CPT

## 2025-09-02 PROCEDURE — 97535 SELF CARE MNGMENT TRAINING: CPT

## 2025-09-02 PROCEDURE — 1100000000 HC RM PRIVATE

## 2025-09-02 PROCEDURE — 6370000000 HC RX 637 (ALT 250 FOR IP): Performed by: NURSE PRACTITIONER

## 2025-09-02 PROCEDURE — 94761 N-INVAS EAR/PLS OXIMETRY MLT: CPT

## 2025-09-02 RX ORDER — GUAIFENESIN 600 MG/1
600 TABLET, EXTENDED RELEASE ORAL 2 TIMES DAILY
Status: DISCONTINUED | OUTPATIENT
Start: 2025-09-02 | End: 2025-09-05 | Stop reason: HOSPADM

## 2025-09-02 RX ADMIN — SERTRALINE 50 MG: 50 TABLET, FILM COATED ORAL at 08:47

## 2025-09-02 RX ADMIN — EZETIMIBE 10 MG: 10 TABLET ORAL at 08:46

## 2025-09-02 RX ADMIN — INSULIN LISPRO 1 UNITS: 100 INJECTION, SOLUTION INTRAVENOUS; SUBCUTANEOUS at 11:56

## 2025-09-02 RX ADMIN — INSULIN LISPRO 1 UNITS: 100 INJECTION, SOLUTION INTRAVENOUS; SUBCUTANEOUS at 20:18

## 2025-09-02 RX ADMIN — GABAPENTIN 100 MG: 100 CAPSULE ORAL at 20:18

## 2025-09-02 RX ADMIN — METOPROLOL TARTRATE 25 MG: 25 TABLET, FILM COATED ORAL at 08:47

## 2025-09-02 RX ADMIN — FENOFIBRATE 54 MG: 54 TABLET ORAL at 08:47

## 2025-09-02 RX ADMIN — OLANZAPINE 5 MG: 5 TABLET, FILM COATED ORAL at 17:51

## 2025-09-02 RX ADMIN — SODIUM CHLORIDE, PRESERVATIVE FREE 10 ML: 5 INJECTION INTRAVENOUS at 20:19

## 2025-09-02 RX ADMIN — NIFEDIPINE 60 MG: 30 TABLET, FILM COATED, EXTENDED RELEASE ORAL at 08:46

## 2025-09-02 RX ADMIN — POLYETHYLENE GLYCOL 3350 17 G: 17 POWDER, FOR SOLUTION ORAL at 15:57

## 2025-09-02 RX ADMIN — APIXABAN 5 MG: 5 TABLET, FILM COATED ORAL at 08:47

## 2025-09-02 RX ADMIN — GABAPENTIN 100 MG: 100 CAPSULE ORAL at 08:47

## 2025-09-02 RX ADMIN — FUROSEMIDE 40 MG: 40 TABLET ORAL at 17:51

## 2025-09-02 RX ADMIN — FUROSEMIDE 40 MG: 40 TABLET ORAL at 08:47

## 2025-09-02 RX ADMIN — INSULIN LISPRO 1 UNITS: 100 INJECTION, SOLUTION INTRAVENOUS; SUBCUTANEOUS at 17:51

## 2025-09-02 RX ADMIN — OLANZAPINE 5 MG: 5 TABLET, FILM COATED ORAL at 08:47

## 2025-09-02 RX ADMIN — APIXABAN 5 MG: 5 TABLET, FILM COATED ORAL at 20:18

## 2025-09-02 RX ADMIN — GUAIFENESIN 600 MG: 600 TABLET, EXTENDED RELEASE ORAL at 20:19

## 2025-09-02 ASSESSMENT — PAIN SCALES - GENERAL
PAINLEVEL_OUTOF10: 0
PAINLEVEL_OUTOF10: 0

## 2025-09-03 PROBLEM — I35.0 AORTIC STENOSIS: Status: ACTIVE | Noted: 2025-09-03

## 2025-09-03 PROBLEM — G25.0 ESSENTIAL TREMOR: Status: ACTIVE | Noted: 2025-09-03

## 2025-09-03 PROBLEM — I50.31 ACUTE HEART FAILURE WITH PRESERVED EJECTION FRACTION (HFPEF) (HCC): Status: ACTIVE | Noted: 2025-08-28

## 2025-09-03 PROBLEM — E78.5 HYPERLIPIDEMIA: Status: ACTIVE | Noted: 2025-09-03

## 2025-09-03 PROBLEM — B34.8 RHINOVIRUS INFECTION: Status: ACTIVE | Noted: 2025-08-28

## 2025-09-03 LAB
ANION GAP SERPL CALC-SCNC: 16 MMOL/L (ref 2–14)
BACTERIA SPEC CULT: NORMAL
BACTERIA SPEC CULT: NORMAL
BUN SERPL-MCNC: 29 MG/DL (ref 8–23)
BUN/CREAT SERPL: 31 (ref 12–20)
CALCIUM SERPL-MCNC: 9.1 MG/DL (ref 8.8–10.2)
CHLORIDE SERPL-SCNC: 92 MMOL/L (ref 98–107)
CO2 SERPL-SCNC: 29 MMOL/L (ref 20–29)
CREAT SERPL-MCNC: 0.94 MG/DL (ref 0.7–1.2)
GLUCOSE BLD STRIP.AUTO-MCNC: 190 MG/DL (ref 65–117)
GLUCOSE BLD STRIP.AUTO-MCNC: 235 MG/DL (ref 65–117)
GLUCOSE BLD STRIP.AUTO-MCNC: 249 MG/DL (ref 65–117)
GLUCOSE SERPL-MCNC: 181 MG/DL (ref 65–100)
POTASSIUM SERPL-SCNC: 3.3 MMOL/L (ref 3.5–5.1)
SERVICE CMNT-IMP: ABNORMAL
SERVICE CMNT-IMP: NORMAL
SERVICE CMNT-IMP: NORMAL
SODIUM SERPL-SCNC: 137 MMOL/L (ref 136–145)

## 2025-09-03 PROCEDURE — 6370000000 HC RX 637 (ALT 250 FOR IP): Performed by: INTERNAL MEDICINE

## 2025-09-03 PROCEDURE — 2500000003 HC RX 250 WO HCPCS: Performed by: HOSPITALIST

## 2025-09-03 PROCEDURE — 94761 N-INVAS EAR/PLS OXIMETRY MLT: CPT

## 2025-09-03 PROCEDURE — 1100000000 HC RM PRIVATE

## 2025-09-03 PROCEDURE — 36415 COLL VENOUS BLD VENIPUNCTURE: CPT

## 2025-09-03 PROCEDURE — 6370000000 HC RX 637 (ALT 250 FOR IP): Performed by: NURSE PRACTITIONER

## 2025-09-03 PROCEDURE — 80048 BASIC METABOLIC PNL TOTAL CA: CPT

## 2025-09-03 PROCEDURE — 6370000000 HC RX 637 (ALT 250 FOR IP): Performed by: HOSPITALIST

## 2025-09-03 PROCEDURE — 82962 GLUCOSE BLOOD TEST: CPT

## 2025-09-03 RX ADMIN — INSULIN LISPRO 1 UNITS: 100 INJECTION, SOLUTION INTRAVENOUS; SUBCUTANEOUS at 17:13

## 2025-09-03 RX ADMIN — APIXABAN 5 MG: 5 TABLET, FILM COATED ORAL at 20:07

## 2025-09-03 RX ADMIN — APIXABAN 5 MG: 5 TABLET, FILM COATED ORAL at 08:23

## 2025-09-03 RX ADMIN — METOPROLOL TARTRATE 25 MG: 25 TABLET, FILM COATED ORAL at 20:07

## 2025-09-03 RX ADMIN — SERTRALINE 50 MG: 50 TABLET, FILM COATED ORAL at 08:23

## 2025-09-03 RX ADMIN — GUAIFENESIN 600 MG: 600 TABLET, EXTENDED RELEASE ORAL at 08:23

## 2025-09-03 RX ADMIN — INSULIN LISPRO 1 UNITS: 100 INJECTION, SOLUTION INTRAVENOUS; SUBCUTANEOUS at 08:23

## 2025-09-03 RX ADMIN — OLANZAPINE 5 MG: 5 TABLET, FILM COATED ORAL at 17:13

## 2025-09-03 RX ADMIN — POTASSIUM BICARBONATE 40 MEQ: 391 TABLET, EFFERVESCENT ORAL at 11:48

## 2025-09-03 RX ADMIN — GUAIFENESIN 600 MG: 600 TABLET, EXTENDED RELEASE ORAL at 20:07

## 2025-09-03 RX ADMIN — METOPROLOL TARTRATE 25 MG: 25 TABLET, FILM COATED ORAL at 08:23

## 2025-09-03 RX ADMIN — GABAPENTIN 100 MG: 100 CAPSULE ORAL at 08:23

## 2025-09-03 RX ADMIN — FUROSEMIDE 40 MG: 40 TABLET ORAL at 08:23

## 2025-09-03 RX ADMIN — INSULIN LISPRO 1 UNITS: 100 INJECTION, SOLUTION INTRAVENOUS; SUBCUTANEOUS at 11:48

## 2025-09-03 RX ADMIN — FENOFIBRATE 54 MG: 54 TABLET ORAL at 08:23

## 2025-09-03 RX ADMIN — FUROSEMIDE 40 MG: 40 TABLET ORAL at 17:13

## 2025-09-03 RX ADMIN — NIFEDIPINE 60 MG: 30 TABLET, FILM COATED, EXTENDED RELEASE ORAL at 08:23

## 2025-09-03 RX ADMIN — SODIUM CHLORIDE, PRESERVATIVE FREE 10 ML: 5 INJECTION INTRAVENOUS at 20:08

## 2025-09-03 RX ADMIN — GABAPENTIN 100 MG: 100 CAPSULE ORAL at 20:07

## 2025-09-03 RX ADMIN — OLANZAPINE 5 MG: 5 TABLET, FILM COATED ORAL at 08:23

## 2025-09-03 RX ADMIN — EZETIMIBE 10 MG: 10 TABLET ORAL at 08:23

## 2025-09-03 ASSESSMENT — PAIN SCALES - GENERAL
PAINLEVEL_OUTOF10: 0
PAINLEVEL_OUTOF10: 0

## 2025-09-04 LAB
ANION GAP SERPL CALC-SCNC: 13 MMOL/L (ref 2–14)
BUN SERPL-MCNC: 31 MG/DL (ref 8–23)
BUN/CREAT SERPL: 31 (ref 12–20)
CALCIUM SERPL-MCNC: 8.6 MG/DL (ref 8.8–10.2)
CHLORIDE SERPL-SCNC: 94 MMOL/L (ref 98–107)
CO2 SERPL-SCNC: 28 MMOL/L (ref 20–29)
CREAT SERPL-MCNC: 0.99 MG/DL (ref 0.7–1.2)
GLUCOSE BLD STRIP.AUTO-MCNC: 137 MG/DL (ref 65–117)
GLUCOSE BLD STRIP.AUTO-MCNC: 159 MG/DL (ref 65–117)
GLUCOSE BLD STRIP.AUTO-MCNC: 202 MG/DL (ref 65–117)
GLUCOSE BLD STRIP.AUTO-MCNC: 235 MG/DL (ref 65–117)
GLUCOSE SERPL-MCNC: 209 MG/DL (ref 65–100)
MAGNESIUM SERPL-MCNC: 2.9 MG/DL (ref 1.6–2.4)
POTASSIUM SERPL-SCNC: 5.1 MMOL/L (ref 3.5–5.1)
SERVICE CMNT-IMP: ABNORMAL
SODIUM SERPL-SCNC: 135 MMOL/L (ref 136–145)

## 2025-09-04 PROCEDURE — 6370000000 HC RX 637 (ALT 250 FOR IP): Performed by: INTERNAL MEDICINE

## 2025-09-04 PROCEDURE — 2500000003 HC RX 250 WO HCPCS: Performed by: HOSPITALIST

## 2025-09-04 PROCEDURE — 82962 GLUCOSE BLOOD TEST: CPT

## 2025-09-04 PROCEDURE — 1100000000 HC RM PRIVATE

## 2025-09-04 PROCEDURE — 83735 ASSAY OF MAGNESIUM: CPT

## 2025-09-04 PROCEDURE — 94761 N-INVAS EAR/PLS OXIMETRY MLT: CPT

## 2025-09-04 PROCEDURE — 80048 BASIC METABOLIC PNL TOTAL CA: CPT

## 2025-09-04 PROCEDURE — 6370000000 HC RX 637 (ALT 250 FOR IP): Performed by: HOSPITALIST

## 2025-09-04 RX ADMIN — BENZONATATE 100 MG: 100 CAPSULE ORAL at 06:21

## 2025-09-04 RX ADMIN — GUAIFENESIN 600 MG: 600 TABLET, EXTENDED RELEASE ORAL at 20:30

## 2025-09-04 RX ADMIN — GABAPENTIN 100 MG: 100 CAPSULE ORAL at 20:29

## 2025-09-04 RX ADMIN — METOPROLOL TARTRATE 25 MG: 25 TABLET, FILM COATED ORAL at 20:30

## 2025-09-04 RX ADMIN — FUROSEMIDE 40 MG: 40 TABLET ORAL at 17:42

## 2025-09-04 RX ADMIN — APIXABAN 5 MG: 5 TABLET, FILM COATED ORAL at 20:30

## 2025-09-04 RX ADMIN — ACETAMINOPHEN 650 MG: 325 TABLET ORAL at 06:20

## 2025-09-04 RX ADMIN — SODIUM CHLORIDE, PRESERVATIVE FREE 10 ML: 5 INJECTION INTRAVENOUS at 20:35

## 2025-09-04 ASSESSMENT — PAIN SCALES - GENERAL
PAINLEVEL_OUTOF10: 4
PAINLEVEL_OUTOF10: 0
PAINLEVEL_OUTOF10: 0

## 2025-09-04 ASSESSMENT — PAIN DESCRIPTION - LOCATION: LOCATION: GENERALIZED

## 2025-09-04 ASSESSMENT — PAIN - FUNCTIONAL ASSESSMENT: PAIN_FUNCTIONAL_ASSESSMENT: 0-10

## 2025-09-05 VITALS
BODY MASS INDEX: 33.9 KG/M2 | WEIGHT: 216 LBS | HEIGHT: 67 IN | OXYGEN SATURATION: 92 % | HEART RATE: 77 BPM | TEMPERATURE: 98.5 F | SYSTOLIC BLOOD PRESSURE: 133 MMHG | DIASTOLIC BLOOD PRESSURE: 75 MMHG | RESPIRATION RATE: 17 BRPM

## 2025-09-05 LAB
GLUCOSE BLD STRIP.AUTO-MCNC: 147 MG/DL (ref 65–117)
GLUCOSE BLD STRIP.AUTO-MCNC: 149 MG/DL (ref 65–117)
GLUCOSE BLD STRIP.AUTO-MCNC: 151 MG/DL (ref 65–117)
GLUCOSE BLD STRIP.AUTO-MCNC: 199 MG/DL (ref 65–117)
SERVICE CMNT-IMP: ABNORMAL

## 2025-09-05 PROCEDURE — 6370000000 HC RX 637 (ALT 250 FOR IP): Performed by: INTERNAL MEDICINE

## 2025-09-05 PROCEDURE — 2500000003 HC RX 250 WO HCPCS: Performed by: HOSPITALIST

## 2025-09-05 PROCEDURE — 6370000000 HC RX 637 (ALT 250 FOR IP): Performed by: HOSPITALIST

## 2025-09-05 PROCEDURE — 97530 THERAPEUTIC ACTIVITIES: CPT

## 2025-09-05 PROCEDURE — 82962 GLUCOSE BLOOD TEST: CPT

## 2025-09-05 PROCEDURE — 97110 THERAPEUTIC EXERCISES: CPT

## 2025-09-05 RX ORDER — METOPROLOL TARTRATE 25 MG/1
25 TABLET, FILM COATED ORAL 2 TIMES DAILY
Qty: 60 TABLET | Refills: 0 | Status: SHIPPED
Start: 2025-09-05

## 2025-09-05 RX ORDER — POLYETHYLENE GLYCOL 3350 17 G/17G
17 POWDER, FOR SOLUTION ORAL DAILY PRN
Qty: 527 G | Refills: 0 | Status: SHIPPED
Start: 2025-09-05 | End: 2025-10-05

## 2025-09-05 RX ORDER — FUROSEMIDE 40 MG/1
40 TABLET ORAL 2 TIMES DAILY
Qty: 60 TABLET | Refills: 0 | Status: SHIPPED
Start: 2025-09-05

## 2025-09-05 RX ORDER — GABAPENTIN 100 MG/1
100 CAPSULE ORAL 2 TIMES DAILY
Qty: 90 CAPSULE | Refills: 0 | Status: SHIPPED | OUTPATIENT
Start: 2025-09-05 | End: 2025-10-05

## 2025-09-05 RX ORDER — GUAIFENESIN 600 MG/1
600 TABLET, EXTENDED RELEASE ORAL 2 TIMES DAILY
Qty: 30 TABLET | Refills: 0 | Status: SHIPPED
Start: 2025-09-05 | End: 2025-09-20

## 2025-09-05 RX ADMIN — FUROSEMIDE 40 MG: 40 TABLET ORAL at 09:55

## 2025-09-05 RX ADMIN — SERTRALINE 50 MG: 50 TABLET, FILM COATED ORAL at 09:55

## 2025-09-05 RX ADMIN — GABAPENTIN 100 MG: 100 CAPSULE ORAL at 09:55

## 2025-09-05 RX ADMIN — INSULIN LISPRO 1 UNITS: 100 INJECTION, SOLUTION INTRAVENOUS; SUBCUTANEOUS at 11:48

## 2025-09-05 RX ADMIN — EZETIMIBE 10 MG: 10 TABLET ORAL at 09:55

## 2025-09-05 RX ADMIN — FENOFIBRATE 54 MG: 54 TABLET ORAL at 09:55

## 2025-09-05 RX ADMIN — GUAIFENESIN 600 MG: 600 TABLET, EXTENDED RELEASE ORAL at 09:55

## 2025-09-05 RX ADMIN — METOPROLOL TARTRATE 25 MG: 25 TABLET, FILM COATED ORAL at 09:55

## 2025-09-05 RX ADMIN — APIXABAN 5 MG: 5 TABLET, FILM COATED ORAL at 09:55

## 2025-09-05 RX ADMIN — SODIUM CHLORIDE, PRESERVATIVE FREE 10 ML: 5 INJECTION INTRAVENOUS at 09:55

## 2025-09-05 ASSESSMENT — PAIN SCALES - GENERAL
PAINLEVEL_OUTOF10: 0
PAINLEVEL_OUTOF10: 0

## (undated) DEVICE — 3M™ IOBAN™ 2 ANTIMICROBIAL INCISE DRAPE 6651EZ: Brand: IOBAN™ 2

## (undated) DEVICE — GAUZE SPONGES,12 PLY: Brand: CURITY

## (undated) DEVICE — HANDPIECE SET WITH COAXIAL HIGH FLOW TIP AND SUCTION TUBE: Brand: INTERPULSE

## (undated) DEVICE — 4-PORT MANIFOLD: Brand: NEPTUNE 2

## (undated) DEVICE — SKIN MARKER,REGULAR TIP WITH RULER AND LABELS: Brand: DEVON

## (undated) DEVICE — Device

## (undated) DEVICE — BNDG ADHESIVE SHEER 3/4X3 -- CONVERT TO ITEM 357948

## (undated) DEVICE — SYR 5ML 1/5 GRAD LL NSAF LF --

## (undated) DEVICE — DEVON™ KNEE AND BODY STRAP 60" X 3" (1.5 M X 7.6 CM): Brand: DEVON

## (undated) DEVICE — NDL FLTR TIP 5 MIC 18GX1.5IN --

## (undated) DEVICE — GOWN,SIRUS,NONRNF,SETINSLV,2XL,18/CS: Brand: MEDLINE

## (undated) DEVICE — SMOKE EVACUATION PENCIL: Brand: VALLEYLAB

## (undated) DEVICE — 3M™ TEGADERM™ TRANSPARENT FILM DRESSING FRAME STYLE, 1624W, 2-3/8 IN X 2-3/4 IN (6 CM X 7 CM), 100/CT 4CT/CASE: Brand: 3M™ TEGADERM™

## (undated) DEVICE — STERILE POLYISOPRENE POWDER-FREE SURGICAL GLOVES: Brand: PROTEXIS

## (undated) DEVICE — 3M™ IOBAN™ 2 ANTIMICROBIAL INCISE DRAPE 6650EZ: Brand: IOBAN™ 2

## (undated) DEVICE — MARKER RAD KNEE TIB CKPT STEREOTAXIC IMAG LESION LOC

## (undated) DEVICE — 3M™ STERI-DRAPE™ U-DRAPE 1015: Brand: STERI-DRAPE™

## (undated) DEVICE — INFECTION CONTROL KIT SYS

## (undated) DEVICE — NDL PRT INJ NSAF BLNT 18GX1.5 --

## (undated) DEVICE — Z DISCONTINUED LINER BOOT TRACTION NS LINDY LF

## (undated) DEVICE — ELECTRODE BLDE L4IN NONINSULATED EDGE

## (undated) DEVICE — WATERPROOF, BACTERIA PROOF DRESSING WITH ABSORBENT SEE THROUGH PAD: Brand: OPSITE POST-OP VISIBLE 30X10CM CTN 20

## (undated) DEVICE — NEEDLE HYPO 18GA L1.5IN PNK S STL HUB POLYPR SHLD REG BVL

## (undated) DEVICE — PAD NON-ADHERENT 3X4 STRL LF --

## (undated) DEVICE — PREP KIT PEEL PTCH POVIDONE IOD

## (undated) DEVICE — LIGHT HANDLE: Brand: DEVON

## (undated) DEVICE — SOL INJ SOD CL0.9% 10ML PREFIL --

## (undated) DEVICE — SUTURE STRATAFIX SPRL SZ 1 L14IN ABSRB VLT L48CM CTX 1/2 SXPD2B405

## (undated) DEVICE — SUTURE VCRL + SZ 1-0 L36IN ABSRB UD CTX 1/2 CIR TAPR PNT VCP977H

## (undated) DEVICE — 6619 2 PTNT ISO SYS INCISE AREA&LT;(&GT;&&LT;)&GT;P: Brand: STERI-DRAPE™ IOBAN™ 2

## (undated) DEVICE — SUTURE ETHBND EXCEL SZ 5 L30IN NONABSORBABLE GRN L40MM V-37 MB66G

## (undated) DEVICE — (D)PREP SKN CHLRAPRP APPL 26ML -- CONVERT TO ITEM 371833

## (undated) DEVICE — PATIENT PROTECTIVE PAD FOR IMP UNIVERSAL LATERAL HIP POSITIONER (ULP) (6/CASE): Brand: PATIENT PROTECTIVE PAD

## (undated) DEVICE — (D)SYR 10ML 1/5ML GRAD NSAF -- PKGING CHANGE USE ITEM 338027

## (undated) DEVICE — SOLUTION IRRIG 3000ML 0.9% SOD CHL FLX CONT 0797208] ICU MEDICAL INC]

## (undated) DEVICE — DRAPE,U/ SHT,SPLIT,PLAS,STERIL: Brand: MEDLINE

## (undated) DEVICE — KIT DRP FOR RIO ROBOTIC ARM ASST SYS

## (undated) DEVICE — SYR LR LCK 1ML GRAD NSAF 30ML --

## (undated) DEVICE — HOOD: Brand: FLYTE

## (undated) DEVICE — NDL SPNE QNCKE 18GX3.5IN LF --

## (undated) DEVICE — SUTURE VCRL SZ 2-0 L36IN ABSRB UD L36MM CT-1 1/2 CIR J945H

## (undated) DEVICE — DERMABOND SKIN ADH 0.7ML -- DERMABOND ADVANCED 12/BX

## (undated) DEVICE — CONTAINER,SPECIMEN,3OZ,OR STRL: Brand: MEDLINE

## (undated) DEVICE — SET EXTN TBNG L BOR 4 W STPCOCK ST 32IN PRIMING VOL 6ML

## (undated) DEVICE — SUTURE MCRYL SZ 3-0 L27IN ABSRB UD L24MM PS-1 3/8 CIR PRIM Y936H

## (undated) DEVICE — STERILE POLYISOPRENE POWDER-FREE SURGICAL GLOVES WITH EMOLLIENT COATING: Brand: PROTEXIS

## (undated) DEVICE — TOWEL,OR,DSP,ST,BLUE,STD,2/PK,40PK/CS: Brand: MEDLINE

## (undated) DEVICE — STRYKER PERFORMANCE SERIES SAGITTAL BLADE: Brand: STRYKER PERFORMANCE SERIES

## (undated) DEVICE — KENDALL SCD EXPRESS SLEEVES, KNEE LENGTH, MEDIUM: Brand: KENDALL SCD

## (undated) DEVICE — DEVICE TRNSF SPIK STL 2008S] MICROTEK MEDICAL INC]

## (undated) DEVICE — KIT TRK HIP PROC VIZADISC

## (undated) DEVICE — 1010 S-DRAPE TOWEL DRAPE 10/BX: Brand: STERI-DRAPE™

## (undated) DEVICE — REM POLYHESIVE ADULT PATIENT RETURN ELECTRODE: Brand: VALLEYLAB

## (undated) DEVICE — Z CONVERTED USE 2271043 CONTAINER SPEC COLL 4OZ SCR ON LID PEEL PCH

## (undated) DEVICE — SHEET, DRAPE, SPLIT, STERILE: Brand: MEDLINE

## (undated) DEVICE — APPLICATOR BNDG 1MM ADH PREMIERPRO EXOFIN

## (undated) DEVICE — DUAL IRRIGATION ADAPTOR

## (undated) DEVICE — T4 HOOD

## (undated) DEVICE — SUTURE STRATAFIX SYMMETRIC SZ 1 L18IN ABSRB VLT CT1 L36CM SXPP1A404

## (undated) DEVICE — YANKAUER OPEN TIP, NO VENT: Brand: ARGYLE

## (undated) DEVICE — PIN FIX 4X170MM STRL -- 2/PK MAKO

## (undated) DEVICE — DRAPE,HIP,W/POUCHES,STERILE: Brand: MEDLINE

## (undated) DEVICE — 3M™ TEGADERM™ TRANSPARENT FILM DRESSING FRAME STYLE, 1626W, 4 IN X 4-3/4 IN (10 CM X 12 CM), 50/CT 4CT/CASE: Brand: 3M™ TEGADERM™

## (undated) DEVICE — DRSG AQUACEL SURG 3.5 X 10IN -- CONVERT TO ITEM 370183

## (undated) DEVICE — NEEDLE SUT SZ 4 MAYO CATGUT 1/2 CIR TAPR PNT DISP